# Patient Record
Sex: MALE | Race: OTHER | NOT HISPANIC OR LATINO | URBAN - METROPOLITAN AREA
[De-identification: names, ages, dates, MRNs, and addresses within clinical notes are randomized per-mention and may not be internally consistent; named-entity substitution may affect disease eponyms.]

---

## 2017-05-19 ENCOUNTER — INPATIENT (INPATIENT)
Facility: HOSPITAL | Age: 47
LOS: 6 days | Discharge: HOME | DRG: 186 | End: 2017-05-26
Attending: INTERNAL MEDICINE | Admitting: HOSPITALIST
Payer: MEDICAID

## 2017-05-19 VITALS
SYSTOLIC BLOOD PRESSURE: 126 MMHG | HEIGHT: 68 IN | HEART RATE: 107 BPM | WEIGHT: 197.98 LBS | TEMPERATURE: 99 F | RESPIRATION RATE: 22 BRPM | OXYGEN SATURATION: 88 % | DIASTOLIC BLOOD PRESSURE: 82 MMHG

## 2017-05-19 DIAGNOSIS — J90 PLEURAL EFFUSION, NOT ELSEWHERE CLASSIFIED: ICD-10-CM

## 2017-05-19 LAB
ALBUMIN SERPL ELPH-MCNC: 3 G/DL — LOW (ref 3.3–5.2)
ALP SERPL-CCNC: 88 U/L — SIGNIFICANT CHANGE UP (ref 40–120)
ALT FLD-CCNC: 60 U/L — HIGH
ANION GAP SERPL CALC-SCNC: 14 MMOL/L — SIGNIFICANT CHANGE UP (ref 5–17)
ANISOCYTOSIS BLD QL: SLIGHT — SIGNIFICANT CHANGE UP
AST SERPL-CCNC: 70 U/L — HIGH
BASOPHILS # BLD AUTO: 0 K/UL — SIGNIFICANT CHANGE UP (ref 0–0.2)
BASOPHILS NFR BLD AUTO: 0 % — SIGNIFICANT CHANGE UP (ref 0–2)
BILIRUB SERPL-MCNC: 0.2 MG/DL — LOW (ref 0.4–2)
BUN SERPL-MCNC: 27 MG/DL — HIGH (ref 8–20)
CALCIUM SERPL-MCNC: 8.7 MG/DL — SIGNIFICANT CHANGE UP (ref 8.6–10.2)
CHLORIDE SERPL-SCNC: 97 MMOL/L — LOW (ref 98–107)
CO2 SERPL-SCNC: 28 MMOL/L — SIGNIFICANT CHANGE UP (ref 22–29)
CREAT SERPL-MCNC: 1.37 MG/DL — HIGH (ref 0.5–1.3)
EOSINOPHIL # BLD AUTO: 0 K/UL — SIGNIFICANT CHANGE UP (ref 0–0.5)
EOSINOPHIL NFR BLD AUTO: 1 % — SIGNIFICANT CHANGE UP (ref 0–6)
GAS PNL BLDA: SIGNIFICANT CHANGE UP
GLUCOSE SERPL-MCNC: 90 MG/DL — SIGNIFICANT CHANGE UP (ref 70–115)
HCT VFR BLD CALC: 31.1 % — LOW (ref 42–52)
HGB BLD-MCNC: 10.2 G/DL — LOW (ref 14–18)
HYPOCHROMIA BLD QL: SLIGHT — SIGNIFICANT CHANGE UP
LACTATE BLDV-MCNC: 0.9 MMOL/L — SIGNIFICANT CHANGE UP (ref 0.5–2)
LYMPHOCYTES # BLD AUTO: 16 % — LOW (ref 20–55)
LYMPHOCYTES # BLD AUTO: 2.1 K/UL — SIGNIFICANT CHANGE UP (ref 1–4.8)
MCHC RBC-ENTMCNC: 29.4 PG — SIGNIFICANT CHANGE UP (ref 27–31)
MCHC RBC-ENTMCNC: 32.8 G/DL — SIGNIFICANT CHANGE UP (ref 32–36)
MCV RBC AUTO: 89.6 FL — SIGNIFICANT CHANGE UP (ref 80–94)
MONOCYTES # BLD AUTO: 1.2 K/UL — HIGH (ref 0–0.8)
MONOCYTES NFR BLD AUTO: 9 % — SIGNIFICANT CHANGE UP (ref 3–10)
NEUTROPHILS # BLD AUTO: 8.7 K/UL — HIGH (ref 1.8–8)
NEUTROPHILS NFR BLD AUTO: 73 % — SIGNIFICANT CHANGE UP (ref 37–73)
PLAT MORPH BLD: NORMAL — SIGNIFICANT CHANGE UP
PLATELET # BLD AUTO: 260 K/UL — SIGNIFICANT CHANGE UP (ref 150–400)
POTASSIUM SERPL-MCNC: 4 MMOL/L — SIGNIFICANT CHANGE UP (ref 3.5–5.3)
POTASSIUM SERPL-SCNC: 4 MMOL/L — SIGNIFICANT CHANGE UP (ref 3.5–5.3)
PROT SERPL-MCNC: 7.3 G/DL — SIGNIFICANT CHANGE UP (ref 6.6–8.7)
RBC # BLD: 3.47 M/UL — LOW (ref 4.6–6.2)
RBC # FLD: 14.4 % — SIGNIFICANT CHANGE UP (ref 11–15.6)
RBC BLD AUTO: SIGNIFICANT CHANGE UP
SODIUM SERPL-SCNC: 139 MMOL/L — SIGNIFICANT CHANGE UP (ref 135–145)
VARIANT LYMPHS # BLD: 1 % — SIGNIFICANT CHANGE UP (ref 0–6)
WBC # BLD: 12.1 K/UL — HIGH (ref 4.8–10.8)
WBC # FLD AUTO: 12.1 K/UL — HIGH (ref 4.8–10.8)

## 2017-05-19 PROCEDURE — 71010: CPT | Mod: 26

## 2017-05-19 PROCEDURE — 99285 EMERGENCY DEPT VISIT HI MDM: CPT

## 2017-05-19 PROCEDURE — 71250 CT THORAX DX C-: CPT | Mod: 26

## 2017-05-19 RX ORDER — FUROSEMIDE 40 MG
40 TABLET ORAL ONCE
Qty: 0 | Refills: 0 | Status: COMPLETED | OUTPATIENT
Start: 2017-05-19 | End: 2017-05-19

## 2017-05-19 RX ORDER — PIPERACILLIN AND TAZOBACTAM 4; .5 G/20ML; G/20ML
3.38 INJECTION, POWDER, LYOPHILIZED, FOR SOLUTION INTRAVENOUS ONCE
Qty: 0 | Refills: 0 | Status: COMPLETED | OUTPATIENT
Start: 2017-05-19 | End: 2017-05-19

## 2017-05-19 RX ORDER — VANCOMYCIN HCL 1 G
1000 VIAL (EA) INTRAVENOUS ONCE
Qty: 0 | Refills: 0 | Status: COMPLETED | OUTPATIENT
Start: 2017-05-19 | End: 2017-05-19

## 2017-05-19 RX ADMIN — Medication 40 MILLIGRAM(S): at 17:05

## 2017-05-19 RX ADMIN — PIPERACILLIN AND TAZOBACTAM 200 GRAM(S): 4; .5 INJECTION, POWDER, LYOPHILIZED, FOR SOLUTION INTRAVENOUS at 13:00

## 2017-05-19 RX ADMIN — Medication 250 MILLIGRAM(S): at 17:06

## 2017-05-19 NOTE — ED ADULT NURSE REASSESSMENT NOTE - NS ED NURSE REASSESS COMMENT FT1
pt refused to urinate in urinal,  very heavy depends diaper changed . pericare provided and linens changed pt manny it well.

## 2017-05-19 NOTE — ED PROVIDER NOTE - OBJECTIVE STATEMENT
48 y/o M presents to the ED from [facility] for AMS. EMS found pt to be SpO2 86% on RA and improved to 99% with NRB. Aide states pt is non verbal and is more alert/active at baseline. Aide states pt has been acting differently the past few days but appearing pale and drowsy today. He was found to be hypoxic.

## 2017-05-19 NOTE — ED PROVIDER NOTE - RESPIRATORY, MLM
Breath sounds clear and equal bilaterally. Spo2 94% on 3L NC. SpO2 90% on RA Breath sounds clear and equal bilaterally. Spo2 94% on 3L NC. SpO2 89% on RA

## 2017-05-19 NOTE — ED ADULT NURSE NOTE - OBJECTIVE STATEMENT
[facility] ref [facility] for eval of mental status changes per 1:1 , pt has been more sleepy, hypoxic on pulse ox. skin pale and dry.

## 2017-05-19 NOTE — ED PROVIDER NOTE - PROGRESS NOTE DETAILS
Called [facility] to attempt to obtain medication list, however, nurse is on break. Will call again in 1 hour. CT scan report noted. Cardiothoracic consulted for pericardial effusion. Awaiting call back. Case discussed with cardiothoracic PA. He recommends an echo and then they will evaluate the patient. Pt. currently stable appearing with no clinical signs of tamponade. Echo performed. Pt. stable appearing. Case discussed with Dr. Haddad for admission.

## 2017-05-19 NOTE — ED ADULT NURSE REASSESSMENT NOTE - NS ED NURSE REASSESS COMMENT FT1
Pt resting comfortable. Pt on monitor 100 normal sinus. cooperative. Pt and [facility] educated on admission. Pt and aide verbalize understanding and agree with POC.

## 2017-05-20 DIAGNOSIS — F25.0 SCHIZOAFFECTIVE DISORDER, BIPOLAR TYPE: ICD-10-CM

## 2017-05-20 DIAGNOSIS — K59.00 CONSTIPATION, UNSPECIFIED: ICD-10-CM

## 2017-05-20 DIAGNOSIS — J90 PLEURAL EFFUSION, NOT ELSEWHERE CLASSIFIED: ICD-10-CM

## 2017-05-20 DIAGNOSIS — F99 MENTAL DISORDER, NOT OTHERWISE SPECIFIED: ICD-10-CM

## 2017-05-20 DIAGNOSIS — R41.82 ALTERED MENTAL STATUS, UNSPECIFIED: ICD-10-CM

## 2017-05-20 DIAGNOSIS — F05 DELIRIUM DUE TO KNOWN PHYSIOLOGICAL CONDITION: ICD-10-CM

## 2017-05-20 DIAGNOSIS — E03.9 HYPOTHYROIDISM, UNSPECIFIED: ICD-10-CM

## 2017-05-20 DIAGNOSIS — I63.9 CEREBRAL INFARCTION, UNSPECIFIED: ICD-10-CM

## 2017-05-20 LAB
T3 SERPL-MCNC: 60 NG/DL — LOW (ref 80–200)
T4 AB SER-ACNC: 4.4 UG/DL — LOW (ref 4.5–12)
TSH SERPL-MCNC: 2.27 UIU/ML — SIGNIFICANT CHANGE UP (ref 0.27–4.2)
VALPROATE SERPL-MCNC: 43.4 UG/ML — LOW (ref 50–100)

## 2017-05-20 PROCEDURE — 74176 CT ABD & PELVIS W/O CONTRAST: CPT | Mod: 26

## 2017-05-20 PROCEDURE — 70450 CT HEAD/BRAIN W/O DYE: CPT | Mod: 26

## 2017-05-20 PROCEDURE — 99233 SBSQ HOSP IP/OBS HIGH 50: CPT

## 2017-05-20 RX ORDER — ATORVASTATIN CALCIUM 80 MG/1
20 TABLET, FILM COATED ORAL AT BEDTIME
Qty: 0 | Refills: 0 | Status: DISCONTINUED | OUTPATIENT
Start: 2017-05-20 | End: 2017-05-26

## 2017-05-20 RX ORDER — ENOXAPARIN SODIUM 100 MG/ML
40 INJECTION SUBCUTANEOUS DAILY
Qty: 0 | Refills: 0 | Status: DISCONTINUED | OUTPATIENT
Start: 2017-05-20 | End: 2017-05-26

## 2017-05-20 RX ORDER — HALOPERIDOL DECANOATE 100 MG/ML
5 INJECTION INTRAMUSCULAR DAILY
Qty: 0 | Refills: 0 | Status: DISCONTINUED | OUTPATIENT
Start: 2017-05-20 | End: 2017-05-20

## 2017-05-20 RX ORDER — DOCUSATE SODIUM 100 MG
100 CAPSULE ORAL
Qty: 0 | Refills: 0 | Status: DISCONTINUED | OUTPATIENT
Start: 2017-05-20 | End: 2017-05-26

## 2017-05-20 RX ORDER — ASPIRIN/CALCIUM CARB/MAGNESIUM 324 MG
325 TABLET ORAL DAILY
Qty: 0 | Refills: 0 | Status: DISCONTINUED | OUTPATIENT
Start: 2017-05-20 | End: 2017-05-26

## 2017-05-20 RX ORDER — VALPROIC ACID (AS SODIUM SALT) 250 MG/5ML
1000 SOLUTION, ORAL ORAL AT BEDTIME
Qty: 0 | Refills: 0 | Status: DISCONTINUED | OUTPATIENT
Start: 2017-05-20 | End: 2017-05-26

## 2017-05-20 RX ORDER — LEVOTHYROXINE SODIUM 125 MCG
150 TABLET ORAL DAILY
Qty: 0 | Refills: 0 | Status: DISCONTINUED | OUTPATIENT
Start: 2017-05-20 | End: 2017-05-26

## 2017-05-20 RX ORDER — VALPROIC ACID (AS SODIUM SALT) 250 MG/5ML
500 SOLUTION, ORAL ORAL DAILY
Qty: 0 | Refills: 0 | Status: DISCONTINUED | OUTPATIENT
Start: 2017-05-20 | End: 2017-05-26

## 2017-05-20 RX ORDER — CLOZAPINE 150 MG/1
400 TABLET, ORALLY DISINTEGRATING ORAL AT BEDTIME
Qty: 0 | Refills: 0 | Status: DISCONTINUED | OUTPATIENT
Start: 2017-05-20 | End: 2017-05-20

## 2017-05-20 RX ORDER — PIPERACILLIN AND TAZOBACTAM 4; .5 G/20ML; G/20ML
3.38 INJECTION, POWDER, LYOPHILIZED, FOR SOLUTION INTRAVENOUS EVERY 8 HOURS
Qty: 0 | Refills: 0 | Status: DISCONTINUED | OUTPATIENT
Start: 2017-05-20 | End: 2017-05-22

## 2017-05-20 RX ORDER — SODIUM CHLORIDE 9 MG/ML
1000 INJECTION, SOLUTION INTRAVENOUS
Qty: 0 | Refills: 0 | Status: DISCONTINUED | OUTPATIENT
Start: 2017-05-20 | End: 2017-05-22

## 2017-05-20 RX ORDER — HALOPERIDOL DECANOATE 100 MG/ML
10 INJECTION INTRAMUSCULAR AT BEDTIME
Qty: 0 | Refills: 0 | Status: DISCONTINUED | OUTPATIENT
Start: 2017-05-20 | End: 2017-05-20

## 2017-05-20 RX ORDER — CLOZAPINE 150 MG/1
300 TABLET, ORALLY DISINTEGRATING ORAL DAILY
Qty: 0 | Refills: 0 | Status: DISCONTINUED | OUTPATIENT
Start: 2017-05-20 | End: 2017-05-20

## 2017-05-20 RX ADMIN — PIPERACILLIN AND TAZOBACTAM 25 GRAM(S): 4; .5 INJECTION, POWDER, LYOPHILIZED, FOR SOLUTION INTRAVENOUS at 14:11

## 2017-05-20 RX ADMIN — Medication 100 MILLIGRAM(S): at 19:07

## 2017-05-20 RX ADMIN — Medication 1000 MILLIGRAM(S): at 22:03

## 2017-05-20 RX ADMIN — SODIUM CHLORIDE 100 MILLILITER(S): 9 INJECTION, SOLUTION INTRAVENOUS at 22:00

## 2017-05-20 RX ADMIN — PIPERACILLIN AND TAZOBACTAM 25 GRAM(S): 4; .5 INJECTION, POWDER, LYOPHILIZED, FOR SOLUTION INTRAVENOUS at 22:13

## 2017-05-20 RX ADMIN — Medication 100 MILLIGRAM(S): at 07:00

## 2017-05-20 RX ADMIN — Medication 2 MILLIGRAM(S): at 12:02

## 2017-05-20 RX ADMIN — Medication 150 MICROGRAM(S): at 07:00

## 2017-05-20 RX ADMIN — Medication 500 MILLIGRAM(S): at 12:02

## 2017-05-20 RX ADMIN — PIPERACILLIN AND TAZOBACTAM 25 GRAM(S): 4; .5 INJECTION, POWDER, LYOPHILIZED, FOR SOLUTION INTRAVENOUS at 06:41

## 2017-05-20 RX ADMIN — Medication 325 MILLIGRAM(S): at 12:02

## 2017-05-20 RX ADMIN — ATORVASTATIN CALCIUM 20 MILLIGRAM(S): 80 TABLET, FILM COATED ORAL at 22:03

## 2017-05-20 NOTE — PROGRESS NOTE ADULT - SUBJECTIVE AND OBJECTIVE BOX
Internal Medicine Hospitalist - Dr. Ghazal NUGENT    [id number]    47y      Male    Patient is a 47y old  Male who presents with a chief complaint of increased lethargy (20 May 2017 00:56)    HPI:  46 y/o male with h/o psychiatric disorder was referred fro [facility] because of increased lethargy. patient looked sleep with O2 sat: 88. o/e: left side abdominal fulness with firm masses ?? fecal impaction. Labs showed WBC: 12.1. AST: 70. ALT: 60. CT chest showed moderated left pleural effusion (20 May 2017 00:56)        INTERVAL HPI/ OVERNIGHT EVENTS: Patient is seen and examined, no new event overnight.     REVIEW OF SYSTEMS:    unable to provide     PHYSICAL EXAM:    Vital Signs Last 24 Hrs  T(C): 36.9, Max: 37.1 (05-20 @ 09:10)  T(F): 98.4, Max: 98.7 (05-20 @ 09:10)  HR: 105 (99 - 105)  BP: 112/61 (100/72 - 122/78)  BP(mean): --  RR: 20 (16 - 20)  SpO2: 95% (93% - 95%)    GENERAL: nonverbal   Neck: supple  CHEST/LUNG: positive air entry   HEART: S1S2+ audible  ABDOMEN: Soft, Nontender, Nondistended; Bowel sounds present  EXTREMITIES:  no edema  CNS: Awake, moving extremities     LABS:                        10.2   12.1  )-----------( 260      ( 19 May 2017 13:02 )             31.1     05-19    139  |  97<L>  |  27.0<H>  ----------------------------<  90  4.0   |  28.0  |  1.37<H>    Ca    8.7      19 May 2017 13:02    TPro  7.3  /  Alb  3.0<L>  /  TBili  0.2<L>  /  DBili  x   /  AST  70<H>  /  ALT  60<H>  /  AlkPhos  88  05-19        MEDICATIONS  (STANDING):  valproic acid 1000milliGRAM(s) Oral at bedtime  valproic acid 500milliGRAM(s) Oral daily  docusate sodium 100milliGRAM(s) Oral two times a day  levothyroxine 150MICROGram(s) Oral daily  dextrose 5% + sodium chloride 0.45%. 1000milliLiter(s) IV Continuous <Continuous>  enoxaparin Injectable 40milliGRAM(s) SubCutaneous daily  piperacillin/tazobactam IVPB. 3.375Gram(s) IV Intermittent every 8 hours  aspirin enteric coated 325milliGRAM(s) Oral daily  atorvastatin 20milliGRAM(s) Oral at bedtime    MEDICATIONS  (PRN):  LORazepam     Tablet 2milliGRAM(s) Oral three times a day PRN Anxiety      RADIOLOGY & ADDITIONAL TEST   EXAM:  CT ABDOMEN AND PELVIS                        PROCEDURE DATE:  05/20/2017    IMPRESSION:     Large colonic stool load. Possible cystitis. Pleural effusions. Other   incidental comments as above.   EXAM:  CT BRAIN                        PROCEDURE DATE:  05/20/2017    IMPRESSION:     Hypodensity in the left rosalind suspicious for an acute infarct. MRI is   recommended for further evaluation.  No acute intracranial hemorrhage.

## 2017-05-20 NOTE — ED BEHAVIORAL HEALTH ASSESSMENT NOTE - DETAILS
[place] transfer summary in chart NA history of aggressive behaviors and sexually inappropriate behaviors SELF [place]

## 2017-05-20 NOTE — H&P ADULT - GASTROINTESTINAL DETAILS
nontender/soft/left side abdominal firm masses possible fecal bowel sounds normal/left side abdominal firm masses possible fecal/soft/nontender

## 2017-05-20 NOTE — ED BEHAVIORAL HEALTH ASSESSMENT NOTE - OTHER
[facility] transfer Summary [facility] NA somnolent non responsive opens eyes to light tactile stimuli not assessed unknown non verbal impaired

## 2017-05-20 NOTE — PHARMACY COMMUNICATION NOTE - COMMENTS
Spoke with MD regarding dosing/frequency of clozapine.  MD said patient was receiving this dose previously and would like to continue this regimen

## 2017-05-20 NOTE — PROGRESS NOTE ADULT - PROBLEM SELECTOR PLAN 2
IVF. Received Enema.   CT abdomen and pelvis -large colonic stool load   start miralex, colace, senna

## 2017-05-20 NOTE — H&P ADULT - HISTORY OF PRESENT ILLNESS
46 y/o male with h/o psychiatric disorder was referred fro [facility] because of increased lethargy. patient looked sleep with O2 sat: 88. o/e: left side abdominal fulness with firm masses ??fecal impaction. Labs showed WBC: 12.1. AST: 70. ALT: 60. CT chest showed moderated left pleural effusion 48 y/o male with h/o psychiatric disorder was referred fro [facility] because of increased lethargy. patient looked sleep with O2 sat: 88. o/e: left side abdominal fulness with firm masses ?? fecal impaction. Labs showed WBC: 12.1. AST: 70. ALT: 60. CT chest showed moderated left pleural effusion

## 2017-05-20 NOTE — ED BEHAVIORAL HEALTH ASSESSMENT NOTE - DESCRIPTION
found to have pleural effusion and likely CVA rosalind hypothyroidism constipation nocturnal enuresis unknown

## 2017-05-20 NOTE — PROGRESS NOTE ADULT - PROBLEM SELECTOR PLAN 1
CT brain suspicious for acute stroke  MRI brain, carotid doppler,   aspirin, statin, lipid profile in am   neurocheck

## 2017-05-20 NOTE — ED BEHAVIORAL HEALTH ASSESSMENT NOTE - CURRENT MEDICATION
Clozapine 300 mg PO AM and 400 mg PO HS Ativan 2 mg PO TID Haldol 5 mg PO AM and 10 mg PO HS  levothyroxine 150 mcg po daily valproic acid 500 mg AM 1000 mg HS

## 2017-05-20 NOTE — ED ADULT NURSE REASSESSMENT NOTE - NS ED NURSE REASSESS COMMENT FT1
Pt. received at 1930, on portable bedside cardiac monitor. 1:1 and [facility] at bedside for safety. as per day RN and [facility] pt. baseline is nonverbal and unresponsive to command. Pt. is awake and alert, moves all extremities, no facial deficits noted. Pt. O2 saturation 95% on 2 liters, continue to monitor and maintain safety

## 2017-05-21 DIAGNOSIS — R47.01 APHASIA: ICD-10-CM

## 2017-05-21 DIAGNOSIS — G93.40 ENCEPHALOPATHY, UNSPECIFIED: ICD-10-CM

## 2017-05-21 LAB
AMMONIA BLD-MCNC: 39 UMOL/L — SIGNIFICANT CHANGE UP (ref 11–55)
CHOLEST SERPL-MCNC: 121 MG/DL — SIGNIFICANT CHANGE UP (ref 110–199)
HDLC SERPL-MCNC: 21 MG/DL — LOW
LIPID PNL WITH DIRECT LDL SERPL: 80 MG/DL — SIGNIFICANT CHANGE UP
TOTAL CHOLESTEROL/HDL RATIO MEASUREMENT: 6 RATIO — SIGNIFICANT CHANGE UP (ref 3.4–9.6)
TRIGL SERPL-MCNC: 100 MG/DL — SIGNIFICANT CHANGE UP (ref 10–200)

## 2017-05-21 PROCEDURE — 70551 MRI BRAIN STEM W/O DYE: CPT | Mod: 26

## 2017-05-21 PROCEDURE — 93880 EXTRACRANIAL BILAT STUDY: CPT | Mod: 26

## 2017-05-21 PROCEDURE — 99233 SBSQ HOSP IP/OBS HIGH 50: CPT

## 2017-05-21 RX ADMIN — SODIUM CHLORIDE 100 MILLILITER(S): 9 INJECTION, SOLUTION INTRAVENOUS at 19:10

## 2017-05-21 RX ADMIN — ENOXAPARIN SODIUM 40 MILLIGRAM(S): 100 INJECTION SUBCUTANEOUS at 14:39

## 2017-05-21 RX ADMIN — Medication 1000 MILLIGRAM(S): at 21:41

## 2017-05-21 RX ADMIN — Medication 150 MICROGRAM(S): at 06:25

## 2017-05-21 RX ADMIN — PIPERACILLIN AND TAZOBACTAM 25 GRAM(S): 4; .5 INJECTION, POWDER, LYOPHILIZED, FOR SOLUTION INTRAVENOUS at 21:41

## 2017-05-21 RX ADMIN — Medication 325 MILLIGRAM(S): at 14:39

## 2017-05-21 RX ADMIN — PIPERACILLIN AND TAZOBACTAM 25 GRAM(S): 4; .5 INJECTION, POWDER, LYOPHILIZED, FOR SOLUTION INTRAVENOUS at 14:39

## 2017-05-21 RX ADMIN — SODIUM CHLORIDE 100 MILLILITER(S): 9 INJECTION, SOLUTION INTRAVENOUS at 22:50

## 2017-05-21 RX ADMIN — Medication 100 MILLIGRAM(S): at 06:25

## 2017-05-21 RX ADMIN — Medication 500 MILLIGRAM(S): at 14:39

## 2017-05-21 RX ADMIN — ATORVASTATIN CALCIUM 20 MILLIGRAM(S): 80 TABLET, FILM COATED ORAL at 21:41

## 2017-05-21 RX ADMIN — Medication 2 MILLIGRAM(S): at 03:43

## 2017-05-21 RX ADMIN — PIPERACILLIN AND TAZOBACTAM 25 GRAM(S): 4; .5 INJECTION, POWDER, LYOPHILIZED, FOR SOLUTION INTRAVENOUS at 06:13

## 2017-05-21 RX ADMIN — SODIUM CHLORIDE 100 MILLILITER(S): 9 INJECTION, SOLUTION INTRAVENOUS at 11:47

## 2017-05-21 NOTE — ED ADULT NURSE REASSESSMENT NOTE - NS ED NURSE REASSESS COMMENT FT1
assumed pt care this am, aide from [facility] with pt, he pulled out saline lock and another on inserted, IVF and meds infusing. chart reviewed.

## 2017-05-21 NOTE — CONSULT NOTE ADULT - ASSESSMENT
47 M w/ multiple psychiatric co morbidities, hypothyroidism, presenting with AMS/lethargy & cough found to have desaturations at [place] now found to have possible acute infarct?, pleural effusions L > R, and pericardial effusion (trivial as per echo) being evaluated for possible chest tube currently thought to be high risk for chest tube placement in ED secondary to behavorial disturbances, selective mutism and multiple psychiatric comorbidities

## 2017-05-21 NOTE — PROGRESS NOTE ADULT - SUBJECTIVE AND OBJECTIVE BOX
Internal Medicine Hospitalist - Dr. Ghazal NUGENT    [id number]    47y      Male    Patient is a 47y old  Male who presents with a chief complaint of increased lethargy (20 May 2017 00:56)    HPI:  46 y/o male with h/o psychiatric disorder was referred fro [facility] because of increased lethargy. patient looked sleep with O2 sat: 88. o/e: left side abdominal fulness with firm masses ?? fecal impaction. Labs showed WBC: 12.1. AST: 70. ALT: 60. CT chest showed moderated left pleural effusion (20 May 2017 00:56)    INTERVAL HPI/ OVERNIGHT EVENTS: Patient is seen and examined, awake, non verbal    REVIEW OF SYSTEMS:    unable to obtain     PHYSICAL EXAM:    Vital Signs Last 24 Hrs  T(C): 37.2, Max: 37.2 (05-20 @ 18:00)  T(F): 99, Max: 99 (05-21 @ 11:33)  HR: 105 (97 - 105)  BP: 97/63 (90/58 - 110/70)  BP(mean): --  RR: 18 (18 - 20)  SpO2: 95% (94% - 96%)    GENERAL: Awake   CHEST/LUNG: decrease breath sounds over left lung base   HEART: S1S2+ audible  ABDOMEN: Soft, Nontender, Nondistended; Bowel sounds present  EXTREMITIES:  no edema  CNS: Awake, moving all extremities     LABS:        MEDICATIONS  (STANDING):  valproic acid 1000milliGRAM(s) Oral at bedtime  valproic acid 500milliGRAM(s) Oral daily  docusate sodium 100milliGRAM(s) Oral two times a day  levothyroxine 150MICROGram(s) Oral daily  dextrose 5% + sodium chloride 0.45%. 1000milliLiter(s) IV Continuous <Continuous>  enoxaparin Injectable 40milliGRAM(s) SubCutaneous daily  piperacillin/tazobactam IVPB. 3.375Gram(s) IV Intermittent every 8 hours  aspirin enteric coated 325milliGRAM(s) Oral daily  atorvastatin 20milliGRAM(s) Oral at bedtime    MEDICATIONS  (PRN):  LORazepam     Tablet 2milliGRAM(s) Oral three times a day PRN Anxiety      RADIOLOGY & ADDITIONAL TEST

## 2017-05-21 NOTE — CONSULT NOTE ADULT - ASSESSMENT
Encephalopathy, non-verbal, ? autism with psychiatric disorder. (no past history regarding psychiatric features and verbal ability prior to being sent to [facility]). Stroke, encephalopathy from multiple causes and or seizure, encephalitis are all differential diagnoses but likely this is chronic psych non-verbal situation decompensated from other medical condition.

## 2017-05-21 NOTE — CONSULT NOTE ADULT - PROBLEM SELECTOR RECOMMENDATION 9
currently stable on 1 L NC in ED in no acute distress   recommending IR drainage   consult appreciated    reconsult if necessary

## 2017-05-21 NOTE — ED ADULT NURSE REASSESSMENT NOTE - NURSING NEURO LEVEL OF CONSCIOUSNESS
alert and awake
alert and awake
follows commands/alert and awake
unresponsive to verbal/unresponsive to pain/pt is awake but is nonverbal

## 2017-05-21 NOTE — CONSULT NOTE ADULT - SUBJECTIVE AND OBJECTIVE BOX
HPI:  48 y/o male with h/o psychiatric disorder was referred from [facility] because of increased lethargy. patient looked asleep with O2 sat: 88 when presented to ER - now neurology consult called due to non-verbal. no information about status of patient prior to being sent to ER.   CT chest showed moderated left pleural effusion (20 May 2017 00:56)         PAST MEDICAL & SURGICAL HISTORY:  Hypothyroidism, unspecified type  Psychiatric disorder - ? bipolar - prior meds depakene, clozapine, haldol.      REVIEW OF SYSTEMS: per chart but patient unable to provide any information.            MEDICATIONS  (STANDING):  valproic acid 1000milliGRAM(s) Oral at bedtime  valproic acid 500milliGRAM(s) Oral daily  docusate sodium 100milliGRAM(s) Oral two times a day  levothyroxine 150MICROGram(s) Oral daily  dextrose 5% + sodium chloride 0.45%. 1000milliLiter(s) IV Continuous <Continuous>  enoxaparin Injectable 40milliGRAM(s) SubCutaneous daily  piperacillin/tazobactam IVPB. 3.375Gram(s) IV Intermittent every 8 hours  aspirin enteric coated 325milliGRAM(s) Oral daily  atorvastatin 20milliGRAM(s) Oral at bedtime    MEDICATIONS  (PRN):  Lorazepam     Tablet 2milliGRAM(s) Oral three times a day PRN Anxiety      Allergies    No Known Allergies    Intolerances        SOCIAL HISTORY: not available    FAMILY HISTORY: not available      PHYSICAL EXAM:  Vital Signs Last 24 Hrs  T(F): 99  HR: 105  BP: 97/63  RR: 18  Wt(kg): --    GENERAL: NAD, poorly groomed, well-developed  HEAD:  Atraumatic, Normocephalic  EYES: EOMI, PERRLA, conjunctiva and sclera clear  NECK: Supple, No JVD, thyroid non-palpable  NERVOUS SYSTEM:   awake alert appearing. non-verbal. does not name, repeat or follow verbal commands. some whispering but inaudible. visual fixation and tracking. blink to visual threat bilaterally.  PERRL, EOMI. facial strength symmetric. moves 4 limbs symmetrically with variable effort but no clear focal weakness. KJ trace. plantars neutral bilateral.  withdraw to painful stimulation symmetrically - bilaterally.   HEART: Regular rate and rhythm; No murmurs audible  Vascular - no carotid bruit  Peripheral - no edema in the legs.      LABS:      RADIOLOGY & ADDITIONAL STUDIES:  CT brain ? pontine CVA (not relevant to clinical picture)

## 2017-05-21 NOTE — CONSULT NOTE ADULT - SUBJECTIVE AND OBJECTIVE BOX
HISTORY OF PRESENT ILLNESS:  47y Male w/ schizophrenia, selective mutism, possible autism, current resident at [facility], hypothyroidism initially presenting to ED (as per chart and [place] nurse Marilu) with altered mental status, lethargy, coughing fits, found to have desaturation on pulse oximetry at [place].  As per staff there they noticed he was just asking differently and got concerned.  They deny sick contacts at the home, fever, chills, sputum production, or other known symptoms.  Patient unable to offer more secondary to selective mutism.      Marilu: # [phone] (at unit in [place])   Dr. Lamb # [phone] (message left for collaborative information)     Review of Systems  CONSTITUTIONAL:  denies fevers, chills   NEURO:  +behavioral disturbances, +AMS, inappropriate behavior   ENMT:  selective mutism, without difficulty of hearing   CV:  no known chest pain, palpitations   RESPIRATORY:  +cough, without hemoptysis/sputum   GI:  without n/v/d/c   : without known dysuria, hematuria   MUSKULOSKELETAL:  without known arthritis   PSYCH: known schizophrenia, known mutism, known developmental delay       PAST MEDICAL & SURGICAL HISTORY:  Hypothyroidism, unspecified type  Psychiatric disorder    MEDICATIONS  (STANDING):  valproic acid 1000milliGRAM(s) Oral at bedtime  valproic acid 500milliGRAM(s) Oral daily  docusate sodium 100milliGRAM(s) Oral two times a day  levothyroxine 150MICROGram(s) Oral daily  dextrose 5% + sodium chloride 0.45%. 1000milliLiter(s) IV Continuous <Continuous>  enoxaparin Injectable 40milliGRAM(s) SubCutaneous daily  piperacillin/tazobactam IVPB. 3.375Gram(s) IV Intermittent every 8 hours  aspirin enteric coated 325milliGRAM(s) Oral daily  atorvastatin 20milliGRAM(s) Oral at bedtime    MEDICATIONS  (PRN):  LORazepam     Tablet 2milliGRAM(s) Oral three times a day PRN Anxiety    SOCIAL HISTORY:  Smoker: NO as per patient and aid:   Live with: [facility] residence   Behavior Notes: as per Marilu/Chart patient has a history inappropriate behavior which has been violent and sexual in nature.  No recent events noted       FAMILY HISTORY:  Unable to assess         PHYSICAL EXAM  Vital Signs Last 24 Hrs  T(C): 37.2, Max: 37.2 (05-20 @ 18:00)  T(F): 99, Max: 99 (05-21 @ 11:33)  HR: 105 (97 - 105)  BP: 97/63 (90/58 - 110/70)  BP(mean): --  RR: 18 (18 - 20)  SpO2: 95% (94% - 96%)    CONSTITUTIONAL:  no acute distress, appears stated age, looks apprehensive   Neuro: moves all extremities spontaneously, sits up to command, alert,                Eyes: PERRLA   ENT: without active drainage   Chest: coughing during examination, without ronchi, wheezing or rales                                                                     CV:  +S1S2 RRR                                                                                GI: +BS soft nontender without distention                                                                                               Extremities: +pulses, without edema                                                             LABS:  T4, Serum: 4.4 ug/dL (05-20 @ 06:23)  Thyroid Stimulating Hormone, Serum: 2.27 uIU/mL (05-20 @ 06:23)      CT SCan:  FINDINGS: There is a moderate pericardial effusion. Fluid density of the   pericardial fluid is consistent with serous fluid. There is mild superior   mediastinal lymphadenopathy. No definite evidence of hilar adenopathy.    There is a moderate left pleural effusion and a small right pleural   effusion.    No evidence of axillary adenopathy.    Bibasilar atelectasis, left greater than right.    There are degenerative changes in the spine.      IMPRESSION:     Moderate left pleural effusion with a small right pleural effusion.    There is a moderate pericardial effusion.    Bibasilar atelectasis.    Superior mediastinal lymphadenopathy..     TTE / ERIN:   Summary:   1. Technically limited study. Chambers not well visualized.   2. The left atrium is normal in size.   3. Left ventricular ejection fraction, by visual estimation, is 50 to   55%.   4. The right atrium is normal in size.   5. Normal right ventricular size and function.   6. No significant valvular abnormality.   7. Trivial pericardial effusion. No echo evidence of tamponade.   8. Moderate pleural effusion in the left lateral region.

## 2017-05-21 NOTE — CONSULT NOTE ADULT - ATTENDING COMMENTS
thoughts and work up outlined as noted above. please order testing as recommended.
Pt seen and examined.  Mod left pleural effusion of unknown etiology.  Comfortable at rest.  Wouls rec IR for drainage.  may need sedation.

## 2017-05-21 NOTE — CONSULT NOTE ADULT - PROBLEM SELECTOR RECOMMENDATION 5
would advise to speak to psychiatrist at [facility] to determine verbal status prior to transfer. MRI Brain no zoila, EEG; tox screen; ammonia level, NMDA, B12, TPO, Thyroglobulin ab (?hashimotos encephalopathy), TSH, VDRL, - would defer LP unless he was noted to be verbal prior to transfer.

## 2017-05-21 NOTE — PROGRESS NOTE ADULT - ASSESSMENT
This is 47Y M with psych comorbidities sent from [place] due to AMS, ct head , neurology consult requested, cont, aspirin, statin, pending MRI brain and carotid doppler. Ct chest showed moderate left side pleural effusion, CT surgery consult requested . Pt had TTE - .Left ventricular ejection fraction, by visual estimation, is 50 to  55%. Normal right ventricular size and function.  Moderate pleural effusion in the left lateral region.

## 2017-05-21 NOTE — ED ADULT NURSE REASSESSMENT NOTE - NS ED NURSE REASSESS COMMENT FT1
Pt care assumed at this time, no apparent distress noted at this time, charting as noted. Pt received A&ox1, pt is not interacting or speaking at this time. Pt is laying in be with [facility] at the bedside. IV fluids are running without difficulty. no nonverbal signs of pain at this time. will continue to monitor. Pt care assumed at this time, no apparent distress noted at this time, charting as noted. Pt received A&ox1, pt is not interacting or speaking at this time. Pt is laying in be with [facility] at the bedside. IV fluids are running without difficulty. Pt took off cardiac monitor leads and will not put it back on at this time. No nonverbal signs of pain at this time. will continue to monitor. Pt care assumed at this time, no apparent distress noted at this time, charting as noted. Pt received A&ox1, pt is not interacting or speaking at this time. Pt is laying in be with [facility] at the bedside. IV fluids are running without difficulty. Pt took off cardiac monitor leads and will not put it back on at this time. Pt is baseline mental status, no change from before. No nonverbal signs of pain at this time. will continue to monitor.

## 2017-05-21 NOTE — PROGRESS NOTE ADULT - PROBLEM SELECTOR PLAN 1
CT brain suspicious for acute stroke, Neurology consult requested   MRI brain, carotid doppler pending   TTE = LVEF 50-55%  aspirin, statin,   LDL 80  neurocheck

## 2017-05-22 ENCOUNTER — RESULT REVIEW (OUTPATIENT)
Age: 47
End: 2017-05-22

## 2017-05-22 LAB
ALBUMIN SERPL ELPH-MCNC: 2.5 G/DL — LOW (ref 3.3–5.2)
ANION GAP SERPL CALC-SCNC: 11 MMOL/L — SIGNIFICANT CHANGE UP (ref 5–17)
APTT BLD: 33.4 SEC — SIGNIFICANT CHANGE UP (ref 27.5–37.4)
BUN SERPL-MCNC: 15 MG/DL — SIGNIFICANT CHANGE UP (ref 8–20)
CALCIUM SERPL-MCNC: 8.3 MG/DL — LOW (ref 8.6–10.2)
CHLORIDE SERPL-SCNC: 103 MMOL/L — SIGNIFICANT CHANGE UP (ref 98–107)
CO2 SERPL-SCNC: 25 MMOL/L — SIGNIFICANT CHANGE UP (ref 22–29)
CREAT SERPL-MCNC: 1.08 MG/DL — SIGNIFICANT CHANGE UP (ref 0.5–1.3)
GLUCOSE SERPL-MCNC: 95 MG/DL — SIGNIFICANT CHANGE UP (ref 70–115)
HCT VFR BLD CALC: 31.2 % — LOW (ref 42–52)
HGB BLD-MCNC: 10.3 G/DL — LOW (ref 14–18)
INR BLD: 1.39 RATIO — HIGH (ref 0.88–1.16)
LDH SERPL L TO P-CCNC: 206 U/L — HIGH (ref 98–192)
MCHC RBC-ENTMCNC: 29.2 PG — SIGNIFICANT CHANGE UP (ref 27–31)
MCHC RBC-ENTMCNC: 33 G/DL — SIGNIFICANT CHANGE UP (ref 32–36)
MCV RBC AUTO: 88.4 FL — SIGNIFICANT CHANGE UP (ref 80–94)
PLATELET # BLD AUTO: 361 K/UL — SIGNIFICANT CHANGE UP (ref 150–400)
POTASSIUM SERPL-MCNC: 3.9 MMOL/L — SIGNIFICANT CHANGE UP (ref 3.5–5.3)
POTASSIUM SERPL-SCNC: 3.9 MMOL/L — SIGNIFICANT CHANGE UP (ref 3.5–5.3)
PROT SERPL-MCNC: 6.4 G/DL — LOW (ref 6.6–8.7)
PROTHROM AB SERPL-ACNC: 15.4 SEC — HIGH (ref 9.8–12.7)
RBC # BLD: 3.53 M/UL — LOW (ref 4.6–6.2)
RBC # FLD: 13.9 % — SIGNIFICANT CHANGE UP (ref 11–15.6)
SODIUM SERPL-SCNC: 139 MMOL/L — SIGNIFICANT CHANGE UP (ref 135–145)
WBC # BLD: 7.3 K/UL — SIGNIFICANT CHANGE UP (ref 4.8–10.8)
WBC # FLD AUTO: 7.3 K/UL — SIGNIFICANT CHANGE UP (ref 4.8–10.8)

## 2017-05-22 PROCEDURE — 99233 SBSQ HOSP IP/OBS HIGH 50: CPT

## 2017-05-22 PROCEDURE — 32555 ASPIRATE PLEURA W/ IMAGING: CPT | Mod: LT

## 2017-05-22 PROCEDURE — 88112 CYTOPATH CELL ENHANCE TECH: CPT | Mod: 26

## 2017-05-22 PROCEDURE — 88305 TISSUE EXAM BY PATHOLOGIST: CPT | Mod: 26

## 2017-05-22 RX ADMIN — ATORVASTATIN CALCIUM 20 MILLIGRAM(S): 80 TABLET, FILM COATED ORAL at 22:08

## 2017-05-22 RX ADMIN — Medication 100 MILLIGRAM(S): at 20:48

## 2017-05-22 RX ADMIN — Medication 100 MILLIGRAM(S): at 05:28

## 2017-05-22 RX ADMIN — SODIUM CHLORIDE 100 MILLILITER(S): 9 INJECTION, SOLUTION INTRAVENOUS at 11:36

## 2017-05-22 RX ADMIN — ENOXAPARIN SODIUM 40 MILLIGRAM(S): 100 INJECTION SUBCUTANEOUS at 11:29

## 2017-05-22 RX ADMIN — Medication 325 MILLIGRAM(S): at 11:30

## 2017-05-22 RX ADMIN — Medication 1000 MILLIGRAM(S): at 22:08

## 2017-05-22 RX ADMIN — Medication 150 MICROGRAM(S): at 05:27

## 2017-05-22 RX ADMIN — PIPERACILLIN AND TAZOBACTAM 25 GRAM(S): 4; .5 INJECTION, POWDER, LYOPHILIZED, FOR SOLUTION INTRAVENOUS at 05:27

## 2017-05-22 RX ADMIN — Medication 500 MILLIGRAM(S): at 11:30

## 2017-05-22 NOTE — ED ADULT NURSE REASSESSMENT NOTE - NS ED NURSE REASSESS COMMENT FT1
Pt is resting in bed comfortably at this time, no apparent distress noted at this time. Pt denies any complaints at this time. [place] aid remains at the bedside. Plan of care explained, will continue to monitor.

## 2017-05-22 NOTE — PROGRESS NOTE ADULT - ASSESSMENT
This is 47Y M with psych comorbidities sent from [place] due to AMS, ct head , neurology consult requested, cont, aspirin, statin,  MRI brain No evidence of acute/subacute infarct.  and carotid doppler negative. Ct chest showed moderate left side pleural effusion, CT surgery consult requested, suggest IR guided pleurocentesis. Pt had TTE - .Left ventricular ejection fraction, by visual estimation, is 50 to  55%. Normal right ventricular size and function.  Moderate pleural effusion in the left lateral region.

## 2017-05-22 NOTE — PROGRESS NOTE ADULT - SUBJECTIVE AND OBJECTIVE BOX
Internal Medicine Hospitalist - Dr. Ghazal NUGENT    [id number]    47y      Male    Patient is a 47y old  Male who presents with a chief complaint of increased lethargy (20 May 2017 00:56)    HPI:  48 y/o male with h/o psychiatric disorder was referred fro [facility] because of increased lethargy. patient looked sleep with O2 sat: 88. o/e: left side abdominal fulness with firm masses ?? fecal impaction. Labs showed WBC: 12.1. AST: 70. ALT: 60. CT chest showed moderated left pleural effusion (20 May 2017 00:56)        INTERVAL HPI/ OVERNIGHT EVENTS: Patient is seen and examined, no new event overnight.     REVIEW OF SYSTEMS:    Denied fever, chills, abd. pain, nausea, vomiting, chest pain, SOB, headache, dizziness    PHYSICAL EXAM:    Vital Signs Last 24 Hrs  T(C): 37.6, Max: 37.6 (05-22 @ 08:10)  T(F): 99.7, Max: 99.7 (05-22 @ 08:10)  HR: 106 (92 - 106)  BP: 117/77 (113/70 - 117/77)  BP(mean): --  RR: 18 (18 - 20)  SpO2: 93% (93% - 97%)    GENERAL: NAD  HEENT: EOMI  Neck: supple  CHEST/LUNG: Clear to percussion bilaterally; No wheezing  HEART: S1S2+ audible  ABDOMEN: Soft, Nontender, Nondistended; Bowel sounds present  EXTREMITIES:  no edema  CNS: AAO X 3  Psychiatry: normal mood    LABS:                        10.3   7.3   )-----------( 361      ( 22 May 2017 08:22 )             31.2     05-22    139  |  103  |  15.0  ----------------------------<  95  3.9   |  25.0  |  1.08    Ca    8.3<L>      22 May 2017 08:22      PT/INR - ( 22 May 2017 09:36 )   PT: 15.4 sec;   INR: 1.39 ratio         PTT - ( 22 May 2017 09:36 )  PTT:33.4 sec        MEDICATIONS  (STANDING):  valproic acid 1000milliGRAM(s) Oral at bedtime  valproic acid 500milliGRAM(s) Oral daily  docusate sodium 100milliGRAM(s) Oral two times a day  levothyroxine 150MICROGram(s) Oral daily  dextrose 5% + sodium chloride 0.45%. 1000milliLiter(s) IV Continuous <Continuous>  enoxaparin Injectable 40milliGRAM(s) SubCutaneous daily  piperacillin/tazobactam IVPB. 3.375Gram(s) IV Intermittent every 8 hours  aspirin enteric coated 325milliGRAM(s) Oral daily  atorvastatin 20milliGRAM(s) Oral at bedtime    MEDICATIONS  (PRN):  LORazepam     Tablet 2milliGRAM(s) Oral three times a day PRN Anxiety      RADIOLOGY & ADDITIONAL TEST   EXAM:  BRAIN (MRI) W DIFFUSION (DWI)                        PROCEDURE DATE:  05/21/2017    IMPRESSION:  No evidence of acute/subacute infarct. No acute intracranial hemorrhage.   Minimal chronic microvascular change. Stable cerebral volume loss, again   disproportionate for age.      EXAM:  US DPLX CAROTIDS COMPL BI                        PROCEDURE DATE:  05/21/2017   IMPRESSION:    Minimal plaque.    No elevated velocities to suggest hemodynamically significant stenosis.

## 2017-05-22 NOTE — PATIENT PROFILE ADULT. - PAIN CHRONIC, PROFILE
Chief Complaint   Patient presents with     RECHECK     1 year follow up for mixed connective tissue disease       Initial /80 mmHg  Pulse 93  Wt 60.328 kg (133 lb)  SpO2 100% Estimated body mass index is 25.97 kg/(m^2) as calculated from the following:    Height as of 10/21/16: 1.524 m (5').    Weight as of this encounter: 60.328 kg (133 lb).  BP completed using cuff size: ketty Blanton CMA    
no

## 2017-05-22 NOTE — PROGRESS NOTE ADULT - SUBJECTIVE AND OBJECTIVE BOX
INTERVAL HISTORY:  stable, awake, His aide from [employer] is here and states he is himself. Nonverbaletc        VITAL SIGNS:  Vital Signs Last 24 Hrs  T(C): 37.6, Max: 37.6 (05-22 @ 08:10)  T(F): 99.7, Max: 99.7 (05-22 @ 08:10)  HR: 106 (92 - 106)  BP: 117/77 (113/70 - 117/77)  BP(mean): --  RR: 18 (18 - 20)  SpO2: 93% (93% - 97%)    PHYSICAL EXAMINATION:    Mentation:  awake, pointing to TV. follows verbal commands   Language/Speech: nonverbal  CN:nl  Visual Fields: full to threat  Motor: moves 4 limbs without diffiuculty  Sensory:  DTR:  Babinski:      MEDS:  MEDICATIONS  (STANDING):  valproic acid 1000milliGRAM(s) Oral at bedtime  valproic acid 500milliGRAM(s) Oral daily  docusate sodium 100milliGRAM(s) Oral two times a day  levothyroxine 150MICROGram(s) Oral daily  dextrose 5% + sodium chloride 0.45%. 1000milliLiter(s) IV Continuous <Continuous>  enoxaparin Injectable 40milliGRAM(s) SubCutaneous daily  piperacillin/tazobactam IVPB. 3.375Gram(s) IV Intermittent every 8 hours  aspirin enteric coated 325milliGRAM(s) Oral daily  atorvastatin 20milliGRAM(s) Oral at bedtime    MEDICATIONS  (PRN):  LORazepam     Tablet 2milliGRAM(s) Oral three times a day PRN Anxiety      LABS:                          10.3   7.3   )-----------( 361      ( 22 May 2017 08:22 )             31.2     05-22    139  |  103  |  15.0  ----------------------------<  95  3.9   |  25.0  |  1.08    Ca    8.3<L>      22 May 2017 08:22            RADIOLOGY & ADDITIONAL STUDIES:    MRI- atrophy, no acute changes  Duplex- neg    IMPRESSION & PLAN:    Altered mentation, hypoxemia, pleural effusion  No evidence of active neurologic issue.Will not actively follow.   Neurologically cleared for discharge/disposition.  Please recontact as needed.

## 2017-05-22 NOTE — PATIENT PROFILE ADULT. - ABILITY TO HEAR (WITH HEARING AID OR HEARING APPLIANCE IF NORMALLY USED):
Mildly to Moderately Impaired: difficulty hearing in some environments or speaker may need to increase volume or speak distinctly/brother states pt [place] pt responds to verbal commands

## 2017-05-22 NOTE — ED ADULT NURSE REASSESSMENT NOTE - NS ED NURSE REASSESS COMMENT FT1
Pt alert and oriented, no apparent distress noted at this time. Pt handed off to RN CB in stable condition.

## 2017-05-22 NOTE — ED BEHAVIORAL HEALTH NOTE - BEHAVIORAL HEALTH NOTE
Patient nonverbal at baseline - selective mutism, chronically psychotic.  Patient currently waiting medical management in regards to pleural effusion.  No psychiatric changes in care or contraindications to discharge.

## 2017-05-22 NOTE — ED ADULT NURSE REASSESSMENT NOTE - NS ED NURSE REASSESS COMMENT FT1
pt. awake, alert. pt. nonverbal. in no apparent distress. aide at bedside. awaiting bed. will continue to monitor. pt. awake, alert. pt. nonverbal. in no apparent distress. aide at bedside. pt. changed. pt. had 1BM.  awaiting bed. will continue to monitor.

## 2017-05-22 NOTE — ED ADULT NURSE REASSESSMENT NOTE - NS ED NURSE REASSESS COMMENT FT1
pt. received from night RN. pt. awake, alert. aide at bedside. as per aide. he does not really speak, would not able to tell if he's in pain. non verbal indicators of pain. in no apparent distress. vs as charted. awaiting bed. will continue to monitor.

## 2017-05-22 NOTE — PROCEDURE NOTE - NSINFORMCONSENT_GEN_A_CORE
Benefits, risks, and possible complications of procedure explained to patient/caregiver who verbalized understanding and gave written consent./from mother

## 2017-05-22 NOTE — PROCEDURE NOTE - NSPROCDETAILS_GEN_ALL_CORE
location identified, draped/prepped, sterile technique used, needle inserted/introduced/catheter inserted over needle/connection to evacuated glass bottle/ultrasound assessment of effusion (localization)

## 2017-05-22 NOTE — PROGRESS NOTE ADULT - PROBLEM SELECTOR PLAN 2
CT surgery consult appreciated, suggest IR guided pleurocentesis   discussed with Dr. Phillips, pt is nonverbal, as per Dr. Monroe, he has no health care proxy, talked to his mother Marcella [phone], updated about pt current condition.  TTE - LVEF 50-55% - no h/o CHF

## 2017-05-22 NOTE — PROGRESS NOTE ADULT - PROBLEM SELECTOR PLAN 1
Appreciate Neurology input, Psych input, back to baseline   MRI brain no acute stroke, carotid doppler negative   TTE = LVEF 50-55%  aspirin, statin,   LDL 80  neurocheck

## 2017-05-23 LAB
ALBUMIN FLD-MCNC: 1.9 G/DL — SIGNIFICANT CHANGE UP
ALBUMIN SERPL ELPH-MCNC: 2.5 G/DL — LOW (ref 3.3–5.2)
ALBUMIN SERPL ELPH-MCNC: 2.7 G/DL — LOW (ref 3.3–5.2)
ALBUMIN SERPL ELPH-MCNC: 2.7 G/DL — LOW (ref 3.3–5.2)
ALP SERPL-CCNC: 76 U/L — SIGNIFICANT CHANGE UP (ref 40–120)
ALT FLD-CCNC: 56 U/L — HIGH
ANION GAP SERPL CALC-SCNC: 12 MMOL/L — SIGNIFICANT CHANGE UP (ref 5–17)
ANION GAP SERPL CALC-SCNC: 13 MMOL/L — SIGNIFICANT CHANGE UP (ref 5–17)
AST SERPL-CCNC: 35 U/L — SIGNIFICANT CHANGE UP
B PERT IGG+IGM PNL SER: CLEAR — SIGNIFICANT CHANGE UP
BASOPHILS # BLD AUTO: 0 K/UL — SIGNIFICANT CHANGE UP (ref 0–0.2)
BASOPHILS NFR BLD AUTO: 0.1 % — SIGNIFICANT CHANGE UP (ref 0–2)
BILIRUB SERPL-MCNC: 0.2 MG/DL — LOW (ref 0.4–2)
BUN SERPL-MCNC: 16 MG/DL — SIGNIFICANT CHANGE UP (ref 8–20)
BUN SERPL-MCNC: 16 MG/DL — SIGNIFICANT CHANGE UP (ref 8–20)
CALCIUM SERPL-MCNC: 8.4 MG/DL — LOW (ref 8.6–10.2)
CALCIUM SERPL-MCNC: 8.6 MG/DL — SIGNIFICANT CHANGE UP (ref 8.6–10.2)
CHLORIDE SERPL-SCNC: 106 MMOL/L — SIGNIFICANT CHANGE UP (ref 98–107)
CHLORIDE SERPL-SCNC: 107 MMOL/L — SIGNIFICANT CHANGE UP (ref 98–107)
CO2 SERPL-SCNC: 21 MMOL/L — LOW (ref 22–29)
CO2 SERPL-SCNC: 24 MMOL/L — SIGNIFICANT CHANGE UP (ref 22–29)
COLOR FLD: YELLOW
CREAT SERPL-MCNC: 1.14 MG/DL — SIGNIFICANT CHANGE UP (ref 0.5–1.3)
CREAT SERPL-MCNC: 1.2 MG/DL — SIGNIFICANT CHANGE UP (ref 0.5–1.3)
CRP SERPL-MCNC: 8.5 MG/DL — HIGH (ref 0–0.4)
EOSINOPHIL # BLD AUTO: 0.3 K/UL — SIGNIFICANT CHANGE UP (ref 0–0.5)
EOSINOPHIL # FLD: 2 % — SIGNIFICANT CHANGE UP
EOSINOPHIL NFR BLD AUTO: 3.8 % — SIGNIFICANT CHANGE UP (ref 0–5)
FLUID INTAKE SUBSTANCE CLASS: SIGNIFICANT CHANGE UP
FLUID SEGMENTED GRANULOCYTES: 5 % — SIGNIFICANT CHANGE UP
GLUCOSE FLD-MCNC: 73 MG/DL — SIGNIFICANT CHANGE UP
GLUCOSE SERPL-MCNC: 104 MG/DL — SIGNIFICANT CHANGE UP (ref 70–115)
GLUCOSE SERPL-MCNC: 80 MG/DL — SIGNIFICANT CHANGE UP (ref 70–115)
GRAM STN FLD: SIGNIFICANT CHANGE UP
HCT VFR BLD CALC: 32.1 % — LOW (ref 42–52)
HCT VFR BLD CALC: 32.9 % — LOW (ref 42–52)
HGB BLD-MCNC: 10.7 G/DL — LOW (ref 14–18)
HGB BLD-MCNC: 10.8 G/DL — LOW (ref 14–18)
INR BLD: 1.51 RATIO — HIGH (ref 0.88–1.16)
LACTATE BLDV-MCNC: 0.7 MMOL/L — SIGNIFICANT CHANGE UP (ref 0.7–2)
LDH SERPL L TO P-CCNC: 208 U/L — HIGH (ref 98–192)
LDH SERPL L TO P-CCNC: 236 U/L — HIGH (ref 98–192)
LDH SERPL L TO P-CCNC: 81 U/L — SIGNIFICANT CHANGE UP
LYMPHOCYTES # BLD AUTO: 2.1 K/UL — SIGNIFICANT CHANGE UP (ref 1–4.8)
LYMPHOCYTES # BLD AUTO: 26.8 % — SIGNIFICANT CHANGE UP (ref 20–55)
LYMPHOCYTES # FLD: 74 % — SIGNIFICANT CHANGE UP
MCHC RBC-ENTMCNC: 28.8 PG — SIGNIFICANT CHANGE UP (ref 27–31)
MCHC RBC-ENTMCNC: 29.3 PG — SIGNIFICANT CHANGE UP (ref 27–31)
MCHC RBC-ENTMCNC: 32.5 G/DL — SIGNIFICANT CHANGE UP (ref 32–36)
MCHC RBC-ENTMCNC: 33.6 G/DL — SIGNIFICANT CHANGE UP (ref 32–36)
MCV RBC AUTO: 87 FL — SIGNIFICANT CHANGE UP (ref 80–94)
MCV RBC AUTO: 88.7 FL — SIGNIFICANT CHANGE UP (ref 80–94)
MESOTHL CELL # FLD: 16 % — SIGNIFICANT CHANGE UP
MONOCYTES # BLD AUTO: 0.7 K/UL — SIGNIFICANT CHANGE UP (ref 0–0.8)
MONOCYTES NFR BLD AUTO: 8.4 % — SIGNIFICANT CHANGE UP (ref 3–10)
MONOS+MACROS # FLD: 3 % — SIGNIFICANT CHANGE UP
NEUTROPHILS # BLD AUTO: 4.7 K/UL — SIGNIFICANT CHANGE UP (ref 1.8–8)
NEUTROPHILS NFR BLD AUTO: 59.6 % — SIGNIFICANT CHANGE UP (ref 37–73)
NIGHT BLUE STAIN TISS: SIGNIFICANT CHANGE UP
PH FLD: 7.5 — SIGNIFICANT CHANGE UP
PLATELET # BLD AUTO: 341 K/UL — SIGNIFICANT CHANGE UP (ref 150–400)
PLATELET # BLD AUTO: 343 K/UL — SIGNIFICANT CHANGE UP (ref 150–400)
POTASSIUM SERPL-MCNC: 3.9 MMOL/L — SIGNIFICANT CHANGE UP (ref 3.5–5.3)
POTASSIUM SERPL-MCNC: 3.9 MMOL/L — SIGNIFICANT CHANGE UP (ref 3.5–5.3)
POTASSIUM SERPL-SCNC: 3.9 MMOL/L — SIGNIFICANT CHANGE UP (ref 3.5–5.3)
POTASSIUM SERPL-SCNC: 3.9 MMOL/L — SIGNIFICANT CHANGE UP (ref 3.5–5.3)
PROCALCITONIN SERPL-MCNC: 0.11 NG/ML — HIGH (ref 0–0.05)
PROT FLD-MCNC: 3.9 G/DL — SIGNIFICANT CHANGE UP
PROT SERPL-MCNC: 6.5 G/DL — LOW (ref 6.6–8.7)
PROT SERPL-MCNC: 6.8 G/DL — SIGNIFICANT CHANGE UP (ref 6.6–8.7)
PROT SERPL-MCNC: 6.8 G/DL — SIGNIFICANT CHANGE UP (ref 6.6–8.7)
PROTHROM AB SERPL-ACNC: 16.8 SEC — HIGH (ref 9.8–12.7)
RBC # BLD: 3.69 M/UL — LOW (ref 4.6–6.2)
RBC # BLD: 3.71 M/UL — LOW (ref 4.6–6.2)
RBC # FLD: 13.9 % — SIGNIFICANT CHANGE UP (ref 11–15.6)
RBC # FLD: 14 % — SIGNIFICANT CHANGE UP (ref 11–15.6)
RCV VOL RI: 1319 /UL — HIGH (ref 0–5)
SODIUM SERPL-SCNC: 140 MMOL/L — SIGNIFICANT CHANGE UP (ref 135–145)
SODIUM SERPL-SCNC: 143 MMOL/L — SIGNIFICANT CHANGE UP (ref 135–145)
SPECIMEN SOURCE: SIGNIFICANT CHANGE UP
SPECIMEN SOURCE: SIGNIFICANT CHANGE UP
TOTAL NUCLEATED CELL COUNT, BODY FLUID: 1270 /UL — HIGH (ref 0–5)
TUBE TYPE: SIGNIFICANT CHANGE UP
WBC # BLD: 7.7 K/UL — SIGNIFICANT CHANGE UP (ref 4.8–10.8)
WBC # BLD: 7.8 K/UL — SIGNIFICANT CHANGE UP (ref 4.8–10.8)
WBC # FLD AUTO: 7.7 K/UL — SIGNIFICANT CHANGE UP (ref 4.8–10.8)
WBC # FLD AUTO: 7.8 K/UL — SIGNIFICANT CHANGE UP (ref 4.8–10.8)

## 2017-05-23 PROCEDURE — 99231 SBSQ HOSP IP/OBS SF/LOW 25: CPT

## 2017-05-23 PROCEDURE — 99233 SBSQ HOSP IP/OBS HIGH 50: CPT

## 2017-05-23 PROCEDURE — 71010: CPT | Mod: 26

## 2017-05-23 RX ORDER — ACETAMINOPHEN 500 MG
650 TABLET ORAL EVERY 6 HOURS
Qty: 0 | Refills: 0 | Status: DISCONTINUED | OUTPATIENT
Start: 2017-05-23 | End: 2017-05-26

## 2017-05-23 RX ORDER — PIPERACILLIN AND TAZOBACTAM 4; .5 G/20ML; G/20ML
3.38 INJECTION, POWDER, LYOPHILIZED, FOR SOLUTION INTRAVENOUS ONCE
Qty: 0 | Refills: 0 | Status: COMPLETED | OUTPATIENT
Start: 2017-05-23 | End: 2017-05-23

## 2017-05-23 RX ORDER — PIPERACILLIN AND TAZOBACTAM 4; .5 G/20ML; G/20ML
3.38 INJECTION, POWDER, LYOPHILIZED, FOR SOLUTION INTRAVENOUS EVERY 8 HOURS
Qty: 0 | Refills: 0 | Status: DISCONTINUED | OUTPATIENT
Start: 2017-05-23 | End: 2017-05-23

## 2017-05-23 RX ORDER — VANCOMYCIN HCL 1 G
1000 VIAL (EA) INTRAVENOUS ONCE
Qty: 0 | Refills: 0 | Status: COMPLETED | OUTPATIENT
Start: 2017-05-23 | End: 2017-05-23

## 2017-05-23 RX ADMIN — ATORVASTATIN CALCIUM 20 MILLIGRAM(S): 80 TABLET, FILM COATED ORAL at 22:13

## 2017-05-23 RX ADMIN — Medication 1000 MILLIGRAM(S): at 22:13

## 2017-05-23 RX ADMIN — Medication 100 MILLIGRAM(S): at 06:28

## 2017-05-23 RX ADMIN — Medication 250 MILLIGRAM(S): at 02:59

## 2017-05-23 RX ADMIN — PIPERACILLIN AND TAZOBACTAM 200 GRAM(S): 4; .5 INJECTION, POWDER, LYOPHILIZED, FOR SOLUTION INTRAVENOUS at 02:11

## 2017-05-23 RX ADMIN — Medication 650 MILLIGRAM(S): at 01:25

## 2017-05-23 RX ADMIN — Medication 150 MICROGRAM(S): at 06:28

## 2017-05-23 NOTE — CHART NOTE - NSCHARTNOTEFT_GEN_A_CORE
Rapid Response/ Sepsis Code Note (PGY3 on call)    47 y.o male with recent  thoracocentesis was noted to have fever of 101.4 and tachycardia of 106 which trigger a code sepsis  ____________________________________________________________________  BACKGROUND DATA  SAMIRA NUGENT,   Patient is a 47y old  Male who presents with a chief complaint of increased lethargy (20 May 2017 00:56)    HPI:  48 y/o male with h/o psychiatric disorder was referred fro [facility] because of increased lethargy. patient looked sleep with O2 sat: 88. o/e: left side abdominal fulness with firm masses ?? fecal impaction. Labs showed WBC: 12.1. AST: 70. ALT: 60. CT chest showed moderated left pleural effusion (20 May 2017 00:56)    ____________________________________________________________________    HEALTH ISSUES - PROBLEM Dx:  Mutism: Mutism  Encephalopathy: Encephalopathy  Schizoaffective disorder, bipolar type  Cerebrovascular accident (CVA), unspecified mechanism  Delirium due to another medical condition, acute, hypoactive  Hypothyroidism, unspecified type: Hypothyroidism, unspecified type  Pleural effusion: Pleural effusion  Psychiatric disorder: Psychiatric disorder  Constipation, unspecified constipation type: Constipation, unspecified constipation type  Altered mental status, unspecified altered mental status type: Altered mental status, unspecified altered mental status type        No Known Allergies    MEDICATIONS  (STANDING):  valproic acid 1000milliGRAM(s) Oral at bedtime  valproic acid 500milliGRAM(s) Oral daily  docusate sodium 100milliGRAM(s) Oral two times a day  levothyroxine 150MICROGram(s) Oral daily  enoxaparin Injectable 40milliGRAM(s) SubCutaneous daily  aspirin enteric coated 325milliGRAM(s) Oral daily  atorvastatin 20milliGRAM(s) Oral at bedtime    MEDICATIONS  (PRN):  LORazepam     Tablet 2milliGRAM(s) Oral three times a day PRN Anxiety  acetaminophen   Tablet 650milliGRAM(s) Oral every 6 hours PRN temp greater 101      ______________________________________________________________________    Vital Signs Last 24 Hrs  T(C): 38.1, Max: 38.4 (05-23 @ 00:57)  T(F): 100.5, Max: 101.2 (05-23 @ 00:57)  HR: 106 (82 - 108)  BP: 125/82 (91/49 - 164/80)  BP(mean): --  RR: 16 (14 - 21)  SpO2: 90% (90% - 100%)          CAPILLARY BLOOD GLUCOSE      PHYSICAL EXAM  GEN: NAD, very comfortable mutism noted but pt follow commands and communicates by moving head.    EENT: PERRLA    Lungs: shallow breath sounds due to poor air intake. No crackles or rhonchi noted    Heart: tachycardic    Abd: soft non tender, non distended    Ext: ROM. , no tenderness of edema    Neuro: non focal    Skin: no lesions    _________________________________________________________________    Labs                          10.8   7.8   )-----------( 341      ( 23 May 2017 01:27 )             32.1                                                        05-23    140  |  106  |  16.0  ----------------------------<  104  3.9   |  21.0<L>  |  1.14    Ca    8.4<L>      23 May 2017 01:27    TPro  6.8  /  Alb  2.7<L>  /  TBili  0.2<L>  /  DBili  x   /  AST  35  /  ALT  56<H>  /  AlkPhos  76  05-23                                                                                    PT/INR - ( 23 May 2017 01:27 )   PT: 16.8 sec;   INR: 1.51 ratio         PTT - ( 22 May 2017 09:36 )  PTT:33.4 sec                   Procalcitonin, Serum: 0.11 ng/mL (05-23 @ 01:27)      Imaging      _______________________________________________________________________  Assessment /Plan:    Fever with uncertain etiology. Sepsis is ruled out for now.  - CBC, CMP, lactate, BCX, UCx were obtained  - Abx ( vanco and zoyn) were temporarily reinstated. Following imaging and lab results the abx were discontinued. 1g of vanco was given however.  - Tylenol 650mg PO given for fever  - At this point, follow up cultures and monitor further signs of fever  - Above therapy were discussed with Dr Patel ( attending) who agreed with current management.

## 2017-05-23 NOTE — PROGRESS NOTE ADULT - ASSESSMENT
This is 47Y M with psych comorbidities sent from [place] due to AMS, ct head , neurology consult requested, cont, aspirin, statin,  MRI brain No evidence of acute/subacute infarct.  and carotid doppler negative. Ct chest showed moderate left side pleural effusion, CT surgery consult requested, suggest IR guided pleurocentesis. Pt had TTE - .Left ventricular ejection fraction, by visual estimation, is 50 to  55%. Normal right ventricular size and function.  Moderate pleural effusion in the left lateral region, pt is s/p IR guided thoracentesis, removed almost 1400 cc, spiked fever last night, received 1 dose of Abx, afebrile today.

## 2017-05-23 NOTE — PROGRESS NOTE ADULT - SUBJECTIVE AND OBJECTIVE BOX
POD # 1 Left Thoracentesis    ALLERGIES:  No Known Allergies      SUBJECTIVE:  Patient is a 47y Male comfortable in bed  Seen and examined at bedside.  No Complaints  Nontoxic appearing.      PHYSICAL EXAM:    VITALS:  T(C): 36.6, Max: 38.4 (05-23 @ 00:57)  HR: 99 (82 - 108)  BP: 115/62 (91/49 - 164/80)  RR: 18 (14 - 21)  SpO2: 98% (90% - 100%)  Wt(kg): --    PE:  Chest: good air exchange, puncture site clean and dry  Abdomin: soft nontender      IMPRESSION:    s/p left thoracentesis    PLAN:  Continue current care management  Plan per Medicine  Discussed with Dr Phillips

## 2017-05-23 NOTE — PROGRESS NOTE ADULT - PROBLEM SELECTOR PLAN 2
pt is s/p IR guided thoracentesis, removed almost 1400 cc, spiked fever last night, received 1 dose of Abx, afebrile today.   Normal WBC, lactic acid, received 1 dose of vanco and zosyn, will hold Abx for now, f/u blood c/s, urine c/s, fever is likely postprocedural, if spiked fever again, will start Abx.   talked to his mother Marcella [phone] on 05/22, updated about pt current condition.  TTE - LVEF 50-55% - no h/o CHF

## 2017-05-23 NOTE — PROGRESS NOTE ADULT - SUBJECTIVE AND OBJECTIVE BOX
Patient more alert and better mood than yesterday, continues to be non-verbal, pt also refusing breakfast and gestures that he does not have appetite.  Vitals today are WNL and patient denies any physical ailments or difficulty breathing.  Patient had fever yesterday Tmax 101.2 and received thoracentesis for L sided pleural effusion, hours later a code sepsis was called d/t tachycardia and fever, antibiotics were reinstated and patient was given 1g of Vancomycin.  Pt has been compliant with all other medications.  Medical team to f/u with cultures and monitor vital signs, patient to return to [place] when medically appropriate.

## 2017-05-23 NOTE — PROGRESS NOTE ADULT - SUBJECTIVE AND OBJECTIVE BOX
Internal Medicine Hospitalist - Dr. Ghazal NUGENT    [id number]    47y      Male    Patient is a 47y old  Male who presents with a chief complaint of increased lethargy (20 May 2017 00:56)    HPI:  46 y/o male with h/o psychiatric disorder was referred fro [facility] because of increased lethargy. patient looked sleep with O2 sat: 88. o/e: left side abdominal fulness with firm masses ?? fecal impaction. Labs showed WBC: 12.1. AST: 70. ALT: 60. CT chest showed moderated left pleural effusion (20 May 2017 00:56)    INTERVAL HPI/ OVERNIGHT EVENTS: Patient is seen and examined, pt is s/p IR guided thoracentesis, removed almost 1400 cc, spiked fever last night, received 1 dose of Abx, afebrile today.      REVIEW OF SYSTEMS:    unable to obtain     PHYSICAL EXAM:    Vital Signs Last 24 Hrs  T(C): 36.6, Max: 38.4 (05-23 @ 00:57)  T(F): 97.8, Max: 101.2 (05-23 @ 00:57)  HR: 99 (82 - 108)  BP: 115/62 (91/49 - 164/80)  BP(mean): --  RR: 18 (14 - 21)  SpO2: 98% (90% - 100%)    GENERAL: NAD  HEENT: EOMI  CHEST/LUNG: improving air entry   HEART: S1S2+ audible  ABDOMEN: Soft, Nontender, Nondistended; Bowel sounds present  EXTREMITIES:  no edema  CNS: Awake       LABS:                        10.7   7.7   )-----------( 343      ( 23 May 2017 07:07 )             32.9     05-23    143  |  107  |  16.0  ----------------------------<  80  3.9   |  24.0  |  1.20    Ca    8.6      23 May 2017 07:07    Blood Gas Venous - Lactate (05.23.17 @ 01:29)    Blood Gas Venous - Lactate: 0.7: As of 4/5/17 the reference range for Lactic Acid, whole blood venous has  changed. mmoL/L      MEDICATIONS  (STANDING):  valproic acid 1000milliGRAM(s) Oral at bedtime  valproic acid 500milliGRAM(s) Oral daily  docusate sodium 100milliGRAM(s) Oral two times a day  levothyroxine 150MICROGram(s) Oral daily  enoxaparin Injectable 40milliGRAM(s) SubCutaneous daily  aspirin enteric coated 325milliGRAM(s) Oral daily  atorvastatin 20milliGRAM(s) Oral at bedtime    MEDICATIONS  (PRN):  LORazepam     Tablet 2milliGRAM(s) Oral three times a day PRN Anxiety  acetaminophen   Tablet 650milliGRAM(s) Oral every 6 hours PRN temp greater 101      RADIOLOGY & ADDITIONAL TEST

## 2017-05-24 DIAGNOSIS — J90 PLEURAL EFFUSION, NOT ELSEWHERE CLASSIFIED: ICD-10-CM

## 2017-05-24 LAB
ANION GAP SERPL CALC-SCNC: 12 MMOL/L — SIGNIFICANT CHANGE UP (ref 5–17)
BUN SERPL-MCNC: 21 MG/DL — HIGH (ref 8–20)
CALCIUM SERPL-MCNC: 9.1 MG/DL — SIGNIFICANT CHANGE UP (ref 8.6–10.2)
CHLORIDE SERPL-SCNC: 108 MMOL/L — HIGH (ref 98–107)
CO2 SERPL-SCNC: 23 MMOL/L — SIGNIFICANT CHANGE UP (ref 22–29)
CREAT SERPL-MCNC: 0.94 MG/DL — SIGNIFICANT CHANGE UP (ref 0.5–1.3)
CULTURE RESULTS: SIGNIFICANT CHANGE UP
CULTURE RESULTS: SIGNIFICANT CHANGE UP
GLUCOSE SERPL-MCNC: 75 MG/DL — SIGNIFICANT CHANGE UP (ref 70–115)
HCT VFR BLD CALC: 33.7 % — LOW (ref 42–52)
HGB BLD-MCNC: 11.2 G/DL — LOW (ref 14–18)
MCHC RBC-ENTMCNC: 29.3 PG — SIGNIFICANT CHANGE UP (ref 27–31)
MCHC RBC-ENTMCNC: 33.2 G/DL — SIGNIFICANT CHANGE UP (ref 32–36)
MCV RBC AUTO: 88.2 FL — SIGNIFICANT CHANGE UP (ref 80–94)
PLATELET # BLD AUTO: 386 K/UL — SIGNIFICANT CHANGE UP (ref 150–400)
POTASSIUM SERPL-MCNC: 4.3 MMOL/L — SIGNIFICANT CHANGE UP (ref 3.5–5.3)
POTASSIUM SERPL-SCNC: 4.3 MMOL/L — SIGNIFICANT CHANGE UP (ref 3.5–5.3)
RBC # BLD: 3.82 M/UL — LOW (ref 4.6–6.2)
RBC # FLD: 14.3 % — SIGNIFICANT CHANGE UP (ref 11–15.6)
SODIUM SERPL-SCNC: 143 MMOL/L — SIGNIFICANT CHANGE UP (ref 135–145)
SPECIMEN SOURCE: SIGNIFICANT CHANGE UP
SPECIMEN SOURCE: SIGNIFICANT CHANGE UP
WBC # BLD: 9.2 K/UL — SIGNIFICANT CHANGE UP (ref 4.8–10.8)
WBC # FLD AUTO: 9.2 K/UL — SIGNIFICANT CHANGE UP (ref 4.8–10.8)

## 2017-05-24 PROCEDURE — 99232 SBSQ HOSP IP/OBS MODERATE 35: CPT

## 2017-05-24 RX ORDER — HALOPERIDOL DECANOATE 100 MG/ML
5 INJECTION INTRAMUSCULAR
Qty: 0 | Refills: 0 | Status: DISCONTINUED | OUTPATIENT
Start: 2017-05-24 | End: 2017-05-26

## 2017-05-24 RX ADMIN — Medication 1000 MILLIGRAM(S): at 22:14

## 2017-05-24 RX ADMIN — Medication 100 MILLIGRAM(S): at 18:17

## 2017-05-24 RX ADMIN — ATORVASTATIN CALCIUM 20 MILLIGRAM(S): 80 TABLET, FILM COATED ORAL at 22:14

## 2017-05-24 RX ADMIN — ENOXAPARIN SODIUM 40 MILLIGRAM(S): 100 INJECTION SUBCUTANEOUS at 13:21

## 2017-05-24 RX ADMIN — HALOPERIDOL DECANOATE 5 MILLIGRAM(S): 100 INJECTION INTRAMUSCULAR at 18:17

## 2017-05-24 RX ADMIN — Medication 325 MILLIGRAM(S): at 14:27

## 2017-05-24 RX ADMIN — Medication 500 MILLIGRAM(S): at 13:19

## 2017-05-24 NOTE — PROGRESS NOTE ADULT - PROBLEM SELECTOR PLAN 1
pt is s/p IR guided thoracentesis, removed almost 1400 cc, spiked fever after procedure, received 1 dose of Abx, afebrile now   Normal WBC, lactic acid, received 1 dose of vanco and zosyn, will hold Abx for now, f/u blood c/s, urine c/s, fever is likely postprocedural, if spiked fever again, will start Abx, f/u blood and urine c/s   Pleural fluid  - ph 7.5, LDH 81, P: 3.9, transduative based on LDH, mildly positive for exudative based on protein   f/u pleural fluid c/s, cytology  talked to his mother Marcella [phone] on 05/22, updated about pt current condition.  TTE - LVEF 50-55% - no h/o CHF

## 2017-05-24 NOTE — PROGRESS NOTE ADULT - SUBJECTIVE AND OBJECTIVE BOX
Patient continues to be nonverbal at bedside but denies any physical complaints by nodding no.  Ultimate plan to return to [facility] when medically appropriate.  Plan to restart Haldol 5mg PO BID, Depakote 500mg QD and 1000mg QHS, and defer to [facility] provider to restart clozapine via titration.

## 2017-05-24 NOTE — PROGRESS NOTE ADULT - ASSESSMENT
This is 47Y M with psych comorbidities sent from [place] due to AMS, ct head , neurology consult requested, cont, aspirin, statin,  MRI brain No evidence of acute/subacute infarct.  and carotid doppler negative. Ct chest showed moderate left side pleural effusion, CT surgery consult requested, suggest IR guided pleurocentesis. Pt had TTE - .Left ventricular ejection fraction, by visual estimation, is 50 to  55%. Normal right ventricular size and function.  Moderate pleural effusion in the left lateral region, pt is s/p IR guided thoracentesis, removed almost 1400 cc, spiked fever after procedure,  received 1 dose of Abx, afebrile now .

## 2017-05-24 NOTE — PROGRESS NOTE ADULT - SUBJECTIVE AND OBJECTIVE BOX
Internal Medicine Hospitalist - Dr. Ghazal NUGENT    [id number]    47y      Male    Patient is a 47y old  Male who presents with a chief complaint of increased lethargy (20 May 2017 00:56)    HPI:  46 y/o male with h/o psychiatric disorder was referred fro [facility] because of increased lethargy. patient looked sleep with O2 sat: 88. o/e: left side abdominal fulness with firm masses ?? fecal impaction. Labs showed WBC: 12.1. AST: 70. ALT: 60. CT chest showed moderated left pleural effusion (20 May 2017 00:56)    INTERVAL HPI/ OVERNIGHT EVENTS: Patient is seen and examined, no new event overnight.     REVIEW OF SYSTEMS:    unable to obtain     PHYSICAL EXAM:    Vital Signs Last 24 Hrs  T(C): 36.6, Max: 37.2 (05-23 @ 15:03)  T(F): 97.9, Max: 98.9 (05-23 @ 15:03)  HR: 97 (97 - 100)  BP: 95/59 (95/59 - 124/84)  BP(mean): --  RR: 18 (18 - 18)  SpO2: 95% (95% - 97%)    GENERAL: NAD  CHEST/LUNG: improved air entry   HEART: S1S2+ audible  ABDOMEN: Soft, Nontender, Nondistended; Bowel sounds present  EXTREMITIES:  no edema      LABS:                        11.2   9.2   )-----------( 386      ( 24 May 2017 08:06 )             33.7     05-24    143  |  108<H>  |  21.0<H>  ----------------------------<  75  4.3   |  23.0  |  0.94    Ca    9.1      24 May 2017 08:06    TPro  6.5<L>  /  Alb  2.5<L>  /  TBili  x   /  DBili  x   /  AST  x   /  ALT  x   /  AlkPhos  x   05-23    PT/INR - ( 23 May 2017 01:27 )   PT: 16.8 sec;   INR: 1.51 ratio        MEDICATIONS  (STANDING):  valproic acid 1000milliGRAM(s) Oral at bedtime  valproic acid 500milliGRAM(s) Oral daily  docusate sodium 100milliGRAM(s) Oral two times a day  levothyroxine 150MICROGram(s) Oral daily  enoxaparin Injectable 40milliGRAM(s) SubCutaneous daily  aspirin enteric coated 325milliGRAM(s) Oral daily  atorvastatin 20milliGRAM(s) Oral at bedtime    MEDICATIONS  (PRN):  LORazepam     Tablet 2milliGRAM(s) Oral three times a day PRN Anxiety  acetaminophen   Tablet 650milliGRAM(s) Oral every 6 hours PRN temp greater 101      RADIOLOGY & ADDITIONAL TEST   EXAM:  CHEST SINGLE VIEW FRONTAL                        PROCEDURE DATE:  05/23/2017      IMPRESSION:   Persistent bilateral of moderate pleural effusions and/or   atelectasis..

## 2017-05-25 ENCOUNTER — TRANSCRIPTION ENCOUNTER (OUTPATIENT)
Age: 47
End: 2017-05-25

## 2017-05-25 VITALS
HEART RATE: 86 BPM | TEMPERATURE: 98 F | SYSTOLIC BLOOD PRESSURE: 122 MMHG | OXYGEN SATURATION: 94 % | DIASTOLIC BLOOD PRESSURE: 58 MMHG | RESPIRATION RATE: 18 BRPM

## 2017-05-25 LAB — NON-GYN CYTOLOGY SPEC: SIGNIFICANT CHANGE UP

## 2017-05-25 PROCEDURE — 99238 HOSP IP/OBS DSCHRG MGMT 30/<: CPT

## 2017-05-25 RX ORDER — VALPROIC ACID (AS SODIUM SALT) 250 MG/5ML
2 SOLUTION, ORAL ORAL
Qty: 60 | Refills: 0 | OUTPATIENT
Start: 2017-05-25 | End: 2017-06-24

## 2017-05-25 RX ORDER — VALPROIC ACID (AS SODIUM SALT) 250 MG/5ML
500 SOLUTION, ORAL ORAL
Qty: 0 | Refills: 0 | COMMUNITY

## 2017-05-25 RX ORDER — CLOZAPINE 150 MG/1
300 TABLET, ORALLY DISINTEGRATING ORAL
Qty: 0 | Refills: 0 | COMMUNITY

## 2017-05-25 RX ORDER — HALOPERIDOL DECANOATE 100 MG/ML
1 INJECTION INTRAMUSCULAR
Qty: 20 | Refills: 0 | OUTPATIENT
Start: 2017-05-25 | End: 2017-06-04

## 2017-05-25 RX ORDER — HALOPERIDOL DECANOATE 100 MG/ML
1 INJECTION INTRAMUSCULAR
Qty: 20 | Refills: 0 | COMMUNITY
Start: 2017-05-25 | End: 2017-06-04

## 2017-05-25 RX ADMIN — ENOXAPARIN SODIUM 40 MILLIGRAM(S): 100 INJECTION SUBCUTANEOUS at 11:46

## 2017-05-25 RX ADMIN — Medication 325 MILLIGRAM(S): at 11:52

## 2017-05-25 RX ADMIN — Medication 500 MILLIGRAM(S): at 11:44

## 2017-05-25 RX ADMIN — Medication 100 MILLIGRAM(S): at 06:21

## 2017-05-25 RX ADMIN — HALOPERIDOL DECANOATE 5 MILLIGRAM(S): 100 INJECTION INTRAMUSCULAR at 06:21

## 2017-05-25 RX ADMIN — Medication 150 MICROGRAM(S): at 06:28

## 2017-05-25 NOTE — DISCHARGE NOTE ADULT - CARE PROVIDER_API CALL
Armen Portillo), Surgery; Thoracic Surgery  [address] [place] [address]  Phone: [phone]  Fax: [phone]    Antolin Warren), Psychiatry  [address] [place] [address]  Phone: [phone]  Fax: [phone]    PCP,   Phone: (   )    -  Fax: (   )    -    Gael Dupont), Neurology  [address] [place] [address]  Phone: [phone]  Fax: [phone]

## 2017-05-25 NOTE — DISCHARGE NOTE ADULT - CARE PLAN
Principal Discharge DX:	Altered mental status, unspecified altered mental status type  Goal:	reason unknown  Instructions for follow-up, activity and diet:	back to baseline  Secondary Diagnosis:	Pleural effusion  Goal:	s/p IR guided thoracentesis  Instructions for follow-up, activity and diet:	f/u Cytology and pleural fluid c.s  Secondary Diagnosis:	Hypothyroidism, unspecified type  Secondary Diagnosis:	Psychiatric disorder  Instructions for follow-up, activity and diet:	As per psych restart Haldol 5mg PO BID, Depakote 500mg QD and 1000mg QHS, and defer to [facility] provider to restart clozapine via titration.  Secondary Diagnosis:	Mutism

## 2017-05-25 NOTE — PROGRESS NOTE ADULT - SUBJECTIVE AND OBJECTIVE BOX
chart reviewed and patient seen [place] aide at bedside reports poor appetite afebrile vital signs stable compliant with oral medications non verbal appears alert nods head "no" in response to ROS questions appears comfortable awaiting medical clearance for return to [facility]

## 2017-05-25 NOTE — DISCHARGE NOTE ADULT - HOSPITAL COURSE
This is 47Y M with psych comorbidities sent from [place] due to AMS, ct head , neurology consult requested,   MRI brain No evidence of acute/subacute infarct.  and carotid doppler negative. He was seen by psych, decrease haldol 5mg bid, cont, depakote and also stopped clozapine. Pt mentation improved, back to baseline, non verbal     Ct chest showed moderate left side pleural effusion, CT surgery consult requested, suggest IR guided pleurocentesis. Pt had TTE - .Left ventricular ejection fraction, by visual estimation, is 50 to  55%. Normal right ventricular size and function.  Moderate pleural effusion in the left lateral region, pt is s/p IR guided thoracentesis, removed almost 1400 cc, spiked fever after procedure,  received 1 dose of Abx, afebrile now , blood c/s are negative.  Normal WBC, lactic acid.  fever is likely postprocedural  Pleural fluid  - ph 7.5, LDH 81, P: 3.9, transduative based on LDH, mildly positive for exudative based on protein   f/u pleural fluid c/s, cytology and fungal c/s  discussed with CT surgery -Dr. Portillo, clear to d/c, suggest to f/u as an outpatient.   talked to his mother Marcella [phone] on 05/22, updated about pt current condition.  TTE - LVEF 50-55% - no h/o CHF.       Psychiatric disorder.  Plan: appreciate psych input   as per psych return to [facility] -  restarted Haldol 5mg PO BID, Depakote 500mg QD and 1000mg QHS, and defer to [facility] provider to restart clozapine via titration.      Pt is back to baseline, updated Dr. duenas at [place] - need to f/u cytology and fungal c/s and f/u with Ct surgery     ICU Vital Signs Last 24 Hrs  T(C): 36.7, Max: 37.1 (05-25 @ 00:41)  T(F): 98, Max: 98.7 (05-25 @ 00:41)  HR: 70 (70 - 85)  BP: 116/70 (108/63 - 124/66)  BP(mean): --  ABP: --  ABP(mean): --  RR: 18 (18 - 18)  SpO2: 96% (95% - 96%)

## 2017-05-25 NOTE — DISCHARGE NOTE ADULT - CARE PROVIDERS DIRECT ADDRESSES
[email or web address],DirectAddress_Unknown,DirectAddress_Unknown,DirectAddress_Unknown

## 2017-05-25 NOTE — DISCHARGE NOTE ADULT - ADDITIONAL INSTRUCTIONS
F/U PCP, CT surgery  F/U Cytology and pleural fluid fungal c/s  Ok to use buckle  guard during transportation  from [facility] to [place] - if needed

## 2017-05-25 NOTE — DISCHARGE NOTE ADULT - PATIENT PORTAL LINK FT
“You can access the Follow[employer] Patient Portal, offered by [facility], by registering with the following website: [email or web address]”

## 2017-05-25 NOTE — DISCHARGE NOTE ADULT - MEDICATION SUMMARY - MEDICATIONS TO CHANGE
I will SWITCH the dose or number of times a day I take the medications listed below when I get home from the hospital:    haloperidol 10 mg oral tablet  -- 10 milligram(s) by mouth once a day    haloperidol 5 mg oral tablet  --  by mouth once a day (at bedtime)

## 2017-05-25 NOTE — DISCHARGE NOTE ADULT - MEDICATION SUMMARY - MEDICATIONS TO TAKE
I will START or STAY ON the medications listed below when I get home from the hospital:    valproic acid 500 mg oral delayed release capsule  -- 1000 milligram(s) by mouth once a day (at bedtime)  -- Indication: For Home medication    valproic acid 250 mg oral capsule  -- 2 cap(s) by mouth once a day  -- Indication: For Home medication    LORazepam 2 mg oral tablet  -- 1 tab(s) by mouth 3 times a day, As Needed  -- Indication: For Home medication    haloperidol 5 mg oral tablet  -- 1 tab(s) by mouth 2 times a day  -- Indication: For Home medication    Colace 100 mg oral capsule  -- 1 cap(s) by mouth 2 times a day, As Needed  -- Indication: For Home medication    levothyroxine 150 mcg (0.15 mg) oral tablet  -- 1 tab(s) by mouth once a day  -- Indication: For Hypothyroidism, unspecified type

## 2017-05-25 NOTE — DISCHARGE NOTE ADULT - PLAN OF CARE
reason unknown back to baseline s/p IR guided thoracentesis f/u Cytology and pleural fluid c.s As per psych restart Haldol 5mg PO BID, Depakote 500mg QD and 1000mg QHS, and defer to [facility] provider to restart clozapine via titration.

## 2017-05-25 NOTE — DISCHARGE NOTE ADULT - PROVIDER TOKENS
TOKEN:'[id number]:'[id number]',FREE:[LAST:[PCP],PHONE:[(   )    -],FAX:[(   )    -]],TOKEN:'[id number]'

## 2017-05-25 NOTE — DISCHARGE NOTE ADULT - ABILITY TO HEAR (WITH HEARING AID OR HEARING APPLIANCE IF NORMALLY USED):
brother states pt [place] pt responds to verbal commands/Mildly to Moderately Impaired: difficulty hearing in some environments or speaker may need to increase volume or speak distinctly

## 2017-05-25 NOTE — DISCHARGE NOTE ADULT - MEDICATION SUMMARY - MEDICATIONS TO STOP TAKING
I will STOP taking the medications listed below when I get home from the hospital:    cloZAPine 200 mg oral tablet, disintegrating  -- 400 milligram(s) by mouth once a day (at bedtime)    cloZAPine 200 mg oral tablet  -- 300  by mouth once a day

## 2017-05-26 ENCOUNTER — INPATIENT (INPATIENT)
Facility: HOSPITAL | Age: 47
LOS: 44 days | Discharge: PSYCHIATRIC OR SUBSTANCE USE FACILITY | DRG: 981 | End: 2017-07-10
Admitting: HOSPITALIST
Payer: MEDICAID

## 2017-05-26 VITALS
WEIGHT: 184.97 LBS | SYSTOLIC BLOOD PRESSURE: 128 MMHG | OXYGEN SATURATION: 95 % | HEIGHT: 70 IN | TEMPERATURE: 99 F | HEART RATE: 80 BPM | DIASTOLIC BLOOD PRESSURE: 75 MMHG | RESPIRATION RATE: 20 BRPM

## 2017-05-26 LAB
ALBUMIN SERPL ELPH-MCNC: 3 G/DL — LOW (ref 3.3–5.2)
ALP SERPL-CCNC: 81 U/L — SIGNIFICANT CHANGE UP (ref 40–120)
ALT FLD-CCNC: 36 U/L — SIGNIFICANT CHANGE UP
ANION GAP SERPL CALC-SCNC: 13 MMOL/L — SIGNIFICANT CHANGE UP (ref 5–17)
AST SERPL-CCNC: 26 U/L — SIGNIFICANT CHANGE UP
BILIRUB SERPL-MCNC: 0.1 MG/DL — LOW (ref 0.4–2)
BUN SERPL-MCNC: 28 MG/DL — HIGH (ref 8–20)
CALCIUM SERPL-MCNC: 9.3 MG/DL — SIGNIFICANT CHANGE UP (ref 8.6–10.2)
CHLORIDE SERPL-SCNC: 103 MMOL/L — SIGNIFICANT CHANGE UP (ref 98–107)
CO2 SERPL-SCNC: 24 MMOL/L — SIGNIFICANT CHANGE UP (ref 22–29)
CREAT SERPL-MCNC: 1.05 MG/DL — SIGNIFICANT CHANGE UP (ref 0.5–1.3)
GLUCOSE SERPL-MCNC: 89 MG/DL — SIGNIFICANT CHANGE UP (ref 70–115)
HCT VFR BLD CALC: 36.3 % — LOW (ref 42–52)
HGB BLD-MCNC: 12 G/DL — LOW (ref 14–18)
MCHC RBC-ENTMCNC: 29.3 PG — SIGNIFICANT CHANGE UP (ref 27–31)
MCHC RBC-ENTMCNC: 33.1 G/DL — SIGNIFICANT CHANGE UP (ref 32–36)
MCV RBC AUTO: 88.5 FL — SIGNIFICANT CHANGE UP (ref 80–94)
PLATELET # BLD AUTO: 452 K/UL — HIGH (ref 150–400)
POTASSIUM SERPL-MCNC: 4.4 MMOL/L — SIGNIFICANT CHANGE UP (ref 3.5–5.3)
POTASSIUM SERPL-SCNC: 4.4 MMOL/L — SIGNIFICANT CHANGE UP (ref 3.5–5.3)
PROT SERPL-MCNC: 7.6 G/DL — SIGNIFICANT CHANGE UP (ref 6.6–8.7)
RBC # BLD: 4.1 M/UL — LOW (ref 4.6–6.2)
RBC # FLD: 14.8 % — SIGNIFICANT CHANGE UP (ref 11–15.6)
SODIUM SERPL-SCNC: 140 MMOL/L — SIGNIFICANT CHANGE UP (ref 135–145)
WBC # BLD: 11.1 K/UL — HIGH (ref 4.8–10.8)
WBC # FLD AUTO: 11.1 K/UL — HIGH (ref 4.8–10.8)

## 2017-05-26 RX ORDER — SODIUM CHLORIDE 9 MG/ML
2000 INJECTION INTRAMUSCULAR; INTRAVENOUS; SUBCUTANEOUS ONCE
Qty: 0 | Refills: 0 | Status: COMPLETED | OUTPATIENT
Start: 2017-05-26 | End: 2017-05-26

## 2017-05-26 RX ORDER — SODIUM CHLORIDE 9 MG/ML
1000 INJECTION INTRAMUSCULAR; INTRAVENOUS; SUBCUTANEOUS ONCE
Qty: 0 | Refills: 0 | Status: COMPLETED | OUTPATIENT
Start: 2017-05-26 | End: 2017-05-26

## 2017-05-26 RX ADMIN — SODIUM CHLORIDE 1000 MILLILITER(S): 9 INJECTION INTRAMUSCULAR; INTRAVENOUS; SUBCUTANEOUS at 21:13

## 2017-05-26 NOTE — ED ADULT NURSE NOTE - OBJECTIVE STATEMENT
Assumed pt care @ 2030. Pt received sitting on stretcher in NAD. Pt at baseline, he is nonverbal as per [facility] aide. Pt sent for decreased po intake @ [facility]. As per caregiver pt d/c yesterday for Pneumonia and has been weak for a few days due to decrease po intake. Lungs diminished RR even unlabored. No Nausea, Vomiting, Diarrhea. Skin warm, dry, and pale.

## 2017-05-26 NOTE — ED ADULT TRIAGE NOTE - CHIEF COMPLAINT QUOTE
BIBA, patient is awake and at baseline patient was sent to the ed for eval of poor po intake, recently treated for pneumonia, sent back from [place] for eval

## 2017-05-26 NOTE — ED PROVIDER NOTE - OBJECTIVE STATEMENT
48 y/o male, nonverbal at baseline, BIBA from [place] with aide at bedside for evaluation secondary to poor PO intake. Pt was recently admitted to [facility], and evaluated at [facility] s/p PNA and pleural effusion on CXR completed at [place]. History is limited.

## 2017-05-26 NOTE — ED ADULT NURSE REASSESSMENT NOTE - NS ED NURSE REASSESS COMMENT FT1
0262- Pt noticed to have drooling. [place] aide @ bedside trying to feed for po challenge, pt not opening mouth. As per aide pt normally feeds himself. Dr. Berumen made aware and Head CT ordered. Will continue to monitor.

## 2017-05-26 NOTE — ED PROVIDER NOTE - UNABLE TO OBTAIN
Severe Illness/Injury Pt nonverbal at baseline, history is limited. Pt is nonverbal at baseline, unable to obtain ROS.

## 2017-05-26 NOTE — ED ADULT NURSE NOTE - CHIEF COMPLAINT QUOTE
BIBA, patient is awake and at baseline patient was sent to the ed for eval of poor po intake, recently treated for pneumonia, sent back from [place] for eval

## 2017-05-26 NOTE — ED PROVIDER NOTE - NS ED MD SCRIBE ATTENDING SCRIBE SECTIONS
HISTORY OF PRESENT ILLNESS/DISPOSITION/PAST MEDICAL/SURGICAL/SOCIAL HISTORY/PHYSICAL EXAM/REVIEW OF SYSTEMS/HIV/VITAL SIGNS( [place])

## 2017-05-26 NOTE — ED PROVIDER NOTE - PROGRESS NOTE DETAILS
pt not eating or drinking with FTT. pt not taking meds and pt is dehydrated. pt will be admitted due to inability to tolerate po/dehydration

## 2017-05-27 DIAGNOSIS — F25.0 SCHIZOAFFECTIVE DISORDER, BIPOLAR TYPE: ICD-10-CM

## 2017-05-27 DIAGNOSIS — E03.9 HYPOTHYROIDISM, UNSPECIFIED: ICD-10-CM

## 2017-05-27 DIAGNOSIS — E86.0 DEHYDRATION: ICD-10-CM

## 2017-05-27 DIAGNOSIS — R69 ILLNESS, UNSPECIFIED: ICD-10-CM

## 2017-05-27 DIAGNOSIS — J90 PLEURAL EFFUSION, NOT ELSEWHERE CLASSIFIED: ICD-10-CM

## 2017-05-27 LAB
APPEARANCE UR: ABNORMAL
BACTERIA # UR AUTO: ABNORMAL
BILIRUB UR-MCNC: NEGATIVE — SIGNIFICANT CHANGE UP
COLOR SPEC: YELLOW — SIGNIFICANT CHANGE UP
COMMENT - URINE: SIGNIFICANT CHANGE UP
DIFF PNL FLD: ABNORMAL
EPI CELLS # UR: SIGNIFICANT CHANGE UP
GLUCOSE UR QL: NEGATIVE MG/DL — SIGNIFICANT CHANGE UP
KETONES UR-MCNC: ABNORMAL
LEUKOCYTE ESTERASE UR-ACNC: NEGATIVE — SIGNIFICANT CHANGE UP
NITRITE UR-MCNC: NEGATIVE — SIGNIFICANT CHANGE UP
PH UR: 5 — SIGNIFICANT CHANGE UP (ref 5–8)
PROT UR-MCNC: 100 MG/DL
RBC CASTS # UR COMP ASSIST: ABNORMAL /HPF (ref 0–4)
SP GR SPEC: 1.02 — SIGNIFICANT CHANGE UP (ref 1.01–1.02)
UROBILINOGEN FLD QL: NEGATIVE MG/DL — SIGNIFICANT CHANGE UP
WBC UR QL: SIGNIFICANT CHANGE UP

## 2017-05-27 PROCEDURE — 99285 EMERGENCY DEPT VISIT HI MDM: CPT

## 2017-05-27 PROCEDURE — 71010: CPT | Mod: 26

## 2017-05-27 PROCEDURE — 12345: CPT | Mod: NC

## 2017-05-27 PROCEDURE — 70450 CT HEAD/BRAIN W/O DYE: CPT | Mod: 26

## 2017-05-27 PROCEDURE — 93010 ELECTROCARDIOGRAM REPORT: CPT

## 2017-05-27 RX ORDER — VALPROIC ACID (AS SODIUM SALT) 250 MG/5ML
1000 SOLUTION, ORAL ORAL AT BEDTIME
Qty: 0 | Refills: 0 | Status: DISCONTINUED | OUTPATIENT
Start: 2017-05-27 | End: 2017-05-29

## 2017-05-27 RX ORDER — SODIUM CHLORIDE 9 MG/ML
1000 INJECTION, SOLUTION INTRAVENOUS
Qty: 0 | Refills: 0 | Status: DISCONTINUED | OUTPATIENT
Start: 2017-05-27 | End: 2017-06-06

## 2017-05-27 RX ORDER — ONDANSETRON 8 MG/1
4 TABLET, FILM COATED ORAL EVERY 6 HOURS
Qty: 0 | Refills: 0 | Status: DISCONTINUED | OUTPATIENT
Start: 2017-05-27 | End: 2017-06-09

## 2017-05-27 RX ORDER — LEVOTHYROXINE SODIUM 125 MCG
150 TABLET ORAL DAILY
Qty: 0 | Refills: 0 | Status: DISCONTINUED | OUTPATIENT
Start: 2017-05-27 | End: 2017-06-06

## 2017-05-27 RX ORDER — HALOPERIDOL DECANOATE 100 MG/ML
5 INJECTION INTRAMUSCULAR AT BEDTIME
Qty: 0 | Refills: 0 | Status: DISCONTINUED | OUTPATIENT
Start: 2017-05-27 | End: 2017-05-29

## 2017-05-27 RX ORDER — ENOXAPARIN SODIUM 100 MG/ML
40 INJECTION SUBCUTANEOUS EVERY 24 HOURS
Qty: 0 | Refills: 0 | Status: DISCONTINUED | OUTPATIENT
Start: 2017-05-27 | End: 2017-06-08

## 2017-05-27 RX ORDER — VALPROIC ACID (AS SODIUM SALT) 250 MG/5ML
500 SOLUTION, ORAL ORAL DAILY
Qty: 0 | Refills: 0 | Status: DISCONTINUED | OUTPATIENT
Start: 2017-05-27 | End: 2017-05-29

## 2017-05-27 RX ORDER — SODIUM CHLORIDE 9 MG/ML
1000 INJECTION, SOLUTION INTRAVENOUS
Qty: 0 | Refills: 0 | Status: DISCONTINUED | OUTPATIENT
Start: 2017-05-27 | End: 2017-05-27

## 2017-05-27 RX ORDER — HALOPERIDOL DECANOATE 100 MG/ML
10 INJECTION INTRAMUSCULAR DAILY
Qty: 0 | Refills: 0 | Status: DISCONTINUED | OUTPATIENT
Start: 2017-05-27 | End: 2017-05-27

## 2017-05-27 RX ORDER — HALOPERIDOL DECANOATE 100 MG/ML
5 INJECTION INTRAMUSCULAR DAILY
Qty: 0 | Refills: 0 | Status: DISCONTINUED | OUTPATIENT
Start: 2017-05-27 | End: 2017-06-06

## 2017-05-27 RX ADMIN — SODIUM CHLORIDE 100 MILLILITER(S): 9 INJECTION, SOLUTION INTRAVENOUS at 20:20

## 2017-05-27 RX ADMIN — SODIUM CHLORIDE 125 MILLILITER(S): 9 INJECTION, SOLUTION INTRAVENOUS at 06:01

## 2017-05-27 RX ADMIN — SODIUM CHLORIDE 2000 MILLILITER(S): 9 INJECTION INTRAMUSCULAR; INTRAVENOUS; SUBCUTANEOUS at 00:24

## 2017-05-27 NOTE — PROGRESS NOTE ADULT - SUBJECTIVE AND OBJECTIVE BOX
Internal Medicine Hospitalist - Dr. Ghazal NUGENT    [id number]    47y      Male    Patient is a 47y old  Male who presents with a chief complaint of wont eat (27 May 2017 05:11)    HPI:  46 y/o male dcd from [facility] on  after inpatient w/u pleural effusion s/p thoracentesis. CHF was ruled out, cytology pending. Patient also had pysch medication regimen adjusted. Since being dcd to [facility] he will not eat and has not taken his medications. In the ED patient with normal vitals, no evidence of any new infection. He is non verbal at baseline. He is not following any commands but is awake and responsive to vocal/tactile stimuli. At this time [facility] can not accept him back until he is able to eat and take his medications. (27 May 2017 05:11)    INTERVAL HPI/ OVERNIGHT EVENTS: Patient is seen and examined, pt refused to take medication, refusing to eat    REVIEW OF SYSTEMS:    Non verbal, unable to obtain     PHYSICAL EXAM:    Vital Signs Last 24 Hrs  T(C): 37.3, Max: 37.3 ( @ 12:00)  T(F): 99.1, Max: 99.1 ( @ 12:00)  HR: 93 (68 - 93)  BP: 168/91 (103/73 - 168/91)  BP(mean): 94 (94 - 94)  RR: 20 (16 - 24)  SpO2: 94% (94% - 98%)    GENERAL: NAD  HEENT: EOMI  Neck: supple  CHEST/LUNG: decrease breath sounds over bases   HEART: S1S2+ audible  ABDOMEN: Soft, Nontender, Nondistended; Bowel sounds present  EXTREMITIES:  no edema  CNS: awake non verbal     LABS:                        12.0   11.1  )-----------( 452      ( 26 May 2017 21:21 )             36.3         140  |  103  |  28.0<H>  ----------------------------<  89  4.4   |  24.0  |  1.05    Ca    9.3      26 May 2017 21:21    TPro  7.6  /  Alb  3.0<L>  /  TBili  0.1<L>  /  DBili  x   /  AST  26  /  ALT  36  /  AlkPhos  81        Urinalysis Basic - ( 27 May 2017 06:20 )    Color: Yellow / Appearance: Slightly Turbid / S.025 / pH: x  Gluc: x / Ketone: Moderate  / Bili: Negative / Urobili: Negative mg/dL   Blood: x / Protein: 100 mg/dL / Nitrite: Negative   Leuk Esterase: Negative / RBC: 25-50 /HPF / WBC 0-2   Sq Epi: x / Non Sq Epi: Few / Bacteria: Few          MEDICATIONS  (STANDING):  enoxaparin Injectable 40milliGRAM(s) SubCutaneous every 24 hours  dextrose 5% + sodium chloride 0.9%. 1000milliLiter(s) IV Continuous <Continuous>  haloperidol     Tablet 5milliGRAM(s) Oral at bedtime  levothyroxine 150MICROGram(s) Oral daily  valproic acid 500milliGRAM(s) Oral daily  valproic acid 1000milliGRAM(s) Oral at bedtime  haloperidol     Tablet 5milliGRAM(s) Oral daily    MEDICATIONS  (PRN):  ondansetron Injectable 4milliGRAM(s) IV Push every 6 hours PRN Nausea  LORazepam     Tablet 2milliGRAM(s) Oral three times a day PRN Anxiety      RADIOLOGY & ADDITIONAL TEST  EXAM:  CHEST SINGLE VIEW FRONTAL                        PROCEDURE DATE:  2017      IMPRESSION:   Persistent bilateral of moderate pleural effusions and/or   atelectasis..

## 2017-05-27 NOTE — ED BEHAVIORAL HEALTH ASSESSMENT NOTE - HPI (INCLUDE ILLNESS QUALITY, SEVERITY, DURATION, TIMING, CONTEXT, MODIFYING FACTORS, ASSOCIATED SIGNS AND SYMPTOMS)
48 y/o, single, unemployed, disabled, -American  male;  with a history of Schizoaffective disorder, bipolar type;  domiciled at [facility]-[address], [facility]; was discharged from [facility] on 5/25 after inpatient hospitalization for pleural effusion s/p thoracentesis. Since discharged from the hospital, patient would not eat nor takes his medications; per his 1:1 aid from [facility], he is also not at his baseline. He was brought back to the hospital for further medical stabilization.     On assessment, patient is non-verbal; unable to answer yes or no questions even via nodding; patient is notably calm; appears unaware of his surrounding; per aid at bedside, patient has not been eating nor engaging with others as usual; his eye contact is notably poor, which is not baseline for patient according to his 1:1 aid. Patient is non-verbal at baseline, but has been able to answer yes or no questions, and followed commands until recently when he got sick, and is no longer able to function as usual as per aid from [facility].  Unable to determine current suicidality, homicidality or psychosis since patient is non-responsive to verbal prompts or commands. He will need further medical diagnostic screening and neuro follow up. At this time, patient’s presentation is not consistent with catatonia or acute psychiatric symptomatology, and will not benefit from impatient psychiatric hospitalization.    ~Case discussed with Dr. Sargent and Dr. Ghazal Ordaz.

## 2017-05-27 NOTE — H&P ADULT - HISTORY OF PRESENT ILLNESS
48 y/o male dcd from [facility] on 5/25 after inpatient w/u pleural effusion s/p thoracentesis. CHF was ruled out, cytology pending. Patient also had pysch medication regimen adjusted. Since being dcd to [facility] he will not eat and has not taken his medications. In the ED patient with normal vitals, no evidence of any new infection. He is non verbal at baseline. He is not following any commands but is awake and responsive to vocal/tactile stimuli. At this time [facility] can not accept him back until he is able to eat and take his medications.

## 2017-05-27 NOTE — ED BEHAVIORAL HEALTH ASSESSMENT NOTE - DESCRIPTION
Patient is nonverbal and unable to answer questions related to the psychiatric assessment. Hypothyroidism See HPI

## 2017-05-27 NOTE — ED BEHAVIORAL HEALTH ASSESSMENT NOTE - OTHER PAST PSYCHIATRIC HISTORY (INCLUDE DETAILS REGARDING ONSET, COURSE OF ILLNESS, INPATIENT/OUTPATIENT TREATMENT)
History of schizoaffective disorder. [place] [facility]

## 2017-05-27 NOTE — ED BEHAVIORAL HEALTH ASSESSMENT NOTE - CURRENT MEDICATION
enoxaparin Injectable 40milliGRAM(s) SubCutaneous every 24 hours; dextrose 5% + sodium chloride 0.9%. 1000milliLiter(s) IV Continuous; haloperidol Tablet 5milliGRAM(s) Oral at bedtime; levothyroxine 150MICROGram(s) Oral daily; valproic acid 500milliGRAM(s) Oral daily; valproic acid 1000milliGRAM(s) Oral at bedtime. Haloperidol Tablet 5milliGRAM(s) Oral daily.

## 2017-05-27 NOTE — PROGRESS NOTE ADULT - ASSESSMENT
48 y/o male with schizoaffective disorder, Pleural effusion s/p thoracentesis, anorexia, dehydration recently discharged from hospital, noted to have  Moderate pleural effusion in the left lateral region, pt is s/p IR guided thoracentesis, removed almost 1400 cc, sent back to ed as he was not eating at [facility], afebrile, xray showed moderate pleural effusion

## 2017-05-27 NOTE — ED ADULT NURSE REASSESSMENT NOTE - NS ED NURSE REASSESS COMMENT FT1
Pt back from CT scan. Resting comfortably in NAD resp even and unlabored. Pt still pending urine specimen. Straight cath attempted earlier but pt had previous had a large output in diaper just before straight cathing. Dr. Berumen aware. Tena rivera remains @ bedside. Pt's sister continues to call hourly. Will continue to monitor pt.

## 2017-05-27 NOTE — ED ADULT NURSE REASSESSMENT NOTE - NS ED NURSE REASSESS COMMENT FT1
Dr. Patel @ bedside assessing pt for admission. Pt remains @ baseline.  Sister called and is aware of the plan of care that doctor Amin will see pt in a.m. as well as psych doctor. Will continue to monitor.

## 2017-05-27 NOTE — H&P ADULT - ASSESSMENT
48 y/o male with schizoaffective disorder, Pleural effusion s/p thoracentesis, anorexia, dehydration

## 2017-05-27 NOTE — PROGRESS NOTE ADULT - PROBLEM SELECTOR PLAN 4
psych consult requested, further management as per psych   during last admission  restarted Haldol 5mg PO BID, Depakote 500mg QD and 1000mg QHS, and defer to [facility] provider to restart clozapine via titration.

## 2017-05-27 NOTE — ED BEHAVIORAL HEALTH ASSESSMENT NOTE - OTHER
Patient's 1:1 Aid from [facility] ([facility]) 1:1 Aid from [facility]. Other patients. Resides in a supervise setting at [facility]. Non-responsive to verbal commands. Unable to assess. Unable to assess due to patient being bedbound. Patient is non-verbal. Patient is non-verbal; unable to assess. Provide adequate nutrition.

## 2017-05-27 NOTE — PROGRESS NOTE ADULT - PROBLEM SELECTOR PLAN 2
pt is s/p IR guided thoracentesis, removed almost 1400 cc,   Pleural fluid  - ph 7.5, LDH 81, P: 3.9, transduative based on LDH, mildly positive for exudative based on protein   f/u pleural fluid c/s, cytology pending   TTE - LVEF 50-55% - no h/o CHF  repeat xray no pneumonia, I don't think he need Abx, will f/u on repeat c/s, consider Abx if WBC trending up or spike fever

## 2017-05-27 NOTE — ED BEHAVIORAL HEALTH ASSESSMENT NOTE - SUMMARY
48 y/o, single, unemployed, disabled, -American  male;  with a history of Schizoaffective disorder, bipolar type;  domiciled at [facility]-[address], [facility]; was discharged from [facility] on 5/25 after inpatient hospitalization for pleural effusion s/p thoracentesis. Since discharged from the hospital, patient would not eat nor takes his medications; per his 1:1 aid from [facility], he is also not at his baseline. He was brought back to the hospital for further medical stabilization.      Patient is not an acute risk to self or others at this time; however, will benefit from neuro follow-up and further screening to rule out new onset of acute medical comorbidities.

## 2017-05-27 NOTE — ED BEHAVIORAL HEALTH ASSESSMENT NOTE - REFERRAL / APPOINTMENT DETAILS
Patient will follow up with his psychiatric provider at [facility] upon discharge from the hospital.

## 2017-05-27 NOTE — PROGRESS NOTE ADULT - PROBLEM SELECTOR PLAN 1
Pt is refusing to eat or take medication, psych consult requested   pt is afebrile, xray no pneumonia, will resend blood and urine c/s  cont. IVF Pt is refusing to eat or take medication, psych consult requested   pt is afebrile, xray no pneumonia, will resend blood and urine c/s  cont. IVF  psych suggested repeat MRI brain to r/o stroke,  though he had MRI on last admission no stroke, if abnormal consider Neurology

## 2017-05-28 LAB
ANION GAP SERPL CALC-SCNC: 11 MMOL/L — SIGNIFICANT CHANGE UP (ref 5–17)
BASOPHILS # BLD AUTO: 0 K/UL — SIGNIFICANT CHANGE UP (ref 0–0.2)
BASOPHILS NFR BLD AUTO: 0.1 % — SIGNIFICANT CHANGE UP (ref 0–2)
BUN SERPL-MCNC: 23 MG/DL — HIGH (ref 8–20)
C DIFF BY PCR RESULT: SIGNIFICANT CHANGE UP
C DIFF TOX GENS STL QL NAA+PROBE: SIGNIFICANT CHANGE UP
CALCIUM SERPL-MCNC: 8.8 MG/DL — SIGNIFICANT CHANGE UP (ref 8.6–10.2)
CHLORIDE SERPL-SCNC: 110 MMOL/L — HIGH (ref 98–107)
CO2 SERPL-SCNC: 24 MMOL/L — SIGNIFICANT CHANGE UP (ref 22–29)
CREAT SERPL-MCNC: 0.86 MG/DL — SIGNIFICANT CHANGE UP (ref 0.5–1.3)
CULTURE RESULTS: SIGNIFICANT CHANGE UP
EOSINOPHIL # BLD AUTO: 0.1 K/UL — SIGNIFICANT CHANGE UP (ref 0–0.5)
EOSINOPHIL NFR BLD AUTO: 0.7 % — SIGNIFICANT CHANGE UP (ref 0–5)
GLUCOSE SERPL-MCNC: 87 MG/DL — SIGNIFICANT CHANGE UP (ref 70–115)
HCT VFR BLD CALC: 33 % — LOW (ref 42–52)
HGB BLD-MCNC: 10.6 G/DL — LOW (ref 14–18)
LYMPHOCYTES # BLD AUTO: 16.2 % — LOW (ref 20–55)
LYMPHOCYTES # BLD AUTO: 2.3 K/UL — SIGNIFICANT CHANGE UP (ref 1–4.8)
MAGNESIUM SERPL-MCNC: 1.8 MG/DL — SIGNIFICANT CHANGE UP (ref 1.6–2.6)
MCHC RBC-ENTMCNC: 28.7 PG — SIGNIFICANT CHANGE UP (ref 27–31)
MCHC RBC-ENTMCNC: 32.1 G/DL — SIGNIFICANT CHANGE UP (ref 32–36)
MCV RBC AUTO: 89.4 FL — SIGNIFICANT CHANGE UP (ref 80–94)
MONOCYTES # BLD AUTO: 0.7 K/UL — SIGNIFICANT CHANGE UP (ref 0–0.8)
MONOCYTES NFR BLD AUTO: 5.2 % — SIGNIFICANT CHANGE UP (ref 3–10)
NEUTROPHILS # BLD AUTO: 10.8 K/UL — HIGH (ref 1.8–8)
NEUTROPHILS NFR BLD AUTO: 76.1 % — HIGH (ref 37–73)
PHOSPHATE SERPL-MCNC: 3 MG/DL — SIGNIFICANT CHANGE UP (ref 2.4–4.7)
PLATELET # BLD AUTO: 344 K/UL — SIGNIFICANT CHANGE UP (ref 150–400)
POTASSIUM SERPL-MCNC: 3.9 MMOL/L — SIGNIFICANT CHANGE UP (ref 3.5–5.3)
POTASSIUM SERPL-SCNC: 3.9 MMOL/L — SIGNIFICANT CHANGE UP (ref 3.5–5.3)
RBC # BLD: 3.69 M/UL — LOW (ref 4.6–6.2)
RBC # FLD: 15.1 % — SIGNIFICANT CHANGE UP (ref 11–15.6)
SODIUM SERPL-SCNC: 145 MMOL/L — SIGNIFICANT CHANGE UP (ref 135–145)
SPECIMEN SOURCE: SIGNIFICANT CHANGE UP
WBC # BLD: 14.2 K/UL — HIGH (ref 4.8–10.8)
WBC # FLD AUTO: 14.2 K/UL — HIGH (ref 4.8–10.8)

## 2017-05-28 PROCEDURE — 99233 SBSQ HOSP IP/OBS HIGH 50: CPT

## 2017-05-28 RX ADMIN — SODIUM CHLORIDE 100 MILLILITER(S): 9 INJECTION, SOLUTION INTRAVENOUS at 11:31

## 2017-05-28 RX ADMIN — Medication 1 MILLIGRAM(S): at 06:14

## 2017-05-28 RX ADMIN — ENOXAPARIN SODIUM 40 MILLIGRAM(S): 100 INJECTION SUBCUTANEOUS at 11:31

## 2017-05-28 NOTE — PATIENT PROFILE ADULT. - ABILITY TO HEAR (WITH HEARING AID OR HEARING APPLIANCE IF NORMALLY USED):
Adequate: hears normal conversation without difficulty Understands gestures/Severely Impaired: absence of useful hearing

## 2017-05-28 NOTE — PROGRESS NOTE ADULT - PROBLEM SELECTOR PLAN 1
Pt is refusing take medication, took some food today  pt is afebrile, xray no pneumonia, will resend blood and urine c/s   although does have leucocytosis - clinically no e/o infection   had some loose stools overnight - 1 episode only  cont. IVF  psych suggested repeat MRI brain to r/o stroke,  though he had MRI on last admission no stroke, if abnormal consider Neurology  f/u MRI head  neurochecks  Will get EEG

## 2017-05-28 NOTE — PROGRESS NOTE ADULT - SUBJECTIVE AND OBJECTIVE BOX
SAMIRA NUGENT    [id number]    47y      Male    CC: unresponsiveness, not accepting feeds and meds.  tolerated food and some fruit juice today per [facility] aide at bedside  at baseline - pt is non-verbal but able to communicate his personal needs, makes good eye contact But this is not his normal self since discharged from hospital     INTERVAL HPI/OVERNIGHT EVENTS: no acute events    REVIEW OF SYSTEMS:    unable to obtain 2.2 mental state      Vital Signs Last 24 Hrs  T(C): 36.8, Max: 36.9 ( @ 17:42)  T(F): 98.3, Max: 98.4 ( @ 17:42)  HR: 84 (60 - 105)  BP: 129/78 (129/78 - 162/92)  BP(mean): --  RR: 14 (14 - 20)  SpO2: 97% (94% - 99%)    PHYSICAL EXAM:    GENERAL: NAD, well-groomed, lying comfortable, drowsy, arousable but does not follow commands  HEENT: PERRL, +EOMI  NECK: soft, Supple, No JVD,   CHEST/LUNG: Clear to percussion bilaterally; No wheezing  HEART: S1S2+, Regular rate and rhythm; No murmurs, rubs, or gallops  ABDOMEN: Soft, Nontender, Nondistended; Bowel sounds present  EXTREMITIES:  2+ Peripheral Pulses, No clubbing, cyanosis, or edema  SKIN: No rashes or lesions  NEURO: AAOX0, does not follow commands - difficult to assess        LABS:                        10.6   14.2  )-----------( 344      ( 28 May 2017 07:36 )             33.0         145  |  110<H>  |  23.0<H>  ----------------------------<  87  3.9   |  24.0  |  0.86    Ca    8.8      28 May 2017 07:36  Phos  3.0       Mg     1.8         TPro  7.6  /  Alb  3.0<L>  /  TBili  0.1<L>  /  DBili  x   /  AST  26  /  ALT  36  /  AlkPhos  81        Urinalysis Basic - ( 27 May 2017 06:20 )    Color: Yellow / Appearance: Slightly Turbid / S.025 / pH: x  Gluc: x / Ketone: Moderate  / Bili: Negative / Urobili: Negative mg/dL   Blood: x / Protein: 100 mg/dL / Nitrite: Negative   Leuk Esterase: Negative / RBC: 25-50 /HPF / WBC 0-2   Sq Epi: x / Non Sq Epi: Few / Bacteria: Few          MEDICATIONS  (STANDING):  enoxaparin Injectable 40milliGRAM(s) SubCutaneous every 24 hours  haloperidol     Tablet 5milliGRAM(s) Oral at bedtime  levothyroxine 150MICROGram(s) Oral daily  valproic acid 500milliGRAM(s) Oral daily  valproic acid 1000milliGRAM(s) Oral at bedtime  haloperidol     Tablet 5milliGRAM(s) Oral daily  dextrose 5% + sodium chloride 0.9%. 1000milliLiter(s) IV Continuous <Continuous>    MEDICATIONS  (PRN):  ondansetron Injectable 4milliGRAM(s) IV Push every 6 hours PRN Nausea  LORazepam     Tablet 2milliGRAM(s) Oral three times a day PRN Anxiety      RADIOLOGY & ADDITIONAL TESTS:  CT head -   CXR - reviewed

## 2017-05-28 NOTE — PROGRESS NOTE ADULT - PROBLEM SELECTOR PLAN 2
pt is s/p IR guided thoracentesis, removed almost 1400 cc,   Pleural fluid  - ph 7.5, LDH 81, P: 3.9, transduative based on LDH, mildly positive for exudative based on protein   f/u pleural fluid c/s, cytology pending   TTE - LVEF 50-55% - no h/o CHF  repeat xray no pneumonia, no need for Abx, will f/u on repeat c/s, consider Abx if WBC trending up or spike fever

## 2017-05-29 DIAGNOSIS — E87.8 OTHER DISORDERS OF ELECTROLYTE AND FLUID BALANCE, NOT ELSEWHERE CLASSIFIED: ICD-10-CM

## 2017-05-29 LAB
ANION GAP SERPL CALC-SCNC: 15 MMOL/L — SIGNIFICANT CHANGE UP (ref 5–17)
BUN SERPL-MCNC: 13 MG/DL — SIGNIFICANT CHANGE UP (ref 8–20)
CALCIUM SERPL-MCNC: 8.4 MG/DL — LOW (ref 8.6–10.2)
CHLORIDE SERPL-SCNC: 104 MMOL/L — SIGNIFICANT CHANGE UP (ref 98–107)
CO2 SERPL-SCNC: 24 MMOL/L — SIGNIFICANT CHANGE UP (ref 22–29)
CREAT SERPL-MCNC: 0.77 MG/DL — SIGNIFICANT CHANGE UP (ref 0.5–1.3)
GLUCOSE SERPL-MCNC: 91 MG/DL — SIGNIFICANT CHANGE UP (ref 70–115)
HCT VFR BLD CALC: 35.4 % — LOW (ref 42–52)
HGB BLD-MCNC: 11.6 G/DL — LOW (ref 14–18)
MCHC RBC-ENTMCNC: 28.9 PG — SIGNIFICANT CHANGE UP (ref 27–31)
MCHC RBC-ENTMCNC: 32.8 G/DL — SIGNIFICANT CHANGE UP (ref 32–36)
MCV RBC AUTO: 88.3 FL — SIGNIFICANT CHANGE UP (ref 80–94)
PLATELET # BLD AUTO: 361 K/UL — SIGNIFICANT CHANGE UP (ref 150–400)
POTASSIUM SERPL-MCNC: 3.2 MMOL/L — LOW (ref 3.5–5.3)
POTASSIUM SERPL-SCNC: 3.2 MMOL/L — LOW (ref 3.5–5.3)
RBC # BLD: 4.01 M/UL — LOW (ref 4.6–6.2)
RBC # FLD: 15.1 % — SIGNIFICANT CHANGE UP (ref 11–15.6)
SODIUM SERPL-SCNC: 143 MMOL/L — SIGNIFICANT CHANGE UP (ref 135–145)
WBC # BLD: 12.3 K/UL — HIGH (ref 4.8–10.8)
WBC # FLD AUTO: 12.3 K/UL — HIGH (ref 4.8–10.8)

## 2017-05-29 PROCEDURE — 93010 ELECTROCARDIOGRAM REPORT: CPT

## 2017-05-29 PROCEDURE — 99233 SBSQ HOSP IP/OBS HIGH 50: CPT

## 2017-05-29 RX ORDER — VALPROIC ACID (AS SODIUM SALT) 250 MG/5ML
1000 SOLUTION, ORAL ORAL
Qty: 0 | Refills: 0 | Status: DISCONTINUED | OUTPATIENT
Start: 2017-05-29 | End: 2017-05-30

## 2017-05-29 RX ORDER — HALOPERIDOL DECANOATE 100 MG/ML
5 INJECTION INTRAMUSCULAR ONCE
Qty: 0 | Refills: 0 | Status: COMPLETED | OUTPATIENT
Start: 2017-05-29 | End: 2017-05-29

## 2017-05-29 RX ORDER — POTASSIUM CHLORIDE 20 MEQ
10 PACKET (EA) ORAL
Qty: 0 | Refills: 0 | Status: COMPLETED | OUTPATIENT
Start: 2017-05-29 | End: 2017-05-29

## 2017-05-29 RX ORDER — VALPROIC ACID (AS SODIUM SALT) 250 MG/5ML
500 SOLUTION, ORAL ORAL
Qty: 0 | Refills: 0 | Status: DISCONTINUED | OUTPATIENT
Start: 2017-05-29 | End: 2017-05-30

## 2017-05-29 RX ORDER — MAGNESIUM SULFATE 500 MG/ML
2 VIAL (ML) INJECTION ONCE
Qty: 0 | Refills: 0 | Status: COMPLETED | OUTPATIENT
Start: 2017-05-29 | End: 2017-05-29

## 2017-05-29 RX ADMIN — Medication 27.5 MILLIGRAM(S): at 17:34

## 2017-05-29 RX ADMIN — Medication 50 GRAM(S): at 13:48

## 2017-05-29 RX ADMIN — Medication 2 MILLIGRAM(S): at 20:51

## 2017-05-29 RX ADMIN — Medication 100 MILLIEQUIVALENT(S): at 16:30

## 2017-05-29 RX ADMIN — Medication 2 MILLIGRAM(S): at 20:56

## 2017-05-29 RX ADMIN — Medication 100 MILLIEQUIVALENT(S): at 14:17

## 2017-05-29 RX ADMIN — ENOXAPARIN SODIUM 40 MILLIGRAM(S): 100 INJECTION SUBCUTANEOUS at 21:42

## 2017-05-29 RX ADMIN — HALOPERIDOL DECANOATE 5 MILLIGRAM(S): 100 INJECTION INTRAMUSCULAR at 22:14

## 2017-05-29 RX ADMIN — SODIUM CHLORIDE 100 MILLILITER(S): 9 INJECTION, SOLUTION INTRAVENOUS at 17:30

## 2017-05-29 RX ADMIN — Medication 30 MILLIGRAM(S): at 21:40

## 2017-05-29 RX ADMIN — Medication 100 MILLIEQUIVALENT(S): at 15:44

## 2017-05-29 NOTE — PROGRESS NOTE ADULT - PROBLEM SELECTOR PLAN 4
psych consult requested, further management as per psych   during last admission  restarted Haldol 5mg PO BID, Depakote 500mg QD and 1000mg QHS, and defer to [facility] provider to restart clozapine via titration. psych consult requested, further management as per psych   during last admission  restarted Haldol 5mg PO BID, Depakote 500mg QD and 1000mg QHS, and defer to [facility] provider to restart clozapine via titration.  switch to iv valproic for now as refusing to po meds

## 2017-05-29 NOTE — PROGRESS NOTE ADULT - ASSESSMENT
46 y/o male with schizoaffective disorder, Pleural effusion s/p thoracentesis, anorexia, dehydration recently discharged from hospital, noted to have  Moderate pleural effusion in the left lateral region, pt is s/p IR guided thoracentesis, removed almost 1400 cc, sent back to ed as he was not eating at [facility], afebrile, xray showed moderate pleural effusion

## 2017-05-29 NOTE — PROGRESS NOTE ADULT - SUBJECTIVE AND OBJECTIVE BOX
SAMIRA NUGENT    [id number]    47y      Male    CC: refusal to po feeds    INTERVAL HPI/OVERNIGHT EVENTS: no acute events    REVIEW OF SYSTEMS:    unable to obtain as non-verbal   but responds by nodding today - nods no to pain      Vital Signs Last 24 Hrs  T(C): 37.7, Max: 37.7 (05-29 @ 10:25)  T(F): 99.8, Max: 99.8 (05-29 @ 10:25)  HR: 88 (60 - 96)  BP: 140/80 (118/60 - 140/80)  BP(mean): --  RR: 14 (14 - 16)  SpO2: 98% (98% - 98%)    PHYSICAL EXAM:    GENERAL: NAD, well-groomed  HEENT: PERRL, +EOMI  NECK: soft, Supple, No JVD,   CHEST/LUNG: Clear to percussion bilaterally; No wheezing  HEART: S1S2+, Regular rate and rhythm; No murmurs, rubs, or gallops  ABDOMEN: Soft, Nontender, Nondistended; Bowel sounds present  EXTREMITIES:  2+ Peripheral Pulses, No clubbing, cyanosis, or edema  NEURO: AAOX1 (difficult to assess as pt does not respond to any verbal coomands, no focal deficits, no motor  loss  PSYCH: normal mood      LABS:                        11.6   12.3  )-----------( 361      ( 29 May 2017 06:55 )             35.4     05-29    143  |  104  |  13.0  ----------------------------<  91  3.2<L>   |  24.0  |  0.77    Ca    8.4<L>      29 May 2017 06:55  Phos  3.0     05-28  Mg     1.8     05-28              MEDICATIONS  (STANDING):  enoxaparin Injectable 40milliGRAM(s) SubCutaneous every 24 hours  haloperidol     Tablet 5milliGRAM(s) Oral at bedtime  levothyroxine 150MICROGram(s) Oral daily  haloperidol     Tablet 5milliGRAM(s) Oral daily  dextrose 5% + sodium chloride 0.9%. 1000milliLiter(s) IV Continuous <Continuous>  LORazepam   Injectable 1milliGRAM(s) IV Push once  potassium chloride  10 mEq/100 mL IVPB 10milliEquivalent(s) IV Intermittent every 1 hour  valproate sodium IVPB 500milliGRAM(s) IV Intermittent <User Schedule>  valproate sodium IVPB 1000milliGRAM(s) IV Intermittent <User Schedule>    MEDICATIONS  (PRN):  ondansetron Injectable 4milliGRAM(s) IV Push every 6 hours PRN Nausea  LORazepam     Tablet 2milliGRAM(s) Oral three times a day PRN Anxiety      RADIOLOGY & ADDITIONAL TESTS:

## 2017-05-29 NOTE — CHART NOTE - NSCHARTNOTEFT_GEN_A_CORE
Rapid Response/ Note (PGY3 on call)    Patient was reported to be unresponsive with  tonic clonic movements and  foaming at the mouth. His  jerking mvts  have stopped and his mental status  improved but not at baseline  after 2 doses of ativan 3mg IVP that was ordered by Cardiac PA. Rapid response was called for concern of status epilepticus.   As per SBAR report, pt skipped 3 dose of PO valproic acid and was IV was ordered and currently is hung.   ____________________________________________________________________  BACKGROUND DATA  RAFFI SAMIRA,   Patient is a 47y old  Male who presents with a chief complaint of wont eat (27 May 2017 05:11)    HPI:  46 y/o male dcd from [facility] on 5/25 after inpatient w/u pleural effusion s/p thoracentesis. CHF was ruled out, cytology pending. Patient also had pysch medication regimen adjusted. Since being dcd to [place] he will not eat and has not taken his medications. In the ED patient with normal vitals, no evidence of any new infection. He is non verbal at baseline. He is not following any commands but is awake and responsive to vocal/tactile stimuli. At this time [place] can not accept him back until he is able to eat and take his medications. (27 May 2017 05:11)    ____________________________________________________________________    HEALTH ISSUES - PROBLEM Dx:  Electrolyte abnormality: Electrolyte abnormality  Deferred condition on axis II  Pleural effusion: Pleural effusion  Hypothyroidism, unspecified type: Hypothyroidism, unspecified type  Dehydration: Dehydration  Schizoaffective disorder, bipolar type: Schizoaffective disorder, bipolar type        No Known Allergies    MEDICATIONS  (STANDING):  enoxaparin Injectable 40milliGRAM(s) SubCutaneous every 24 hours  levothyroxine 150MICROGram(s) Oral daily  haloperidol     Tablet 5milliGRAM(s) Oral daily  dextrose 5% + sodium chloride 0.9%. 1000milliLiter(s) IV Continuous <Continuous>  LORazepam   Injectable 1milliGRAM(s) IV Push once  valproate sodium IVPB 500milliGRAM(s) IV Intermittent <User Schedule>  valproate sodium IVPB 1000milliGRAM(s) IV Intermittent <User Schedule>  LORazepam   Injectable 2milliGRAM(s) IntraMuscular once  LORazepam   Injectable 2milliGRAM(s) IntraMuscular once  haloperidol    Injectable 5milliGRAM(s) IntraMuscular once    MEDICATIONS  (PRN):  ondansetron Injectable 4milliGRAM(s) IV Push every 6 hours PRN Nausea  LORazepam     Tablet 2milliGRAM(s) Oral three times a day PRN Anxiety      ______________________________________________________________________    Vital Signs Last 24 Hrs  T(C): 36.9, Max: 37.7 (05-29 @ 10:25)  T(F): 98.4, Max: 99.8 (05-29 @ 10:25)  HR: 109 (84 - 109)  BP: 122/82 (122/82 - 140/80)  BP(mean): --  RR: 16 (14 - 16)  SpO2: 96% (96% - 99%)          CAPILLARY BLOOD GLUCOSE      PHYSICAL EXAM: LIMITED  GEN: alert, eye tracking, not following command but mvts of head, eyes, limbs appear to be purposeful     EENT: PERLLA    Lungs:     Heart: tachycardic    Abd:    Ext: no edema,  no tenderness,     Neuro: no focal deficit     _________________________________________________________________    Labs                          11.6   12.3  )-----------( 361      ( 29 May 2017 06:55 )             35.4                                                        05-29    143  |  104  |  13.0  ----------------------------<  91  3.2<L>   |  24.0  |  0.77    Ca    8.4<L>      29 May 2017 06:55  Phos  3.0     05-28  Mg     1.8     05-28                                                                                    Imaging: none    _______________________________________________________________________  Assessment /Plan:    tonic clonic seizures  - witness by medical staff and pilgram staffer  - Pt is s/p ativan 2mg x2  - no signs of convulsions at the moment.  Will continue with Valproic IV administration  - Will place pt on full monitor and closely assess vitals and mental status for signs decompensation/ seizures.  - Seizure precautions  - Neurology is onboard and EEG has been ordered but not yet performed. C.w to follow neuro work up  - Dr Stevens (hospitalist attending) made aware of status and mgt. He agrees with plan.         Jamel DALEY PGY3 Rapid Response/ Note (PGY3 on call)    Patient was reported to be unresponsive with  tonic clonic movements and  foaming at the mouth. His  jerking mvts  have stopped and his mental status  improved but not at baseline  after 2 doses of ativan 3mg IVP that was ordered by Cardiac PA. Rapid response was called for concern of status epilepticus.   As per SBAR report, pt skipped 3 dose of PO valproic acid. IV dose  was ordered and currently is being given  ____________________________________________________________________  BACKGROUND DATA  RAFFI SAMIRA,   Patient is a 47y old  Male who presents with a chief complaint of wont eat (27 May 2017 05:11)    HPI:  48 y/o male dcd from [facility] on 5/25 after inpatient w/u pleural effusion s/p thoracentesis. CHF was ruled out, cytology pending. Patient also had pysch medication regimen adjusted. Since being dcd to [place] he will not eat and has not taken his medications. In the ED patient with normal vitals, no evidence of any new infection. He is non verbal at baseline. He is not following any commands but is awake and responsive to vocal/tactile stimuli. At this time [place] can not accept him back until he is able to eat and take his medications. (27 May 2017 05:11)    ____________________________________________________________________    HEALTH ISSUES - PROBLEM Dx:  Electrolyte abnormality: Electrolyte abnormality  Deferred condition on axis II  Pleural effusion: Pleural effusion  Hypothyroidism, unspecified type: Hypothyroidism, unspecified type  Dehydration: Dehydration  Schizoaffective disorder, bipolar type: Schizoaffective disorder, bipolar type        No Known Allergies    MEDICATIONS  (STANDING):  enoxaparin Injectable 40milliGRAM(s) SubCutaneous every 24 hours  levothyroxine 150MICROGram(s) Oral daily  haloperidol     Tablet 5milliGRAM(s) Oral daily  dextrose 5% + sodium chloride 0.9%. 1000milliLiter(s) IV Continuous <Continuous>  LORazepam   Injectable 1milliGRAM(s) IV Push once  valproate sodium IVPB 500milliGRAM(s) IV Intermittent <User Schedule>  valproate sodium IVPB 1000milliGRAM(s) IV Intermittent <User Schedule>  LORazepam   Injectable 2milliGRAM(s) IntraMuscular once  LORazepam   Injectable 2milliGRAM(s) IntraMuscular once  haloperidol    Injectable 5milliGRAM(s) IntraMuscular once    MEDICATIONS  (PRN):  ondansetron Injectable 4milliGRAM(s) IV Push every 6 hours PRN Nausea  LORazepam     Tablet 2milliGRAM(s) Oral three times a day PRN Anxiety      ______________________________________________________________________    Vital Signs Last 24 Hrs  T(C): 36.9, Max: 37.7 (05-29 @ 10:25)  T(F): 98.4, Max: 99.8 (05-29 @ 10:25)  HR: 109 (84 - 109)  BP: 122/82 (122/82 - 140/80)  BP(mean): --  RR: 16 (14 - 16)  SpO2: 96% (96% - 99%)          CAPILLARY BLOOD GLUCOSE      PHYSICAL EXAM: LIMITED  GEN: alert, eye tracking, not following command but mvts of head, eyes, limbs appear to be purposeful     EENT: PERLLA    Lungs:     Heart: tachycardic    Abd:    Ext: no edema,  no tenderness,     Neuro: no focal deficit     _________________________________________________________________    Labs                          11.6   12.3  )-----------( 361      ( 29 May 2017 06:55 )             35.4                                                        05-29    143  |  104  |  13.0  ----------------------------<  91  3.2<L>   |  24.0  |  0.77    Ca    8.4<L>      29 May 2017 06:55  Phos  3.0     05-28  Mg     1.8     05-28                                                                                    Imaging: none    _______________________________________________________________________  Assessment /Plan:    tonic clonic seizures  - witness by medical staff and pilgram staffer  - Pt is s/p ativan 2mg x2  - no signs of convulsions at the moment.  Will continue with Valproic IV administration  - Will place pt on full monitor and closely assess vitals and mental status for signs decompensation/ seizures.  - Seizure precautions  - Neurology is onboard and EEG has been ordered but not yet performed. C.w to follow neuro work up  - Dr Stevens (hospitalist attending) made aware of status and mgt. He agrees with plan.         Jamel DALEY PGY3

## 2017-05-30 DIAGNOSIS — G40.909 EPILEPSY, UNSPECIFIED, NOT INTRACTABLE, WITHOUT STATUS EPILEPTICUS: ICD-10-CM

## 2017-05-30 DIAGNOSIS — R62.7 ADULT FAILURE TO THRIVE: ICD-10-CM

## 2017-05-30 LAB
AMMONIA BLD-MCNC: 40 UMOL/L — SIGNIFICANT CHANGE UP (ref 11–55)
ANION GAP SERPL CALC-SCNC: 11 MMOL/L — SIGNIFICANT CHANGE UP (ref 5–17)
BUN SERPL-MCNC: 10 MG/DL — SIGNIFICANT CHANGE UP (ref 8–20)
CALCIUM SERPL-MCNC: 8.5 MG/DL — LOW (ref 8.6–10.2)
CHLORIDE SERPL-SCNC: 106 MMOL/L — SIGNIFICANT CHANGE UP (ref 98–107)
CO2 SERPL-SCNC: 23 MMOL/L — SIGNIFICANT CHANGE UP (ref 22–29)
CREAT SERPL-MCNC: 0.82 MG/DL — SIGNIFICANT CHANGE UP (ref 0.5–1.3)
GLUCOSE SERPL-MCNC: 90 MG/DL — SIGNIFICANT CHANGE UP (ref 70–115)
HCT VFR BLD CALC: 37.6 % — LOW (ref 42–52)
HGB BLD-MCNC: 12.3 G/DL — LOW (ref 14–18)
MCHC RBC-ENTMCNC: 28.7 PG — SIGNIFICANT CHANGE UP (ref 27–31)
MCHC RBC-ENTMCNC: 32.7 G/DL — SIGNIFICANT CHANGE UP (ref 32–36)
MCV RBC AUTO: 87.6 FL — SIGNIFICANT CHANGE UP (ref 80–94)
PLATELET # BLD AUTO: 438 K/UL — HIGH (ref 150–400)
POTASSIUM SERPL-MCNC: 3.6 MMOL/L — SIGNIFICANT CHANGE UP (ref 3.5–5.3)
POTASSIUM SERPL-SCNC: 3.6 MMOL/L — SIGNIFICANT CHANGE UP (ref 3.5–5.3)
RBC # BLD: 4.29 M/UL — LOW (ref 4.6–6.2)
RBC # FLD: 14.9 % — SIGNIFICANT CHANGE UP (ref 11–15.6)
SODIUM SERPL-SCNC: 140 MMOL/L — SIGNIFICANT CHANGE UP (ref 135–145)
VALPROATE SERPL-MCNC: 47.1 UG/ML — LOW (ref 50–100)
WBC # BLD: 17.8 K/UL — HIGH (ref 4.8–10.8)
WBC # FLD AUTO: 17.8 K/UL — HIGH (ref 4.8–10.8)

## 2017-05-30 PROCEDURE — 99233 SBSQ HOSP IP/OBS HIGH 50: CPT

## 2017-05-30 PROCEDURE — 70551 MRI BRAIN STEM W/O DYE: CPT | Mod: 26

## 2017-05-30 RX ORDER — VALPROIC ACID (AS SODIUM SALT) 250 MG/5ML
1000 SOLUTION, ORAL ORAL EVERY 12 HOURS
Qty: 0 | Refills: 0 | Status: DISCONTINUED | OUTPATIENT
Start: 2017-05-30 | End: 2017-06-09

## 2017-05-30 RX ADMIN — HALOPERIDOL DECANOATE 5 MILLIGRAM(S): 100 INJECTION INTRAMUSCULAR at 22:49

## 2017-05-30 RX ADMIN — Medication 27.5 MILLIGRAM(S): at 08:58

## 2017-05-30 RX ADMIN — Medication 30 MILLIGRAM(S): at 18:10

## 2017-05-30 RX ADMIN — SODIUM CHLORIDE 100 MILLILITER(S): 9 INJECTION, SOLUTION INTRAVENOUS at 07:03

## 2017-05-30 RX ADMIN — ENOXAPARIN SODIUM 40 MILLIGRAM(S): 100 INJECTION SUBCUTANEOUS at 22:49

## 2017-05-30 NOTE — PROGRESS NOTE ADULT - PROBLEM SELECTOR PLAN 2
episode of seizure last night  on iv velproate - dose increased by neurology. was off his AED due to reusal to take po  F/u EEG episode of seizure last night  on iv valproate - dose increased by neurology. was off his AED due to reusal to take po  F/u EEG

## 2017-05-30 NOTE — PROGRESS NOTE BEHAVIORAL HEALTH - OTHER
Non-responsive to verbal commands. Unable to assess. Unable to assess due to patient being bedbound. Patient is non-verbal. Patient is non-verbal; unable to assess.

## 2017-05-30 NOTE — PROGRESS NOTE ADULT - PROBLEM SELECTOR PLAN 1
Pt is refusing take medications  pt is afebrile, xray no pneumonia, will resend blood and urine c/s   although does have leucocytosis - clinically no e/o infection   had some loose stools overnight - 1 episode only  cont. IVF  psych suggested repeat MRI brain to r/o stroke,  though he had MRI on last admission no stroke, if abnormal consider Neurology  f/u MRI head  neurochecks  Will get EEG Pt is refusing to take medications  pt is afebrile, xray no pneumonia,   although does have leucocytosis - clinically no e/o infection   MRI head - Neg , neuro consulted   neurochecks  Will get EEG

## 2017-05-30 NOTE — DIETITIAN INITIAL EVALUATION ADULT. - NS AS NUTRI INTERV MEALS SNACK
Texture-modified diet/Composition of meals/snacks/defer food consistency to SLP/MD/General/healthful diet

## 2017-05-30 NOTE — DIETITIAN INITIAL EVALUATION ADULT. - OTHER INFO
Pt seen at bedside with limited history obtained. Pt unable to provide information due to being non-verbal and having altered mental status. Pt has been refusing food and therefore has a poor PO intake. Today, pt had a PO intake of 75% for the first time since current admission. As per charts, pt has had difficulty swallowing solid food/liquids

## 2017-05-30 NOTE — PROGRESS NOTE ADULT - PROBLEM SELECTOR PLAN 3
pt is s/p IR guided thoracentesis, removed almost 1400 cc,   Pleural fluid  - ph 7.5, LDH 81, P: 3.9, transduative based on LDH, mildly positive for exudative based on protein   f/u pleural fluid c/s, cytology pending   TTE - LVEF 50-55% - no h/o CHF  repeat xray no pneumonia, no need for Abx - no e/o active infection pt is s/p IR guided thoracentesis, removed almost 1400 cc,   Pleural fluid  - ph 7.5, LDH 81, P: 3.9, transduative based on LDH, mildly positive for exudative based on protein   TTE - LVEF 50-55% - no h/o CHF  repeat xray no pneumonia, no need for Abx - no e/o active infection

## 2017-05-30 NOTE — PROGRESS NOTE ADULT - SUBJECTIVE AND OBJECTIVE BOX
SAMIRA NUGENT    [id number]    47y      Male    CC: AMS - unresponsive episodes     INTERVAL HPI/OVERNIGHT EVENTS: tonic clonic seizure overnight     REVIEW OF SYSTEMS:    unable to obtain 2/2 mental state      Vital Signs Last 24 Hrs  T(C): 36.6, Max: 37.1 (05-30 @ 06:49)  T(F): 97.8, Max: 98.7 (05-30 @ 06:49)  HR: 92 (84 - 109)  BP: 124/68 (120/78 - 130/80)  BP(mean): --  RR: 18 (14 - 18)  SpO2: 98% (96% - 99%)    PHYSICAL EXAM:    GENERAL: NAD, well-groomed  HEENT: PERRL, +EOMI  NECK: soft, Supple  CHEST/LUNG: Clear to percussion bilaterally; No wheezing  HEART: S1S2+, Regular rate and rhythm; No murmurs, rubs, or gallops  ABDOMEN: Soft, Nontender, Nondistended; Bowel sounds present  EXTREMITIES:  2+ Peripheral Pulses, No clubbing, cyanosis, or edema  SKIN: No rashes or lesions  NEURO: AAOX0-1 - difficult to assess  PSYCH: normal mood      LABS:                        12.3   17.8  )-----------( 438      ( 30 May 2017 07:08 )             37.6     05-30    140  |  106  |  10.0  ----------------------------<  90  3.6   |  23.0  |  0.82    Ca    8.5<L>      30 May 2017 07:08              MEDICATIONS  (STANDING):  enoxaparin Injectable 40milliGRAM(s) SubCutaneous every 24 hours  levothyroxine 150MICROGram(s) Oral daily  haloperidol     Tablet 5milliGRAM(s) Oral daily  dextrose 5% + sodium chloride 0.9%. 1000milliLiter(s) IV Continuous <Continuous>  LORazepam   Injectable 1milliGRAM(s) IV Push once  valproate sodium IVPB 1000milliGRAM(s) IV Intermittent every 12 hours    MEDICATIONS  (PRN):  ondansetron Injectable 4milliGRAM(s) IV Push every 6 hours PRN Nausea  LORazepam     Tablet 2milliGRAM(s) Oral three times a day PRN Anxiety      RADIOLOGY & ADDITIONAL TESTS: SAMIRA NUGENT    [id number]    47y      Male    CC: AMS - unresponsiveness, awake   today he is communicating some per aide. He is non-verbal at baseline, makes eye contact but does not follow commands    INTERVAL HPI/OVERNIGHT EVENTS: tonic clonic seizure overnight     REVIEW OF SYSTEMS:    unable to obtain 2/2 mental state       Vital Signs Last 24 Hrs  T(C): 36.6, Max: 37.1 (05-30 @ 06:49)  T(F): 97.8, Max: 98.7 (05-30 @ 06:49)  HR: 92 (84 - 109)  BP: 124/68 (120/78 - 130/80)  BP(mean): --  RR: 18 (14 - 18)  SpO2: 98% (96% - 99%)    PHYSICAL EXAM:    GENERAL: NAD, well-groomed  HEENT: PERRL, +EOMI  NECK: soft, Supple  CHEST/LUNG: Clear to percussion bilaterally; No wheezing  HEART: S1S2+, Regular rate and rhythm; No murmurs, rubs, or gallops  ABDOMEN: Soft, Nontender, Nondistended; Bowel sounds present  EXTREMITIES:  2+ Peripheral Pulses, No clubbing, cyanosis, or edema  SKIN: No rashes or lesions  NEURO: AAOX0-1 - difficult to assess  PSYCH: normal mood      LABS:                        12.3   17.8  )-----------( 438      ( 30 May 2017 07:08 )             37.6     05-30    140  |  106  |  10.0  ----------------------------<  90  3.6   |  23.0  |  0.82    Ca    8.5<L>      30 May 2017 07:08              MEDICATIONS  (STANDING):  enoxaparin Injectable 40milliGRAM(s) SubCutaneous every 24 hours  levothyroxine 150MICROGram(s) Oral daily  haloperidol     Tablet 5milliGRAM(s) Oral daily  dextrose 5% + sodium chloride 0.9%. 1000milliLiter(s) IV Continuous <Continuous>  LORazepam   Injectable 1milliGRAM(s) IV Push once  valproate sodium IVPB 1000milliGRAM(s) IV Intermittent every 12 hours    MEDICATIONS  (PRN):  ondansetron Injectable 4milliGRAM(s) IV Push every 6 hours PRN Nausea  LORazepam     Tablet 2milliGRAM(s) Oral three times a day PRN Anxiety      RADIOLOGY & ADDITIONAL TESTS:

## 2017-05-31 DIAGNOSIS — D72.829 ELEVATED WHITE BLOOD CELL COUNT, UNSPECIFIED: ICD-10-CM

## 2017-05-31 LAB
ALBUMIN SERPL ELPH-MCNC: 2.3 G/DL — LOW (ref 3.3–5.2)
ALP SERPL-CCNC: 65 U/L — SIGNIFICANT CHANGE UP (ref 40–120)
ALT FLD-CCNC: 16 U/L — SIGNIFICANT CHANGE UP
ANION GAP SERPL CALC-SCNC: 13 MMOL/L — SIGNIFICANT CHANGE UP (ref 5–17)
ANION GAP SERPL CALC-SCNC: 14 MMOL/L — SIGNIFICANT CHANGE UP (ref 5–17)
ANISOCYTOSIS BLD QL: SLIGHT — SIGNIFICANT CHANGE UP
AST SERPL-CCNC: 10 U/L — SIGNIFICANT CHANGE UP
BILIRUB SERPL-MCNC: 0.3 MG/DL — LOW (ref 0.4–2)
BUN SERPL-MCNC: 11 MG/DL — SIGNIFICANT CHANGE UP (ref 8–20)
BUN SERPL-MCNC: 9 MG/DL — SIGNIFICANT CHANGE UP (ref 8–20)
CALCIUM SERPL-MCNC: 8.1 MG/DL — LOW (ref 8.6–10.2)
CALCIUM SERPL-MCNC: 8.4 MG/DL — LOW (ref 8.6–10.2)
CHLORIDE SERPL-SCNC: 102 MMOL/L — SIGNIFICANT CHANGE UP (ref 98–107)
CHLORIDE SERPL-SCNC: 102 MMOL/L — SIGNIFICANT CHANGE UP (ref 98–107)
CO2 SERPL-SCNC: 23 MMOL/L — SIGNIFICANT CHANGE UP (ref 22–29)
CO2 SERPL-SCNC: 24 MMOL/L — SIGNIFICANT CHANGE UP (ref 22–29)
CREAT SERPL-MCNC: 0.8 MG/DL — SIGNIFICANT CHANGE UP (ref 0.5–1.3)
CREAT SERPL-MCNC: 0.9 MG/DL — SIGNIFICANT CHANGE UP (ref 0.5–1.3)
CRP SERPL-MCNC: 16.2 MG/DL — HIGH (ref 0–0.4)
GLUCOSE SERPL-MCNC: 105 MG/DL — SIGNIFICANT CHANGE UP (ref 70–115)
GLUCOSE SERPL-MCNC: 96 MG/DL — SIGNIFICANT CHANGE UP (ref 70–115)
HCT VFR BLD CALC: 34.4 % — LOW (ref 42–52)
HCT VFR BLD CALC: 38.2 % — LOW (ref 42–52)
HGB BLD-MCNC: 11.6 G/DL — LOW (ref 14–18)
HGB BLD-MCNC: 12.7 G/DL — LOW (ref 14–18)
LACTATE BLDV-MCNC: 0.9 MMOL/L — SIGNIFICANT CHANGE UP (ref 0.5–2)
LDH SERPL L TO P-CCNC: 133 U/L — SIGNIFICANT CHANGE UP (ref 98–192)
LYMPHOCYTES # BLD AUTO: 6 % — LOW (ref 20–55)
MACROCYTES BLD QL: SLIGHT — SIGNIFICANT CHANGE UP
MAGNESIUM SERPL-MCNC: 1.6 MG/DL — SIGNIFICANT CHANGE UP (ref 1.6–2.6)
MCHC RBC-ENTMCNC: 28.7 PG — SIGNIFICANT CHANGE UP (ref 27–31)
MCHC RBC-ENTMCNC: 29 PG — SIGNIFICANT CHANGE UP (ref 27–31)
MCHC RBC-ENTMCNC: 33.2 G/DL — SIGNIFICANT CHANGE UP (ref 32–36)
MCHC RBC-ENTMCNC: 33.7 G/DL — SIGNIFICANT CHANGE UP (ref 32–36)
MCV RBC AUTO: 86 FL — SIGNIFICANT CHANGE UP (ref 80–94)
MCV RBC AUTO: 86.4 FL — SIGNIFICANT CHANGE UP (ref 80–94)
MICROCYTES BLD QL: SLIGHT — SIGNIFICANT CHANGE UP
MONOCYTES NFR BLD AUTO: 8 % — SIGNIFICANT CHANGE UP (ref 3–10)
NEUTROPHILS NFR BLD AUTO: 86 % — HIGH (ref 37–73)
PHOSPHATE SERPL-MCNC: 2.8 MG/DL — SIGNIFICANT CHANGE UP (ref 2.4–4.7)
PLAT MORPH BLD: NORMAL — SIGNIFICANT CHANGE UP
PLATELET # BLD AUTO: 379 K/UL — SIGNIFICANT CHANGE UP (ref 150–400)
PLATELET # BLD AUTO: 402 K/UL — HIGH (ref 150–400)
POLYCHROMASIA BLD QL SMEAR: SLIGHT — SIGNIFICANT CHANGE UP
POTASSIUM SERPL-MCNC: 3.3 MMOL/L — LOW (ref 3.5–5.3)
POTASSIUM SERPL-MCNC: 3.4 MMOL/L — LOW (ref 3.5–5.3)
POTASSIUM SERPL-SCNC: 3.3 MMOL/L — LOW (ref 3.5–5.3)
POTASSIUM SERPL-SCNC: 3.4 MMOL/L — LOW (ref 3.5–5.3)
PROCALCITONIN SERPL-MCNC: 0.15 NG/ML — HIGH (ref 0–0.04)
PROT SERPL-MCNC: 6.2 G/DL — LOW (ref 6.6–8.7)
RBC # BLD: 4 M/UL — LOW (ref 4.6–6.2)
RBC # BLD: 4.42 M/UL — LOW (ref 4.6–6.2)
RBC # FLD: 14.8 % — SIGNIFICANT CHANGE UP (ref 11–15.6)
RBC # FLD: 15 % — SIGNIFICANT CHANGE UP (ref 11–15.6)
RBC BLD AUTO: ABNORMAL
SODIUM SERPL-SCNC: 138 MMOL/L — SIGNIFICANT CHANGE UP (ref 135–145)
SODIUM SERPL-SCNC: 140 MMOL/L — SIGNIFICANT CHANGE UP (ref 135–145)
VALPROATE SERPL-MCNC: 63.9 UG/ML — SIGNIFICANT CHANGE UP (ref 50–100)
WBC # BLD: 19.4 K/UL — HIGH (ref 4.8–10.8)
WBC # BLD: 19.6 K/UL — HIGH (ref 4.8–10.8)
WBC # FLD AUTO: 19.4 K/UL — HIGH (ref 4.8–10.8)
WBC # FLD AUTO: 19.6 K/UL — HIGH (ref 4.8–10.8)

## 2017-05-31 PROCEDURE — 99223 1ST HOSP IP/OBS HIGH 75: CPT

## 2017-05-31 PROCEDURE — 99233 SBSQ HOSP IP/OBS HIGH 50: CPT

## 2017-05-31 PROCEDURE — 71020: CPT | Mod: 26

## 2017-05-31 RX ORDER — VANCOMYCIN HCL 1 G
1000 VIAL (EA) INTRAVENOUS EVERY 12 HOURS
Qty: 0 | Refills: 0 | Status: DISCONTINUED | OUTPATIENT
Start: 2017-05-31 | End: 2017-06-02

## 2017-05-31 RX ORDER — PIPERACILLIN AND TAZOBACTAM 4; .5 G/20ML; G/20ML
3.38 INJECTION, POWDER, LYOPHILIZED, FOR SOLUTION INTRAVENOUS ONCE
Qty: 0 | Refills: 0 | Status: COMPLETED | OUTPATIENT
Start: 2017-05-31 | End: 2017-05-31

## 2017-05-31 RX ORDER — POTASSIUM CHLORIDE 20 MEQ
10 PACKET (EA) ORAL
Qty: 0 | Refills: 0 | Status: COMPLETED | OUTPATIENT
Start: 2017-05-31 | End: 2017-05-31

## 2017-05-31 RX ORDER — ACETAMINOPHEN 500 MG
650 TABLET ORAL EVERY 6 HOURS
Qty: 0 | Refills: 0 | Status: DISCONTINUED | OUTPATIENT
Start: 2017-05-31 | End: 2017-06-09

## 2017-05-31 RX ORDER — SODIUM CHLORIDE 9 MG/ML
2500 INJECTION, SOLUTION INTRAVENOUS ONCE
Qty: 0 | Refills: 0 | Status: COMPLETED | OUTPATIENT
Start: 2017-05-31 | End: 2017-05-31

## 2017-05-31 RX ORDER — PIPERACILLIN AND TAZOBACTAM 4; .5 G/20ML; G/20ML
3.38 INJECTION, POWDER, LYOPHILIZED, FOR SOLUTION INTRAVENOUS EVERY 8 HOURS
Qty: 0 | Refills: 0 | Status: DISCONTINUED | OUTPATIENT
Start: 2017-05-31 | End: 2017-06-03

## 2017-05-31 RX ADMIN — HALOPERIDOL DECANOATE 5 MILLIGRAM(S): 100 INJECTION INTRAMUSCULAR at 12:51

## 2017-05-31 RX ADMIN — Medication 250 MILLIGRAM(S): at 22:23

## 2017-05-31 RX ADMIN — SODIUM CHLORIDE 100 MILLILITER(S): 9 INJECTION, SOLUTION INTRAVENOUS at 06:55

## 2017-05-31 RX ADMIN — Medication 30 MILLIGRAM(S): at 20:12

## 2017-05-31 RX ADMIN — PIPERACILLIN AND TAZOBACTAM 200 GRAM(S): 4; .5 INJECTION, POWDER, LYOPHILIZED, FOR SOLUTION INTRAVENOUS at 23:41

## 2017-05-31 RX ADMIN — SODIUM CHLORIDE 1666.67 MILLILITER(S): 9 INJECTION, SOLUTION INTRAVENOUS at 22:22

## 2017-05-31 RX ADMIN — Medication 1 MILLIGRAM(S): at 03:13

## 2017-05-31 RX ADMIN — ENOXAPARIN SODIUM 40 MILLIGRAM(S): 100 INJECTION SUBCUTANEOUS at 22:22

## 2017-05-31 RX ADMIN — Medication 100 MILLIEQUIVALENT(S): at 18:23

## 2017-05-31 RX ADMIN — Medication 150 MICROGRAM(S): at 06:54

## 2017-05-31 RX ADMIN — Medication 30 MILLIGRAM(S): at 06:56

## 2017-05-31 RX ADMIN — Medication 100 MILLIEQUIVALENT(S): at 17:21

## 2017-05-31 RX ADMIN — Medication 650 MILLIGRAM(S): at 21:44

## 2017-05-31 RX ADMIN — SODIUM CHLORIDE 100 MILLILITER(S): 9 INJECTION, SOLUTION INTRAVENOUS at 23:40

## 2017-05-31 RX ADMIN — Medication 100 MILLIEQUIVALENT(S): at 16:15

## 2017-05-31 NOTE — CONSULT NOTE ADULT - SUBJECTIVE AND OBJECTIVE BOX
[facility]  INFECTIOUS DISEASES AND INTERNAL MEDICINE OF [place]  =======================================================  Ritesh Lang MD  Diplomates American Board of Internal Medicine and Infectious Diseases  =======================================================      [id number]  SAMIRA NUGENT    CC: Patient is a 47y old  Male who presents with a chief complaint of wont eat (27 May 2017 05:11)      48y/o  Male with schizoaffective disorder, Pleural effusion s/p thoracentesis, anorexia, dehydration recently discharged from [facility] on 5/25. During that admission patient was noted to have a pleural effusion so he underwent a thoracocentesis which was borderline transudative vs exudative. Patient did not have fevers and was not given any antibiotics. He was discharged and comes back to the hospital on 5/26 for decreased appetite and not taking any medications. Patient had a seizure episode on May 29th due to not taking his medications. He also developed worsening leukocytosis since May 28th. Patient has no fever in the hospital. No antibiotics started. ID input requested.       Past Medical & Surgical Hx:  Hypothyroidism, unspecified type  Psychiatric disorder  No significant past surgical history      Social Hx:  L[facility] in [facility]      FAMILY HISTORY:  Unable to obtain due to patient's condition      Allergies  No Known Allergies      ANTIBIOTICS:   None       REVIEW OF SYSTEMS:  Unable to obtain due to patient's condition      Physical Exam:  Vital Signs Last 24 Hrs  T(C): 37.3, Max: 37.6 (05-31 @ 06:48)  T(F): 99.1, Max: 99.7 (05-31 @ 06:48)  HR: 111 (94 - 111)  BP: 130/70 (120/72 - 130/70)  RR: 18 (16 - 18)  SpO2: 97% (94% - 98%)      GEN: NAD, no meaningful communication   HEENT: normocephalic and atraumatic. EOMI. PERRL.    NECK: Supple.   LUNGS: Clear to auscultation.  HEART: Regular rate and rhythm  ABDOMEN: Soft, nontender, and nondistended.  Positive bowel sounds.    : No CVA tenderness  EXTREMITIES: Without any edema.  MSK: No joint swelling  NEUROLOGIC: Awake, Alert, no meaningful communication   PSYCHIATRIC: no meaningful communication   SKIN: No rash      Labs:  05-31    140  |  102  |  9.0  ----------------------------<  96  3.3<L>   |  24.0  |  0.90    Ca    8.4<L>      31 May 2017 07:24                            12.7   19.4  )-----------( 402      ( 31 May 2017 07:24 )             38.2       RECENT CULTURES:  05-27 @ 10:56 .Blood Blood     No growth at 48 hours      05-27 @ 06:21 .Urine Clean Catch (Midstream)     <10,000 CFU/ml Yeast cells      05-23 @ 18:18 .Body Fluid Pleural Fluid     No growth at 1 week.      05-23 @ 08:43 .Body Fluid Pleural Fluid     No fungus isolated at 1 week.  Culture in progress      05-23 @ 08:42 .Body Fluid Pleural Fluid     No growth at 5 days.  Few WBC's  No organisms seen      05-23 @ 02:01 .Blood Blood     No growth at 5 days.      05-23 @ 01:48 .Blood Blood     No growth at 5 days.      05-19 @ 17:42 .Blood Blood-Peripheral     No growth at 5 days.      05-19 @ 17:41 .Blood Blood-Peripheral     No growth at 5 days.

## 2017-05-31 NOTE — PROGRESS NOTE ADULT - PROBLEM SELECTOR PLAN 2
Pt is refusing to take medications  pt is afebrile,but worsening leucocytosis  MRI head - Neg , neuro consulted   EEG - pt refusing per neuro

## 2017-05-31 NOTE — CHART NOTE - NSCHARTNOTEFT_GEN_A_CORE
Code sepsis PGY 3 Note  CC/ HPI Patient is a 47y old  [facility] PMHx schizophrenia, pleural efussion admited for failure to thrive, pleural efussion sepsis called due to fever, tachycardia and tachypnea.        Allergies    No Known Allergies    Intolerances        PAST MEDICAL & SURGICAL HISTORY:  Hypothyroidism, unspecified type  Psychiatric disorder  No significant past surgical history      Vital Signs Last 24 Hrs  T(C): 39.3, Max: 39.3 (05-31 @ 21:55)  T(F): 102.7, Max: 102.7 (05-31 @ 21:55)  HR: 114 (97 - 115)  BP: 128/72 (120/72 - 130/70)  BP(mean): --  RR: 28 (18 - 28)  SpO2: 96% (92% - 98%)        HR: 114 BP: 122/70 T: 102.7 rectal RR: 28 O2 sat: 96% RA  GENERAL: The patient is awake and alert in no apparent distress.   HEENT: Head is normocephalic and atraumatic. Extraocular muscles are intact. Mucous membranes are moist. No throat erythema/exudates no lymphadenopathy, no JVD,   NECK: Supple.  LUNGS: Clear to auscultation BL without wheezing, rales or rhonchi; respirations unlabored  HEART: Regular rate and rhythm ,+S1/+S2, no murmurs, rubs, gallops  ABDOMEN: Soft, nontender, and nondistended, no rebound, guarding rigidity, bowel sounds in all 4 quadrants  EXTREMITIES: Without any cyanosis, clubbing, rash, lesions or edema.  SKIN: No new rashes or lesions.  MSK: strength equal BL  VASCULAR: Radial and Dorsal pedal pulses palpable BL  NEUROLOGIC: Grossly intact.  PSYCH: No new changes.                            12.7   19.4  )-----------( 402      ( 31 May 2017 07:24 )             38.2     05-31    140  |  102  |  9.0  ----------------------------<  96  3.3<L>   |  24.0  |  0.90    Ca    8.4<L>      31 May 2017 07:24                    Vital Signs Last 24 Hrs*       Assessment- Rapid Response called for 47y year old Male with a past medical history of schizophrenia, pleural effusion s/p thoracentesis admitted due to failure to thrive, Id recently consulted due to leukocytosis, no atbx started because pt was afebrile, cultures no growth, now sepsis code called new onset fever, tachycardia and tachypnea,  pt looks comfortable, pt is going to be started on broad spectrum antibiotics, to cover nosocomial infection likely  pulmonary in origin, low in the differential urine and other sources of infection.      Plan-  Plasmalyte  2500 ml bolus ivfs as 30cc/kg will adjust pending lactate results  Stat Xray  lactate, procalcitonin, cbc, cmp,   blood cx x 2, urine cx  Tylenol  650po stat  ABX- vancomycin 1g, zosyn 3.375g q8hr    Dr Stevens hospitalist on call notified and agree with the plan.

## 2017-05-31 NOTE — PROGRESS NOTE ADULT - SUBJECTIVE AND OBJECTIVE BOX
SAMIRA NUGENT    [id number]    47y      Male    CC: Refusal to eat, seizure episode 5/29    INTERVAL HPI/OVERNIGHT EVENTS : no acute events overnight    REVIEW OF SYSTEMS:    unable to obtain 2/2 pt non-verbal       Vital Signs Last 24 Hrs  T(C): 37.3, Max: 37.6 (05-31 @ 06:48)  T(F): 99.1, Max: 99.7 (05-31 @ 06:48)  HR: 111 (94 - 111)  BP: 130/70 (120/72 - 130/70)  BP(mean): --  RR: 18 (16 - 18)  SpO2: 97% (94% - 98%)    PHYSICAL EXAM:    GENERAL: NAD, well-groomed  HEENT: PERRL, +EOMI  CHEST/LUNG: Clear to percussion bilaterally; No wheezing  HEART: S1S2+, Regular rate and rhythm; No murmurs, rubs, or gallops  ABDOMEN: Soft, Nontender, Nondistended; Bowel sounds present  EXTREMITIES:   No clubbing, cyanosis, or edema  NEURO: AAOX3, no focal deficits, no motor r sensory loss        LABS:                        12.7   19.4  )-----------( 402      ( 31 May 2017 07:24 )             38.2     05-31    140  |  102  |  9.0  ----------------------------<  96  3.3<L>   |  24.0  |  0.90    Ca    8.4<L>      31 May 2017 07:24              MEDICATIONS  (STANDING):  enoxaparin Injectable 40milliGRAM(s) SubCutaneous every 24 hours  levothyroxine 150MICROGram(s) Oral daily  haloperidol     Tablet 5milliGRAM(s) Oral daily  dextrose 5% + sodium chloride 0.9%. 1000milliLiter(s) IV Continuous <Continuous>  valproate sodium IVPB 1000milliGRAM(s) IV Intermittent every 12 hours  potassium chloride  10 mEq/100 mL IVPB 10milliEquivalent(s) IV Intermittent every 1 hour    MEDICATIONS  (PRN):  ondansetron Injectable 4milliGRAM(s) IV Push every 6 hours PRN Nausea  LORazepam     Tablet 2milliGRAM(s) Oral three times a day PRN Anxiety      RADIOLOGY & ADDITIONAL TESTS:  CXR 5/27 - reviewed

## 2017-05-31 NOTE — PROGRESS NOTE ADULT - PROBLEM SELECTOR PLAN 4
pt is s/p IR guided thoracentesis, removed almost 1400 cc,   Pleural fluid  - ph 7.5, LDH 81, P: 3.9, transudative based on LDH, mildly positive for exudative based on protein   TTE - LVEF 50-55% - no h/o CHF  repeat xray no pneumonia, no need for Abx - no e/o active infection

## 2017-05-31 NOTE — PROGRESS NOTE ADULT - ASSESSMENT
46 y/o male with schizoaffective disorder, Pleural effusion s/p thoracentesis, anorexia, dehydration recently discharged from hospital, noted to have  Moderate pleural effusion in the left lateral region, pt is s/p IR guided thoracentesis, removed almost 1400 cc, sent back to ed as he was not eating at [facility], afebrile, xray showed moderate pleural effusion. Had a seizure episode on 5/29 2/2 refusal to take po meds. Started on iv AEDs, Now stay complicated by worsening leucocytosis. ID consulted. continues to refuse to eat.

## 2017-05-31 NOTE — PROGRESS NOTE ADULT - SUBJECTIVE AND OBJECTIVE BOX
INTERVAL HISTORY:  Seen at bedside; no new changes.  No further sz overnight.    No Known Allergies      VITAL SIGNS:  Vital Signs Last 24 Hrs  T(C): 37.3, Max: 37.6 (05-31 @ 06:48)  T(F): 99.1, Max: 99.7 (05-31 @ 06:48)  HR: 97 (92 - 105)  BP: 124/74 (120/72 - 126/70)  BP(mean): --  RR: 18 (16 - 18)  SpO2: 98% (94% - 98%)    PHYSICAL EXAMINATION:  General: Well-developed, well nourished, in no acute distress.  Eyes: Conjunctiva and sclera clear.  Neurologic:  - Mental Status:  Alert, awake.  No speech.  Follows minimally.  Not cooperative  Cranial Nerves II-XII:  Pupil 4 mm reactive; + corneal; no facial.  - Motor:  Spontaneous all 4 ext.  Normal muscle bulk and tone throughout.  - Reflexes:  2+ and symmetric throughout.  - Sensory:  Withdraws to noxious.  - Coordination:  Not cooperative.  - Gait: Deferred.    MEDS:  MEDICATIONS  (STANDING):  enoxaparin Injectable 40milliGRAM(s) SubCutaneous every 24 hours  levothyroxine 150MICROGram(s) Oral daily  haloperidol     Tablet 5milliGRAM(s) Oral daily  dextrose 5% + sodium chloride 0.9%. 1000milliLiter(s) IV Continuous <Continuous>  valproate sodium IVPB 1000milliGRAM(s) IV Intermittent every 12 hours    MEDICATIONS  (PRN):  ondansetron Injectable 4milliGRAM(s) IV Push every 6 hours PRN Nausea  LORazepam     Tablet 2milliGRAM(s) Oral three times a day PRN Anxiety      LABS:                          12.7   19.4  )-----------( 402      ( 31 May 2017 07:24 )             38.2     05-31    140  |  102  |  9.0  ----------------------------<  96  3.3<L>   |  24.0  |  0.90    Ca    8.4<L>      31 May 2017 07:24    Valproic Acid Level, Serum: 63.9 ug/mL (05.31.17 @ 07:24)             IMPRESSION: Neurology ADDENDUM 5/31/17 3:28pm    Was notified that pt is refusing EEG and continues not to cooperate.  Continue per Psychiatry at this point.  Reconsult PRN.      INTERVAL HISTORY:  Seen at bedside; no new changes.  No further sz overnight.    No Known Allergies      VITAL SIGNS:  Vital Signs Last 24 Hrs  T(C): 37.3, Max: 37.6 (05-31 @ 06:48)  T(F): 99.1, Max: 99.7 (05-31 @ 06:48)  HR: 97 (92 - 105)  BP: 124/74 (120/72 - 126/70)  BP(mean): --  RR: 18 (16 - 18)  SpO2: 98% (94% - 98%)    PHYSICAL EXAMINATION:  General: Well-developed, well nourished, in no acute distress.  Eyes: Conjunctiva and sclera clear.  Neurologic:  - Mental Status:  Alert, awake.  No speech.  Follows minimally.  Not cooperative  Cranial Nerves II-XII:  Pupil 4 mm reactive; + corneal; no facial.  - Motor:  Spontaneous all 4 ext.  Normal muscle bulk and tone throughout.  - Reflexes:  2+ and symmetric throughout.  - Sensory:  Withdraws to noxious.  - Coordination:  Not cooperative.  - Gait: Deferred.    MEDS:  MEDICATIONS  (STANDING):  enoxaparin Injectable 40milliGRAM(s) SubCutaneous every 24 hours  levothyroxine 150MICROGram(s) Oral daily  haloperidol     Tablet 5milliGRAM(s) Oral daily  dextrose 5% + sodium chloride 0.9%. 1000milliLiter(s) IV Continuous <Continuous>  valproate sodium IVPB 1000milliGRAM(s) IV Intermittent every 12 hours    MEDICATIONS  (PRN):  ondansetron Injectable 4milliGRAM(s) IV Push every 6 hours PRN Nausea  LORazepam     Tablet 2milliGRAM(s) Oral three times a day PRN Anxiety      LABS:                          12.7   19.4  )-----------( 402      ( 31 May 2017 07:24 )             38.2     05-31    140  |  102  |  9.0  ----------------------------<  96  3.3<L>   |  24.0  |  0.90    Ca    8.4<L>      31 May 2017 07:24    Valproic Acid Level, Serum: 63.9 ug/mL (05.31.17 @ 07:24)             IMPRESSION: Encephalopathy with Schizoaffective dx.  Seizure

## 2017-05-31 NOTE — PROGRESS NOTE ADULT - PROBLEM SELECTOR PLAN 1
Continue Depacon 1000mg q12.  Await for eeg.  Follow per Psych.  DVT prophylaxis.  Maintain seizure precaution.  Will follow.

## 2017-05-31 NOTE — PROGRESS NOTE ADULT - PROBLEM SELECTOR PLAN 1
? 2/2 seizure epsiode vs Infection  will repeat CBC in am   CXR to look for pleural effusion  c/o cough per aide  hold off on antibiotics  ID consult

## 2017-05-31 NOTE — PROGRESS NOTE BEHAVIORAL HEALTH - OTHER
Unable to assess due to patient being bedbound. Patient is non-verbal. Patient is non-verbal; unable to assess. not assessed

## 2017-05-31 NOTE — PROGRESS NOTE ADULT - PROBLEM SELECTOR PLAN 3
episode of seizure 5/29  on iv valproate - dose increased by neurology. was off his AED due to refusal to take po  F/u EEG

## 2017-06-01 DIAGNOSIS — R65.10 SYSTEMIC INFLAMMATORY RESPONSE SYNDROME (SIRS) OF NON-INFECTIOUS ORIGIN WITHOUT ACUTE ORGAN DYSFUNCTION: ICD-10-CM

## 2017-06-01 LAB
ALBUMIN SERPL ELPH-MCNC: 2.2 G/DL — LOW (ref 3.3–5.2)
ALP SERPL-CCNC: 61 U/L — SIGNIFICANT CHANGE UP (ref 40–120)
ALT FLD-CCNC: 14 U/L — SIGNIFICANT CHANGE UP
ANION GAP SERPL CALC-SCNC: 16 MMOL/L — SIGNIFICANT CHANGE UP (ref 5–17)
ANISOCYTOSIS BLD QL: SLIGHT — SIGNIFICANT CHANGE UP
AST SERPL-CCNC: 11 U/L — SIGNIFICANT CHANGE UP
BILIRUB SERPL-MCNC: 0.2 MG/DL — LOW (ref 0.4–2)
BUN SERPL-MCNC: 9 MG/DL — SIGNIFICANT CHANGE UP (ref 8–20)
CALCIUM SERPL-MCNC: 8.3 MG/DL — LOW (ref 8.6–10.2)
CHLORIDE SERPL-SCNC: 102 MMOL/L — SIGNIFICANT CHANGE UP (ref 98–107)
CO2 SERPL-SCNC: 23 MMOL/L — SIGNIFICANT CHANGE UP (ref 22–29)
CREAT SERPL-MCNC: 0.95 MG/DL — SIGNIFICANT CHANGE UP (ref 0.5–1.3)
CULTURE RESULTS: SIGNIFICANT CHANGE UP
GLUCOSE SERPL-MCNC: 71 MG/DL — SIGNIFICANT CHANGE UP (ref 70–115)
HCT VFR BLD CALC: 34.8 % — LOW (ref 42–52)
HGB BLD-MCNC: 11.5 G/DL — LOW (ref 14–18)
LACTATE SERPL-SCNC: 0.7 MMOL/L — SIGNIFICANT CHANGE UP (ref 0.5–2)
LYMPHOCYTES # BLD AUTO: 9 % — LOW (ref 20–55)
MACROCYTES BLD QL: SLIGHT — SIGNIFICANT CHANGE UP
MCHC RBC-ENTMCNC: 28.7 PG — SIGNIFICANT CHANGE UP (ref 27–31)
MCHC RBC-ENTMCNC: 33 G/DL — SIGNIFICANT CHANGE UP (ref 32–36)
MCV RBC AUTO: 86.8 FL — SIGNIFICANT CHANGE UP (ref 80–94)
MICROCYTES BLD QL: SLIGHT — SIGNIFICANT CHANGE UP
MONOCYTES NFR BLD AUTO: 8 % — SIGNIFICANT CHANGE UP (ref 3–10)
NEUTROPHILS NFR BLD AUTO: 80 % — HIGH (ref 37–73)
PLAT MORPH BLD: NORMAL — SIGNIFICANT CHANGE UP
PLATELET # BLD AUTO: 384 K/UL — SIGNIFICANT CHANGE UP (ref 150–400)
POTASSIUM SERPL-MCNC: 3.2 MMOL/L — LOW (ref 3.5–5.3)
POTASSIUM SERPL-SCNC: 3.2 MMOL/L — LOW (ref 3.5–5.3)
PROCALCITONIN SERPL-MCNC: 0.18 NG/ML — HIGH (ref 0–0.04)
PROT SERPL-MCNC: 5.9 G/DL — LOW (ref 6.6–8.7)
RBC # BLD: 4.01 M/UL — LOW (ref 4.6–6.2)
RBC # FLD: 14.9 % — SIGNIFICANT CHANGE UP (ref 11–15.6)
RBC BLD AUTO: ABNORMAL
SODIUM SERPL-SCNC: 141 MMOL/L — SIGNIFICANT CHANGE UP (ref 135–145)
SPECIMEN SOURCE: SIGNIFICANT CHANGE UP
VARIANT LYMPHS # BLD: 3 % — SIGNIFICANT CHANGE UP (ref 0–6)
WBC # BLD: 18 K/UL — HIGH (ref 4.8–10.8)
WBC # FLD AUTO: 18 K/UL — HIGH (ref 4.8–10.8)

## 2017-06-01 PROCEDURE — 99232 SBSQ HOSP IP/OBS MODERATE 35: CPT

## 2017-06-01 PROCEDURE — 99233 SBSQ HOSP IP/OBS HIGH 50: CPT

## 2017-06-01 RX ADMIN — PIPERACILLIN AND TAZOBACTAM 25 GRAM(S): 4; .5 INJECTION, POWDER, LYOPHILIZED, FOR SOLUTION INTRAVENOUS at 14:11

## 2017-06-01 RX ADMIN — Medication 250 MILLIGRAM(S): at 05:56

## 2017-06-01 RX ADMIN — PIPERACILLIN AND TAZOBACTAM 25 GRAM(S): 4; .5 INJECTION, POWDER, LYOPHILIZED, FOR SOLUTION INTRAVENOUS at 05:35

## 2017-06-01 RX ADMIN — ENOXAPARIN SODIUM 40 MILLIGRAM(S): 100 INJECTION SUBCUTANEOUS at 22:23

## 2017-06-01 RX ADMIN — PIPERACILLIN AND TAZOBACTAM 25 GRAM(S): 4; .5 INJECTION, POWDER, LYOPHILIZED, FOR SOLUTION INTRAVENOUS at 22:23

## 2017-06-01 RX ADMIN — Medication 650 MILLIGRAM(S): at 05:46

## 2017-06-01 RX ADMIN — Medication 150 MICROGRAM(S): at 05:35

## 2017-06-01 RX ADMIN — Medication 30 MILLIGRAM(S): at 05:35

## 2017-06-01 RX ADMIN — Medication 650 MILLIGRAM(S): at 16:48

## 2017-06-01 RX ADMIN — SODIUM CHLORIDE 100 MILLILITER(S): 9 INJECTION, SOLUTION INTRAVENOUS at 07:38

## 2017-06-01 RX ADMIN — Medication 30 MILLIGRAM(S): at 18:59

## 2017-06-01 RX ADMIN — Medication 250 MILLIGRAM(S): at 17:01

## 2017-06-01 RX ADMIN — SODIUM CHLORIDE 100 MILLILITER(S): 9 INJECTION, SOLUTION INTRAVENOUS at 18:20

## 2017-06-01 RX ADMIN — HALOPERIDOL DECANOATE 5 MILLIGRAM(S): 100 INJECTION INTRAMUSCULAR at 11:22

## 2017-06-01 NOTE — PROGRESS NOTE ADULT - SUBJECTIVE AND OBJECTIVE BOX
SAMIRA NUGENT    [id number]    47y      Male    CC: fever overnight, tachypnea, tachycardia - rapid response called - started on antibiotics for SIRS.  pt - non-verbal , refusal to eat/take po medications        INTERVAL HPI/OVERNIGHT EVENTS: as above    REVIEW OF SYSTEMS:  unable to obtain 2/2 non-verbal       Vital Signs Last 24 Hrs  T(C): 38.9, Max: 39.3 (05-31 @ 21:55)  T(F): 102.1, Max: 102.7 (05-31 @ 21:55)  HR: 109 (104 - 115)  BP: 143/82 (110/64 - 143/82)  BP(mean): --  RR: 20 (18 - 34)  SpO2: 98% (92% - 98%)    PHYSICAL EXAM:    GENERAL: NAD, well-groomed  HEENT: PERRL, +EOMI  NECK: soft, Supple, No JVD,   CHEST/LUNG: Clear to percussion bilaterally; No wheezing  HEART: S1S2+, Regular rate and rhythm; No murmurs, rubs, or gallops  ABDOMEN: Soft, Nontender, Nondistended; Bowel sounds present  EXTREMITIES:  2+ Peripheral Pulses, No clubbing, cyanosis, or edema  SKIN: No rashes or lesions  NEURO: AAOX3, no focal deficits, no motor r sensory loss  PSYCH: normal mood      LABS:                        11.5   18.0  )-----------( 384      ( 01 Jun 2017 05:20 )             34.8     06-01    141  |  102  |  9.0  ----------------------------<  71  3.2<L>   |  23.0  |  0.95    Ca    8.3<L>      01 Jun 2017 05:20  Phos  2.8     05-31  Mg     1.6     05-31    TPro  5.9<L>  /  Alb  2.2<L>  /  TBili  0.2<L>  /  DBili  x   /  AST  11  /  ALT  14  /  AlkPhos  61  06-01            MEDICATIONS  (STANDING):  enoxaparin Injectable 40milliGRAM(s) SubCutaneous every 24 hours  levothyroxine 150MICROGram(s) Oral daily  haloperidol     Tablet 5milliGRAM(s) Oral daily  dextrose 5% + sodium chloride 0.9%. 1000milliLiter(s) IV Continuous <Continuous>  valproate sodium IVPB 1000milliGRAM(s) IV Intermittent every 12 hours  vancomycin  IVPB 1000milliGRAM(s) IV Intermittent every 12 hours  piperacillin/tazobactam IVPB. 3.375Gram(s) IV Intermittent every 8 hours    MEDICATIONS  (PRN):  ondansetron Injectable 4milliGRAM(s) IV Push every 6 hours PRN Nausea  LORazepam     Tablet 2milliGRAM(s) Oral three times a day PRN Anxiety  acetaminophen   Tablet 650milliGRAM(s) Oral every 6 hours PRN For Temp greater than 38 C (100.4 F)      RADIOLOGY & ADDITIONAL TESTS:  CXR - reviewed

## 2017-06-01 NOTE — PROGRESS NOTE ADULT - PROBLEM SELECTOR PLAN 1
? 2/2 seizure episode vs Infection  CT chest today to look for Lt pleural effusion/pneumonia - will consider pulm/thoracic sx pending CT  on zosyn+vanc   ID recs

## 2017-06-01 NOTE — PROGRESS NOTE ADULT - SUBJECTIVE AND OBJECTIVE BOX
[facility]  INFECTIOUS DISEASES AND INTERNAL MEDICINE OF [place]  =======================================================  Ritesh Lang MD  Diplomates American Board of Internal Medicine and Infectious Diseases  =======================================================    SAMIRA NUGENT [id number]    Follow up: Leukocytosis  Had fever last night      Allergies:  No Known Allergies      Antibiotics:  vancomycin  IVPB 1000milliGRAM(s) IV Intermittent every 12 hours  piperacillin/tazobactam IVPB. 3.375Gram(s) IV Intermittent every 8 hours        REVIEW OF SYSTEMS:  Unable to obtain due to patient's condition      Physical Exam:  Vital Signs Last 24 Hrs  T(C): 37.4, Max: 39.3 (05-31 @ 21:55)  T(F): 99.3, Max: 102.7 (05-31 @ 21:55)  HR: 110 (103 - 115)  BP: 149/88 (110/64 - 149/88)  RR: 20 (20 - 34)  SpO2: 97% (92% - 98%)    GEN: NAD, pleasant  HEENT: normocephalic and atraumatic. EOMI. CASSANDRA.    NECK: Supple. No carotid bruits.  No lymphadenopathy or thyromegaly.  LUNGS: Clear to auscultation.  HEART: Regular rate and rhythm without murmur.  ABDOMEN: Soft, nontender, and nondistended.  Positive bowel sounds.    : No CVA tenderness  EXTREMITIES: Without any cyanosis, clubbing, rash, lesions or edema.  MSK: no joint swelling  NEUROLOGIC: Cranial nerves II through XII are grossly intact.  PSYCHIATRIC: Appropriate affect .  SKIN: No ulceration or induration present.        Labs:  06-01    141  |  102  |  9.0  ----------------------------<  71  3.2<L>   |  23.0  |  0.95    Ca    8.3<L>      01 Jun 2017 05:20  Phos  2.8     05-31  Mg     1.6     05-31    TPro  5.9<L>  /  Alb  2.2<L>  /  TBili  0.2<L>  /  DBili  x   /  AST  11  /  ALT  14  /  AlkPhos  61  06-01                          11.5   18.0  )-----------( 384      ( 01 Jun 2017 05:20 )             34.8       LIVER FUNCTIONS - ( 01 Jun 2017 05:20 )  Alb: 2.2 g/dL / Pro: 5.9 g/dL / ALK PHOS: 61 U/L / ALT: 14 U/L / AST: 11 U/L / GGT: x             RECENT CULTURES:  05-27 @ 10:56 .Blood Blood     No growth at 5 days.      05-27 @ 06:21 .Urine Clean Catch (Midstream)     <10,000 CFU/ml Yeast cells      05-23 @ 18:18 .Body Fluid Pleural Fluid     No growth at 1 week.      05-23 @ 08:43 .Body Fluid Pleural Fluid     No fungus isolated at 1 week.  Culture in progress      05-23 @ 08:42 .Body Fluid Pleural Fluid     No growth at 5 days.  Few WBC's  No organisms seen      05-23 @ 02:01 .Blood Blood     No growth at 5 days.      05-23 @ 01:48 .Blood Blood     No growth at 5 days.      05-19 @ 17:42 .Blood Blood-Peripheral     No growth at 5 days.      05-19 @ 17:41 .Blood Blood-Peripheral     No growth at 5 days.        EXAM:  CHEST P.A. LATERAL                        PROCEDURE DATE:  05/31/2017    INTERPRETATION:  PA and lateral chest radiographs   COMPARISON: 5/27/2017.  CLINICAL INFORMATION: Chest Pain.  FINDINGS:  Focus of breath is enlarging left pleural effusion which layers to the   left posterior eighth rib. Underlying infiltrate atelectasis cannot be   excluded. There is a early right lung base of infiltrate/opacity. Upper   lobes and apices are clear.  The  heart is enlarged in transverse diameter. No hilar mass. Trachea   midline.        The visualized osseus structures are intact.   IMPRESSION:  Increasing left pleural effusion with consolidation of the left lower   lobe hemithorax..

## 2017-06-01 NOTE — PROGRESS NOTE ADULT - PROBLEM SELECTOR PLAN 1
Blood culture no growth, repeat pending  Procalcitonin elevated  Had a fever last night  CXR with recurrent effusion, images reviewed  Need to do CT chest d/w Dr Traore  Started on Vancomycin and Zosyn  Will monitor Vancomycin level  follow up cultures

## 2017-06-01 NOTE — PROGRESS NOTE ADULT - PROBLEM SELECTOR PLAN 3
episode of seizure 5/29  on iv valproate - dose increased by neurology. was off his AED due to refusal to take po

## 2017-06-01 NOTE — PROGRESS NOTE ADULT - SUBJECTIVE AND OBJECTIVE BOX
comfortable in bed communicates by gestures non verbal no complaints cooperative with current treratment

## 2017-06-01 NOTE — PROGRESS NOTE ADULT - ASSESSMENT
48 y/o male with schizoaffective disorder, Pleural effusion s/p thoracentesis, anorexia, dehydration recently discharged from hospital, noted to have  Moderate pleural effusion in the left lateral region, pt is s/p IR guided thoracentesis, removed almost 1400 cc, sent back to ed as he was not eating at [facility], afebrile, xray showed moderate pleural effusion. Had a seizure episode on 5/29 2/2 refusal to take po meds. Started on iv AEDs, Now stay complicated by worsening leucocytosis. ID consulted. continues to refuse to eat.

## 2017-06-01 NOTE — PROGRESS NOTE ADULT - PROBLEM SELECTOR PLAN 2
pleural effusion from prior admission s/p thoracocentesis now has reoccurred on repeat CXR  Will do CT chest since this may be the source.

## 2017-06-02 LAB
ALBUMIN SERPL ELPH-MCNC: 2.4 G/DL — LOW (ref 3.3–5.2)
ALP SERPL-CCNC: 69 U/L — SIGNIFICANT CHANGE UP (ref 40–120)
ALT FLD-CCNC: 37 U/L — SIGNIFICANT CHANGE UP
ANION GAP SERPL CALC-SCNC: 17 MMOL/L — SIGNIFICANT CHANGE UP (ref 5–17)
ANISOCYTOSIS BLD QL: SLIGHT — SIGNIFICANT CHANGE UP
AST SERPL-CCNC: 50 U/L — HIGH
BILIRUB SERPL-MCNC: 0.2 MG/DL — LOW (ref 0.4–2)
BUN SERPL-MCNC: 9 MG/DL — SIGNIFICANT CHANGE UP (ref 8–20)
CALCIUM SERPL-MCNC: 8.6 MG/DL — SIGNIFICANT CHANGE UP (ref 8.6–10.2)
CHLORIDE SERPL-SCNC: 102 MMOL/L — SIGNIFICANT CHANGE UP (ref 98–107)
CO2 SERPL-SCNC: 24 MMOL/L — SIGNIFICANT CHANGE UP (ref 22–29)
CREAT SERPL-MCNC: 0.87 MG/DL — SIGNIFICANT CHANGE UP (ref 0.5–1.3)
EOSINOPHIL NFR BLD AUTO: 1 % — SIGNIFICANT CHANGE UP (ref 0–6)
GLUCOSE SERPL-MCNC: 87 MG/DL — SIGNIFICANT CHANGE UP (ref 70–115)
HCT VFR BLD CALC: 30.8 % — LOW (ref 42–52)
HCT VFR BLD CALC: 34.2 % — LOW (ref 42–52)
HGB BLD-MCNC: 10.3 G/DL — LOW (ref 14–18)
HGB BLD-MCNC: 11.5 G/DL — LOW (ref 14–18)
LACTATE BLDV-MCNC: 1.3 MMOL/L — SIGNIFICANT CHANGE UP (ref 0.5–2)
LYMPHOCYTES # BLD AUTO: 7 % — LOW (ref 20–55)
MCHC RBC-ENTMCNC: 28.6 PG — SIGNIFICANT CHANGE UP (ref 27–31)
MCHC RBC-ENTMCNC: 29.2 PG — SIGNIFICANT CHANGE UP (ref 27–31)
MCHC RBC-ENTMCNC: 33.4 G/DL — SIGNIFICANT CHANGE UP (ref 32–36)
MCHC RBC-ENTMCNC: 33.6 G/DL — SIGNIFICANT CHANGE UP (ref 32–36)
MCV RBC AUTO: 85.6 FL — SIGNIFICANT CHANGE UP (ref 80–94)
MCV RBC AUTO: 86.8 FL — SIGNIFICANT CHANGE UP (ref 80–94)
MONOCYTES NFR BLD AUTO: 10 % — SIGNIFICANT CHANGE UP (ref 3–10)
NEUTROPHILS NFR BLD AUTO: 81 % — HIGH (ref 37–73)
NEUTS BAND # BLD: 1 % — SIGNIFICANT CHANGE UP (ref 0–8)
PLAT MORPH BLD: NORMAL — SIGNIFICANT CHANGE UP
PLATELET # BLD AUTO: 274 K/UL — SIGNIFICANT CHANGE UP (ref 150–400)
PLATELET # BLD AUTO: 353 K/UL — SIGNIFICANT CHANGE UP (ref 150–400)
POTASSIUM SERPL-MCNC: 3.3 MMOL/L — LOW (ref 3.5–5.3)
POTASSIUM SERPL-SCNC: 3.3 MMOL/L — LOW (ref 3.5–5.3)
PROCALCITONIN SERPL-MCNC: 0.25 NG/ML — HIGH (ref 0–0.04)
PROT SERPL-MCNC: 6.9 G/DL — SIGNIFICANT CHANGE UP (ref 6.6–8.7)
RBC # BLD: 3.6 M/UL — LOW (ref 4.6–6.2)
RBC # BLD: 3.94 M/UL — LOW (ref 4.6–6.2)
RBC # FLD: 14.7 % — SIGNIFICANT CHANGE UP (ref 11–15.6)
RBC # FLD: 15 % — SIGNIFICANT CHANGE UP (ref 11–15.6)
RBC BLD AUTO: PRESENT — SIGNIFICANT CHANGE UP
SODIUM SERPL-SCNC: 143 MMOL/L — SIGNIFICANT CHANGE UP (ref 135–145)
VANCOMYCIN TROUGH SERPL-MCNC: 8.4 UG/ML — LOW (ref 10–20)
WBC # BLD: 16.3 K/UL — HIGH (ref 4.8–10.8)
WBC # BLD: 18.1 K/UL — HIGH (ref 4.8–10.8)
WBC # FLD AUTO: 16.3 K/UL — HIGH (ref 4.8–10.8)
WBC # FLD AUTO: 18.1 K/UL — HIGH (ref 4.8–10.8)

## 2017-06-02 PROCEDURE — 99232 SBSQ HOSP IP/OBS MODERATE 35: CPT

## 2017-06-02 PROCEDURE — 99233 SBSQ HOSP IP/OBS HIGH 50: CPT

## 2017-06-02 PROCEDURE — 71010: CPT | Mod: 26

## 2017-06-02 PROCEDURE — 71250 CT THORAX DX C-: CPT | Mod: 26

## 2017-06-02 RX ORDER — VANCOMYCIN HCL 1 G
1000 VIAL (EA) INTRAVENOUS EVERY 8 HOURS
Qty: 0 | Refills: 0 | Status: DISCONTINUED | OUTPATIENT
Start: 2017-06-02 | End: 2017-06-03

## 2017-06-02 RX ORDER — SODIUM CHLORIDE 9 MG/ML
1000 INJECTION, SOLUTION INTRAVENOUS ONCE
Qty: 0 | Refills: 0 | Status: COMPLETED | OUTPATIENT
Start: 2017-06-02 | End: 2017-06-02

## 2017-06-02 RX ORDER — SODIUM CHLORIDE 9 MG/ML
500 INJECTION, SOLUTION INTRAVENOUS ONCE
Qty: 0 | Refills: 0 | Status: COMPLETED | OUTPATIENT
Start: 2017-06-02 | End: 2017-06-02

## 2017-06-02 RX ORDER — VANCOMYCIN HCL 1 G
1000 VIAL (EA) INTRAVENOUS EVERY 12 HOURS
Qty: 0 | Refills: 0 | Status: DISCONTINUED | OUTPATIENT
Start: 2017-06-02 | End: 2017-06-02

## 2017-06-02 RX ADMIN — Medication 150 MICROGRAM(S): at 05:34

## 2017-06-02 RX ADMIN — SODIUM CHLORIDE 100 MILLILITER(S): 9 INJECTION, SOLUTION INTRAVENOUS at 04:23

## 2017-06-02 RX ADMIN — HALOPERIDOL DECANOATE 5 MILLIGRAM(S): 100 INJECTION INTRAMUSCULAR at 13:57

## 2017-06-02 RX ADMIN — SODIUM CHLORIDE 2000 MILLILITER(S): 9 INJECTION, SOLUTION INTRAVENOUS at 21:49

## 2017-06-02 RX ADMIN — Medication 250 MILLIGRAM(S): at 10:40

## 2017-06-02 RX ADMIN — PIPERACILLIN AND TAZOBACTAM 25 GRAM(S): 4; .5 INJECTION, POWDER, LYOPHILIZED, FOR SOLUTION INTRAVENOUS at 13:57

## 2017-06-02 RX ADMIN — PIPERACILLIN AND TAZOBACTAM 25 GRAM(S): 4; .5 INJECTION, POWDER, LYOPHILIZED, FOR SOLUTION INTRAVENOUS at 05:34

## 2017-06-02 RX ADMIN — Medication 30 MILLIGRAM(S): at 17:56

## 2017-06-02 RX ADMIN — SODIUM CHLORIDE 100 MILLILITER(S): 9 INJECTION, SOLUTION INTRAVENOUS at 13:57

## 2017-06-02 RX ADMIN — PIPERACILLIN AND TAZOBACTAM 25 GRAM(S): 4; .5 INJECTION, POWDER, LYOPHILIZED, FOR SOLUTION INTRAVENOUS at 20:59

## 2017-06-02 RX ADMIN — Medication 30 MILLIGRAM(S): at 06:35

## 2017-06-02 RX ADMIN — ENOXAPARIN SODIUM 40 MILLIGRAM(S): 100 INJECTION SUBCUTANEOUS at 21:02

## 2017-06-02 RX ADMIN — Medication 250 MILLIGRAM(S): at 22:05

## 2017-06-02 NOTE — CONSULT NOTE ADULT - SUBJECTIVE AND OBJECTIVE BOX
HISTORY OF PRESENT ILLNESS:  47y Male    Notes from last CT Sx Consult   47y Male w/ schizophrenia, selective mutism, possible autism, current resident at [facility], hypothyroidism initially presenting to ED (as per chart and [place] nurse Marilu) with altered mental status, lethargy, coughing fits, found to have desaturation on pulse oximetry at [place].  As per staff there they noticed he was just asking differently and got concerned.  They deny sick contacts at the home, fever, chills, sputum production, or other known symptoms.  Patient unable to offer more secondary to selective mutism.      PAST MEDICAL & SURGICAL HISTORY:  Hypothyroidism, unspecified type  Psychiatric disorder  No significant past surgical history      MEDICATIONS  (STANDING):  enoxaparin Injectable 40milliGRAM(s) SubCutaneous every 24 hours  levothyroxine 150MICROGram(s) Oral daily  haloperidol     Tablet 5milliGRAM(s) Oral daily  dextrose 5% + sodium chloride 0.9%. 1000milliLiter(s) IV Continuous <Continuous>  valproate sodium IVPB 1000milliGRAM(s) IV Intermittent every 12 hours  piperacillin/tazobactam IVPB. 3.375Gram(s) IV Intermittent every 8 hours  multiple electrolytes Injection Type 1 Bolus 1000milliLiter(s) IV Bolus once  multiple electrolytes Injection Type 1 Bolus 500milliLiter(s) IV Bolus once  vancomycin  IVPB 1000milliGRAM(s) IV Intermittent every 8 hours    MEDICATIONS  (PRN):  ondansetron Injectable 4milliGRAM(s) IV Push every 6 hours PRN Nausea  LORazepam     Tablet 2milliGRAM(s) Oral three times a day PRN Anxiety  acetaminophen   Tablet 650milliGRAM(s) Oral every 6 hours PRN For Temp greater than 38 C (100.4 F)    Antiplatelet therapy:                           Last dose/amt:    Allergies    No Known Allergies    Intolerances        SOCIAL HISTORY:  Smoker: [ ] Yes  [ ] No        PACK YEARS:                         WHEN QUIT?  ETOH use: [ ] Yes  [ ] No              FREQUENCY / QUANTITY:  Ilicit Drug use:  [ ] Yes  [ ] No  Occupation:  Live with:  Assisted device use:    FAMILY HISTORY:  No pertinent family history in first degree relatives      Review of Systems  CONSTITUTIONAL:  Fevers[ ] chills[ ] sweats[ ] fatigue[ ] weight loss[ ] weight gain [ ]                                     NEGATIVE [ ]   NEURO:  parathesias[ ] seizures [ ]  syncope [ ]  confusion [ ]                                                                                NEGATIVE[ ]   EYES: glasses[ ]  blurry vision[ ]  discharge[ ] pain[ ] glaucoma [ ]                                                                          NEGATIVE[ ]   ENMT:  difficulty hearing [ ]  vertigo[ ]  dysphagia[ ] epistaxis[ ] recent dental work [ ]                                    NEGATIVE[ ]   CV:  chest pain[ ] palpitations[ ] SHAIKH [ ] diaphoresis [ ]                                                                                           NEGATIVE[ ]   RESPIRATORY:  wheezing[ ] SOB[ ] cough [ ] sputum[ ] hemoptysis[ ]                                                                  NEGATIVE[ ]   GI:  nausea[ ]  vommiting [ ]  diarrhea[ ] constipation [ ] melena [ ]                                                                      NEGATIVE[ ]   : hematuria[ ]  dysuria[ ] urgency[ ] incontinence[ ]                                                                                            NEGATIVE[ ]   MUSKULOSKELETAL:  arthritis[ ]  joint swelling [ ] muscle weakness [ ]                                                                NEGATIVE[ ]   SKIN/BREAST:  rash[ ] itching [ ]  hair loss[ ] masses[ ]                                                                                              NEGATIVE[ ]   PSYCH:  dementia [ ] depresion [ ] anxiety[ ]                                                                                                               NEGATIVE[ ]   HEME/LYMPH:  bruises easily[ ] enlarged lymph nodes[ ] tender lymph nodes[ ]                                               NEGATIVE[ ]   ENDOCRINE:  cold intolerance[ ] heat intolerance[ ] polydipsia[ ]                                                                          NEGATIVE[ ]     PHYSICAL EXAM  Vital Signs Last 24 Hrs  T(C): 38.8, Max: 38.8 (06-02 @ 21:15)  T(F): 101.8, Max: 101.8 (06-02 @ 21:15)  HR: 110 (83 - 110)  BP: 136/76 (114/75 - 136/76)  BP(mean): --  RR: 20 (19 - 20)  SpO2: 93% (82% - 94%)    CONSTITUTIONAL:                                                                          WNL[ ]   Neuro: WNL[ ] Normal exam oriented to person/place & time with no focal motor or sensory  deficits. Other __________                    Eyes: WNL[ ]   Normal exam of conjunctiva & lids, pupils equally reactive. Other______________________________     ENT: WNL[ ]    Normal exam of nasal/oral mucosa with absence of cyanosis. Other_____________________________  Neck: WNL[ ]  Normal exam of jugular veins, trachea & thyroid. Other_________________________________________  Chest: WNL[ ] Normal lung exam with good air movement absence of wheezes, rales, or rhonchi: Other_________________________________________                                                                                CV:  Auscultation: normal [ ] S3[ ] S4[ ] Irregular [ ] Rub[ ] Clicks[ ]    Murmurs none:[ ]systolic [ ]  diastolic [ ] holosystolic [ ]  Carotids: No Bruits[ ] Other____________ Abdominal Aorta: normal [ ] nonpalpable[ ]Other___________                                                                                      GI: WNL[ ] Normal exam of abdomen, liver & spleen with no noted masses or tenderness. Other______________________                                                                                                        Extremities: WNL[ ] Normal no evidence of cyanosis or deformity Edema: none[ ]trace[ ]1+[ ]2+[ ]3+[ ]4+[ ]  Lower Extremity Pulses: Right[ ] Left[ ]Varicosities[ ]  SKIN :WNL[ ] Normal exam to inspection & palation. Other:____________________                                                          LABS:                        11.5   18.1  )-----------( 353      ( 02 Jun 2017 22:08 )             34.2     06-02    143  |  102  |  9.0  ----------------------------<  87  3.3<L>   |  24.0  |  0.87    Ca    8.6      02 Jun 2017 22:08    TPro  6.9  /  Alb  2.4<L>  /  TBili  0.2<L>  /  DBili  x   /  AST  50<H>  /  ALT  37  /  AlkPhos  69  06-02                    Cardiac Cath:    TTE / ERIN:      Assessment:          Plan: HISTORY OF PRESENT ILLNESS:  47y Male w/ schizophrenia, selective mutism, possible autism, current resident at [facility], hypothyroidism readmitted to [facility] for failure to thrive and increasing altered state found to be unsafe; admission significant for RR for foaming at the mouth and sepsis complicated by recurrent pleural effusion now found to have complete white our of left lung thought to be an empyema     Notes from last CT Sx Consult   47y Male w/ schizophrenia, selective mutism, possible autism, current resident at [facility], hypothyroidism initially presenting to ED (as per chart and [place] nurse Marilu) with altered mental status, lethargy, coughing fits, found to have desaturation on pulse oximetry at [place].  As per staff there they noticed he was just asking differently and got concerned.  They deny sick contacts at the home, fever, chills, sputum production, or other known symptoms.  Patient unable to offer more secondary to selective mutism.      PAST MEDICAL & SURGICAL HISTORY:  Hypothyroidism, unspecified type  Psychiatric disorder  No significant past surgical history      MEDICATIONS  (STANDING):  enoxaparin Injectable 40milliGRAM(s) SubCutaneous every 24 hours  levothyroxine 150MICROGram(s) Oral daily  haloperidol     Tablet 5milliGRAM(s) Oral daily  dextrose 5% + sodium chloride 0.9%. 1000milliLiter(s) IV Continuous <Continuous>  valproate sodium IVPB 1000milliGRAM(s) IV Intermittent every 12 hours  piperacillin/tazobactam IVPB. 3.375Gram(s) IV Intermittent every 8 hours  multiple electrolytes Injection Type 1 Bolus 1000milliLiter(s) IV Bolus once  multiple electrolytes Injection Type 1 Bolus 500milliLiter(s) IV Bolus once  vancomycin  IVPB 1000milliGRAM(s) IV Intermittent every 8 hours    MEDICATIONS  (PRN):  ondansetron Injectable 4milliGRAM(s) IV Push every 6 hours PRN Nausea  LORazepam     Tablet 2milliGRAM(s) Oral three times a day PRN Anxiety  acetaminophen   Tablet 650milliGRAM(s) Oral every 6 hours PRN For Temp greater than 38 C (100.4 F)          FAMILY HISTORY:  No pertinent family history in first degree relatives        PHYSICAL EXAM  Vital Signs Last 24 Hrs  T(C): 38.8, Max: 38.8 (06-02 @ 21:15)  T(F): 101.8, Max: 101.8 (06-02 @ 21:15)  HR: 110 (83 - 110)  BP: 136/76 (114/75 - 136/76)  BP(mean): --  RR: 20 (19 - 20)  SpO2: 93% (82% - 94%)    CONSTITUTIONAL:   ashen male sitting in bed, in no acute distress   Neuro: unable to assess, tracks, appears to listen   Chest: +s1s2 without murmur                                                                                                                                                       GI: soft nontender without guarding                                                                                                   Extremities: +pulses trace edema                                                             LABS:                        11.5   18.1  )-----------( 353      ( 02 Jun 2017 22:08 )             34.2     06-02    143  |  102  |  9.0  ----------------------------<  87  3.3<L>   |  24.0  |  0.87    Ca    8.6      02 Jun 2017 22:08    TPro  6.9  /  Alb  2.4<L>  /  TBili  0.2<L>  /  DBili  x   /  AST  50<H>  /  ALT  37  /  AlkPhos  69  06-02

## 2017-06-02 NOTE — PROGRESS NOTE ADULT - SUBJECTIVE AND OBJECTIVE BOX
SAMIRA NUGENT is a 47y Male with HPI:  48 y/o male dcd from [facility] on 5/25 after inpatient w/u pleural effusion s/p thoracentesis. CHF was ruled out, cytology pending. Patient also had pysch medication regimen adjusted. Since being dcd to [place] he will not eat and has not taken his medications. In the ED patient with normal vitals, no evidence of any new infection. He is non verbal at baseline. He is not following any commands but is awake and responsive to vocal/tactile stimuli. At this time [facility] can not accept him back until he is able to eat and take his medications.   PT WITH LEUKOCYTOSIS  CXR PLEURAL EFFUSION FOR CT CHEST        Allergies:  No Known Allergies      Medications:  enoxaparin Injectable 40milliGRAM(s) SubCutaneous every 24 hours  ondansetron Injectable 4milliGRAM(s) IV Push every 6 hours PRN  LORazepam     Tablet 2milliGRAM(s) Oral three times a day PRN  levothyroxine 150MICROGram(s) Oral daily  haloperidol     Tablet 5milliGRAM(s) Oral daily  dextrose 5% + sodium chloride 0.9%. 1000milliLiter(s) IV Continuous <Continuous>  valproate sodium IVPB 1000milliGRAM(s) IV Intermittent every 12 hours  acetaminophen   Tablet 650milliGRAM(s) Oral every 6 hours PRN  vancomycin  IVPB 1000milliGRAM(s) IV Intermittent every 12 hours  piperacillin/tazobactam IVPB. 3.375Gram(s) IV Intermittent every 8 hours      ANTIBIOTICS: ZOSYN        Review of Systems: - Negative except as mentioned above     Physical Exam:  ICU Vital Signs Last 24 Hrs  T(C): 36.8, Max: 37.9 (06-01 @ 16:53)  T(F): 98.2, Max: 100.3 (06-01 @ 16:53)  HR: 92 (92 - 110)  BP: 154/72 (149/88 - 154/72)  BP(mean): --  ABP: --  ABP(mean): --  RR: 20 (20 - 20)  SpO2: 94% (94% - 94%)    GEN: NAD NONVERBAL   HEENT: normocephalic and atraumatic. EOMI. CASSANDRA...  NECK: Supple. No carotid bruits.  No lymphadenopathy or thyromegaly.  LUNGS: Clear to auscultation.  HEART: Regular rate and rhythm without murmur.  ABDOMEN: Soft, nontender, and nondistended.  Positive bowel sounds.  No hepatosplenomegaly was noted.  NO REBOUND NO GUARDING  EXTREMITIES: Without any cyanosis, clubbing, rash, lesions or edema.  NEUROLOGIC: NON VERBAL   SKIN: No ulceration or induration present.      Labs:    06-01    141  |  102  |  9.0  ----------------------------<  71  3.2<L>   |  23.0  |  0.95    Ca    8.3<L>      01 Jun 2017 05:20  Phos  2.8     05-31  Mg     1.6     05-31    TPro  5.9<L>  /  Alb  2.2<L>  /  TBili  0.2<L>  /  DBili  x   /  AST  11  /  ALT  14  /  AlkPhos  61  06-01                          10.3   16.3  )-----------( 274      ( 02 Jun 2017 09:36 )             30.8           LIVER FUNCTIONS - ( 01 Jun 2017 05:20 )  Alb: 2.2 g/dL / Pro: 5.9 g/dL / ALK PHOS: 61 U/L / ALT: 14 U/L / AST: 11 U/L / GGT: x               CAPILLARY BLOOD GLUCOSE

## 2017-06-02 NOTE — PROGRESS NOTE ADULT - ASSESSMENT
46 y/o male dcd from [facility] on 5/25 after inpatient w/u pleural effusion s/p thoracentesis. CHF was ruled out, cytology pending. Patient also had pysch medication regimen adjusted. Since being dcd to [place] he will not eat and has not taken his medications. In the ED patient with normal vitals, no evidence of any new infection. He is non verbal at baseline. He is not following any commands but is awake and responsive to vocal/tactile stimuli. At this time [facility] can not accept him back until he is able to eat and take his medications.   PT WITH LEUKOCYTOSIS  CXR PLEURAL EFFUSION FOR CT CHEST  TODAY  PT APPEARS COMFORTABLE   WILL FOLLOW UP

## 2017-06-02 NOTE — CHART NOTE - NSCHARTNOTEFT_GEN_A_CORE
Code sepsis PGY 3 Note  CC/ HPI Patient is a 47y old  with PMHx schizophrenia, pleural efussion admitted for failure to thrive and  pleural efussion, recently diagnosed with HCAP during code on 05/31/2017, started on vanco zosyn that day, now  sepsis called again due to fever, tachycardia.        Allergies    No Known Allergies    Intolerances        PAST MEDICAL & SURGICAL HISTORY:  Hypothyroidism, unspecified type  Psychiatric disorder  No significant past surgical history      Vital Signs Last 24 Hrs  T(C): 38.8, Max: 38.8 (06-02 @ 21:15)  T(F): 101.8, Max: 101.8 (06-02 @ 21:15)  HR: 110 (83 - 110)  BP: 136/76 (114/75 - 136/76)  BP(mean): --  RR: 20 (19 - 20)  SpO2: 93% (82% - 94%)      HR: 110 BP: 136/70 T: 101.8 rectal RR: 20 O2 sat: 96% with O2 3 liters NC  GENERAL: The patient is awake and alert in no apparent distress.   HEENT: Head is normocephalic and atraumatic. Extraocular muscles are intact. Mucous membranes are moist. No throat erythema/exudates no lymphadenopathy, no JVD,   NECK: Supple.  LUNGS: Clear to auscultation BL without wheezing, rales or rhonchi; respirations unlabored  HEART: Regular rate and rhythm ,+S1/+S2, no murmurs, rubs, gallops  ABDOMEN: Soft, nontender, and nondistended, no rebound, guarding rigidity, bowel sounds in all 4 quadrants  EXTREMITIES: Without any cyanosis, clubbing, rash, lesions or edema.  SKIN: No new rashes or lesions.  MSK: strength equal BL  VASCULAR: Radial and Dorsal pedal pulses palpable BL  NEUROLOGIC: Grossly intact.  PSYCH: No new changes.                          10.3   16.3  )-----------( 274      ( 02 Jun 2017 09:36 )             30.8   06-01    141  |  102  |  9.0  ----------------------------<  71  3.2<L>   |  23.0  |  0.95    Ca    8.3<L>      01 Jun 2017 05:20  Phos  2.8     05-31  Mg     1.6     05-31    TPro  5.9<L>  /  Alb  2.2<L>  /  TBili  0.2<L>  /  DBili  x   /  AST  11  /  ALT  14  /  AlkPhos  61  06-01                                        Vital Signs Last 24 Hrs*       Assessment- Rapid Response called for 47y year old Male with a past medical history of schizophrenia, pleural effusion s/p thoracentesis admitted due to failure to thrive, Id recently consulted due to leukocytosis, no atbx started because pt was afebrile, cultures no growth, 2 days ago sepsis code called for  fever, tachycardia and tachypnea started at that time on vanco+zosyn, today called again due to fever and tachycardia,  also nurse resport on episode pf loose stool pt looks comfortable, pt is going to be restarted on vancomycin, to cover nosocomial infection likely  pulmonary in origin, low in the differential urine and other sources of infection.      Plan-  Plasmalyte  2500 ml bolus ivfs as 30cc/kg will adjust pending lactate results  Stat Xray  lactate, procalcitonin, cbc, cmp, C diff.  blood cx x 2, urine cx  Tylenol  650po stat  ABX- vancomycin 1g, zosyn 3.375g q8hr    Dr Stevens hospitalist on call notified and agree with the plan. Code sepsis PGY 3 Note  CC/ HPI Patient is a 47y old  with PMHx schizophrenia, pleural efussion admitted for failure to thrive and  pleural efussion, recently diagnosed with HCAP during code on 05/31/2017, started on vanco zosyn that day, now  sepsis called again due to fever, tachycardia.        Allergies    No Known Allergies    Intolerances        PAST MEDICAL & SURGICAL HISTORY:  Hypothyroidism, unspecified type  Psychiatric disorder  No significant past surgical history      Vital Signs Last 24 Hrs  T(C): 38.8, Max: 38.8 (06-02 @ 21:15)  T(F): 101.8, Max: 101.8 (06-02 @ 21:15)  HR: 110 (83 - 110)  BP: 136/76 (114/75 - 136/76)  BP(mean): --  RR: 20 (19 - 20)  SpO2: 93% (82% - 94%)      HR: 110 BP: 136/70 T: 101.8 rectal RR: 20 O2 sat: 96% with O2 3 liters NC  GENERAL: The patient is awake and alert in no apparent distress.   HEENT: Head is normocephalic and atraumatic. Extraocular muscles are intact. Mucous membranes are moist. No throat erythema/exudates no lymphadenopathy, no JVD,   NECK: Supple.  LUNGS: Clear to auscultation BL without wheezing, rales or rhonchi; respirations unlabored  HEART: Regular rate and rhythm ,+S1/+S2, no murmurs, rubs, gallops  ABDOMEN: Soft, nontender, and nondistended, no rebound, guarding rigidity, bowel sounds in all 4 quadrants  EXTREMITIES: Without any cyanosis, clubbing, rash, lesions or edema.  SKIN: No new rashes or lesions.  MSK: strength equal BL  VASCULAR: Radial and Dorsal pedal pulses palpable BL  NEUROLOGIC: Grossly intact.  PSYCH: No new changes.                          10.3   16.3  )-----------( 274      ( 02 Jun 2017 09:36 )             30.8   06-01    141  |  102  |  9.0  ----------------------------<  71  3.2<L>   |  23.0  |  0.95    Ca    8.3<L>      01 Jun 2017 05:20  Phos  2.8     05-31  Mg     1.6     05-31    TPro  5.9<L>  /  Alb  2.2<L>  /  TBili  0.2<L>  /  DBili  x   /  AST  11  /  ALT  14  /  AlkPhos  61  06-01                                        Vital Signs Last 24 Hrs*       Assessment- Rapid Response called for 47y year old Male with a past medical history of schizophrenia, pleural effusion s/p thoracentesis admitted due to failure to thrive, Id recently consulted due to leukocytosis, no atbx started because pt was afebrile, cultures no growth, 2 days ago sepsis code called for  fever, tachycardia and tachypnea started at that time on vanco+zosyn, today called again due to fever and tachycardia,  also nurse resport on episode pf loose stool pt looks comfortable, pt is going to be restarted on vancomycin, to cover nosocomial infection likely  pulmonary in origin, low in the differential urine and other sources of infection.      Plan-  Plasmalyte  2500 ml bolus ivfs as 30cc/kg will adjust pending lactate results  Stat Xray  lactate, procalcitonin, cbc, cmp, C diff.  blood cx x 2, urine cx  Tylenol  650po stat  ABX- vancomycin 1g, zosyn 3.375g q8hr  CXR: showed left lung pleural efussion complete left lung.    Dr Stevens hospitalist on call notified and agree with the plan.

## 2017-06-02 NOTE — CONSULT NOTE ADULT - ATTENDING COMMENTS
Will follow
Pt seen and images reviewed.  b/L effusion lt>rt.  Lt pig placed and will likelty need VATS in future for decort/ bx

## 2017-06-02 NOTE — PROGRESS NOTE ADULT - SUBJECTIVE AND OBJECTIVE BOX
SAMIRA NUGENT    [id number]    47y      Male    CC: being seen for Failure to thrive, fever in the setting of Pleural effusion     INTERVAL HPI/OVERNIGHT EVENTS: no acute events. febrile.     REVIEW OF SYSTEMS:    unable to obtain 2/2 mental state    Vital Signs Last 24 Hrs  T(C): 36.9, Max: 37.9 (06-01 @ 16:53)  T(F): 98.4, Max: 100.3 (06-01 @ 16:53)  HR: 83 (83 - 110)  BP: 114/75 (114/75 - 154/72)  BP(mean): --  RR: 19 (19 - 20)  SpO2: 92% (82% - 94%)    PHYSICAL EXAM:    GENERAL: NAD, well-groomed  HEENT: PERRL, +EOMI  NECK: soft, Supple  CHEST/LUNG: Clear to auscultation. LLE AE decreased  HEART: S1S2+, Regular rate and rhythm; No murmurs, rubs, or gallops  ABDOMEN: Soft, Nontender, Nondistended; Bowel sounds present  EXTREMITIES:  2+ Peripheral Pulses, No clubbing, cyanosis, or edema  NEURO: AAOX2, (difficult to assess as pt non-verbal)        LABS:                        10.3   16.3  )-----------( 274      ( 02 Jun 2017 09:36 )             30.8     06-01    141  |  102  |  9.0  ----------------------------<  71  3.2<L>   |  23.0  |  0.95    Ca    8.3<L>      01 Jun 2017 05:20  Phos  2.8     05-31  Mg     1.6     05-31    TPro  5.9<L>  /  Alb  2.2<L>  /  TBili  0.2<L>  /  DBili  x   /  AST  11  /  ALT  14  /  AlkPhos  61  06-01            MEDICATIONS  (STANDING):  enoxaparin Injectable 40milliGRAM(s) SubCutaneous every 24 hours  levothyroxine 150MICROGram(s) Oral daily  haloperidol     Tablet 5milliGRAM(s) Oral daily  dextrose 5% + sodium chloride 0.9%. 1000milliLiter(s) IV Continuous <Continuous>  valproate sodium IVPB 1000milliGRAM(s) IV Intermittent every 12 hours  piperacillin/tazobactam IVPB. 3.375Gram(s) IV Intermittent every 8 hours    MEDICATIONS  (PRN):  ondansetron Injectable 4milliGRAM(s) IV Push every 6 hours PRN Nausea  LORazepam     Tablet 2milliGRAM(s) Oral three times a day PRN Anxiety  acetaminophen   Tablet 650milliGRAM(s) Oral every 6 hours PRN For Temp greater than 38 C (100.4 F)      RADIOLOGY & ADDITIONAL TESTS:

## 2017-06-02 NOTE — PROGRESS NOTE ADULT - PROBLEM SELECTOR PLAN 1
? 2/2 seizure episode vs Infection  CT ches to look for Lt pleural effusion/pneumonia - will consider pulm/thoracic sx pending CT  on zosyn+vanc   ID recs

## 2017-06-03 DIAGNOSIS — J90 PLEURAL EFFUSION, NOT ELSEWHERE CLASSIFIED: ICD-10-CM

## 2017-06-03 LAB
ALBUMIN FLD-MCNC: 1.8 G/DL — SIGNIFICANT CHANGE UP
ANION GAP SERPL CALC-SCNC: 10 MMOL/L — SIGNIFICANT CHANGE UP (ref 5–17)
B PERT IGG+IGM PNL SER: ABNORMAL
BUN SERPL-MCNC: 10 MG/DL — SIGNIFICANT CHANGE UP (ref 8–20)
CALCIUM SERPL-MCNC: 8.1 MG/DL — LOW (ref 8.6–10.2)
CHLORIDE SERPL-SCNC: 105 MMOL/L — SIGNIFICANT CHANGE UP (ref 98–107)
CO2 SERPL-SCNC: 26 MMOL/L — SIGNIFICANT CHANGE UP (ref 22–29)
COLOR FLD: YELLOW
CREAT SERPL-MCNC: 0.89 MG/DL — SIGNIFICANT CHANGE UP (ref 0.5–1.3)
EOSINOPHIL # FLD: 1 % — SIGNIFICANT CHANGE UP
FLUID INTAKE SUBSTANCE CLASS: SIGNIFICANT CHANGE UP
FLUID SEGMENTED GRANULOCYTES: 39 % — SIGNIFICANT CHANGE UP
GAS PNL BLDA: SIGNIFICANT CHANGE UP
GLUCOSE FLD-MCNC: 98 MG/DL — SIGNIFICANT CHANGE UP
GLUCOSE SERPL-MCNC: 92 MG/DL — SIGNIFICANT CHANGE UP (ref 70–115)
GRAM STN FLD: SIGNIFICANT CHANGE UP
GRAM STN FLD: SIGNIFICANT CHANGE UP
HCT VFR BLD CALC: 32.1 % — LOW (ref 42–52)
HGB BLD-MCNC: 10.7 G/DL — LOW (ref 14–18)
LYMPHOCYTES # FLD: 24 % — SIGNIFICANT CHANGE UP
MCHC RBC-ENTMCNC: 28.7 PG — SIGNIFICANT CHANGE UP (ref 27–31)
MCHC RBC-ENTMCNC: 33.3 G/DL — SIGNIFICANT CHANGE UP (ref 32–36)
MCV RBC AUTO: 86.1 FL — SIGNIFICANT CHANGE UP (ref 80–94)
MESOTHL CELL # FLD: 20 % — SIGNIFICANT CHANGE UP
MONOS+MACROS # FLD: 16 % — SIGNIFICANT CHANGE UP
NRBC # FLD: 0 % — SIGNIFICANT CHANGE UP (ref 0–0)
PH FLD: 8 — SIGNIFICANT CHANGE UP
PLATELET # BLD AUTO: 342 K/UL — SIGNIFICANT CHANGE UP (ref 150–400)
POTASSIUM SERPL-MCNC: 3.2 MMOL/L — LOW (ref 3.5–5.3)
POTASSIUM SERPL-SCNC: 3.2 MMOL/L — LOW (ref 3.5–5.3)
PROT FLD-MCNC: 3.7 G/DL — SIGNIFICANT CHANGE UP
RBC # BLD: 3.73 M/UL — LOW (ref 4.6–6.2)
RBC # FLD: 14.9 % — SIGNIFICANT CHANGE UP (ref 11–15.6)
RCV VOL RI: 2250 /UL — HIGH (ref 0–5)
SODIUM SERPL-SCNC: 141 MMOL/L — SIGNIFICANT CHANGE UP (ref 135–145)
SPECIMEN SOURCE FLD: SIGNIFICANT CHANGE UP
SPECIMEN SOURCE FLD: SIGNIFICANT CHANGE UP
SPECIMEN SOURCE: SIGNIFICANT CHANGE UP
SPECIMEN SOURCE: SIGNIFICANT CHANGE UP
TOTAL NUCLEATED CELL COUNT, BODY FLUID: 975 /UL — HIGH (ref 0–5)
TUBE TYPE: SIGNIFICANT CHANGE UP
VANCOMYCIN TROUGH SERPL-MCNC: 16.8 UG/ML — SIGNIFICANT CHANGE UP (ref 10–20)
WBC # BLD: 18.9 K/UL — HIGH (ref 4.8–10.8)
WBC # FLD AUTO: 18.9 K/UL — HIGH (ref 4.8–10.8)

## 2017-06-03 PROCEDURE — 32556 INSERT CATH PLEURA W/O IMAGE: CPT

## 2017-06-03 PROCEDURE — 71010: CPT | Mod: 26,76

## 2017-06-03 PROCEDURE — 99233 SBSQ HOSP IP/OBS HIGH 50: CPT

## 2017-06-03 PROCEDURE — 99232 SBSQ HOSP IP/OBS MODERATE 35: CPT

## 2017-06-03 PROCEDURE — 71010: CPT | Mod: 26,77

## 2017-06-03 RX ORDER — POTASSIUM CHLORIDE 20 MEQ
40 PACKET (EA) ORAL EVERY 4 HOURS
Qty: 0 | Refills: 0 | Status: COMPLETED | OUTPATIENT
Start: 2017-06-03 | End: 2017-06-03

## 2017-06-03 RX ORDER — PIPERACILLIN AND TAZOBACTAM 4; .5 G/20ML; G/20ML
3.38 INJECTION, POWDER, LYOPHILIZED, FOR SOLUTION INTRAVENOUS EVERY 8 HOURS
Qty: 0 | Refills: 0 | Status: DISCONTINUED | OUTPATIENT
Start: 2017-06-03 | End: 2017-06-09

## 2017-06-03 RX ORDER — IBUPROFEN 200 MG
400 TABLET ORAL ONCE
Qty: 0 | Refills: 0 | Status: COMPLETED | OUTPATIENT
Start: 2017-06-03 | End: 2017-06-03

## 2017-06-03 RX ADMIN — Medication 400 MILLIGRAM(S): at 19:30

## 2017-06-03 RX ADMIN — Medication 250 MILLIGRAM(S): at 05:19

## 2017-06-03 RX ADMIN — Medication 30 MILLIGRAM(S): at 17:10

## 2017-06-03 RX ADMIN — Medication 650 MILLIGRAM(S): at 17:09

## 2017-06-03 RX ADMIN — PIPERACILLIN AND TAZOBACTAM 25 GRAM(S): 4; .5 INJECTION, POWDER, LYOPHILIZED, FOR SOLUTION INTRAVENOUS at 21:05

## 2017-06-03 RX ADMIN — PIPERACILLIN AND TAZOBACTAM 25 GRAM(S): 4; .5 INJECTION, POWDER, LYOPHILIZED, FOR SOLUTION INTRAVENOUS at 13:27

## 2017-06-03 RX ADMIN — HALOPERIDOL DECANOATE 5 MILLIGRAM(S): 100 INJECTION INTRAMUSCULAR at 13:16

## 2017-06-03 RX ADMIN — Medication 30 MILLIGRAM(S): at 05:19

## 2017-06-03 RX ADMIN — PIPERACILLIN AND TAZOBACTAM 25 GRAM(S): 4; .5 INJECTION, POWDER, LYOPHILIZED, FOR SOLUTION INTRAVENOUS at 05:18

## 2017-06-03 RX ADMIN — Medication 40 MILLIEQUIVALENT(S): at 14:56

## 2017-06-03 RX ADMIN — ENOXAPARIN SODIUM 40 MILLIGRAM(S): 100 INJECTION SUBCUTANEOUS at 21:05

## 2017-06-03 RX ADMIN — SODIUM CHLORIDE 100 MILLILITER(S): 9 INJECTION, SOLUTION INTRAVENOUS at 17:10

## 2017-06-03 RX ADMIN — Medication 250 MILLIGRAM(S): at 13:16

## 2017-06-03 RX ADMIN — Medication 400 MILLIGRAM(S): at 19:00

## 2017-06-03 RX ADMIN — Medication 40 MILLIEQUIVALENT(S): at 17:09

## 2017-06-03 NOTE — PROGRESS NOTE ADULT - PROBLEM SELECTOR PLAN 1
? 2/2 seizure episode vs Infection   Ct surgery input appreciated, chest tube in place  on zosyn+vanc   ID recs

## 2017-06-03 NOTE — PROGRESS NOTE ADULT - ASSESSMENT
47 year old male with schizoaffective disorder, Pleural effusion s/p thoracentesis, anorexia, dehydration recently discharged from hospital, noted to have  Moderate pleural effusion in the left lateral region, pt is s/p IR guided thoracentesis, removed almost 1400 cc, sent back to ed as he was not eating at [facility], afebrile, xray showed moderate pleural effusion. Had a seizure episode on 5/29 2/2 refusal to take po meds. Started on iv AEDs, Now stay complicated by worsening leucocytosis. ID consulted. continues to refuse to eat. CT chest to evaluate pleural effusion awaited. would Consider thoracic surgery cpnsult if persistent/worsening effusion.  fever improving with zosyn. Spoke to sister Aydee @ [phone] at length - awit CT chest, swallow eval r/o aspiration

## 2017-06-03 NOTE — PROGRESS NOTE ADULT - SUBJECTIVE AND OBJECTIVE BOX
SAMIRA NUGENT     Chief Complaint: Patient is a 47y old  Male who presents with a chief complaint of wont eat (27 May 2017 05:11)      PAST MEDICAL & SURGICAL HISTORY:  Hypothyroidism, unspecified type  Psychiatric disorder  No significant past surgical history      HPI/OVERNIGHT EVENTS: Patient continues with chest tube non-verbal on vanco and zosyn. Poor appetite    MEDICATIONS  (STANDING):  enoxaparin Injectable 40milliGRAM(s) SubCutaneous every 24 hours  levothyroxine 150MICROGram(s) Oral daily  haloperidol     Tablet 5milliGRAM(s) Oral daily  dextrose 5% + sodium chloride 0.9%. 1000milliLiter(s) IV Continuous <Continuous>  valproate sodium IVPB 1000milliGRAM(s) IV Intermittent every 12 hours  piperacillin/tazobactam IVPB. 3.375Gram(s) IV Intermittent every 8 hours  vancomycin  IVPB 1000milliGRAM(s) IV Intermittent every 8 hours  potassium chloride    Tablet ER 40milliEquivalent(s) Oral every 4 hours      Vital Signs Last 24 Hrs  T(C): 36.6, Max: 38.8 (06-02 @ 21:15)  T(F): 97.8, Max: 101.8 (06-02 @ 21:15)  HR: 85 (85 - 110)  BP: 109/66 (109/66 - 136/76)  BP(mean): --  RR: 20 (20 - 26)  SpO2: 100% (89% - 999%)    PHYSICAL EXAM:     Non verbal with chest tube in place  HEENT: PERRLA, EOMI, Normal Hearing, MMM  Neck: No LAD, No JVD  Back: Normal spine flexure, No CVA tenderness  Respiratory:  Chest tube Cardiovascular: S1 and S2, RRR, no M/G/R  Gastrointestinal: BS+, soft, NT/ND  Extremities: No peripheral edema  Vascular: 2+ peripheral pulses  Neurological: non verbal  Psychiatric: Normal mood, normal affect       CAPILLARY BLOOD GLUCOSE  109 (31 May 2017 21:55)    LABS:                        10.7   18.9  )-----------( 342      ( 03 Jun 2017 05:51 )             32.1     06-03    141  |  105  |  10.0  ----------------------------<  92  3.2<L>   |  26.0  |  0.89    Ca    8.1<L>      03 Jun 2017 05:51    TPro  6.9  /  Alb  2.4<L>  /  TBili  0.2<L>  /  DBili  x   /  AST  50<H>  /  ALT  37  /  AlkPhos  69  06-02          RADIOLOGY & ADDITIONAL TESTS:

## 2017-06-03 NOTE — PROCEDURE NOTE - NSPROCDETAILS_GEN_ALL_CORE
ultrasound assessment of fluid (size)/Seldinger technique/sterile dressing applied/ultrasound assessment of fluid (location)/secured in place

## 2017-06-03 NOTE — CHART NOTE - NSCHARTNOTEFT_GEN_A_CORE
Patient has left sided effusion which has markedly increased since 5/31/17. Mediastinum is pushed to the right. CTS was consulted for thoracocentesis. Tried to contact patient's mother who is health care proxy for consent but couldn't get in touch.   Due to emergent nature of the procedure and medical necessity at this time - will do two provider consent.  Case discussed with CTS NP - Jose Daniel Avalos.

## 2017-06-03 NOTE — PROGRESS NOTE ADULT - PROBLEM SELECTOR PLAN 1
blood cultures negative  sputum with GPC pairs on Gram stain  continue Zosyn for now  - d/c vancomycin

## 2017-06-03 NOTE — PROGRESS NOTE ADULT - SUBJECTIVE AND OBJECTIVE BOX
[facility]  INFECTIOUS DISEASES AND [facility] [place] [facility]  =======================================================  Ritesh Lang MD  Diplomates American Board of Internal Medicine and Infectious Diseases  =======================================================    SAMIRA NUGENT [id number]    Follow up: Leukocytosis; Pleural effusion    s/p chest tube on 6/2/17 with  more than 2.2 Liter removed.    =======================================================    Allergies:  No Known Allergies    MEDICATIONS  (STANDING):  enoxaparin Injectable 40milliGRAM(s) SubCutaneous every 24 hours  levothyroxine 150MICROGram(s) Oral daily  haloperidol     Tablet 5milliGRAM(s) Oral daily  dextrose 5% + sodium chloride 0.9%. 1000milliLiter(s) IV Continuous <Continuous>  valproate sodium IVPB 1000milliGRAM(s) IV Intermittent every 12 hours  potassium chloride    Tablet ER 40milliEquivalent(s) Oral every 4 hours  piperacillin/tazobactam IVPB. 3.375Gram(s) IV Intermittent every 8 hours     =======================================================  REVIEW OF SYSTEMS:  Unable to obtain due to patient's condition    =======================================================  Physical Exam:  ICU Vital Signs Last 24 Hrs  T(C): 36.6, Max: 38.8 (06-02 @ 21:15)  T(F): 97.8, Max: 101.8 (06-02 @ 21:15)  HR: 85 (85 - 110)  BP: 109/66 (109/66 - 136/76)  RR: 20 (20 - 26)  SpO2: 100% (89% - 999%)      GEN: NAD, pleasant  HEENT: normocephalic and atraumatic. EOMI. CASSANDRA.    NECK: Supple. No carotid bruits.  No lymphadenopathy or thyromegaly.  LUNGS: Clear to auscultation.  HEART: Regular rate and rhythm without murmur.  ABDOMEN: Soft, nontender, and nondistended.  Positive bowel sounds.    : No CVA tenderness  EXTREMITIES: Without any cyanosis, clubbing, rash, lesions or edema.  MSK: no joint swelling  NEUROLOGIC: Cranial nerves II through XII are grossly intact.  PSYCHIATRIC: Appropriate affect .  SKIN: No ulceration or induration present.      =======================================================    Labs:  06-03    141  |  105  |  10.0  ----------------------------<  92  3.2<L>   |  26.0  |  0.89    Ca    8.1<L>      03 Jun 2017 05:51    TPro  6.9  /  Alb  2.4<L>  /  TBili  0.2<L>  /  DBili  x   /  AST  50<H>  /  ALT  37  /  AlkPhos  69  06-02                          10.7   18.9  )-----------( 342      ( 03 Jun 2017 05:51 )             32.1        LIVER FUNCTIONS - ( 02 Jun 2017 22:08 )  Alb: 2.4 g/dL / Pro: 6.9 g/dL / ALK PHOS: 69 U/L / ALT: 37 U/L / AST: 50 U/L / GGT: x               CAPILLARY BLOOD GLUCOSE    ABG - ( 03 Jun 2017 03:47 )  pH: 7.46  /  pCO2: 35    /  pO2: 87    / HCO3: 25    / Base Excess: 1.2   /  SaO2: 97               RECENT CULTURES:  06-03 @ 14:19 .Sputum Sputum       Few White blood cells  Few Gram positive cocci in pairs      06-03 @ 01:48 .Body Fluid Pleural Fluid       Few White blood cells  No organisms seen      06-01 @ 05:20 .Blood Blood     No growth at 48 hours        05-31 @ 22:12 .Blood Blood     No growth at 48 hours        05-27 @ 10:56 .Blood Blood     No growth at 5 days.        05-27 @ 06:21 .Urine Clean Catch (Midstream)     <10,000 CFU/ml Yeast cells        05-23 @ 18:18 .Body Fluid Pleural Fluid     No growth at 1 week.        05-23 @ 08:43 .Body Fluid Pleural Fluid     No fungus isolated at 1 week.  Culture in progress        05-23 @ 08:42 .Body Fluid Pleural Fluid     No growth at 5 days.    Few WBC's  No organisms seen      05-23 @ 02:01 .Blood Blood     No growth at 5 days.        05-23 @ 01:48 .Blood Blood     No growth at 5 days.              =======================================================    EXAM:  CHEST P.A. LATERAL                        PROCEDURE DATE:  05/31/2017    INTERPRETATION:  PA and lateral chest radiographs   COMPARISON: 5/27/2017.  CLINICAL INFORMATION: Chest Pain.  FINDINGS:  Focus of breath is enlarging left pleural effusion which layers to the   left posterior eighth rib. Underlying infiltrate atelectasis cannot be   excluded. There is a early right lung base of infiltrate/opacity. Upper   lobes and apices are clear.  The  heart is enlarged in transverse diameter. No hilar mass. Trachea   midline.        The visualized osseus structures are intact.   IMPRESSION:  Increasing left pleural effusion with consolidation of the left lower   lobe hemithorax.. [facility]  INFECTIOUS DISEASES [facility]NAL [facility] [place] [facility]  =======================================================  Ritesh Lang MD  Diplomates American Board of Internal Medicine and Infectious Diseases  =======================================================    SMAIRA NUGENT [id number]    Follow up: Leukocytosis; Pleural effusion    s/p chest tube on 6/2/17 with  more than 2.2 Liter removed.    =======================================================    Allergies:  No Known Allergies    MEDICATIONS  (STANDING):  enoxaparin Injectable 40milliGRAM(s) SubCutaneous every 24 hours  levothyroxine 150MICROGram(s) Oral daily  haloperidol     Tablet 5milliGRAM(s) Oral daily  dextrose 5% + sodium chloride 0.9%. 1000milliLiter(s) IV Continuous <Continuous>  valproate sodium IVPB 1000milliGRAM(s) IV Intermittent every 12 hours  potassium chloride    Tablet ER 40milliEquivalent(s) Oral every 4 hours  piperacillin/tazobactam IVPB. 3.375Gram(s) IV Intermittent every 8 hours     =======================================================  REVIEW OF SYSTEMS:  Unable to obtain due to patient's condition. patient non verbal    =======================================================  Physical Exam:  ICU Vital Signs Last 24 Hrs  T(C): 36.6, Max: 38.8 (06-02 @ 21:15)  T(F): 97.8, Max: 101.8 (06-02 @ 21:15)  HR: 85 (85 - 110)  BP: 109/66 (109/66 - 136/76)  RR: 20 (20 - 26)  SpO2: 100% (89% - 999%)      GEN: NAD, pleasant Non verbal  HEENT: normocephalic and atraumatic. EOMI. CASSANDRA.    NECK: Supple. No carotid bruits.  No lymphadenopathy or thyromegaly.  LUNGS: DIMINISHED breath sounds left base    CHEST TUBE left side with yellow pleural fluid drainage.   HEART: Regular rate and rhythm without murmur.  ABDOMEN: Soft, nontender, and nondistended.  Positive bowel sounds.    : No CVA tenderness  EXTREMITIES: Without any cyanosis, clubbing, rash, lesions or edema.  MSK: no joint swelling  NEUROLOGIC: Cranial nerves II through XII are grossly intact.  PSYCHIATRIC: Appropriate affect .  SKIN: No ulceration or induration present.      =======================================================    Labs:  06-03    141  |  105  |  10.0  ----------------------------<  92  3.2<L>   |  26.0  |  0.89    Ca    8.1<L>      03 Jun 2017 05:51    TPro  6.9  /  Alb  2.4<L>  /  TBili  0.2<L>  /  DBili  x   /  AST  50<H>  /  ALT  37  /  AlkPhos  69  06-02                          10.7   18.9  )-----------( 342      ( 03 Jun 2017 05:51 )             32.1        LIVER FUNCTIONS - ( 02 Jun 2017 22:08 )  Alb: 2.4 g/dL / Pro: 6.9 g/dL / ALK PHOS: 69 U/L / ALT: 37 U/L / AST: 50 U/L / GGT: x               CAPILLARY BLOOD GLUCOSE    ABG - ( 03 Jun 2017 03:47 )  pH: 7.46  /  pCO2: 35    /  pO2: 87    / HCO3: 25    / Base Excess: 1.2   /  SaO2: 97               RECENT CULTURES:  06-03 @ 14:19 .Sputum Sputum       Few White blood cells  Few Gram positive cocci in pairs      06-03 @ 01:48 .Body Fluid Pleural Fluid       Few White blood cells  No organisms seen      06-01 @ 05:20 .Blood Blood     No growth at 48 hours        05-31 @ 22:12 .Blood Blood     No growth at 48 hours        05-27 @ 10:56 .Blood Blood     No growth at 5 days.        05-27 @ 06:21 .Urine Clean Catch (Midstream)     <10,000 CFU/ml Yeast cells        05-23 @ 18:18 .Body Fluid Pleural Fluid     No growth at 1 week.        05-23 @ 08:43 .Body Fluid Pleural Fluid     No fungus isolated at 1 week.  Culture in progress        05-23 @ 08:42 .Body Fluid Pleural Fluid     No growth at 5 days.    Few WBC's  No organisms seen      05-23 @ 02:01 .Blood Blood     No growth at 5 days.        05-23 @ 01:48 .Blood Blood     No growth at 5 days.              =======================================================    EXAM:  CHEST P.A. LATERAL                        PROCEDURE DATE:  05/31/2017    INTERPRETATION:  PA and lateral chest radiographs   COMPARISON: 5/27/2017.  CLINICAL INFORMATION: Chest Pain.  FINDINGS:  Focus of breath is enlarging left pleural effusion which layers to the   left posterior eighth rib. Underlying infiltrate atelectasis cannot be   excluded. There is a early right lung base of infiltrate/opacity. Upper   lobes and apices are clear.  The  heart is enlarged in transverse diameter. No hilar mass. Trachea   midline.        The visualized osseus structures are intact.   IMPRESSION:  Increasing left pleural effusion with consolidation of the left lower   lobe hemithorax..

## 2017-06-04 LAB
ANION GAP SERPL CALC-SCNC: 15 MMOL/L — SIGNIFICANT CHANGE UP (ref 5–17)
BUN SERPL-MCNC: 12 MG/DL — SIGNIFICANT CHANGE UP (ref 8–20)
CALCIUM SERPL-MCNC: 8.2 MG/DL — LOW (ref 8.6–10.2)
CHLORIDE SERPL-SCNC: 106 MMOL/L — SIGNIFICANT CHANGE UP (ref 98–107)
CO2 SERPL-SCNC: 24 MMOL/L — SIGNIFICANT CHANGE UP (ref 22–29)
CREAT SERPL-MCNC: 1.39 MG/DL — HIGH (ref 0.5–1.3)
GLUCOSE SERPL-MCNC: 89 MG/DL — SIGNIFICANT CHANGE UP (ref 70–115)
HCT VFR BLD CALC: 31.9 % — LOW (ref 42–52)
HGB BLD-MCNC: 10.6 G/DL — LOW (ref 14–18)
LDH SERPL L TO P-CCNC: 142 U/L — SIGNIFICANT CHANGE UP
MAGNESIUM SERPL-MCNC: 2 MG/DL — SIGNIFICANT CHANGE UP (ref 1.6–2.6)
MCHC RBC-ENTMCNC: 28.8 PG — SIGNIFICANT CHANGE UP (ref 27–31)
MCHC RBC-ENTMCNC: 33.2 G/DL — SIGNIFICANT CHANGE UP (ref 32–36)
MCV RBC AUTO: 86.7 FL — SIGNIFICANT CHANGE UP (ref 80–94)
NIGHT BLUE STAIN TISS: SIGNIFICANT CHANGE UP
PHOSPHATE SERPL-MCNC: 3.2 MG/DL — SIGNIFICANT CHANGE UP (ref 2.4–4.7)
PLATELET # BLD AUTO: 313 K/UL — SIGNIFICANT CHANGE UP (ref 150–400)
POTASSIUM SERPL-MCNC: 3.4 MMOL/L — LOW (ref 3.5–5.3)
POTASSIUM SERPL-SCNC: 3.4 MMOL/L — LOW (ref 3.5–5.3)
RBC # BLD: 3.68 M/UL — LOW (ref 4.6–6.2)
RBC # FLD: 15.2 % — SIGNIFICANT CHANGE UP (ref 11–15.6)
SODIUM SERPL-SCNC: 145 MMOL/L — SIGNIFICANT CHANGE UP (ref 135–145)
SPECIMEN SOURCE: SIGNIFICANT CHANGE UP
WBC # BLD: 11.9 K/UL — HIGH (ref 4.8–10.8)
WBC # FLD AUTO: 11.9 K/UL — HIGH (ref 4.8–10.8)

## 2017-06-04 PROCEDURE — 99233 SBSQ HOSP IP/OBS HIGH 50: CPT

## 2017-06-04 RX ADMIN — PIPERACILLIN AND TAZOBACTAM 25 GRAM(S): 4; .5 INJECTION, POWDER, LYOPHILIZED, FOR SOLUTION INTRAVENOUS at 14:17

## 2017-06-04 RX ADMIN — Medication 30 MILLIGRAM(S): at 17:57

## 2017-06-04 RX ADMIN — Medication 150 MICROGRAM(S): at 05:39

## 2017-06-04 RX ADMIN — HALOPERIDOL DECANOATE 5 MILLIGRAM(S): 100 INJECTION INTRAMUSCULAR at 12:05

## 2017-06-04 RX ADMIN — Medication 30 MILLIGRAM(S): at 05:39

## 2017-06-04 RX ADMIN — PIPERACILLIN AND TAZOBACTAM 25 GRAM(S): 4; .5 INJECTION, POWDER, LYOPHILIZED, FOR SOLUTION INTRAVENOUS at 22:48

## 2017-06-04 RX ADMIN — PIPERACILLIN AND TAZOBACTAM 25 GRAM(S): 4; .5 INJECTION, POWDER, LYOPHILIZED, FOR SOLUTION INTRAVENOUS at 05:39

## 2017-06-04 RX ADMIN — ENOXAPARIN SODIUM 40 MILLIGRAM(S): 100 INJECTION SUBCUTANEOUS at 22:48

## 2017-06-04 NOTE — SWALLOW BEDSIDE ASSESSMENT ADULT - ORAL PREPARATORY PHASE
Reduced oral grading Reduced oral grading/Decreased mastication ability pt with dorinda rodriguez, spit out PO trial,/Decreased mastication ability

## 2017-06-04 NOTE — PROGRESS NOTE ADULT - PROBLEM SELECTOR PLAN 1
? 2/2 seizure episode vs Infection   Ct surgery input appreciated, chest tube in place  on zosyn+vanc   6/4/2017 Vanco discontinued but zosyn remains  ID recs

## 2017-06-04 NOTE — PROGRESS NOTE ADULT - ASSESSMENT
47 year old male with schizoaffective disorder, Pleural effusion s/p thoracentesis, anorexia, dehydration recently discharged from hospital, noted to have  Moderate pleural effusion in the left lateral region, pt is s/p IR guided thoracentesis, removed almost 1400 cc, sent back to ed as he was not eating at [facility], afebrile, xray showed moderate pleural effusion. Had a seizure episode on 5/29 2/2 refusal to take po meds. Started on iv AEDs, Now stay complicated by worsening leucocytosis. ID consulted. continues to refuse to eat. CT chest to evaluate pleural effusion awaited. would Consider thoracic surgery cpnsult if persistent/worsening effusion.  6/4/2017 Ct surgery input appreciated. Chest tube in place. Small pneumo noted. Repeat CXR in am.  fever improving with zosyn. Spoke to sister Aydee @ [phone] at length - awit CT chest, swallow eval r/o aspiration

## 2017-06-04 NOTE — PROGRESS NOTE ADULT - SUBJECTIVE AND OBJECTIVE BOX
SAMIRA NUGENT     Chief Complaint: Patient is a 47y old  Male who presents with a chief complaint of wont eat (27 May 2017 05:11)      PAST MEDICAL & SURGICAL HISTORY:  Hypothyroidism, unspecified type  Psychiatric disorder  No significant past surgical history      HPI/OVERNIGHT EVENTS: I was called by radiology DR Rose who alerted me of small pneumothorax apical less than 10%. I alerted ct surgery. Chest tube is already in place.    MEDICATIONS  (STANDING):  enoxaparin Injectable 40milliGRAM(s) SubCutaneous every 24 hours  levothyroxine 150MICROGram(s) Oral daily  haloperidol     Tablet 5milliGRAM(s) Oral daily  dextrose 5% + sodium chloride 0.9%. 1000milliLiter(s) IV Continuous <Continuous>  valproate sodium IVPB 1000milliGRAM(s) IV Intermittent every 12 hours  piperacillin/tazobactam IVPB. 3.375Gram(s) IV Intermittent every 8 hours      Vital Signs Last 24 Hrs  T(C): 37.4, Max: 38.4 (06-03 @ 18:37)  T(F): 99.4, Max: 101.1 (06-03 @ 18:37)  HR: 82 (81 - 92)  BP: 126/79 (104/61 - 134/77)  BP(mean): --  RR: 20 (20 - 20)  SpO2: 99% (99% - 99%)    PHYSICAL EXAM:  Constitutional: non verbal makes eye contacts   HEENT: PERRLA, EOMI, Normal Hearing, MMM  Neck: No LAD, No JVD  Back: Normal spine flexure, No CVA tenderness  Respiratory: Chest tube in place Cardiovascular: S1 and S2, RRR, no M/G/R  Gastrointestinal: BS+, soft, NT/ND  Extremities: No peripheral edema  Vascular: 2+ peripheral pulses  Neurological: non verbal     CAPILLARY BLOOD GLUCOSE  109 (31 May 2017 21:55)    LABS:                        10.6   11.9  )-----------( 313      ( 04 Jun 2017 07:41 )             31.9     06-04    145  |  106  |  12.0  ----------------------------<  89  3.4<L>   |  24.0  |  1.39<H>    Ca    8.2<L>      04 Jun 2017 07:41  Phos  3.2     06-04  Mg     2.0     06-04    TPro  6.9  /  Alb  2.4<L>  /  TBili  0.2<L>  /  DBili  x   /  AST  50<H>  /  ALT  37  /  AlkPhos  69  06-02          RADIOLOGY & ADDITIONAL TESTS:

## 2017-06-04 NOTE — SWALLOW BEDSIDE ASSESSMENT ADULT - ORAL PHASE
pt holds bolus in oral cavity, cues to swallow needed. 1:1 reports this behavior is baseline/Decreased anterior-posterior movement of the bolus Decreased anterior-posterior movement of the bolus/pt holds bolus in oral cavity, cues to swallow needed. 1:1 reports this behavior is baseline Decreased anterior-posterior movement of the bolus Decreased anterior-posterior movement of the bolus/Delayed oral transit time/?piecemeal, pt would transport and swallow part of bolus, then 30 seconds would pass and would then transfer and swallow bolus ?piecemeal, pt would transport and swallow part of bolus, then 5-10 seconds would pass and would then transfer and swallow bolus/Delayed oral transit time/Decreased anterior-posterior movement of the bolus

## 2017-06-04 NOTE — SWALLOW BEDSIDE ASSESSMENT ADULT - SWALLOW EVAL: DIAGNOSIS
Pt presents with at least mild oral and overt s/s suspected pharyngeal dysphagia due to overt s/s aspiration/penetration impacted by decreased cogition. Pt presents with at least mild-mod oral and overt s/s suspected pharyngeal dysphagia due to overt s/s aspiration/penetration impacted by decreased cogition.

## 2017-06-04 NOTE — SWALLOW BEDSIDE ASSESSMENT ADULT - SLP PERTINENT HISTORY OF CURRENT PROBLEM
pt resides at [facility], r/o aspiration pt resides at [facility], r/o aspiration, chest xray: New  complete  atelectasis  of  the  left  hemithorax, pt resides at [facility], r/o aspiration, chest xray: Small bilateral  pleural  effusions, pneumothorax, chest tube in place

## 2017-06-05 LAB
CULTURE RESULTS: SIGNIFICANT CHANGE UP
SPECIMEN SOURCE: SIGNIFICANT CHANGE UP

## 2017-06-05 PROCEDURE — 99233 SBSQ HOSP IP/OBS HIGH 50: CPT

## 2017-06-05 PROCEDURE — 71010: CPT | Mod: 26

## 2017-06-05 RX ORDER — CLOZAPINE 150 MG/1
400 TABLET, ORALLY DISINTEGRATING ORAL AT BEDTIME
Qty: 0 | Refills: 0 | Status: DISCONTINUED | OUTPATIENT
Start: 2017-06-05 | End: 2017-06-06

## 2017-06-05 RX ORDER — CLOZAPINE 150 MG/1
300 TABLET, ORALLY DISINTEGRATING ORAL DAILY
Qty: 0 | Refills: 0 | Status: DISCONTINUED | OUTPATIENT
Start: 2017-06-05 | End: 2017-06-06

## 2017-06-05 RX ADMIN — Medication 30 MILLIGRAM(S): at 05:16

## 2017-06-05 RX ADMIN — CLOZAPINE 400 MILLIGRAM(S): 150 TABLET, ORALLY DISINTEGRATING ORAL at 21:33

## 2017-06-05 RX ADMIN — Medication 30 MILLIGRAM(S): at 18:18

## 2017-06-05 RX ADMIN — Medication 2 MILLIGRAM(S): at 21:33

## 2017-06-05 RX ADMIN — PIPERACILLIN AND TAZOBACTAM 25 GRAM(S): 4; .5 INJECTION, POWDER, LYOPHILIZED, FOR SOLUTION INTRAVENOUS at 14:38

## 2017-06-05 RX ADMIN — PIPERACILLIN AND TAZOBACTAM 25 GRAM(S): 4; .5 INJECTION, POWDER, LYOPHILIZED, FOR SOLUTION INTRAVENOUS at 07:55

## 2017-06-05 RX ADMIN — ENOXAPARIN SODIUM 40 MILLIGRAM(S): 100 INJECTION SUBCUTANEOUS at 21:33

## 2017-06-05 RX ADMIN — SODIUM CHLORIDE 100 MILLILITER(S): 9 INJECTION, SOLUTION INTRAVENOUS at 21:41

## 2017-06-05 RX ADMIN — PIPERACILLIN AND TAZOBACTAM 25 GRAM(S): 4; .5 INJECTION, POWDER, LYOPHILIZED, FOR SOLUTION INTRAVENOUS at 21:33

## 2017-06-05 RX ADMIN — Medication 150 MICROGRAM(S): at 05:16

## 2017-06-05 RX ADMIN — HALOPERIDOL DECANOATE 5 MILLIGRAM(S): 100 INJECTION INTRAMUSCULAR at 12:09

## 2017-06-05 NOTE — PROGRESS NOTE ADULT - SUBJECTIVE AND OBJECTIVE BOX
SAMIRA NUGENT     Chief Complaint: Patient is a 47y old  Male who presents with a chief complaint of wont eat (27 May 2017 05:11)      PAST MEDICAL & SURGICAL HISTORY:  Hypothyroidism, unspecified type  Psychiatric disorder  No significant past surgical history      HPI/OVERNIGHT EVENTS: Patient sleeping, arousable. He is non verbal.    MEDICATIONS  (STANDING):  enoxaparin Injectable 40milliGRAM(s) SubCutaneous every 24 hours  levothyroxine 150MICROGram(s) Oral daily  haloperidol     Tablet 5milliGRAM(s) Oral daily  dextrose 5% + sodium chloride 0.9%. 1000milliLiter(s) IV Continuous <Continuous>  valproate sodium IVPB 1000milliGRAM(s) IV Intermittent every 12 hours  piperacillin/tazobactam IVPB. 3.375Gram(s) IV Intermittent every 8 hours      Vital Signs Last 24 Hrs  T(C): 36.7, Max: 37.5 (06-04 @ 17:58)  T(F): 98.1, Max: 99.5 (06-04 @ 17:58)  HR: 87 (87 - 99)  BP: 142/86 (127/73 - 142/86)  BP(mean): --  RR: 20 (20 - 20)  SpO2: 98% (96% - 98%)    PHYSICAL EXAM:  Constitutional: NAD, well-groomed, well-developed  HEENT:  temporal wasting  Neck: No LAD, No JVD  Back: Normal spine flexure, No CVA tenderness  Respiratory: CTAB Cardiovascular: S1 and S2, RRR, no M/G/R chest tube in place  Gastrointestinal: BS+, soft, NT/ND  Extremities: No peripheral edema  Vascular: 2+ peripheral pulses  Neurological:  non verbal    CAPILLARY BLOOD GLUCOSE    LABS:                        10.6   11.9  )-----------( 313      ( 04 Jun 2017 07:41 )             31.9     06-04    145  |  106  |  12.0  ----------------------------<  89  3.4<L>   |  24.0  |  1.39<H>    Ca    8.2<L>      04 Jun 2017 07:41  Phos  3.2     06-04  Mg     2.0     06-04            RADIOLOGY & ADDITIONAL TESTS:

## 2017-06-05 NOTE — PROGRESS NOTE ADULT - SUBJECTIVE AND OBJECTIVE BOX
chart reviewed and patient seen Mute eyes wide open nods head "No" in response to questions Side reports patient ate most of breakfast when she fed him earlier  no new recommendations  Needs surrogate consent for any procedure as patient lacks reasonable decision making capacity He has not opposed any treatment thus far

## 2017-06-05 NOTE — PROGRESS NOTE ADULT - ASSESSMENT
47 year old male with schizoaffective disorder, Pleural effusion s/p thoracentesis, anorexia, dehydration recently discharged from hospital, noted to have  Moderate pleural effusion in the left lateral region, pt is s/p IR guided thoracentesis, removed almost 1400 cc, sent back to ed as he was not eating at [facility], afebrile, xray showed moderate pleural effusion. Had a seizure episode on 5/29 2/2 refusal to take po meds. Started on iv AEDs, Now stay complicated by worsening leucocytosis. ID consulted. continues to refuse to eat. CT chest to evaluate pleural effusion awaited. would Consider thoracic surgery cpnsult if persistent/worsening effusion.  6/4/2017 Ct surgery input appreciated. Chest tube in place. Small pneumo noted. Repeat CXR in am.  fever improving with zosyn. Spoke to sister Aydee @ [phone] at length - await CT chest, swallow eval r/o aspiration

## 2017-06-06 DIAGNOSIS — L98.9 DISORDER OF THE SKIN AND SUBCUTANEOUS TISSUE, UNSPECIFIED: ICD-10-CM

## 2017-06-06 DIAGNOSIS — R41.82 ALTERED MENTAL STATUS, UNSPECIFIED: ICD-10-CM

## 2017-06-06 DIAGNOSIS — R09.02 HYPOXEMIA: ICD-10-CM

## 2017-06-06 LAB
ANION GAP SERPL CALC-SCNC: 11 MMOL/L — SIGNIFICANT CHANGE UP (ref 5–17)
ANION GAP SERPL CALC-SCNC: 12 MMOL/L — SIGNIFICANT CHANGE UP (ref 5–17)
BASE EXCESS BLDA CALC-SCNC: 1.5 MMOL/L — SIGNIFICANT CHANGE UP (ref -3–3)
BLOOD GAS COMMENTS ARTERIAL: SIGNIFICANT CHANGE UP
BUN SERPL-MCNC: 10 MG/DL — SIGNIFICANT CHANGE UP (ref 8–20)
BUN SERPL-MCNC: 11 MG/DL — SIGNIFICANT CHANGE UP (ref 8–20)
CALCIUM SERPL-MCNC: 8.2 MG/DL — LOW (ref 8.6–10.2)
CALCIUM SERPL-MCNC: 8.7 MG/DL — SIGNIFICANT CHANGE UP (ref 8.6–10.2)
CHLORIDE SERPL-SCNC: 108 MMOL/L — HIGH (ref 98–107)
CHLORIDE SERPL-SCNC: 111 MMOL/L — HIGH (ref 98–107)
CO2 SERPL-SCNC: 24 MMOL/L — SIGNIFICANT CHANGE UP (ref 22–29)
CO2 SERPL-SCNC: 28 MMOL/L — SIGNIFICANT CHANGE UP (ref 22–29)
CREAT SERPL-MCNC: 1.21 MG/DL — SIGNIFICANT CHANGE UP (ref 0.5–1.3)
CREAT SERPL-MCNC: 1.21 MG/DL — SIGNIFICANT CHANGE UP (ref 0.5–1.3)
D DIMER BLD IA.RAPID-MCNC: 2910 NG/ML DDU — HIGH
GAS PNL BLDA: SIGNIFICANT CHANGE UP
GLUCOSE SERPL-MCNC: 92 MG/DL — SIGNIFICANT CHANGE UP (ref 70–115)
GLUCOSE SERPL-MCNC: 98 MG/DL — SIGNIFICANT CHANGE UP (ref 70–115)
HCO3 BLDA-SCNC: 26 MMOL/L — SIGNIFICANT CHANGE UP (ref 20–26)
HCT VFR BLD CALC: 29.1 % — LOW (ref 42–52)
HGB BLD-MCNC: 9.5 G/DL — LOW (ref 14–18)
HOROWITZ INDEX BLDA+IHG-RTO: SIGNIFICANT CHANGE UP
MAGNESIUM SERPL-MCNC: 1.9 MG/DL — SIGNIFICANT CHANGE UP (ref 1.8–2.6)
MCHC RBC-ENTMCNC: 28.9 PG — SIGNIFICANT CHANGE UP (ref 27–31)
MCHC RBC-ENTMCNC: 32.6 G/DL — SIGNIFICANT CHANGE UP (ref 32–36)
MCV RBC AUTO: 88.4 FL — SIGNIFICANT CHANGE UP (ref 80–94)
PCO2 BLDA: 44 MMHG — SIGNIFICANT CHANGE UP (ref 35–45)
PH BLDA: 7.39 — SIGNIFICANT CHANGE UP (ref 7.35–7.45)
PHOSPHATE SERPL-MCNC: 3.9 MG/DL — SIGNIFICANT CHANGE UP (ref 2.4–4.7)
PLATELET # BLD AUTO: 275 K/UL — SIGNIFICANT CHANGE UP (ref 150–400)
PO2 BLDA: 70 MMHG — LOW (ref 83–108)
POTASSIUM SERPL-MCNC: 3.1 MMOL/L — LOW (ref 3.5–5.3)
POTASSIUM SERPL-MCNC: 3.4 MMOL/L — LOW (ref 3.5–5.3)
POTASSIUM SERPL-SCNC: 3.1 MMOL/L — LOW (ref 3.5–5.3)
POTASSIUM SERPL-SCNC: 3.4 MMOL/L — LOW (ref 3.5–5.3)
RBC # BLD: 3.29 M/UL — LOW (ref 4.6–6.2)
RBC # FLD: 15.4 % — SIGNIFICANT CHANGE UP (ref 11–15.6)
SAO2 % BLDA: 94 % — LOW (ref 95–99)
SODIUM SERPL-SCNC: 146 MMOL/L — HIGH (ref 135–145)
SODIUM SERPL-SCNC: 148 MMOL/L — HIGH (ref 135–145)
WBC # BLD: 7.9 K/UL — SIGNIFICANT CHANGE UP (ref 4.8–10.8)
WBC # FLD AUTO: 7.9 K/UL — SIGNIFICANT CHANGE UP (ref 4.8–10.8)

## 2017-06-06 PROCEDURE — 93010 ELECTROCARDIOGRAM REPORT: CPT

## 2017-06-06 PROCEDURE — 99233 SBSQ HOSP IP/OBS HIGH 50: CPT

## 2017-06-06 PROCEDURE — 99291 CRITICAL CARE FIRST HOUR: CPT

## 2017-06-06 PROCEDURE — 71010: CPT | Mod: 26,76

## 2017-06-06 RX ORDER — MORPHINE SULFATE 50 MG/1
2 CAPSULE, EXTENDED RELEASE ORAL ONCE
Qty: 0 | Refills: 0 | Status: DISCONTINUED | OUTPATIENT
Start: 2017-06-06 | End: 2017-06-06

## 2017-06-06 RX ORDER — SODIUM CHLORIDE 9 MG/ML
1000 INJECTION, SOLUTION INTRAVENOUS
Qty: 0 | Refills: 0 | Status: DISCONTINUED | OUTPATIENT
Start: 2017-06-06 | End: 2017-06-07

## 2017-06-06 RX ORDER — LEVOTHYROXINE SODIUM 125 MCG
75 TABLET ORAL DAILY
Qty: 0 | Refills: 0 | Status: DISCONTINUED | OUTPATIENT
Start: 2017-06-06 | End: 2017-06-09

## 2017-06-06 RX ADMIN — Medication 75 MICROGRAM(S): at 14:34

## 2017-06-06 RX ADMIN — Medication 30 MILLIGRAM(S): at 05:43

## 2017-06-06 RX ADMIN — PIPERACILLIN AND TAZOBACTAM 25 GRAM(S): 4; .5 INJECTION, POWDER, LYOPHILIZED, FOR SOLUTION INTRAVENOUS at 05:43

## 2017-06-06 RX ADMIN — SODIUM CHLORIDE 150 MILLILITER(S): 9 INJECTION, SOLUTION INTRAVENOUS at 09:08

## 2017-06-06 RX ADMIN — Medication 30 MILLIGRAM(S): at 18:22

## 2017-06-06 RX ADMIN — PIPERACILLIN AND TAZOBACTAM 25 GRAM(S): 4; .5 INJECTION, POWDER, LYOPHILIZED, FOR SOLUTION INTRAVENOUS at 14:35

## 2017-06-06 RX ADMIN — ENOXAPARIN SODIUM 40 MILLIGRAM(S): 100 INJECTION SUBCUTANEOUS at 21:51

## 2017-06-06 RX ADMIN — PIPERACILLIN AND TAZOBACTAM 25 GRAM(S): 4; .5 INJECTION, POWDER, LYOPHILIZED, FOR SOLUTION INTRAVENOUS at 21:51

## 2017-06-06 NOTE — PROVIDER CONTACT NOTE (OTHER) - ASSESSMENT
Patient using accessory muscles with increase respiration, oxygen saturation 95% on O2. Chest tube intact .

## 2017-06-06 NOTE — CONSULT NOTE ADULT - PROBLEM SELECTOR RECOMMENDATION 9
Increase Depacon 1000mg Q12.  Follow per Psychiatry.  EEG Pending.  DVT prophylaxis.  Maintain seizure precaution.  Ativan PRN for seizure.  DVT prophylaxis.  Will follow.
Likely due to readdition of psych meds Clozaril and Ativan restarted yesterday  Both medications d/c'd  monitor neurologic status, pt did wake up and scream at me with NT suctioning and was able to speak   If  no improvement in MS will need head CT, moves all ext well
plan to place pigtail catheter secondary to reaccumulation of effusion   continue ABX as per ID   f/u labs  f/u chest xray
Leukocytosis is trending up  Will monitor  No fevers  Blood culture no growth  Will check Procalcitonin, CBC with diff, LFT's, and blood cultures  He has pleural effusion from prior admission, will check CXR and possibly CT chest for reoccurrence  UA not suggestive of UTI  Continue to monitor of antibiotics

## 2017-06-06 NOTE — CONSULT NOTE ADULT - PROBLEM SELECTOR PROBLEM 1
Nonintractable epilepsy without status epilepticus, unspecified epilepsy type
Altered mental status, unspecified altered mental status type
Pleural effusion on left
Leukocytosis, unspecified type

## 2017-06-06 NOTE — CONSULT NOTE ADULT - PROBLEM SELECTOR RECOMMENDATION 3
End tidal CO2 monitoring due to lethargy  If numbers increase from baseline will repeat ABG
encourage intake
On iV medications since not taking oral

## 2017-06-06 NOTE — PROGRESS NOTE ADULT - PROBLEM SELECTOR PLAN 2
s/p Chest tube  Possible VATS  sputum with GPC pairs on Gram stain  Pleural fluid culture with no growth  Blood cultures no growth  Continue Zosyn

## 2017-06-06 NOTE — CHART NOTE - NSCHARTNOTEFT_GEN_A_CORE
Rapid Response PGY 2/ PGY 3 Note  Patient is a 47y old   dcd from [facility] on 5/25 after inpatient w/u pleural effusion s/p thoracentesis, admitted  Schizoaffective disorder and dehydration. Pt had a reaccumulation of fluid and a chest tube was placed. Chest tube has been draining well as seen by serial CXR.   Rapid response team called because Pt has been tachycardic since this AM    Patient was seen and examined at the bedside by the rapid response team.    Allergies    No Known Allergies    Intolerances  PAST MEDICAL & SURGICAL HISTORY:  Hypothyroidism, unspecified type  Psychiatric disorder    Vital Signs Last 24 Hrs  T(C): 37.7, Max: 37.7 (06-06 @ 04:39)  T(F): 99.9, Max: 99.9 (06-06 @ 04:39)  HR: 126 (88 - 126)  BP: 125/63 (125/63 - 144/85)  BP(mean): --  RR: 28 (19 - 28)  SpO2: 95% (95% - 98%)          GENERAL: The patient is mildly responsive but below baseline  HEENT: Head is normocephalic and atraumatic. Extraocular muscles are intact. Mucous membranes are moist. No throat erythema/exudates no lymphadenopathy, no JVD,   NECK: Supple.  LUNGS:BL rhonchi, chest tube in left lung tachypnea, exertional   HEART: Regular rate and rhythm ,+S1/+S2, no murmurs, rubs, gallops  ABDOMEN: Soft, nontender, and nondistended, no rebound, guarding rigidity, bowel sounds in all 4 quadrants  EXTREMITIES: Without any cyanosis, clubbing, rash, lesions or edema.  SKIN: No new rashes or lesions.  MSK: strength equal BL  VASCULAR: Radial and Dorsal pedal pulses palpable BL  NEUROLOGIC: Grossly intact.  PSYCH: No new changes.    I & Os for current day (as of 06-06 @ 08:42)  =============================================  IN: 1600 ml / OUT: 170 ml / NET: 1430 ml                            9.5    7.9   )-----------( 275      ( 06 Jun 2017 05:27 )             29.1     06-06    146<H>  |  111<H>  |  10.0  ----------------------------<  98  3.1<L>   |  24.0  |  1.21    Ca    8.2<L>      06 Jun 2017 05:27  Phos  3.9     06-06  Mg     1.9     06-06      ABG - ( 06 Jun 2017 07:45 )  pH: 7.42  /  pCO2: 40    /  pO2: 86    / HCO3: 25    / Base Excess: 1.2   /  SaO2: 97                Vital Signs Last 24 Hrs*       Assessment- Rapid Response called for 47y year old Male with a past medical history of pleural effusions and schiztoaffective disorder  seen for tachypnea    Plan-ABG, cxr normal. Case was discussed with Dr Richardson, who stated that pt was recently restarted on his psych meds.  CT surgery was also on the case who was considering a VATS procedure due to the effusion. D-dimer and TSH pending. I spoke with ICU who agreed that pt may need to watched because of the respiratory status and use of accessory muscles.  DR Richardson spoke with Dr Payne who suggested the cause may be due to Clozaril toxicity as the dose needs to be titrated slowly and resumed at a lower dose than previously. Pt is currently on a monitor and will be transferred to [facility] when bed available.

## 2017-06-06 NOTE — PROGRESS NOTE ADULT - ASSESSMENT
47 year old male with schizoaffective disorder, Pleural effusion s/p thoracentesis, anorexia, dehydration recently discharged from hospital, noted to have  Moderate pleural effusion in the left lateral region, pt is s/p IR guided thoracentesis, removed almost 1400 cc, sent back to ed as he was not eating at [facility], afebrile, xray showed moderate pleural effusion. Had a seizure episode on 5/29 2/2 refusal to take po meds. Started on iv AEDs, Now stay complicated by worsening leucocytosis. ID consulted. continues to refuse to eat. CT chest to evaluate pleural effusion awaited. would Consider thoracic surgery cpnsult if persistent/worsening effusion.  6/4/2017 Ct surgery input appreciated. Chest tube in place. Small pneumo noted. Repeat CXR in am.  fever improving with zosyn. Spoke to sister Aydee @ [phone] at length - await CT chest, swallow eval r/o aspiration   6/6/2017 Rapid response called for altered mental status. Likely Clozapine toxicity. Tranfer to ICU.

## 2017-06-06 NOTE — CONSULT NOTE ADULT - PROBLEM SELECTOR RECOMMENDATION 2
O2 as needed to maintain 02 sat>92%  Started on 50% facemask on floor, will continue to wean fi02 down if tolerates
as per medicine   nutrition consult
Neurology following  Will need to avoid antibiotics which lower threshold for seizures

## 2017-06-06 NOTE — PROGRESS NOTE ADULT - SUBJECTIVE AND OBJECTIVE BOX
[facility]  INFECTIOUS DISEASES AND INTERNAL MEDICINE OF [place]  =======================================================  Ritesh Lang MD  Diplomates American Board of Internal Medicine and Infectious Diseases  =======================================================      SAMIRA NUGENT [id number]    Follow up: Leukocytosis; Pleural effusion  s/p chest tube on 6/2/17    Had rapid response 6/6/17 for tachycardia and change in mental status.   Patient transferred to MICU       Allergies:  No Known Allergies      Antibiotics:  piperacillin/tazobactam IVPB. 3.375Gram(s) IV Intermittent every 8 hours      REVIEW OF SYSTEMS:  Unable to obtain due to patient's condition. Patient non verbal      Physical Exam:  Vital Signs Last 24 Hrs  T(C): 36.6, Max: 37.7 (06-06 @ 04:39)  T(F): 97.8, Max: 99.9 (06-06 @ 04:39)  HR: 106 (84 - 126)  BP: 142/78 (96/53 - 144/85)  BP(mean): 104 (67 - 104)  RR: 33 (20 - 33)  SpO2: 96% (95% - 100%)      GEN: Non verbal. Moderate respiratory distress, on 50% ventimask   HEENT: normocephalic and atraumatic. EOMI. PERRL.    NECK: Supple.   LUNGS: DIMINISHED breath sounds left base, chest tube side.   HEART: Regular rate and rhythm.  ABDOMEN: Soft, nontender, and nondistended.  Positive bowel sounds.    : No CVA tenderness  EXTREMITIES: Without any edema.  MSK: no joint swelling  NEUROLOGIC: awake, no meaningful communication  PSYCHIATRIC: Appropriate affect .  SKIN: dark pigmented lesions B/L LE.      Labs:  06-06    146<H>  |  111<H>  |  10.0  ----------------------------<  98  3.1<L>   |  24.0  |  1.21    Ca    8.2<L>      06 Jun 2017 05:27  Phos  3.9     06-06  Mg     1.9     06-06                        9.5    7.9   )-----------( 275      ( 06 Jun 2017 05:27 )             29.1         ABG - ( 06 Jun 2017 16:06 )  pH: 7.39  /  pCO2: 46    /  pO2: 96    / HCO3: 26    / Base Excess: 2.1   /  SaO2: 98          RECENT CULTURES:  06-03 @ 21:38 .Body Fluid Pleural Fluid         06-03 @ 19:40 .Blood Blood     No growth at 48 hours      06-03 @ 14:19 .Sputum Sputum     Few Routine respiratory grayson present  Few White blood cells  Few Gram positive cocci in pairs      06-03 @ 01:48 .Body Fluid Pleural Fluid     No growth at 3 days.  Culture in progress  Few White blood cells  No organisms seen      06-02 @ 22:08 .Blood Blood-Peripheral     No growth at 48 hours      06-01 @ 05:20 .Blood Blood     No growth at 48 hours      05-31 @ 22:12 .Blood Blood     No growth at 5 days.      05-27 @ 10:56 .Blood Blood     No growth at 5 days.      05-27 @ 06:21 .Urine Clean Catch (Midstream)     <10,000 CFU/ml Yeast cells      05-23 @ 18:18 .Body Fluid Pleural Fluid     No growth at 1 week.      05-23 @ 08:43 .Body Fluid Pleural Fluid     No fungus isolated at 2 weeks.  Culture in progress      05-23 @ 08:42 .Body Fluid Pleural Fluid     No growth at 5 days.  Few WBC's  No organisms seen      05-23 @ 02:01 .Blood Blood     No growth at 5 days.      05-23 @ 01:48 .Blood Blood     No growth at 5 days.        EXAM:  CHEST SINGLE VIEW FRONTAL                        PROCEDURE DATE:  06/06/2017    INTERPRETATION:  Portable chest radiograph      CLINICAL INFORMATION:     The response. Shortness of breath.  TECHNIQUE:  Portable  AP view of the chest was obtained.  COMPARISON: 6/6/2017 taken 5:11 available for review.  FINDINGS: There is no interval change.  The lungs  show haziness overlying left] lower hemithoraces with a left   chest tube in place without pneumothorax. Increased opacity in left   retrocardiac parenchyma likely indicates lower lobe retrocardiac airspace   consolidation/atelectasis.       The  heart is enlarged in transverse diameter. No hilar mass. Trachea   midline.        Visualized osseous structures are intact.  IMPRESSION:   No interval change as described

## 2017-06-06 NOTE — PROGRESS NOTE ADULT - SUBJECTIVE AND OBJECTIVE BOX
SAMIRA NUGENT     Chief Complaint: Patient is a 47y old  Male who presents with a chief complaint of wont eat (27 May 2017 05:11)      PAST MEDICAL & SURGICAL HISTORY:  Hypothyroidism, unspecified type  Psychiatric disorder  No significant past surgical history      HPI/OVERNIGHT EVENTS: Rapid response called for altered mental status. Likely clozapine toxicity. Transfer to ICU.    MEDICATIONS  (STANDING):  enoxaparin Injectable 40milliGRAM(s) SubCutaneous every 24 hours  valproate sodium IVPB 1000milliGRAM(s) IV Intermittent every 12 hours  piperacillin/tazobactam IVPB. 3.375Gram(s) IV Intermittent every 8 hours  multiple electrolytes Injection Type 1 1000milliLiter(s) IV Continuous <Continuous>  levothyroxine Injectable 75MICROGram(s) IV Push daily      Vital Signs Last 24 Hrs  T(C): 37.7, Max: 37.7 (06-06 @ 04:39)  T(F): 99.9, Max: 99.9 (06-06 @ 04:39)  HR: 100 (88 - 126)  BP: 96/53 (96/53 - 144/85)  BP(mean): 67 (67 - 93)  RR: 27 (19 - 28)  SpO2: 99% (95% - 100%)    PHYSICAL EXAM:     HEENT: PERRLA, EOMI non verbal  Neck: No LAD, No JVD  Back: Normal spine flexure, No CVA tenderness  Respiratory: Chest tube in place Cardiovascular: S1 and S2, RRR, no M/G/R  Gastrointestinal: BS+, soft, NT/ND  Extremities: No peripheral edema  Vascular: 2+ peripheral pulses  Neurological: Minimally responsive    CAPILLARY BLOOD GLUCOSE    LABS:                        9.5    7.9   )-----------( 275      ( 06 Jun 2017 05:27 )             29.1     06-06    146<H>  |  111<H>  |  10.0  ----------------------------<  98  3.1<L>   |  24.0  |  1.21    Ca    8.2<L>      06 Jun 2017 05:27  Phos  3.9     06-06  Mg     1.9     06-06            RADIOLOGY & ADDITIONAL TESTS:

## 2017-06-06 NOTE — PROGRESS NOTE ADULT - SUBJECTIVE AND OBJECTIVE BOX
The patient is a 47y old schizophrenic basically deaf mute male from [facility] who   presents with a chief problem of  loss of appetite and  recurrence of a large left pleural effusion  (27 May 2017 05:11).  He is scheduled to have a FLEXIBLE BRONCHOSCOPT, LEFT VIDEO  ASSISTED THORACOSCOPIC SURGERY (VATS), DECORTICATION.      PAST MEDICAL HISTORY:  Hypothyroidism  Schizophrenia  Hx of seizures  Deaf mute  Left lung pleural effusion  ? new black bruises on his lower legs  Difficulty swallowing     PAST SURGICAL HISTORY:  Interventional radiology drainage of pleural effusions      MEDICATIONS  (STANDING):  enoxaparin Injectable 40milliGRAM(s) SubCutaneous every 24 hours  valproate sodium IVPB 1000milliGRAM(s) IV Intermittent every 12 hours  piperacillin/tazobactam IVPB. 3.375Gram(s) IV Intermittent every 8 hours  multiple electrolytes Injection Type 1 1000milliLiter(s) IV Continuous <Continuous>  levothyroxine Injectable 75MICROGram(s) IV Push daily    MEDICATIONS  (PRN):  ondansetron Injectable 4milliGRAM(s) IV Push every 6 hours PRN Nausea  acetaminophen   Tablet 650milliGRAM(s) Oral every 6 hours PRN For Temp greater than 38 C (100.4 F)      Allergies: No Known Drug  Allergies      SOCIAL HISTORY:  The patient in an inpatient at [facility] and does not smoke                                drink alcohol or use illicit drugs.                      9.5    7.9   )-----------( 275      ( 2017 05:27 )             29.1       PT/INR - ( 23 May 2017 01:27 )   PT: 16.8 sec;   INR: 1.51 ratio         PTT - ( 22 May 2017 09:36 )  PTT:33.4 sec        148<H>  |  108<H>  |  11.0  ----------------------------<  92  3.4<L>   |  28.0  |  1.21    Ca    8.7      2017 20:42  Phos  3.9     -  Mg     1.9         EK/6/2017  Sinus tachycardia  Nonspecific T wave abnormality  Abnormal ECG     ECHO TRANSTHORACIC  -   May 19 2017    Summary:   2. The left atrium is normal in size.   3. Left ventricular ejection fraction, by visual estimation, is 50 to         55%.   4. The right atrium is normal in size.   5. Normal right ventricular size and function.   6. No significant valvular abnormality.     CT CHEST   -  2017    FINDINGS:  LUNGS AND LARGE AIRWAYS: Near complete atelectasis of the left lung.   Small amount of aerated lung in the left upper lobe. Subtotal atelectasis   of the right lower lobe. New groundglass opacity in the right upper lobe,   measuring 1.4 x 0.7 cm (series 2, image 26) and in the right upper lobe,   measuring 1.2 x 0.8 cm (series 2, image 41).  PLEURA: Increased size of large left pleural effusion which exerts mass   effect on the left hemidiaphragm resulting in flattened appearance.   Increased size of small right pleural effusion.  IMPRESSION:   1.  Since May 19, 2017, increased size of large left pleural effusion   with near complete atelectasis of the left lung.  2.  Increased size of small right pleural effusion with subtotal   atelectasis of the right lower lobe.  3.  Opacities in the right upper lobe, possibly infection/inflammatory.    ASA # =  3   Mallampati # = unknown

## 2017-06-06 NOTE — CONSULT NOTE ADULT - SUBJECTIVE AND OBJECTIVE BOX
Pt is a 47 YOM h/o psychiatric d/o and multiple admissions with left pleural effusions, possible pneumonia.  Pt was admitted several days ago with poor appetite, not eating.  He was found to be hypoxic and have recurrent left pleural effusion with white out of left lung.  A left pigtail chest tube was placed for drainage of recurrent left pleural effusion with hypoxemia.  Today RRT called for pt with tachypneia, tachycardia and worsening mental status since yesterday.  Pt was started on Ativan and Clozaril yesterday.  He has received no narcotics.  His 02 sat is 95-96% on venti mask at 50%.  He is lethargic and snoring at times.  He wakes up to noxious stimulation.  He had a small amount of upper airway secretions which were NT suctioned.  He has rectal temp of 99.3 this am.  His RR is in the high 30's to low 40's and Pt is a 47 YOM h/o psychiatric d/o and multiple admissions with left pleural effusions, possible pneumonia.  Pt was admitted several days ago with poor appetite, not eating.  He was found to be hypoxic and have recurrent left pleural effusion with white out of left lung.  A left pigtail chest tube was placed for drainage of recurrent left pleural effusion with hypoxemia.  Today RRT called for pt with tachypneia, tachycardia and worsening mental status since yesterday.  Pt was started on Ativan and Clozaril yesterday.  He has received no narcotics.  His 02 sat is 95-96% on venti mask at 50%.  He is lethargic and snoring at times.  He wakes up to noxious stimulation.  He had a small amount of upper airway secretions which were NT suctioned.  He has rectal temp of 99.3 this am.  His RR is in the high 30's to 40's and is having some retractions.   Pt is most likely lethargic due to medications and will move to ICU for Endtidal CO2 monitoring until mental status improves.  He will be NT suctioned as needed for upper airway secretions.  He will be monitored to make sure he is protecting his airway.  REASON FOR CONSULT:  tacypnea and tachycardia    CONSULT REQUESTED BY: Debra    Patient is a 47y old  Male who presents with a chief complaint of wont eat (27 May 2017 05:11)      BRIEF HOSPITAL COURSE: as above    Events last 24 hours: as above    PAST MEDICAL & SURGICAL HISTORY:  Hypothyroidism, unspecified type  Psychiatric disorder  No significant past surgical history    Allergies    No Known Allergies    Intolerances      FAMILY HISTORY:  No pertinent family history in first degree relatives      Review of Systems:  Pt is unable to answer questions in ROS, h/o selective mutism/deaf  Medications:  piperacillin/tazobactam IVPB. 3.375Gram(s) IV Intermittent every 8 hours        ondansetron Injectable 4milliGRAM(s) IV Push every 6 hours PRN  valproate sodium IVPB 1000milliGRAM(s) IV Intermittent every 12 hours  acetaminophen   Tablet 650milliGRAM(s) Oral every 6 hours PRN  LORazepam   Injectable 1milliGRAM(s) IV Push every 8 hours PRN      enoxaparin Injectable 40milliGRAM(s) SubCutaneous every 24 hours        levothyroxine Injectable 75MICROGram(s) IV Push daily    multiple electrolytes Injection Type 1 1000milliLiter(s) IV Continuous <Continuous>                ICU Vital Signs Last 24 Hrs  T(C): 36.6, Max: 37.7 (06-06 @ 04:39)  T(F): 97.8, Max: 99.9 (06-06 @ 04:39)  HR: 84 (84 - 126)  BP: 109/64 (96/53 - 144/85)  BP(mean): 81 (67 - 93)  ABP: --  ABP(mean): --  RR: 21 (19 - 28)  SpO2: 100% (95% - 100%)    Vital Signs Last 24 Hrs  T(C): 36.6, Max: 37.7 (06-06 @ 04:39)  T(F): 97.8, Max: 99.9 (06-06 @ 04:39)  HR: 84 (84 - 126)  BP: 109/64 (96/53 - 144/85)  BP(mean): 81 (67 - 93)  RR: 21 (19 - 28)  SpO2: 100% (95% - 100%)    ABG - ( 06 Jun 2017 07:45 )  pH: 7.42  /  pCO2: 40    /  pO2: 86    / HCO3: 25    / Base Excess: 1.2   /  SaO2: 97                  I&O's Detail  I & Os for 24h ending 06 Jun 2017 07:00  =============================================  IN:    dextrose 5% + sodium chloride 0.9%: 1400 ml    Solution: 150 ml    Solution: 50 ml    Total IN: 1600 ml  ---------------------------------------------  OUT:    Chest Tube: 170 ml    Total OUT: 170 ml  ---------------------------------------------  Total NET: 1430 ml    I & Os for current day (as of 06 Jun 2017 13:50)  =============================================  IN:    multiple electrolytes Injection Type 1: 450 ml    Total IN: 450 ml  ---------------------------------------------  OUT:    Total OUT: 0 ml  ---------------------------------------------  Total NET: 450 ml        LABS:                        9.5    7.9   )-----------( 275      ( 06 Jun 2017 05:27 )             29.1     06-06    146<H>  |  111<H>  |  10.0  ----------------------------<  98  3.1<L>   |  24.0  |  1.21    Ca    8.2<L>      06 Jun 2017 05:27  Phos  3.9     06-06  Mg     1.9     06-06            CAPILLARY BLOOD GLUCOSE        CULTURES:  Culture Results:   No growth at 48 hours (06-03 @ 19:40)  Culture Results:   No growth at 48 hours (06-03 @ 19:40)  Culture Results:   Few Routine respiratory grayson present (06-03 @ 14:19)  Culture Results:   No growth at 3 days.  Culture in progress (06-03 @ 01:48)  Culture Results:   No growth at 48 hours (06-02 @ 22:08)  Culture Results:   No growth at 48 hours (06-02 @ 22:08)  Culture Results:   No growth at 48 hours (06-01 @ 05:20)  Culture Results:   No growth at 5 days. (05-31 @ 22:12)  Culture Results:   No growth at 5 days. (05-31 @ 22:12)      Physical Examination:    General: Lethargic, arousable to noxious stimuli, coughs with NT suctioning, strong gag    HEENT: Pupils equal, reactive to light.  Symmetric.    PULM: diminished at bases bilaterally, increased thin white sputum production    CVS: Sinus tachycardia,no murmurs, rubs, or gallops    ABD: Soft, nondistended, nontender, normoactive bowel sounds, no masses    EXT: No edema, nontender    SKIN: Warm and well perfused, no rashes noted.    RADIOLOGY:     CRITICAL CARE TIME SPENT: 45 min

## 2017-06-06 NOTE — PROGRESS NOTE ADULT - PROBLEM SELECTOR PLAN 1
Patient started on psych medications yesterday vs worsening hypoxia causing change in mental status.

## 2017-06-06 NOTE — PROGRESS NOTE ADULT - SUBJECTIVE AND OBJECTIVE BOX
[id number]    HPI:  48 y/o male dcd from [facility] on 5/25 after inpatient w/u pleural effusion s/p thoracentesis. CHF was ruled out, cytology pending. Patient also had pysch medication regimen adjusted. Since being dcd to [facility] he will not eat and has not taken his medications. In the ED patient with normal vitals, no evidence of any new infection. He is non verbal at baseline. He is not following any commands but is awake and responsive to vocal/tactile stimuli. At this time [facility] can not accept him back until he is able to eat and take his medications. (27 May 2017 05:11)      PAST MEDICAL & SURGICAL HISTORY:  Hypothyroidism, unspecified type  Psychiatric disorder  No significant past surgical history      No Known Allergies      Chief Complaint: Pt noted to have no drainage from Chest tube entire shift. Pt  RR 38    ROS: Pt tachycardic and tachypnic    Vital Signs Last 24 Hrs  T(C): 37.7, Max: 37.7 (06-06 @ 04:39)  T(F): 99.9, Max: 99.9 (06-06 @ 04:39)  HR: 126 (87 - 126)  BP: 125/63 (125/63 - 144/85)  BP(mean): --  RR: 28 (19 - 28)  SpO2: 95% (95% - 98%)    General :pt lethargic  tachypneic using accessory muscles  heart tachy  lungs audible congestion bilat    abg po2 70 other values wnl  cxr appears improved  ekg tachy rbbb    Discussed cxr with cardiothoracic pa  Discussed with Dr Aguilera  Pt sat 95% on 50% venti mask  continue to monitor. no further w/u or treatment at this time

## 2017-06-07 LAB
ANION GAP SERPL CALC-SCNC: 11 MMOL/L — SIGNIFICANT CHANGE UP (ref 5–17)
BUN SERPL-MCNC: 11 MG/DL — SIGNIFICANT CHANGE UP (ref 8–20)
CALCIUM SERPL-MCNC: 8.8 MG/DL — SIGNIFICANT CHANGE UP (ref 8.6–10.2)
CHLORIDE SERPL-SCNC: 110 MMOL/L — HIGH (ref 98–107)
CO2 SERPL-SCNC: 29 MMOL/L — SIGNIFICANT CHANGE UP (ref 22–29)
CREAT SERPL-MCNC: 1.22 MG/DL — SIGNIFICANT CHANGE UP (ref 0.5–1.3)
CULTURE RESULTS: SIGNIFICANT CHANGE UP
GLUCOSE SERPL-MCNC: 82 MG/DL — SIGNIFICANT CHANGE UP (ref 70–115)
HCT VFR BLD CALC: 26.7 % — LOW (ref 42–52)
HGB BLD-MCNC: 8.4 G/DL — LOW (ref 14–18)
MAGNESIUM SERPL-MCNC: 2.2 MG/DL — SIGNIFICANT CHANGE UP (ref 1.6–2.6)
MCHC RBC-ENTMCNC: 28.3 PG — SIGNIFICANT CHANGE UP (ref 27–31)
MCHC RBC-ENTMCNC: 31.5 G/DL — LOW (ref 32–36)
MCV RBC AUTO: 89.9 FL — SIGNIFICANT CHANGE UP (ref 80–94)
PHOSPHATE SERPL-MCNC: 4 MG/DL — SIGNIFICANT CHANGE UP (ref 2.4–4.7)
PLATELET # BLD AUTO: 251 K/UL — SIGNIFICANT CHANGE UP (ref 150–400)
POTASSIUM SERPL-MCNC: 3.3 MMOL/L — LOW (ref 3.5–5.3)
POTASSIUM SERPL-SCNC: 3.3 MMOL/L — LOW (ref 3.5–5.3)
RBC # BLD: 2.97 M/UL — LOW (ref 4.6–6.2)
RBC # FLD: 15.9 % — HIGH (ref 11–15.6)
SODIUM SERPL-SCNC: 150 MMOL/L — HIGH (ref 135–145)
SPECIMEN SOURCE: SIGNIFICANT CHANGE UP
WBC # BLD: 6.6 K/UL — SIGNIFICANT CHANGE UP (ref 4.8–10.8)
WBC # FLD AUTO: 6.6 K/UL — SIGNIFICANT CHANGE UP (ref 4.8–10.8)

## 2017-06-07 PROCEDURE — 99233 SBSQ HOSP IP/OBS HIGH 50: CPT

## 2017-06-07 PROCEDURE — 99222 1ST HOSP IP/OBS MODERATE 55: CPT

## 2017-06-07 RX ORDER — DEXTROSE MONOHYDRATE, SODIUM CHLORIDE, AND POTASSIUM CHLORIDE 50; .745; 4.5 G/1000ML; G/1000ML; G/1000ML
1000 INJECTION, SOLUTION INTRAVENOUS
Qty: 0 | Refills: 0 | Status: DISCONTINUED | OUTPATIENT
Start: 2017-06-07 | End: 2017-06-08

## 2017-06-07 RX ORDER — POTASSIUM CHLORIDE 20 MEQ
10 PACKET (EA) ORAL
Qty: 0 | Refills: 0 | Status: COMPLETED | OUTPATIENT
Start: 2017-06-07 | End: 2017-06-07

## 2017-06-07 RX ORDER — POTASSIUM CHLORIDE 20 MEQ
10 PACKET (EA) ORAL ONCE
Qty: 0 | Refills: 0 | Status: COMPLETED | OUTPATIENT
Start: 2017-06-07 | End: 2017-06-07

## 2017-06-07 RX ADMIN — Medication 30 MILLIGRAM(S): at 06:03

## 2017-06-07 RX ADMIN — Medication 100 MILLIEQUIVALENT(S): at 06:03

## 2017-06-07 RX ADMIN — DEXTROSE MONOHYDRATE, SODIUM CHLORIDE, AND POTASSIUM CHLORIDE 125 MILLILITER(S): 50; .745; 4.5 INJECTION, SOLUTION INTRAVENOUS at 15:37

## 2017-06-07 RX ADMIN — Medication 100 MILLIEQUIVALENT(S): at 15:00

## 2017-06-07 RX ADMIN — Medication 100 MILLIEQUIVALENT(S): at 07:30

## 2017-06-07 RX ADMIN — DEXTROSE MONOHYDRATE, SODIUM CHLORIDE, AND POTASSIUM CHLORIDE 125 MILLILITER(S): 50; .745; 4.5 INJECTION, SOLUTION INTRAVENOUS at 20:46

## 2017-06-07 RX ADMIN — PIPERACILLIN AND TAZOBACTAM 25 GRAM(S): 4; .5 INJECTION, POWDER, LYOPHILIZED, FOR SOLUTION INTRAVENOUS at 14:00

## 2017-06-07 RX ADMIN — Medication 75 MICROGRAM(S): at 06:02

## 2017-06-07 RX ADMIN — Medication 30 MILLIGRAM(S): at 18:00

## 2017-06-07 RX ADMIN — Medication 100 MILLIEQUIVALENT(S): at 08:33

## 2017-06-07 RX ADMIN — ENOXAPARIN SODIUM 40 MILLIGRAM(S): 100 INJECTION SUBCUTANEOUS at 22:21

## 2017-06-07 RX ADMIN — PIPERACILLIN AND TAZOBACTAM 25 GRAM(S): 4; .5 INJECTION, POWDER, LYOPHILIZED, FOR SOLUTION INTRAVENOUS at 22:22

## 2017-06-07 RX ADMIN — PIPERACILLIN AND TAZOBACTAM 25 GRAM(S): 4; .5 INJECTION, POWDER, LYOPHILIZED, FOR SOLUTION INTRAVENOUS at 06:03

## 2017-06-07 RX ADMIN — DEXTROSE MONOHYDRATE, SODIUM CHLORIDE, AND POTASSIUM CHLORIDE 75 MILLILITER(S): 50; .745; 4.5 INJECTION, SOLUTION INTRAVENOUS at 08:34

## 2017-06-07 NOTE — PROGRESS NOTE ADULT - ASSESSMENT
47 year old male with schizoaffective disorder, Pleural effusion s/p thoracentesis, anorexia, dehydration recently discharged from hospital, noted to have  Moderate pleural effusion in the left lateral region, pt is s/p IR guided thoracentesis, removed almost 1400 cc, sent back to ed as he was not eating at [facility], afebrile, xray showed moderate pleural effusion. Had a seizure episode on 5/29 2/2 refusal to take po meds. Started on iv AEDs, Now stay complicated by worsening leucocytosis. ID consulted. continues to refuse to eat. CT chest to evaluate pleural effusion awaited. would Consider thoracic surgery cpnsult if persistent/worsening effusion.  6/4/2017 Ct surgery input appreciated. Chest tube in place. Small pneumo noted. Repeat CXR in am.  fever improving with zosyn. Spoke to sister Aydee @ [phone] at length - await CT chest, swallow eval r/o aspiration   6/6/2017 Rapid response called for altered mental status. Likely Clozapine toxicity. Tranfer to ICU.  6/7/2017 Patient downgraded from icu to medical floor. He is arousable to deep stimuli.

## 2017-06-07 NOTE — PROGRESS NOTE ADULT - PROBLEM SELECTOR PLAN 1
Patient started on psych medications to fast of an increase in dose per psych note  will monitor mental status

## 2017-06-07 NOTE — CHART NOTE - NSCHARTNOTEFT_GEN_A_CORE
Admit to critical care yesterday with AMS and hypoxemia with respiratory distress and secretions attributed to drug effect of Clozaril and ativan now d/c'd    His ABG and ETco2 ok   BP OK too    He is arousable to stimuli.  I pinched his nail bed and he told me to "F*^k off"    NA this    K low replaced.      change IVF to 1/2 NS    OK to go to SDU    Case endorsed to Dr Plaza.

## 2017-06-07 NOTE — PROGRESS NOTE ADULT - SUBJECTIVE AND OBJECTIVE BOX
[facility]  INFECTIOUS DISEASES AND INTERNAL MEDICINE OF [place]  =======================================================  Ritesh Lang MD  Diplomates American Board of Internal Medicine and Infectious Diseases  =======================================================      SAMIRA NUGENT [id number]    Follow up: Leukocytosis; Pleural effusion  s/p chest tube on 6/2/17    Had rapid response 6/6/17 for tachycardia and change in mental status.     Patient not tachycardic or tachypneic today  Afebrile  wakes up to stimuli, no meaningful communication      Allergies:  No Known Allergies      Antibiotics:  piperacillin/tazobactam IVPB. 3.375Gram(s) IV Intermittent every 8 hours      REVIEW OF SYSTEMS:  Unable to obtain due to patient's condition. Patient non verbal      Physical Exam:  ICU Vital Signs Last 24 Hrs  T(C): 36.7, Max: 36.7 (06-06 @ 16:00)  T(F): 98.1, Max: 98.1 (06-06 @ 16:00)  HR: 97 (80 - 107)  BP: 146/79 (96/53 - 163/80)  BP(mean): 107 (67 - 115)  RR: 15 (14 - 33)  SpO2: 98% (96% - 100%)      GEN: Non verbal. NAD  HEENT: normocephalic and atraumatic. EOMI. PERRL.    NECK: Supple.   LUNGS: DIMINISHED breath sounds left base, chest tube side.   HEART: Regular rate and rhythm.  ABDOMEN: Soft, nontender, and nondistended.  Positive bowel sounds.    : No CVA tenderness  EXTREMITIES: Without any edema.  MSK: no joint swelling  NEUROLOGIC: awake, no meaningful communication  PSYCHIATRIC: Appropriate affect .  SKIN: dark pigmented lesions B/L LE.      Labs:  06-07    150<H>  |  110<H>  |  11.0  ----------------------------<  82  3.3<L>   |  29.0  |  1.22    Ca    8.8      07 Jun 2017 04:15  Phos  4.0     06-07  Mg     2.2     06-07                        8.4    6.6   )-----------( 251      ( 07 Jun 2017 04:15 )             26.7         ABG - ( 06 Jun 2017 16:06 )  pH: 7.39  /  pCO2: 46    /  pO2: 96    / HCO3: 26    / Base Excess: 2.1   /  SaO2: 98          RECENT CULTURES:  06-03 @ 21:38 .Body Fluid Pleural Fluid         06-03 @ 19:40 .Blood Blood     No growth at 48 hours      06-03 @ 14:19 .Sputum Sputum     Few Routine respiratory grayson present  Few White blood cells  Few Gram positive cocci in pairs      06-03 @ 01:48 .Body Fluid Pleural Fluid     No growth at 3 days.  Culture in progress  Few White blood cells  No organisms seen      06-02 @ 22:08 .Blood Blood-Peripheral     No growth at 48 hours      06-01 @ 05:20 .Blood Blood     No growth at 5 days.      05-31 @ 22:12 .Blood Blood     No growth at 5 days.      05-27 @ 10:56 .Blood Blood     No growth at 5 days.      05-27 @ 06:21 .Urine Clean Catch (Midstream)     <10,000 CFU/ml Yeast cells      05-23 @ 18:18 .Body Fluid Pleural Fluid     No growth at 1 week.      05-23 @ 08:43 .Body Fluid Pleural Fluid     No fungus isolated at 2 weeks.  Culture in progress      05-23 @ 08:42 .Body Fluid Pleural Fluid     No growth at 5 days.  Few WBC's  No organisms seen      05-23 @ 02:01 .Blood Blood     No growth at 5 days.      05-23 @ 01:48 .Blood Blood     No growth at 5 days.        EXAM:  CHEST SINGLE VIEW FRONTAL                        PROCEDURE DATE:  06/06/2017    INTERPRETATION:  Portable chest radiograph      CLINICAL INFORMATION:     The response. Shortness of breath.  TECHNIQUE:  Portable  AP view of the chest was obtained.  COMPARISON: 6/6/2017 taken 5:11 available for review.  FINDINGS: There is no interval change.  The lungs  show haziness overlying left] lower hemithoraces with a left   chest tube in place without pneumothorax. Increased opacity in left   retrocardiac parenchyma likely indicates lower lobe retrocardiac airspace   consolidation/atelectasis.       The  heart is enlarged in transverse diameter. No hilar mass. Trachea   midline.        Visualized osseous structures are intact.  IMPRESSION:   No interval change as described

## 2017-06-07 NOTE — CONSULT NOTE ADULT - CONSULT REASON
Leukocytosis
Asked to eval pt after RRT called for tachypnea, tachycardia
Left pleural effusion on CXR; concerned for empyema and source of sepsis
Rash
Seizure

## 2017-06-07 NOTE — PROGRESS NOTE ADULT - PROBLEM SELECTOR PLAN 2
MRI head - Neg , neuro consulted   EEG - pt refusing per neuro  accepting some food  6/7/2017 Pt NPO until he wakes up.

## 2017-06-07 NOTE — PROGRESS NOTE ADULT - SUBJECTIVE AND OBJECTIVE BOX
SAMIRA NUGENT     Chief Complaint: Patient is a 47y old  Male who presents with a chief complaint of wont eat (27 May 2017 05:11)      PAST MEDICAL & SURGICAL HISTORY:  Hypothyroidism, unspecified type  Psychiatric disorder  No significant past surgical history      HPI/OVERNIGHT EVENTS:    MEDICATIONS  (STANDING):  enoxaparin Injectable 40milliGRAM(s) SubCutaneous every 24 hours  valproate sodium IVPB 1000milliGRAM(s) IV Intermittent every 12 hours  piperacillin/tazobactam IVPB. 3.375Gram(s) IV Intermittent every 8 hours  levothyroxine Injectable 75MICROGram(s) IV Push daily  sodium chloride 0.45% with potassium chloride 20 mEq/L 1000milliLiter(s) IV Continuous <Continuous>  potassium chloride  10 mEq/100 mL IVPB 10milliEquivalent(s) IV Intermittent once      Vital Signs Last 24 Hrs  T(C): 36.7, Max: 36.7 (06-06 @ 16:00)  T(F): 98.1, Max: 98.1 (06-06 @ 16:00)  HR: 89 (80 - 106)  BP: 113/63 (106/62 - 163/80)  BP(mean): 80 (77 - 115)  RR: 16 (14 - 33)  SpO2: 99% (96% - 100%)    PHYSICAL EXAM:  Constitutional: minimally responsive to deep stimuli  HEENT: PERRLA, EOMI, Normal Hearing, MMM  Neck: No LAD, No JVD  Back: Normal spine flexure, No CVA tenderness  Respiratory: chest tube in place  Cardiovascular: S1 and S2, RRR, no M/G/R  Gastrointestinal: BS+, soft, NT/ND  Extremities: No peripheral edema  Vascular: 2+ peripheral pulses  Neurological: lethargic    CAPILLARY BLOOD GLUCOSE    LABS:                        8.4    6.6   )-----------( 251      ( 07 Jun 2017 04:15 )             26.7     06-07    150<H>  |  110<H>  |  11.0  ----------------------------<  82  3.3<L>   |  29.0  |  1.22    Ca    8.8      07 Jun 2017 04:15  Phos  4.0     06-07  Mg     2.2     06-07            RADIOLOGY & ADDITIONAL TESTS:

## 2017-06-07 NOTE — PROGRESS NOTE ADULT - PROBLEM SELECTOR PLAN 6
management as per psych   defer to [facility] provider to restart clozapine via titration.  6/7/2017 All psychactive medications are to be used minimally.

## 2017-06-07 NOTE — CONSULT NOTE ADULT - ASSESSMENT
47 M w/ schizophrenia, selective mutism, possible autism, hypothyroidism, currently admitted for failure to thrive found to have sepsis now with complete opacification of hemithorax w/ or without shift cannot rule out empyema/ source of sepsis
47 YOM h/o schizophrenia with lethargy, tachypnea with mild hypoxia, r/o hypercarbia
POST INFLAMMATORY HYPERPIGMENTATION      PER AIDE AND BROTHER THEY ARE AWARE THAT HE DOES SIT ON FLOOR AND JUMP ON AND OFF FURNITURE.  NO EVIDENCE OF RECENT PHYSICAL INJURY
46 y/o M with unexplained leukocytosis, seizure episode

## 2017-06-07 NOTE — CHART NOTE - NSCHARTNOTEFT_GEN_A_CORE
Upon Nutritional Assessment by the Registered Dietitian your patient was determined to meet criteria / has evidence of the following diagnosis/diagnoses:          [ ]  Mild Protein Calorie Malnutrition        [ ]  Moderate Protein Calorie Malnutrition        [ x] Severe Protein Calorie Malnutrition        [ ] Unspecified Protein Calorie Malnutrition        [ ] Underweight / BMI <19        [ ] Morbid Obesity / BMI > 40      Findings as based on:  •  Comprehensive nutrition assessment and consultation  •  Calorie counts (nutrient intake analysis)  •  Food acceptance and intake status from observations by staff  •  Follow up  •  Patient education  •  Intervention secondary to interdisciplinary rounds  •   concerns      Treatment:    The following diet has been recommended:    re-swallow evaluation as/when feasible.     PROVIDER Section:     By signing this assessment you are acknowledging and agree with the diagnosis/diagnoses assigned by the Registered Dietitian    Comments:

## 2017-06-07 NOTE — CONSULT NOTE ADULT - SUBJECTIVE AND OBJECTIVE BOX
HPI:  46 y/o male dcd from [facility] on 5/25 after inpatient w/u pleural effusion s/p thoracentesis. CHF was ruled out, cytology pending. Patient also had pysch medication regimen adjusted. Since being dcd to [facility] he will not eat and has not taken his medications. In the ED patient with normal vitals, no evidence of any new infection. He is non verbal at baseline. He is not following any commands but is awake and responsive to vocal/tactile stimuli. At this time [facility] can not accept him back until he is able to eat and take his medications. (27 May 2017 05:11). Being treated for pleural effusion. No evidence of infection. Brother concerned about marks on legs that are getting "better". Three months ago the marks were not there      PAST MEDICAL & SURGICAL HISTORY:  Hypothyroidism, unspecified type  Psychiatric disorder  No significant past surgical history      REVIEW OF SYSTEMS:          MEDICATIONS  (STANDING):  enoxaparin Injectable 40milliGRAM(s) SubCutaneous every 24 hours  valproate sodium IVPB 1000milliGRAM(s) IV Intermittent every 12 hours  piperacillin/tazobactam IVPB. 3.375Gram(s) IV Intermittent every 8 hours  levothyroxine Injectable 75MICROGram(s) IV Push daily  sodium chloride 0.45% with potassium chloride 20 mEq/L 1000milliLiter(s) IV Continuous <Continuous>    MEDICATIONS  (PRN):  ondansetron Injectable 4milliGRAM(s) IV Push every 6 hours PRN Nausea  acetaminophen   Tablet 650milliGRAM(s) Oral every 6 hours PRN For Temp greater than 38 C (100.4 F)      Allergies    No Known Allergies    Intolerances        SOCIAL HISTORY:    FAMILY HISTORY:  No pertinent family history in first degree relatives      Vital Signs Last 24 Hrs  T(C): 36.4, Max: 36.7 (06-07 @ 07:00)  T(F): 97.6, Max: 98.1 (06-07 @ 07:00)  HR: 100 (80 - 103)  BP: 151/74 (106/62 - 155/87)  BP(mean): 103 (77 - 115)  RR: 18 (14 - 26)  SpO2: 95% (94% - 100%)    PHYSICAL EXAM:    GENERAL: NAD    SKIN    PATCHES OF HYPERPIGMENTATION BILATERAL LOWER EXTREMITIES.      LABS:                        8.4    6.6   )-----------( 251      ( 07 Jun 2017 04:15 )             26.7     06-07    150<H>  |  110<H>  |  11.0  ----------------------------<  82  3.3<L>   |  29.0  |  1.22    Ca    8.8      07 Jun 2017 04:15  Phos  4.0     06-07  Mg     2.2     06-07            RADIOLOGY & ADDITIONAL STUDIES:

## 2017-06-07 NOTE — PROGRESS NOTE ADULT - PROBLEM SELECTOR PLAN 2
s/p Chest tube  Possible VATS  sputum with normal grayson  Pleural fluid culture with no growth  Blood cultures no growth  Continue Zosyn

## 2017-06-07 NOTE — PROGRESS NOTE ADULT - PROBLEM SELECTOR PLAN 1
? 2/2 seizure episode vs Infection   Ct surgery input appreciated, chest tube in place  on zosyn+vanc   6/4/2017 Vanco discontinued but zosyn remains  ID recs  6/7/2017 Patient downgraded from icu to medicine

## 2017-06-08 LAB
ANION GAP SERPL CALC-SCNC: 13 MMOL/L — SIGNIFICANT CHANGE UP (ref 5–17)
ANION GAP SERPL CALC-SCNC: 15 MMOL/L — SIGNIFICANT CHANGE UP (ref 5–17)
BLD GP AB SCN SERPL QL: SIGNIFICANT CHANGE UP
BUN SERPL-MCNC: 14 MG/DL — SIGNIFICANT CHANGE UP (ref 8–20)
BUN SERPL-MCNC: 14 MG/DL — SIGNIFICANT CHANGE UP (ref 8–20)
CALCIUM SERPL-MCNC: 8.8 MG/DL — SIGNIFICANT CHANGE UP (ref 8.6–10.2)
CALCIUM SERPL-MCNC: 8.9 MG/DL — SIGNIFICANT CHANGE UP (ref 8.6–10.2)
CHLORIDE SERPL-SCNC: 103 MMOL/L — SIGNIFICANT CHANGE UP (ref 98–107)
CHLORIDE SERPL-SCNC: 106 MMOL/L — SIGNIFICANT CHANGE UP (ref 98–107)
CO2 SERPL-SCNC: 28 MMOL/L — SIGNIFICANT CHANGE UP (ref 22–29)
CO2 SERPL-SCNC: 29 MMOL/L — SIGNIFICANT CHANGE UP (ref 22–29)
CREAT SERPL-MCNC: 0.98 MG/DL — SIGNIFICANT CHANGE UP (ref 0.5–1.3)
CREAT SERPL-MCNC: 1.03 MG/DL — SIGNIFICANT CHANGE UP (ref 0.5–1.3)
CULTURE RESULTS: SIGNIFICANT CHANGE UP
CULTURE RESULTS: SIGNIFICANT CHANGE UP
GLUCOSE SERPL-MCNC: 117 MG/DL — HIGH (ref 70–115)
GLUCOSE SERPL-MCNC: 79 MG/DL — SIGNIFICANT CHANGE UP (ref 70–115)
HCT VFR BLD CALC: 29 % — LOW (ref 42–52)
HGB BLD-MCNC: 9.3 G/DL — LOW (ref 14–18)
MAGNESIUM SERPL-MCNC: 1.8 MG/DL — SIGNIFICANT CHANGE UP (ref 1.6–2.6)
MCHC RBC-ENTMCNC: 28.5 PG — SIGNIFICANT CHANGE UP (ref 27–31)
MCHC RBC-ENTMCNC: 32.1 G/DL — SIGNIFICANT CHANGE UP (ref 32–36)
MCV RBC AUTO: 89 FL — SIGNIFICANT CHANGE UP (ref 80–94)
PHOSPHATE SERPL-MCNC: 2.9 MG/DL — SIGNIFICANT CHANGE UP (ref 2.4–4.7)
PLATELET # BLD AUTO: 284 K/UL — SIGNIFICANT CHANGE UP (ref 150–400)
POTASSIUM SERPL-MCNC: 3.6 MMOL/L — SIGNIFICANT CHANGE UP (ref 3.5–5.3)
POTASSIUM SERPL-MCNC: 3.7 MMOL/L — SIGNIFICANT CHANGE UP (ref 3.5–5.3)
POTASSIUM SERPL-SCNC: 3.6 MMOL/L — SIGNIFICANT CHANGE UP (ref 3.5–5.3)
POTASSIUM SERPL-SCNC: 3.7 MMOL/L — SIGNIFICANT CHANGE UP (ref 3.5–5.3)
RBC # BLD: 3.26 M/UL — LOW (ref 4.6–6.2)
RBC # FLD: 15.6 % — SIGNIFICANT CHANGE UP (ref 11–15.6)
SODIUM SERPL-SCNC: 145 MMOL/L — SIGNIFICANT CHANGE UP (ref 135–145)
SODIUM SERPL-SCNC: 149 MMOL/L — HIGH (ref 135–145)
SPECIMEN SOURCE: SIGNIFICANT CHANGE UP
SPECIMEN SOURCE: SIGNIFICANT CHANGE UP
TYPE + AB SCN PNL BLD: SIGNIFICANT CHANGE UP
WBC # BLD: 9.6 K/UL — SIGNIFICANT CHANGE UP (ref 4.8–10.8)
WBC # FLD AUTO: 9.6 K/UL — SIGNIFICANT CHANGE UP (ref 4.8–10.8)

## 2017-06-08 PROCEDURE — 99233 SBSQ HOSP IP/OBS HIGH 50: CPT

## 2017-06-08 PROCEDURE — 71020: CPT | Mod: 26

## 2017-06-08 RX ORDER — SODIUM CHLORIDE 9 MG/ML
1000 INJECTION, SOLUTION INTRAVENOUS
Qty: 0 | Refills: 0 | Status: DISCONTINUED | OUTPATIENT
Start: 2017-06-08 | End: 2017-06-09

## 2017-06-08 RX ORDER — CEFAZOLIN SODIUM 1 G
2000 VIAL (EA) INJECTION ONCE
Qty: 0 | Refills: 0 | Status: DISCONTINUED | OUTPATIENT
Start: 2017-06-08 | End: 2017-06-09

## 2017-06-08 RX ADMIN — Medication 30 MILLIGRAM(S): at 05:19

## 2017-06-08 RX ADMIN — PIPERACILLIN AND TAZOBACTAM 25 GRAM(S): 4; .5 INJECTION, POWDER, LYOPHILIZED, FOR SOLUTION INTRAVENOUS at 22:10

## 2017-06-08 RX ADMIN — PIPERACILLIN AND TAZOBACTAM 25 GRAM(S): 4; .5 INJECTION, POWDER, LYOPHILIZED, FOR SOLUTION INTRAVENOUS at 15:39

## 2017-06-08 RX ADMIN — Medication 75 MICROGRAM(S): at 05:20

## 2017-06-08 RX ADMIN — Medication 30 MILLIGRAM(S): at 18:17

## 2017-06-08 RX ADMIN — DEXTROSE MONOHYDRATE, SODIUM CHLORIDE, AND POTASSIUM CHLORIDE 125 MILLILITER(S): 50; .745; 4.5 INJECTION, SOLUTION INTRAVENOUS at 05:19

## 2017-06-08 RX ADMIN — SODIUM CHLORIDE 175 MILLILITER(S): 9 INJECTION, SOLUTION INTRAVENOUS at 15:38

## 2017-06-08 RX ADMIN — PIPERACILLIN AND TAZOBACTAM 25 GRAM(S): 4; .5 INJECTION, POWDER, LYOPHILIZED, FOR SOLUTION INTRAVENOUS at 05:19

## 2017-06-08 NOTE — SWALLOW BEDSIDE ASSESSMENT ADULT - SLP GENERAL OBSERVATIONS
Pt received & seen bedside, +awake/alert, +non-verbal, +inconsistent command following, +O2 nasal canula (sats: 94-95%), +brother present
Awake, +02 5L NC, sat 97%, nonverbal/nonvocal, decreased command following, 1:1 from [facility] present, cooperative,

## 2017-06-08 NOTE — PROGRESS NOTE BEHAVIORAL HEALTH - NSBHCHARTREVIEWVS_PSY_A_CORE FT
Vital Signs Last 24 Hrs  T(C): 36.3, Max: 37.1 (06-08 @ 00:00)  T(F): 97.4, Max: 98.7 (06-08 @ 00:00)  HR: 99 (83 - 108)  BP: 162/80 (115/55 - 162/80)  BP(mean): 113 (70 - 113)  RR: 26 (14 - 26)  SpO2: 94% (92% - 98%)
Vital Signs Last 24 Hrs  T(C): 37.3, Max: 37.6 (05-31 @ 06:48)  T(F): 99.1, Max: 99.7 (05-31 @ 06:48)  HR: 97 (92 - 105)  BP: 124/74 (120/72 - 126/70)  BP(mean): --  RR: 18 (16 - 18)  SpO2: 98% (94% - 98%)

## 2017-06-08 NOTE — PROGRESS NOTE ADULT - PROBLEM SELECTOR PLAN 1
? 2/2 seizure episode vs Infection   Ct surgery input appreciated, chest tube in place  on zosyn+vanc   6/4/2017 Vanco discontinued but zosyn remains  ID recs  6/7/2017 Patient downgraded from icu to medicine  6/8/2017 Patient is now on zosyn for exudative transudate.

## 2017-06-08 NOTE — PROGRESS NOTE ADULT - PROBLEM SELECTOR PLAN 4
pt is s/p IR guided thoracentesis, removed almost 1400 cc,   Pleural fluid  - ph 7.5, LDH 81, P: 3.9, transudative based on LDH, mildly positive for exudative based on protein   TTE - LVEF 50-55% - no h/o CHF  repeat xray no pneumonia, no need for Abx - no e/o active infection  6/8/2017 VATS in am.

## 2017-06-08 NOTE — SWALLOW BEDSIDE ASSESSMENT ADULT - SWALLOW EVAL: RECOMMENDED FEEDING/EATING TECHNIQUES
oral hygiene/allow for swallow between intakes/small sips/bites/no straws/crush medication (when feasible)/maintain upright posture during/after eating for 30 mins/position upright (90 degrees)/check mouth frequently for oral residue/pocketing
small sips/bites/check mouth frequently for oral residue/pocketing/position upright (90 degrees)/maintain upright posture during/after eating for 30 mins/oral hygiene/allow for swallow between intakes

## 2017-06-08 NOTE — PROGRESS NOTE ADULT - ASSESSMENT
47 year old male with schizoaffective disorder, Pleural effusion s/p thoracentesis, anorexia, dehydration recently discharged from hospital, noted to have  Moderate pleural effusion in the left lateral region, pt is s/p IR guided thoracentesis, removed almost 1400 cc, sent back to ed as he was not eating at [facility], afebrile, xray showed moderate pleural effusion. Had a seizure episode on 5/29 2/2 refusal to take po meds. Started on iv AEDs, Now stay complicated by worsening leucocytosis. ID consulted. continues to refuse to eat. CT chest to evaluate pleural effusion awaited. would Consider thoracic surgery cpnsult if persistent/worsening effusion.  6/4/2017 Ct surgery input appreciated. Chest tube in place. Small pneumo noted. Repeat CXR in am.  fever improving with zosyn. Spoke to sister Aydee @ [phone] at length - await CT chest, swallow eval r/o aspiration   6/6/2017 Rapid response called for altered mental status. Likely Clozapine toxicity. Tranfer to ICU.  6/7/2017 Patient downgraded from icu to medical floor. He is arousable to deep stimuli.

## 2017-06-08 NOTE — SWALLOW BEDSIDE ASSESSMENT ADULT - ORAL PHASE
Within functional limits suspected premature spillage/Within functional limits Decreased anterior-posterior movement of the bolus/Delayed oral transit time/reduced attention to the bolus due to reduced cognition

## 2017-06-08 NOTE — PROGRESS NOTE ADULT - PROBLEM SELECTOR PLAN 2
Patient started on psych medications to fast of an increase in dose per psych note  Mental status seems to be at baseline now

## 2017-06-08 NOTE — SWALLOW BEDSIDE ASSESSMENT ADULT - SLP PERTINENT HISTORY OF CURRENT PROBLEM
Seen bedside 6/4/17 with RX for mech soft & nectar thick fluids. Pt s/p rapid response 6/6 for tachycardia. Now NPO pending swallow re-evaluation at bedside

## 2017-06-08 NOTE — PROGRESS NOTE ADULT - PROBLEM SELECTOR PLAN 1
s/p Chest tube  Possible VATS  Pleural fluid culture with no growth  Blood cultures no growth  Continue Zosyn

## 2017-06-08 NOTE — SWALLOW BEDSIDE ASSESSMENT ADULT - MODE OF PRESENTATION
spoon fed by clinician/self fed/cup fed by clinician/cup/self fed spoon/fed by clinician fed by clinician

## 2017-06-08 NOTE — PROGRESS NOTE ADULT - SUBJECTIVE AND OBJECTIVE BOX
[facility]  INFECTIOUS DISEASES AND INTERNAL MEDICINE OF [place]  =======================================================  Ritesh Lang MD  Diplomates American Board of Internal Medicine and Infectious Diseases  =======================================================      SAMIRA NUGENT [id number]    Follow up: Leukocytosis; Pleural effusion  s/p chest tube on 6/2/17    Patient is awake, comfortable  Afebrile  no meaningful communication      Allergies:  No Known Allergies      Antibiotics:  piperacillin/tazobactam IVPB. 3.375Gram(s) IV Intermittent every 8 hours      REVIEW OF SYSTEMS:  Unable to obtain due to patient's condition. Patient non verbal      Physical Exam:  Vital Signs Last 24 Hrs  T(C): 37.1, Max: 37.1 (06-08 @ 00:00)  T(F): 98.7, Max: 98.7 (06-08 @ 00:00)  HR: 99 (83 - 108)  BP: 115/55 (114/67 - 158/77)  BP(mean): 70 (70 - 111)  RR: 14 (14 - 20)  SpO2: 92% (92% - 99%)      GEN: Non verbal. NAD  HEENT: normocephalic and atraumatic. EOMI. PERRL.    NECK: Supple.   LUNGS: DIMINISHED breath sounds left base, chest tube side.   HEART: Regular rate and rhythm.  ABDOMEN: Soft, nontender, and nondistended.  Positive bowel sounds.    : No CVA tenderness  EXTREMITIES: Without any edema.  MSK: no joint swelling  NEUROLOGIC: awake, no meaningful communication  SKIN: dark pigmented lesions B/L LE.      Labs:  06-08    149<H>  |  106  |  14.0  ----------------------------<  79  3.6   |  28.0  |  1.03    Ca    8.8      08 Jun 2017 04:17  Phos  2.9     06-08  Mg     1.8     06-08                          9.3    9.6   )-----------( 284      ( 08 Jun 2017 04:17 )             29.0       ABG - ( 06 Jun 2017 16:06 )  pH: 7.39  /  pCO2: 46    /  pO2: 96    / HCO3: 26    / Base Excess: 2.1   /  SaO2: 98          RECENT CULTURES:  06-03 @ 21:38 .Body Fluid Pleural Fluid         06-03 @ 19:40 .Blood Blood     No growth at 48 hours      06-03 @ 14:19 .Sputum Sputum     Few Routine respiratory grayson present  Few White blood cells  Few Gram positive cocci in pairs      06-03 @ 01:48 .Body Fluid Pleural Fluid     No growth at 4 days.  Culture in progress  Few White blood cells  No organisms seen      06-02 @ 22:08 .Blood Blood-Peripheral     No growth at 5 days.      06-01 @ 05:20 .Blood Blood     No growth at 5 days.      05-31 @ 22:12 .Blood Blood     No growth at 5 days.      05-27 @ 10:56 .Blood Blood     No growth at 5 days.      05-27 @ 06:21 .Urine Clean Catch (Midstream)     <10,000 CFU/ml Yeast cells      05-23 @ 18:18 .Body Fluid Pleural Fluid     No growth at 1 week.      05-23 @ 08:43 .Body Fluid Pleural Fluid     No fungus isolated at 2 weeks.  Culture in progress      05-23 @ 08:42 .Body Fluid Pleural Fluid     No growth at 5 days.  Few WBC's  No organisms seen      05-23 @ 02:01 .Blood Blood     No growth at 5 days.      05-23 @ 01:48 .Blood Blood     No growth at 5 days.        EXAM:  CHEST SINGLE VIEW FRONTAL                        PROCEDURE DATE:  06/06/2017    INTERPRETATION:  Portable chest radiograph      CLINICAL INFORMATION:     The response. Shortness of breath.  TECHNIQUE:  Portable  AP view of the chest was obtained.  COMPARISON: 6/6/2017 taken 5:11 available for review.  FINDINGS: There is no interval change.  The lungs  show haziness overlying left] lower hemithoraces with a left   chest tube in place without pneumothorax. Increased opacity in left   retrocardiac parenchyma likely indicates lower lobe retrocardiac airspace   consolidation/atelectasis.       The  heart is enlarged in transverse diameter. No hilar mass. Trachea   midline.        Visualized osseous structures are intact.  IMPRESSION:   No interval change as described

## 2017-06-08 NOTE — SWALLOW BEDSIDE ASSESSMENT ADULT - ASR SWALLOW ASPIRATION MONITOR
oral hygiene/change of breathing pattern/pneumonia/position upright (90Y)/fever/throat clearing/cough/gurgly voice/upper respiratory infection
gurgly voice/oral hygiene/throat clearing/change of breathing pattern/cough/pneumonia/upper respiratory infection/position upright (90Y)/fever

## 2017-06-08 NOTE — SWALLOW BEDSIDE ASSESSMENT ADULT - SWALLOW EVAL: DIAGNOSIS
Mild oral stage dysphagia impacted by reduced cognition. Pharyngeal dysphagia suspected for thin fluids.

## 2017-06-08 NOTE — CHART NOTE - NSCHARTNOTEFT_GEN_A_CORE
pt with recurrent pleural effusion, possible empyema plan to go to OR with Dr. Portillo for VATS decortication pleurodesis tomorrow. 2 units PRBC on hold for OR. NPO after midnight. call [phone] for questions or concerns.

## 2017-06-08 NOTE — PROGRESS NOTE ADULT - SUBJECTIVE AND OBJECTIVE BOX
SAMIRA NUGENT     Chief Complaint: Patient is a 47y old  Male who presents with a chief complaint of wont eat (27 May 2017 05:11)      PAST MEDICAL & SURGICAL HISTORY:  Hypothyroidism, unspecified type  Psychiatric disorder  No significant past surgical history      HPI/OVERNIGHT EVENTS:Patient chest tube continues to drain. He is scheduled for VATS in am.    MEDICATIONS  (STANDING):  valproate sodium IVPB 1000milliGRAM(s) IV Intermittent every 12 hours  piperacillin/tazobactam IVPB. 3.375Gram(s) IV Intermittent every 8 hours  levothyroxine Injectable 75MICROGram(s) IV Push daily  ceFAZolin   IVPB 2000milliGRAM(s) IV Intermittent once  dextrose 5%. 1000milliLiter(s) IV Continuous <Continuous>      Vital Signs Last 24 Hrs  T(C): 36.7, Max: 37.1 (06-08 @ 00:00)  T(F): 98.1, Max: 98.7 (06-08 @ 00:00)  HR: 85 (85 - 108)  BP: 151/72 (115/55 - 162/80)  BP(mean): 101 (70 - 113)  RR: 15 (14 - 26)  SpO2: 98% (92% - 98%)    PHYSICAL EXAM:  Constitutional: NAD, well-groomed, well-developed  HEENT: PERRLA, EOMI, Normal Hearing, MMM  Neck: No LAD, No JVD  Back: Normal spine flexure, No CVA tenderness  Respiratory: chest tube  Cardiovascular: S1 and S2, RRR, no M/G/R  Gastrointestinal: BS+, soft, NT/ND  Extremities: No peripheral edema  Vascular: 2+ peripheral pulses  Neurological: non verbal    CAPILLARY BLOOD GLUCOSE    LABS:                        9.3    9.6   )-----------( 284      ( 08 Jun 2017 04:17 )             29.0     06-08    145  |  103  |  14.0  ----------------------------<  117<H>  3.7   |  29.0  |  0.98    Ca    8.9      08 Jun 2017 15:44  Phos  2.9     06-08  Mg     1.8     06-08            RADIOLOGY & ADDITIONAL TESTS:

## 2017-06-09 ENCOUNTER — RESULT REVIEW (OUTPATIENT)
Age: 47
End: 2017-06-09

## 2017-06-09 ENCOUNTER — APPOINTMENT (OUTPATIENT)
Dept: THORACIC SURGERY | Facility: HOSPITAL | Age: 47
End: 2017-06-09
Payer: MEDICAID

## 2017-06-09 DIAGNOSIS — R13.10 DYSPHAGIA, UNSPECIFIED: ICD-10-CM

## 2017-06-09 LAB
ABO RH CONFIRMATION: SIGNIFICANT CHANGE UP
ALBUMIN SERPL ELPH-MCNC: 2.1 G/DL — LOW (ref 3.3–5.2)
ALP SERPL-CCNC: 61 U/L — SIGNIFICANT CHANGE UP (ref 40–120)
ALT FLD-CCNC: 22 U/L — SIGNIFICANT CHANGE UP
ANION GAP SERPL CALC-SCNC: 13 MMOL/L — SIGNIFICANT CHANGE UP (ref 5–17)
APTT BLD: 37.7 SEC — HIGH (ref 27.5–37.4)
AST SERPL-CCNC: 16 U/L — SIGNIFICANT CHANGE UP
BILIRUB SERPL-MCNC: 0.1 MG/DL — LOW (ref 0.4–2)
BUN SERPL-MCNC: 13 MG/DL — SIGNIFICANT CHANGE UP (ref 8–20)
CALCIUM SERPL-MCNC: 8.7 MG/DL — SIGNIFICANT CHANGE UP (ref 8.6–10.2)
CHLORIDE SERPL-SCNC: 97 MMOL/L — LOW (ref 98–107)
CO2 SERPL-SCNC: 28 MMOL/L — SIGNIFICANT CHANGE UP (ref 22–29)
CREAT SERPL-MCNC: 0.87 MG/DL — SIGNIFICANT CHANGE UP (ref 0.5–1.3)
GLUCOSE SERPL-MCNC: 119 MG/DL — HIGH (ref 70–115)
GRAM STN FLD: SIGNIFICANT CHANGE UP
HCT VFR BLD CALC: 30.9 % — LOW (ref 42–52)
HGB BLD-MCNC: 10.1 G/DL — LOW (ref 14–18)
INR BLD: 2.01 RATIO — HIGH (ref 0.88–1.16)
MAGNESIUM SERPL-MCNC: 1.5 MG/DL — LOW (ref 1.6–2.6)
MCHC RBC-ENTMCNC: 28.3 PG — SIGNIFICANT CHANGE UP (ref 27–31)
MCHC RBC-ENTMCNC: 32.7 G/DL — SIGNIFICANT CHANGE UP (ref 32–36)
MCV RBC AUTO: 86.6 FL — SIGNIFICANT CHANGE UP (ref 80–94)
PHOSPHATE SERPL-MCNC: 3.1 MG/DL — SIGNIFICANT CHANGE UP (ref 2.4–4.7)
PLATELET # BLD AUTO: 256 K/UL — SIGNIFICANT CHANGE UP (ref 150–400)
POTASSIUM SERPL-MCNC: 3.3 MMOL/L — LOW (ref 3.5–5.3)
POTASSIUM SERPL-SCNC: 3.3 MMOL/L — LOW (ref 3.5–5.3)
PROT SERPL-MCNC: 5.4 G/DL — LOW (ref 6.6–8.7)
PROTHROM AB SERPL-ACNC: 22.4 SEC — HIGH (ref 9.8–12.7)
RBC # BLD: 3.57 M/UL — LOW (ref 4.6–6.2)
RBC # FLD: 15.3 % — SIGNIFICANT CHANGE UP (ref 11–15.6)
SODIUM SERPL-SCNC: 138 MMOL/L — SIGNIFICANT CHANGE UP (ref 135–145)
SPECIMEN SOURCE: SIGNIFICANT CHANGE UP
WBC # BLD: 12 K/UL — HIGH (ref 4.8–10.8)
WBC # FLD AUTO: 12 K/UL — HIGH (ref 4.8–10.8)

## 2017-06-09 PROCEDURE — 88305 TISSUE EXAM BY PATHOLOGIST: CPT | Mod: 26

## 2017-06-09 PROCEDURE — 32650 THORACOSCOPY W/PLEURODESIS: CPT | Mod: AS

## 2017-06-09 PROCEDURE — 99233 SBSQ HOSP IP/OBS HIGH 50: CPT

## 2017-06-09 PROCEDURE — 31622 DX BRONCHOSCOPE/WASH: CPT

## 2017-06-09 PROCEDURE — 32650 THORACOSCOPY W/PLEURODESIS: CPT

## 2017-06-09 PROCEDURE — 71010: CPT | Mod: 26,76

## 2017-06-09 PROCEDURE — 32651 THORACOSCOPY REMOVE CORTEX: CPT | Mod: AS

## 2017-06-09 PROCEDURE — 32651 THORACOSCOPY REMOVE CORTEX: CPT

## 2017-06-09 RX ORDER — PIPERACILLIN AND TAZOBACTAM 4; .5 G/20ML; G/20ML
3.38 INJECTION, POWDER, LYOPHILIZED, FOR SOLUTION INTRAVENOUS EVERY 8 HOURS
Qty: 0 | Refills: 0 | Status: COMPLETED | OUTPATIENT
Start: 2017-06-09 | End: 2017-06-27

## 2017-06-09 RX ORDER — MAGNESIUM SULFATE 500 MG/ML
1 VIAL (ML) INJECTION ONCE
Qty: 0 | Refills: 0 | Status: COMPLETED | OUTPATIENT
Start: 2017-06-09 | End: 2017-06-09

## 2017-06-09 RX ORDER — ONDANSETRON 8 MG/1
4 TABLET, FILM COATED ORAL ONCE
Qty: 0 | Refills: 0 | Status: DISCONTINUED | OUTPATIENT
Start: 2017-06-09 | End: 2017-06-09

## 2017-06-09 RX ORDER — DOCUSATE SODIUM 100 MG
100 CAPSULE ORAL THREE TIMES A DAY
Qty: 0 | Refills: 0 | Status: DISCONTINUED | OUTPATIENT
Start: 2017-06-09 | End: 2017-06-19

## 2017-06-09 RX ORDER — SODIUM CHLORIDE 9 MG/ML
1000 INJECTION, SOLUTION INTRAVENOUS
Qty: 0 | Refills: 0 | Status: DISCONTINUED | OUTPATIENT
Start: 2017-06-09 | End: 2017-06-09

## 2017-06-09 RX ORDER — SODIUM CHLORIDE 9 MG/ML
1000 INJECTION, SOLUTION INTRAVENOUS
Qty: 0 | Refills: 0 | Status: DISCONTINUED | OUTPATIENT
Start: 2017-06-09 | End: 2017-06-10

## 2017-06-09 RX ORDER — POTASSIUM CHLORIDE 20 MEQ
10 PACKET (EA) ORAL ONCE
Qty: 0 | Refills: 0 | Status: COMPLETED | OUTPATIENT
Start: 2017-06-09 | End: 2017-06-09

## 2017-06-09 RX ORDER — ENOXAPARIN SODIUM 100 MG/ML
40 INJECTION SUBCUTANEOUS EVERY 24 HOURS
Qty: 0 | Refills: 0 | Status: DISCONTINUED | OUTPATIENT
Start: 2017-06-10 | End: 2017-06-23

## 2017-06-09 RX ORDER — LEVOTHYROXINE SODIUM 125 MCG
75 TABLET ORAL DAILY
Qty: 0 | Refills: 0 | Status: DISCONTINUED | OUTPATIENT
Start: 2017-06-09 | End: 2017-06-12

## 2017-06-09 RX ORDER — FENTANYL CITRATE 50 UG/ML
50 INJECTION INTRAVENOUS
Qty: 0 | Refills: 0 | Status: DISCONTINUED | OUTPATIENT
Start: 2017-06-09 | End: 2017-06-09

## 2017-06-09 RX ORDER — VALPROIC ACID (AS SODIUM SALT) 250 MG/5ML
1000 SOLUTION, ORAL ORAL EVERY 12 HOURS
Qty: 0 | Refills: 0 | Status: DISCONTINUED | OUTPATIENT
Start: 2017-06-09 | End: 2017-06-12

## 2017-06-09 RX ORDER — ACETAMINOPHEN 500 MG
650 TABLET ORAL EVERY 6 HOURS
Qty: 0 | Refills: 0 | Status: DISCONTINUED | OUTPATIENT
Start: 2017-06-09 | End: 2017-06-12

## 2017-06-09 RX ADMIN — FENTANYL CITRATE 50 MICROGRAM(S): 50 INJECTION INTRAVENOUS at 10:35

## 2017-06-09 RX ADMIN — Medication 30 MILLIGRAM(S): at 18:19

## 2017-06-09 RX ADMIN — PIPERACILLIN AND TAZOBACTAM 25 GRAM(S): 4; .5 INJECTION, POWDER, LYOPHILIZED, FOR SOLUTION INTRAVENOUS at 05:55

## 2017-06-09 RX ADMIN — FENTANYL CITRATE 50 MICROGRAM(S): 50 INJECTION INTRAVENOUS at 10:40

## 2017-06-09 RX ADMIN — PIPERACILLIN AND TAZOBACTAM 25 GRAM(S): 4; .5 INJECTION, POWDER, LYOPHILIZED, FOR SOLUTION INTRAVENOUS at 21:18

## 2017-06-09 RX ADMIN — FENTANYL CITRATE 50 MICROGRAM(S): 50 INJECTION INTRAVENOUS at 11:00

## 2017-06-09 RX ADMIN — FENTANYL CITRATE 50 MICROGRAM(S): 50 INJECTION INTRAVENOUS at 10:49

## 2017-06-09 RX ADMIN — SODIUM CHLORIDE 100 MILLILITER(S): 9 INJECTION, SOLUTION INTRAVENOUS at 18:11

## 2017-06-09 NOTE — PROGRESS NOTE ADULT - PROBLEM SELECTOR PLAN 2
MRI head - Neg , neuro consulted   EEG - pt refusing per neuro  accepting some food  6/7/2017 Pt NPO until he wakes up.  6/8/2017 Patient awake and able to resume diet. NPO after midnight for VATS MRI head - Neg , neuro consulted   EEG - pt refusing per neuro  accepting some food  6/7/2017 Pt NPO until he wakes up.  6/8/2017 Patient awake and able to resume diet. NPO after midnight for VATS  6/9/2017 The patient is now npo and sedated. When he was at his baseline he could be spoonfed few mouthfuls of food.

## 2017-06-09 NOTE — PROGRESS NOTE ADULT - PROBLEM SELECTOR PLAN 3
episode of seizure 5/29  on iv valproate - dose increased by neurology. was off his AED due to refusal to take po episode of seizure 5/29  on iv valproate - dose increased by neurology. was off his AED due to refusal to take po  6/9/2017 No further seizure activity

## 2017-06-09 NOTE — PROGRESS NOTE ADULT - SUBJECTIVE AND OBJECTIVE BOX
SAMIRA NUGENT     Chief Complaint: Patient is a 47y old  Male who presents with a chief complaint of wont eat (27 May 2017 05:11)      PAST MEDICAL & SURGICAL HISTORY:  Hypothyroidism, unspecified type  Psychiatric disorder  No significant past surgical history      HPI/OVERNIGHT EVENTS: Patient status post Vats. 2 chest tubes in place. He is nonverbal at baseline. In the OR surgery reports no evidence of infection. Partially trapped lung. Biopsy and culture performed. Pleurodesis performed and 1500 cc drained from left side.    MEDICATIONS  (STANDING):  lactated ringers. 1000milliLiter(s) IV Continuous <Continuous>  docusate sodium 100milliGRAM(s) Oral three times a day  valproate sodium IVPB 1000milliGRAM(s) IV Intermittent every 12 hours  piperacillin/tazobactam IVPB. 3.375Gram(s) IV Intermittent every 8 hours  levothyroxine Injectable 75MICROGram(s) IV Push daily      Vital Signs Last 24 Hrs  T(C): 36.2, Max: 36.8 (06-09 @ 04:00)  T(F): 97.2, Max: 98.2 (06-09 @ 04:00)  HR: 94 (83 - 99)  BP: 123/82 (116/75 - 154/91)  BP(mean): 89 (89 - 115)  RR: 20 (10 - 26)  SpO2: 98% (94% - 100%)    PHYSICAL EXAM:     HEENT:  venti mask in place   Neck: No LAD, No JVD  Back: Normal spine flexure, No CVA tenderness  Respiratory: CTAB Cardiovascular: S1 and S2, RRR, no M/G/R  Gastrointestinal: BS+, soft, NT/ND  Extremities: No peripheral edema  Vascular: 2+ peripheral pulses  Neurological:  sedated    CAPILLARY BLOOD GLUCOSE    LABS:                        10.1   12.0  )-----------( 256      ( 09 Jun 2017 04:25 )             30.9     06-09    138  |  97<L>  |  13.0  ----------------------------<  119<H>  3.3<L>   |  28.0  |  0.87    Ca    8.7      09 Jun 2017 03:49  Phos  3.1     06-09  Mg     1.5     06-09    TPro  5.4<L>  /  Alb  2.1<L>  /  TBili  0.1<L>  /  DBili  x   /  AST  16  /  ALT  22  /  AlkPhos  61  06-09    PT/INR - ( 09 Jun 2017 03:49 )   PT: 22.4 sec;   INR: 2.01 ratio         PTT - ( 09 Jun 2017 03:49 )  PTT:37.7 sec      RADIOLOGY & ADDITIONAL TESTS:

## 2017-06-09 NOTE — PROGRESS NOTE ADULT - PROBLEM SELECTOR PLAN 1
? 2/2 seizure episode vs Infection   Ct surgery input appreciated, chest tube in place  on zosyn+vanc   6/4/2017 Vanco discontinued but zosyn remains  ID recs  6/7/2017 Patient downgraded from icu to medicine  6/8/2017 Patient is now on zosyn for exudative transudate. ? 2/2 seizure episode vs Infection   Ct surgery input appreciated, chest tube in place  on zosyn+vanc   6/4/2017 Vanco discontinued but zosyn remains  ID recs  6/7/2017 Patient downgraded from icu to medicine  6/8/2017 Patient is now on zosyn for exudative transudate.  6/9/2017 Zosyn day 10. ID suggests empyema requires antibiotics for weeks.

## 2017-06-09 NOTE — PROGRESS NOTE ADULT - PROBLEM SELECTOR PLAN 4
pt is s/p IR guided thoracentesis, removed almost 1400 cc,   Pleural fluid  - ph 7.5, LDH 81, P: 3.9, transudative based on LDH, mildly positive for exudative based on protein   TTE - LVEF 50-55% - no h/o CHF  repeat xray no pneumonia, no need for Abx - no e/o active infection  6/8/2017 VATS in am. pt is s/p IR guided thoracentesis, removed almost 1400 cc,   Pleural fluid  - ph 7.5, LDH 81, P: 3.9, transudative based on LDH, mildly positive for exudative based on protein   TTE - LVEF 50-55% - no h/o CHF  repeat xray no pneumonia, no need for Abx - no e/o active infection  6/8/2017 VATS in am.  6/9/2017 S/P Vats, findings reported above.

## 2017-06-09 NOTE — PROGRESS NOTE ADULT - PROBLEM SELECTOR PLAN 6
management as per psych   defer to [facility] provider to restart clozapine via titration.  6/7/2017 All psychactive medications are to be used minimally. management as per psych   defer to [facility] provider to restart clozapine via titration.  6/7/2017 All psychactive medications are to be used minimally.  6/9/2017 Psychoactive medications have been held as patient is lethargic. If he needs to resume psychoactive they have to be titrated slowly with psychiatric approval as he is sensitive to clozaril. management as per psych   defer to [facility] provider to restart clozapine via titration.  6/7/2017 All psychactive medications are to be used minimally.  6/9/2017 Psychoactive medications have been held as patient is lethargic. If he needs to resume psychoactive they have to be titrated slowly with psychiatric approval as he is sensitive to clozapine.

## 2017-06-09 NOTE — PROGRESS NOTE ADULT - ASSESSMENT
47 year old male with schizoaffective disorder, Pleural effusion s/p thoracentesis, anorexia, dehydration recently discharged from hospital, noted to have  Moderate pleural effusion in the left lateral region, pt is s/p IR guided thoracentesis, removed almost 1400 cc, sent back to ed as he was not eating at [employer], afebrile, xray showed moderate pleural effusion. Had a seizure episode on 5/29 2/2 refusal to take po meds. Started on iv AEDs, Now stay complicated by worsening leucocytosis. ID consulted. continues to refuse to eat. CT chest to evaluate pleural effusion awaited. would Consider thoracic surgery cpnsult if persistent/worsening effusion.  6/4/2017 Ct surgery input appreciated. Chest tube in place. Small pneumo noted. Repeat CXR in am.  fever improving with zosyn. Spoke to sister Aydee @ [phone] at length - await CT chest, swallow eval r/o aspiration   6/6/2017 Rapid response called for altered mental status. Likely Clozapine toxicity. Tranfer to ICU.  6/7/2017 Patient downgraded from icu to medical floor. He is arousable to deep stimuli.  6/9/2017 Patient status post VATS.  2 chest tubes in place. He is nonverbal at baseline. In the OR surgery reports no evidence of infection. Partially trapped lung. Biopsy and culture performed. Pleurodesis performed and 1500 cc drained from left side. 47 year old male with schizoaffective disorder, Pleural effusion s/p thoracentesis, anorexia, dehydration recently discharged from hospital, noted to have moderate pleural effusion in the left lateral region, patient is status post IR guided thoracentesis, removed almost 1400 cc, sent back to ed as he was not eating at [employer], afebrile, xray showed moderate pleural effusion. Had a seizure episode on 5/29 2/2 refusal to take po meds. Started on iv AEDs, Now stay complicated by worsening leucocytosis. ID consulted. continues to refuse to eat. CT chest to evaluate pleural effusion awaited. would Consider thoracic surgery cpnsult if persistent/worsening effusion.  6/4/2017 Ct surgery input appreciated. Chest tube in place. Small pneumo noted. Repeat CXR in am.  fever improving with zosyn. Spoke to sister Aydee @ [phone] at length - await CT chest, swallow eval r/o aspiration   6/6/2017 Rapid response called for altered mental status. Likely Clozapine toxicity. Tranfer to ICU.  6/7/2017 Patient downgraded from icu to medical floor. He is arousable to deep stimuli.  6/9/2017 Patient status post VATS.  2 chest tubes in place. He is nonverbal at baseline. In the OR surgery reports no evidence of infection. Partially trapped lung. Biopsy and culture performed. Pleurodesis performed and 1500 cc drained from left side.

## 2017-06-09 NOTE — PROGRESS NOTE ADULT - SUBJECTIVE AND OBJECTIVE BOX
Patient is resting comfortably @ time of visit  Vital signs are Stable   No Anesthesia Complications noted

## 2017-06-10 ENCOUNTER — TRANSCRIPTION ENCOUNTER (OUTPATIENT)
Age: 47
End: 2017-06-10

## 2017-06-10 DIAGNOSIS — R79.1 ABNORMAL COAGULATION PROFILE: ICD-10-CM

## 2017-06-10 LAB
ANION GAP SERPL CALC-SCNC: 10 MMOL/L — SIGNIFICANT CHANGE UP (ref 5–17)
ANISOCYTOSIS BLD QL: SLIGHT — SIGNIFICANT CHANGE UP
BUN SERPL-MCNC: 16 MG/DL — SIGNIFICANT CHANGE UP (ref 8–20)
CALCIUM SERPL-MCNC: 8.3 MG/DL — LOW (ref 8.6–10.2)
CHLORIDE SERPL-SCNC: 99 MMOL/L — SIGNIFICANT CHANGE UP (ref 98–107)
CO2 SERPL-SCNC: 33 MMOL/L — HIGH (ref 22–29)
CREAT SERPL-MCNC: 0.98 MG/DL — SIGNIFICANT CHANGE UP (ref 0.5–1.3)
GLUCOSE SERPL-MCNC: 84 MG/DL — SIGNIFICANT CHANGE UP (ref 70–115)
HCT VFR BLD CALC: 28.2 % — LOW (ref 42–52)
HGB BLD-MCNC: 9.2 G/DL — LOW (ref 14–18)
HYPOCHROMIA BLD QL: SLIGHT — SIGNIFICANT CHANGE UP
INR BLD: 1.58 RATIO — HIGH (ref 0.88–1.16)
LYMPHOCYTES # BLD AUTO: 4 % — LOW (ref 20–55)
MACROCYTES BLD QL: SLIGHT — SIGNIFICANT CHANGE UP
MAGNESIUM SERPL-MCNC: 1.7 MG/DL — SIGNIFICANT CHANGE UP (ref 1.6–2.6)
MCHC RBC-ENTMCNC: 28.6 PG — SIGNIFICANT CHANGE UP (ref 27–31)
MCHC RBC-ENTMCNC: 32.6 G/DL — SIGNIFICANT CHANGE UP (ref 32–36)
MCV RBC AUTO: 87.6 FL — SIGNIFICANT CHANGE UP (ref 80–94)
MICROCYTES BLD QL: SLIGHT — SIGNIFICANT CHANGE UP
MONOCYTES NFR BLD AUTO: 8 % — SIGNIFICANT CHANGE UP (ref 3–10)
NEUTROPHILS NFR BLD AUTO: 82 % — HIGH (ref 37–73)
NIGHT BLUE STAIN TISS: SIGNIFICANT CHANGE UP
OVALOCYTES BLD QL SMEAR: SLIGHT — SIGNIFICANT CHANGE UP
PHOSPHATE SERPL-MCNC: 4.1 MG/DL — SIGNIFICANT CHANGE UP (ref 2.4–4.7)
PLAT MORPH BLD: NORMAL — SIGNIFICANT CHANGE UP
PLATELET # BLD AUTO: 190 K/UL — SIGNIFICANT CHANGE UP (ref 150–400)
POIKILOCYTOSIS BLD QL AUTO: SLIGHT — SIGNIFICANT CHANGE UP
POTASSIUM SERPL-MCNC: 3.7 MMOL/L — SIGNIFICANT CHANGE UP (ref 3.5–5.3)
POTASSIUM SERPL-SCNC: 3.7 MMOL/L — SIGNIFICANT CHANGE UP (ref 3.5–5.3)
PROTHROM AB SERPL-ACNC: 17.5 SEC — HIGH (ref 9.8–12.7)
RBC # BLD: 3.22 M/UL — LOW (ref 4.6–6.2)
RBC # FLD: 15.5 % — SIGNIFICANT CHANGE UP (ref 11–15.6)
RBC BLD AUTO: ABNORMAL
SODIUM SERPL-SCNC: 142 MMOL/L — SIGNIFICANT CHANGE UP (ref 135–145)
SPECIMEN SOURCE: SIGNIFICANT CHANGE UP
VARIANT LYMPHS # BLD: 1 % — SIGNIFICANT CHANGE UP (ref 0–6)
WBC # BLD: 13.6 K/UL — HIGH (ref 4.8–10.8)
WBC # FLD AUTO: 13.6 K/UL — HIGH (ref 4.8–10.8)

## 2017-06-10 PROCEDURE — 99232 SBSQ HOSP IP/OBS MODERATE 35: CPT

## 2017-06-10 PROCEDURE — 99233 SBSQ HOSP IP/OBS HIGH 50: CPT

## 2017-06-10 PROCEDURE — 71010: CPT | Mod: 26

## 2017-06-10 RX ADMIN — PIPERACILLIN AND TAZOBACTAM 25 GRAM(S): 4; .5 INJECTION, POWDER, LYOPHILIZED, FOR SOLUTION INTRAVENOUS at 21:58

## 2017-06-10 RX ADMIN — SODIUM CHLORIDE 100 MILLILITER(S): 9 INJECTION, SOLUTION INTRAVENOUS at 05:03

## 2017-06-10 RX ADMIN — PIPERACILLIN AND TAZOBACTAM 25 GRAM(S): 4; .5 INJECTION, POWDER, LYOPHILIZED, FOR SOLUTION INTRAVENOUS at 05:03

## 2017-06-10 RX ADMIN — PIPERACILLIN AND TAZOBACTAM 25 GRAM(S): 4; .5 INJECTION, POWDER, LYOPHILIZED, FOR SOLUTION INTRAVENOUS at 14:48

## 2017-06-10 RX ADMIN — Medication 100 MILLIGRAM(S): at 21:58

## 2017-06-10 RX ADMIN — Medication 30 MILLIGRAM(S): at 05:03

## 2017-06-10 RX ADMIN — ENOXAPARIN SODIUM 40 MILLIGRAM(S): 100 INJECTION SUBCUTANEOUS at 21:58

## 2017-06-10 RX ADMIN — Medication 30 MILLIGRAM(S): at 17:13

## 2017-06-10 RX ADMIN — Medication 75 MICROGRAM(S): at 05:03

## 2017-06-10 NOTE — PROGRESS NOTE ADULT - PROBLEM SELECTOR PLAN 3
Requires assistance in feeding.  Per aide, eats minimally  Will eventually need to OOB with physical therapy

## 2017-06-10 NOTE — PROGRESS NOTE ADULT - ASSESSMENT
46 yo M with h/o schizoaffective disorder, selective mutism, recurrent pleural effusion (transudative) here with anroexia and pleural effusion.

## 2017-06-10 NOTE — PROGRESS NOTE ADULT - PROBLEM SELECTOR PLAN 2
Consistently elevated INR  Trend PT/PTT  Check mixing study, fibronogen assay  No evidence of cirrhosis on CT abd/pelvis

## 2017-06-10 NOTE — PROGRESS NOTE ADULT - PROBLEM SELECTOR PLAN 1
s/p VATS decortication.  Maintain CT x 2 to suction through tomorrow per Dr. Ferrer.  CXR in AM.  Discussed with CT surgery team and Dr. Ferrer in AM.

## 2017-06-10 NOTE — PROGRESS NOTE ADULT - SUBJECTIVE AND OBJECTIVE BOX
[facility]  INFECTIOUS DISEASES AND INTERNAL MEDICINE OF [place]  =======================================================  Ritesh Lang MD  Diplomates American Board of Internal Medicine and Infectious Diseases  =======================================================      SAMIRA NUGENT [id number]    Follow up: Leukocytosis; Pleural effusion  s/p chest tube on 6/2/17  s/p VATS 6/9/17    Patient is awake, comfortable  Afebrile  no meaningful communication      Allergies:  No Known Allergies      Antibiotics:  piperacillin/tazobactam IVPB. 3.375Gram(s) IV Intermittent every 8 hours      REVIEW OF SYSTEMS:  Unable to obtain due to patient's condition. Patient non verbal      Physical Exam:  Vital Signs Last 24 Hrs  T(C): 36.9, Max: 36.9 (06-10 @ 04:58)  T(F): 98.5, Max: 98.5 (06-10 @ 04:58)  HR: 82 (82 - 103)  BP: 112/64 (112/64 - 135/82)  RR: 16 (12 - 20)  SpO2: 100% (95% - 100%)    GEN: Non verbal. NAD  HEENT: normocephalic and atraumatic. EOMI. PERRL.    NECK: Supple.   LUNGS: Decreased breath sounds b/l base. + Chest tubes  HEART: Regular rate and rhythm.  ABDOMEN: Soft, nontender, and nondistended.  Positive bowel sounds.    : No CVA tenderness  EXTREMITIES: Without any edema.  MSK: no joint swelling  NEUROLOGIC: awake, no meaningful communication  SKIN: dark pigmented lesions B/L LE.      Labs:  06-10    142  |  99  |  16.0  ----------------------------<  84  3.7   |  33.0<H>  |  0.98    Ca    8.3<L>      10 [place] 2017 07:22  Phos  4.1     06-10  Mg     1.7     06-10    TPro  5.4<L>  /  Alb  2.1<L>  /  TBili  0.1<L>  /  DBili  x   /  AST  16  /  ALT  22  /  AlkPhos  61  06-09                          9.2    13.6  )-----------( 190      ( 10 Colten 2017 07:22 )             28.2       PT/INR - ( 10 Colten 2017 07:22 )   PT: 17.5 sec;   INR: 1.58 ratio         PTT - ( 09 Jun 2017 03:49 )  PTT:37.7 sec    LIVER FUNCTIONS - ( 09 Jun 2017 03:49 )  Alb: 2.1 g/dL / Pro: 5.4 g/dL / ALK PHOS: 61 U/L / ALT: 22 U/L / AST: 16 U/L / GGT: x             RECENT CULTURES:  06-09 @ 11:56 .Body Fluid     No growth at 1 day.  Culture in progress  No WBC's seen.  No organisms seen      06-03 @ 19:40 .Blood Blood     No growth at 5 days.      06-03 @ 14:19 .Sputum Sputum     Few Routine respiratory grayson present  Few White blood cells  Few Gram positive cocci in pairs      06-03 @ 01:48 .Body Fluid Pleural Fluid     No growth at 4 days.  Culture in progress  Few White blood cells  No organisms seen      06-02 @ 22:08 .Blood Blood-Peripheral     No growth at 5 days.      06-01 @ 05:20 .Blood Blood     No growth at 5 days.      05-31 @ 22:12 .Blood Blood     No growth at 5 days.      05-27 @ 10:56 .Blood Blood     No growth at 5 days.      05-27 @ 06:21 .Urine Clean Catch (Midstream)     <10,000 CFU/ml Yeast cells      05-23 @ 18:18 .Body Fluid Pleural Fluid     No growth at 1 week.      05-23 @ 08:43 .Body Fluid Pleural Fluid     No fungus isolated at 2 weeks.  Culture in progress      05-23 @ 08:42 .Body Fluid Pleural Fluid     No growth at 5 days.  Few WBC's  No organisms seen      05-23 @ 02:01 .Blood Blood     No growth at 5 days.      05-23 @ 01:48 .Blood Blood     No growth at 5 days.        EXAM:  CHEST SINGLE VIEW FRONTAL                        PROCEDURE DATE:  06/10/2017    INTERPRETATION:  TECHNIQUE: Single portable view of the chest.  COMPARISON: 6/9/2017  CLINICAL HISTORY: Pleural effusion.   FINDINGS:  Single frontal view of the chest demonstrates tiny left apical   pneumothorax. 2 left-sided chest tubes. Mild right basilar atelectasis,   unchanged. The cardiomediastinal silhouette is enlarged. No acute osseous   abnormalities. Overlying EKG leads and wires are noted.  IMPRESSION:   No interval change.

## 2017-06-10 NOTE — PROGRESS NOTE ADULT - SUBJECTIVE AND OBJECTIVE BOX
SAMIRA NUGENT    [id number]    47y      Male    INTERVAL HPI/OVERNIGHT EVENTS: S/p VATS yesterday. [place] aide at bedside. Per documentation and aide, pt nonverbal at bedside. Pt opens eyes to verbal stimuli but does not communicate.    Hospital course:  48 yo M with h/o schizoaffective disorder, selective mutism, recurrent pleural effusion (transudative) represents from [place] for anorexia and refusal to take PO medications. He was last discharged after thoracentesis (1400cc) of transudative pleural effusion in left lateral region. Upon readmission, repeat chest xray showed recurrent moderate left pleural effusion. On 5/29, pt had seizure episode 2/2 noncompliance with PO medications, then transitioned to IV valproate. MRI brain x 2 were negative for acute infarct. Pt noted to have worsening leukocytosis, elevated CRP and procalcitonin. Per ID, vanc and zosyn were started. CT chest showed worsening left pleural effusion. CTS placed chest tube on 6/4. Pt had rapid response on 6/6, likely due to clozapine toxicity and was monitored overnight in MICU. On 6/9, pt had VATS which did not show evidence of infection and partially trapped lung. Pleurodesis was performed and had 1500cc drained.     REVIEW OF SYSTEMS:  Unable to obtain 2/2 mutism.    Vital Signs Last 24 Hrs  T(C): 36.9, Max: 36.9 (06-10 @ 04:58)  T(F): 98.5, Max: 98.5 (06-10 @ 04:58)  HR: 82 (82 - 103)  BP: 112/64 (112/64 - 154/91)  BP(mean): --  RR: 16 (11 - 26)  SpO2: 100% (94% - 100%) BiPAP    PHYSICAL EXAM:    GENERAL: nontoxic appearing, easily arousable to verbal stimuli  HEENT: PERRL, +EOMI  CHEST/LUNG: No wheezing  HEART: S1S2+, Regular rate and rhythm   ABDOMEN: Soft, Nontender, Nondistended; Bowel sounds present    LABS:                        9.2    13.6  )-----------( 190      ( 10 Colten 2017 07:22 )             28.2     06-10    142  |  99  |  16.0  ----------------------------<  84  3.7   |  33.0<H>  |  0.98    Ca    8.3<L>      10 Colten 2017 07:22  Phos  4.1     06-10  Mg     1.7     06-10    TPro  5.4<L>  /  Alb  2.1<L>  /  TBili  0.1<L>  /  DBili  x   /  AST  16  /  ALT  22  /  AlkPhos  61  06-09    PT/INR - ( 10 Colten 2017 07:22 )   PT: 17.5 sec;   INR: 1.58 ratio         PTT - ( 09 Jun 2017 03:49 )  PTT:37.7 sec        MEDICATIONS  (STANDING):  enoxaparin Injectable 40milliGRAM(s) SubCutaneous every 24 hours  docusate sodium 100milliGRAM(s) Oral three times a day  valproate sodium IVPB 1000milliGRAM(s) IV Intermittent every 12 hours  piperacillin/tazobactam IVPB. 3.375Gram(s) IV Intermittent every 8 hours  levothyroxine Injectable 75MICROGram(s) IV Push daily  multiple electrolytes Injection Type 1 1000milliLiter(s) IV Continuous <Continuous>    MEDICATIONS  (PRN):  acetaminophen   Tablet 650milliGRAM(s) Oral every 6 hours PRN For Temp greater than 38 C (100.4 F)      RADIOLOGY & ADDITIONAL TESTS:

## 2017-06-10 NOTE — PROGRESS NOTE ADULT - SUBJECTIVE AND OBJECTIVE BOX
Subjective: Pt lying in bed.  [place] aid at bedside for safety. Pt is Nonverbal.        Tele:  SR                        T(F): 97.9, Max: 98.5 (06-10 @ 04:58)  HR: 102 (82 - 103)  BP: 114/68 (112/64 - 118/74)  RR: 16 (16 - 16)  SpO2: 100% (97% - 100%) on 3 liters nasal O2 with nocturnal Bipap.        No Known Allergies      06-10    142  |  99  |  16.0  ----------------------------<  84  3.7   |  33.0<H>  |  0.98    Ca    8.3<L>      10 Colten 2017 07:22  Phos  4.1     06-10  Mg     1.7     06-10    TPro  5.4<L>  /  Alb  2.1<L>  /  TBili  0.1<L>  /  DBili  x   /  AST  16  /  ALT  22  /  AlkPhos  61  06-09                               9.2    13.6  )-----------( 190      ( 10 Colten 2017 07:22 )             28.2        PT/INR - ( 10 Colten 2017 07:22 )   PT: 17.5 sec;   INR: 1.58 ratio         PTT - ( 09 Jun 2017 03:49 )  PTT:37.7 sec            CXR: FINDINGS:    Single frontal view of the chest demonstrates tiny left apical   pneumothorax. 2 left-sided chest tubes. Mild right basilar atelectasis,   unchanged. The cardiomediastinal silhouette is enlarged. No acute osseous   abnormalities. Overlying EKG leads and wires are noted.    IMPRESSION: No interval change.    LOS BOOKER M.D., ATTENDING RADIOLOGIST  This document has been electronically signed. [place] 10 2017 10:35AM    I&O's Detail    I & Os for current day (as of 10 Colten 2017 19:19)  =============================================  IN:    multiple electrolytes Injection Type 1: 1200 ml    Solution: 100 ml    Solution: 50 ml    Total IN: 1350 ml  ---------------------------------------------  OUT:    Chest Tube: 420 ml    Chest Tube: 170 ml    Total OUT: 590 ml  ---------------------------------------------  Total NET: 760 ml      CHEST TUBE:  [x ] YES [ ] NO  OUTPUT:       AIR LEAKS:  [ ] YES [x ] NO      Active Medications:  enoxaparin Injectable 40milliGRAM(s) SubCutaneous every 24 hours  docusate sodium 100milliGRAM(s) Oral three times a day  valproate sodium IVPB 1000milliGRAM(s) IV Intermittent every 12 hours  acetaminophen   Tablet 650milliGRAM(s) Oral every 6 hours PRN  piperacillin/tazobactam IVPB. 3.375Gram(s) IV Intermittent every 8 hours  levothyroxine Injectable 75MICROGram(s) IV Push daily  multiple electrolytes Injection Type 1 1000milliLiter(s) IV Continuous <Continuous>      Physical Exam:    Neuro: Nonverbal.  Opens eyes.  Does not respond.    Pulm: Diminished BLL. +CT x 2.  No air leak or crepitus.    CV: RRR.  +S1+S2.    Abd: Soft/NT/ND.  +BS.    Extremities: No edema BLE.  +pp.    Incision(s): Left VATS incision ANYA and without erythema or drainage.  CT sites with DSD.       PAST MEDICAL & SURGICAL HISTORY:  Hypothyroidism, unspecified type  Psychiatric disorder  No significant past surgical history

## 2017-06-10 NOTE — PROGRESS NOTE ADULT - PROBLEM SELECTOR PLAN 1
s/p Chest tube  s/p VATS 6/9/17  Pleural fluid culture with no growth  Blood cultures no growth  Continue Zosyn

## 2017-06-11 DIAGNOSIS — J86.9 PYOTHORAX WITHOUT FISTULA: ICD-10-CM

## 2017-06-11 LAB
ANION GAP SERPL CALC-SCNC: 9 MMOL/L — SIGNIFICANT CHANGE UP (ref 5–17)
APTT BLD: 37.3 SEC — SIGNIFICANT CHANGE UP (ref 27.5–37.4)
BUN SERPL-MCNC: 14 MG/DL — SIGNIFICANT CHANGE UP (ref 8–20)
CALCIUM SERPL-MCNC: 8.3 MG/DL — LOW (ref 8.6–10.2)
CHLORIDE SERPL-SCNC: 97 MMOL/L — LOW (ref 98–107)
CO2 SERPL-SCNC: 33 MMOL/L — HIGH (ref 22–29)
CREAT SERPL-MCNC: 0.97 MG/DL — SIGNIFICANT CHANGE UP (ref 0.5–1.3)
FIBRINOGEN PPP-MCNC: 679 MG/DL — HIGH (ref 310–510)
GLUCOSE SERPL-MCNC: 103 MG/DL — SIGNIFICANT CHANGE UP (ref 70–115)
HAPTOGLOB SERPL-MCNC: 430 MG/DL — HIGH (ref 34–200)
HCT VFR BLD CALC: 30.9 % — LOW (ref 42–52)
HGB BLD-MCNC: 10.1 G/DL — LOW (ref 14–18)
INR BLD: 1.92 RATIO — HIGH (ref 0.88–1.16)
LDH SERPL L TO P-CCNC: 134 U/L — SIGNIFICANT CHANGE UP (ref 98–192)
MCHC RBC-ENTMCNC: 28.7 PG — SIGNIFICANT CHANGE UP (ref 27–31)
MCHC RBC-ENTMCNC: 32.7 G/DL — SIGNIFICANT CHANGE UP (ref 32–36)
MCV RBC AUTO: 87.8 FL — SIGNIFICANT CHANGE UP (ref 80–94)
PLATELET # BLD AUTO: 199 K/UL — SIGNIFICANT CHANGE UP (ref 150–400)
POTASSIUM SERPL-MCNC: 3.5 MMOL/L — SIGNIFICANT CHANGE UP (ref 3.5–5.3)
POTASSIUM SERPL-SCNC: 3.5 MMOL/L — SIGNIFICANT CHANGE UP (ref 3.5–5.3)
PROTHROM AB SERPL-ACNC: 21.4 SEC — HIGH (ref 9.8–12.7)
RBC # BLD: 3.52 M/UL — LOW (ref 4.6–6.2)
RBC # BLD: 3.52 M/UL — LOW (ref 4.6–6.2)
RBC # FLD: 15.5 % — SIGNIFICANT CHANGE UP (ref 11–15.6)
RETICS #: 78.5 K/UL — SIGNIFICANT CHANGE UP (ref 25–125)
RETICS/RBC NFR: 2.2 % — SIGNIFICANT CHANGE UP (ref 0.5–2.6)
SODIUM SERPL-SCNC: 139 MMOL/L — SIGNIFICANT CHANGE UP (ref 135–145)
WBC # BLD: 14.9 K/UL — HIGH (ref 4.8–10.8)
WBC # FLD AUTO: 14.9 K/UL — HIGH (ref 4.8–10.8)

## 2017-06-11 PROCEDURE — 99233 SBSQ HOSP IP/OBS HIGH 50: CPT

## 2017-06-11 PROCEDURE — 71010: CPT | Mod: 26

## 2017-06-11 PROCEDURE — 99232 SBSQ HOSP IP/OBS MODERATE 35: CPT

## 2017-06-11 RX ORDER — POTASSIUM CHLORIDE 20 MEQ
20 PACKET (EA) ORAL ONCE
Qty: 0 | Refills: 0 | Status: COMPLETED | OUTPATIENT
Start: 2017-06-11 | End: 2017-06-11

## 2017-06-11 RX ADMIN — PIPERACILLIN AND TAZOBACTAM 25 GRAM(S): 4; .5 INJECTION, POWDER, LYOPHILIZED, FOR SOLUTION INTRAVENOUS at 21:40

## 2017-06-11 RX ADMIN — Medication 100 MILLIGRAM(S): at 06:01

## 2017-06-11 RX ADMIN — ENOXAPARIN SODIUM 40 MILLIGRAM(S): 100 INJECTION SUBCUTANEOUS at 21:40

## 2017-06-11 RX ADMIN — PIPERACILLIN AND TAZOBACTAM 25 GRAM(S): 4; .5 INJECTION, POWDER, LYOPHILIZED, FOR SOLUTION INTRAVENOUS at 13:56

## 2017-06-11 RX ADMIN — Medication 100 MILLIGRAM(S): at 13:57

## 2017-06-11 RX ADMIN — Medication 20 MILLIEQUIVALENT(S): at 16:57

## 2017-06-11 RX ADMIN — Medication 100 MILLIGRAM(S): at 21:40

## 2017-06-11 RX ADMIN — Medication 650 MILLIGRAM(S): at 06:27

## 2017-06-11 RX ADMIN — PIPERACILLIN AND TAZOBACTAM 25 GRAM(S): 4; .5 INJECTION, POWDER, LYOPHILIZED, FOR SOLUTION INTRAVENOUS at 06:01

## 2017-06-11 RX ADMIN — Medication 75 MICROGRAM(S): at 06:01

## 2017-06-11 RX ADMIN — Medication 650 MILLIGRAM(S): at 22:04

## 2017-06-11 RX ADMIN — Medication 30 MILLIGRAM(S): at 17:01

## 2017-06-11 RX ADMIN — Medication 30 MILLIGRAM(S): at 06:01

## 2017-06-11 NOTE — PROGRESS NOTE ADULT - PROBLEM SELECTOR PLAN 1
s/p Chest tube  s/p VATS 6/9/17  Pleural fluid culture with no growth  Blood cultures no growth  Continue Zosyn  Had fever  will repeat blood cultures

## 2017-06-11 NOTE — PROGRESS NOTE ADULT - SUBJECTIVE AND OBJECTIVE BOX
SAMIRA NUGENT    [id number]    47y      Male    INTERVAL HPI/OVERNIGHT EVENTS: Passed bedside dysphagia. Ate entire breakfast this morning per [place] aide. Pt noncommunicative but tracks me.    Hospital course:  46 yo M with h/o schizoaffective disorder, selective mutism, recurrent pleural effusion (transudative) represents from [place] for anorexia and refusal to take PO medications. He was last discharged after thoracentesis (1400cc) of transudative pleural effusion in left lateral region. Upon readmission, repeat chest xray showed recurrent moderate left pleural effusion. On 5/29, pt had seizure episode 2/2 noncompliance with PO medications, then transitioned to IV valproate. MRI brain x 2 were negative for acute infarct. Pt noted to have worsening leukocytosis, elevated CRP and procalcitonin. Per ID, vanc and zosyn were started. CT chest showed worsening left pleural effusion. CTS placed chest tube on 6/4. Pt had rapid response on 6/6, likely due to clozapine toxicity and was monitored overnight in MICU. On 6/9, pt had VATS which did not show evidence of infection and partially trapped lung. Pleurodesis was performed and had 1500cc drained.     REVIEW OF SYSTEMS:  Unable to obtain 2/2 mutism.       Vital Signs Last 24 Hrs  T(C): 36.8, Max: 38.7 (06-11 @ 06:00)  T(F): 98.3, Max: 101.7 (06-11 @ 06:00)  HR: 87 (78 - 103)  BP: 92/62 (92/62 - 124/74)  BP(mean): --  RR: 14 (14 - 16)  SpO2: 96% (96% - 100%) RA    PHYSICAL EXAM:    GENERAL: nontoxic appearing, comfortable  HEENT: PERRL, +EOMI, MMM  CHEST/LUNG: Clear to percussion bilaterally   HEART: S1S2+, Regular rate and rhythm   ABDOMEN: Soft, Nontender, Nondistended; Bowel sounds present    LABS:                        10.1   14.9  )-----------( 199      ( 11 Jun 2017 07:14 )             30.9     06-11    139  |  97<L>  |  14.0  ----------------------------<  103  3.5   |  33.0<H>  |  0.97    Ca    8.3<L>      11 Jun 2017 07:14  Phos  4.1     06-10  Mg     1.7     06-10      PT/INR - ( 11 Jun 2017 07:14 )   PT: 21.4 sec;   INR: 1.92 ratio         PTT - ( 11 Jun 2017 07:14 )  PTT:37.3 sec        MEDICATIONS  (STANDING):  enoxaparin Injectable 40milliGRAM(s) SubCutaneous every 24 hours  docusate sodium 100milliGRAM(s) Oral three times a day  valproate sodium IVPB 1000milliGRAM(s) IV Intermittent every 12 hours  piperacillin/tazobactam IVPB. 3.375Gram(s) IV Intermittent every 8 hours  levothyroxine Injectable 75MICROGram(s) IV Push daily    MEDICATIONS  (PRN):  acetaminophen   Tablet 650milliGRAM(s) Oral every 6 hours PRN For Temp greater than 38 C (100.4 F)      RADIOLOGY & ADDITIONAL TESTS:

## 2017-06-11 NOTE — PROGRESS NOTE ADULT - SUBJECTIVE AND OBJECTIVE BOX
[facility]  INFECTIOUS DISEASES AND INTERNAL MEDICINE OF [place]  =======================================================  Ritesh Lang MD  Diplomates American Board of Internal Medicine and Infectious Diseases  =======================================================      SAMIRA NUGENT [id number]    Follow up: Leukocytosis; Pleural effusion  s/p chest tube on 6/2/17  s/p VATS 6/9/17    Patient is awake, comfortable  Febrile today  no meaningful communication      Allergies:  No Known Allergies      Antibiotics:  piperacillin/tazobactam IVPB. 3.375Gram(s) IV Intermittent every 8 hours      REVIEW OF SYSTEMS:  Unable to obtain due to patient's condition. Patient non verbal      Physical Exam:  Vital Signs Last 24 Hrs  T(C): 38.7, Max: 38.7 (06-11 @ 06:00)  T(F): 101.7, Max: 101.7 (06-11 @ 06:00)  HR: 103 (78 - 103)  BP: 106/76 (106/76 - 124/74)  RR: 16 (16 - 16)  SpO2: 97% (97% - 100%)      GEN: Non verbal. NAD  HEENT: normocephalic and atraumatic. EOMI. PERRL.    NECK: Supple.   LUNGS: Decreased breath sounds b/l base. + Chest tubes  HEART: Regular rate and rhythm.  ABDOMEN: Soft, nontender, and nondistended.  Positive bowel sounds.    : No CVA tenderness  EXTREMITIES: Without any edema.  MSK: no joint swelling  NEUROLOGIC: awake, no meaningful communication  SKIN: dark pigmented lesions B/L LE.      Labs:  06-11    139  |  97<L>  |  14.0  ----------------------------<  103  3.5   |  33.0<H>  |  0.97    Ca    8.3<L>      11 Jun 2017 07:14  Phos  4.1     06-10  Mg     1.7     06-10                          10.1   14.9  )-----------( 199      ( 11 Jun 2017 07:14 )             30.9       PT/INR - ( 11 Jun 2017 07:14 )   PT: 21.4 sec;   INR: 1.92 ratio         PTT - ( 11 Jun 2017 07:14 )  PTT:37.3 sec      RECENT CULTURES:  06-09 @ 21:58 .Body Fluid     No AFB on smear      06-09 @ 11:56 .Body Fluid     No growth at 1 day.  Culture in progress  No WBC's seen.  No organisms seen      06-03 @ 19:40 .Blood Blood     No growth at 5 days.      06-03 @ 14:19 .Sputum Sputum     Few Routine respiratory grayson present  Few White blood cells  Few Gram positive cocci in pairs      06-03 @ 01:48 .Body Fluid Pleural Fluid     No growth at 4 days.  Culture in progress  Few White blood cells  No organisms seen      06-02 @ 22:08 .Blood Blood-Peripheral     No growth at 5 days.      06-01 @ 05:20 .Blood Blood     No growth at 5 days.      05-31 @ 22:12 .Blood Blood     No growth at 5 days.      05-27 @ 10:56 .Blood Blood     No growth at 5 days.      05-27 @ 06:21 .Urine Clean Catch (Midstream)     <10,000 CFU/ml Yeast cells      05-23 @ 18:18 .Body Fluid Pleural Fluid     No growth at 1 week.      05-23 @ 08:43 .Body Fluid Pleural Fluid     No fungus isolated at 2 weeks.  Culture in progress      05-23 @ 08:42 .Body Fluid Pleural Fluid     No growth at 5 days.  Few WBC's  No organisms seen      05-23 @ 02:01 .Blood Blood     No growth at 5 days.      05-23 @ 01:48 .Blood Blood     No growth at 5 days.        EXAM:  CHEST SINGLE VIEW FRONTAL                        PROCEDURE DATE:  06/10/2017    INTERPRETATION:  TECHNIQUE: Single portable view of the chest.  COMPARISON: 6/9/2017  CLINICAL HISTORY: Pleural effusion.   FINDINGS:  Single frontal view of the chest demonstrates tiny left apical   pneumothorax. 2 left-sided chest tubes. Mild right basilar atelectasis,   unchanged. The cardiomediastinal silhouette is enlarged. No acute osseous   abnormalities. Overlying EKG leads and wires are noted.  IMPRESSION:   No interval change.

## 2017-06-12 LAB
ANION GAP SERPL CALC-SCNC: 11 MMOL/L — SIGNIFICANT CHANGE UP (ref 5–17)
APTT BLD: 42.3 SEC — HIGH (ref 27.5–37.4)
APTT BLD: SIGNIFICANT CHANGE UP (ref 27.5–37.4)
BUN SERPL-MCNC: 17 MG/DL — SIGNIFICANT CHANGE UP (ref 8–20)
CALCIUM SERPL-MCNC: 8.1 MG/DL — LOW (ref 8.6–10.2)
CHLORIDE SERPL-SCNC: 98 MMOL/L — SIGNIFICANT CHANGE UP (ref 98–107)
CO2 SERPL-SCNC: 30 MMOL/L — HIGH (ref 22–29)
CREAT SERPL-MCNC: 1.02 MG/DL — SIGNIFICANT CHANGE UP (ref 0.5–1.3)
FIBRINOGEN PPP-MCNC: 637 MG/DL — HIGH (ref 310–510)
GLUCOSE SERPL-MCNC: 92 MG/DL — SIGNIFICANT CHANGE UP (ref 70–115)
HCT VFR BLD CALC: 29.9 % — LOW (ref 42–52)
HGB BLD-MCNC: 9.8 G/DL — LOW (ref 14–18)
MCHC RBC-ENTMCNC: 29.1 PG — SIGNIFICANT CHANGE UP (ref 27–31)
MCHC RBC-ENTMCNC: 32.8 G/DL — SIGNIFICANT CHANGE UP (ref 32–36)
MCV RBC AUTO: 88.7 FL — SIGNIFICANT CHANGE UP (ref 80–94)
PLATELET # BLD AUTO: 220 K/UL — SIGNIFICANT CHANGE UP (ref 150–400)
POTASSIUM SERPL-MCNC: 3.8 MMOL/L — SIGNIFICANT CHANGE UP (ref 3.5–5.3)
POTASSIUM SERPL-SCNC: 3.8 MMOL/L — SIGNIFICANT CHANGE UP (ref 3.5–5.3)
PT 100%: 19.9 SEC — HIGH (ref 10–13.1)
PT 50/50: SIGNIFICANT CHANGE UP (ref 9.7–15.2)
RBC # BLD: 3.37 M/UL — LOW (ref 4.6–6.2)
RBC # FLD: 15.3 % — SIGNIFICANT CHANGE UP (ref 11–15.6)
SODIUM SERPL-SCNC: 139 MMOL/L — SIGNIFICANT CHANGE UP (ref 135–145)
THROMBIN TIME: 19.4 SEC — SIGNIFICANT CHANGE UP (ref 16–25)
WBC # BLD: 16.9 K/UL — HIGH (ref 4.8–10.8)
WBC # FLD AUTO: 16.9 K/UL — HIGH (ref 4.8–10.8)

## 2017-06-12 PROCEDURE — 99233 SBSQ HOSP IP/OBS HIGH 50: CPT

## 2017-06-12 RX ORDER — VALPROIC ACID (AS SODIUM SALT) 250 MG/5ML
1000 SOLUTION, ORAL ORAL EVERY 12 HOURS
Qty: 0 | Refills: 0 | Status: DISCONTINUED | OUTPATIENT
Start: 2017-06-12 | End: 2017-06-19

## 2017-06-12 RX ORDER — LEVOTHYROXINE SODIUM 125 MCG
150 TABLET ORAL DAILY
Qty: 0 | Refills: 0 | Status: DISCONTINUED | OUTPATIENT
Start: 2017-06-12 | End: 2017-06-29

## 2017-06-12 RX ORDER — ACETAMINOPHEN 500 MG
650 TABLET ORAL EVERY 6 HOURS
Qty: 0 | Refills: 0 | Status: DISCONTINUED | OUTPATIENT
Start: 2017-06-12 | End: 2017-06-12

## 2017-06-12 RX ORDER — ACETAMINOPHEN 500 MG
650 TABLET ORAL EVERY 6 HOURS
Qty: 0 | Refills: 0 | Status: DISCONTINUED | OUTPATIENT
Start: 2017-06-12 | End: 2017-06-29

## 2017-06-12 RX ADMIN — PIPERACILLIN AND TAZOBACTAM 25 GRAM(S): 4; .5 INJECTION, POWDER, LYOPHILIZED, FOR SOLUTION INTRAVENOUS at 17:22

## 2017-06-12 RX ADMIN — Medication 1000 MILLIGRAM(S): at 17:22

## 2017-06-12 RX ADMIN — Medication 650 MILLIGRAM(S): at 09:39

## 2017-06-12 RX ADMIN — Medication 30 MILLIGRAM(S): at 05:30

## 2017-06-12 RX ADMIN — ENOXAPARIN SODIUM 40 MILLIGRAM(S): 100 INJECTION SUBCUTANEOUS at 21:48

## 2017-06-12 RX ADMIN — Medication 75 MICROGRAM(S): at 05:30

## 2017-06-12 RX ADMIN — PIPERACILLIN AND TAZOBACTAM 25 GRAM(S): 4; .5 INJECTION, POWDER, LYOPHILIZED, FOR SOLUTION INTRAVENOUS at 09:23

## 2017-06-12 RX ADMIN — Medication 650 MILLIGRAM(S): at 17:44

## 2017-06-12 RX ADMIN — PIPERACILLIN AND TAZOBACTAM 25 GRAM(S): 4; .5 INJECTION, POWDER, LYOPHILIZED, FOR SOLUTION INTRAVENOUS at 21:48

## 2017-06-12 RX ADMIN — Medication 100 MILLIGRAM(S): at 21:48

## 2017-06-12 NOTE — PROGRESS NOTE ADULT - PROBLEM SELECTOR PLAN 4
Requires assistance in feeding.  Appreciate speech therapy: 1. Initiate mechanical soft with thin liquids  2. Aspiration Precautions  3. Oral Care  4. 1:1 feed and assist with all PO  5. Only feed pt when awake and alert  6. Discontinue PO if overt s/s aspiration/penetration noted  Will eventually need to OOB with physical therapy

## 2017-06-12 NOTE — PROGRESS NOTE ADULT - PROBLEM SELECTOR PLAN 1
Febrile this morning - likely due to pleurodesis as it creates an inflammatory response  Repeat blood cultures pending  C/w zosyn  Appreciate ID consult

## 2017-06-12 NOTE — PROGRESS NOTE ADULT - SUBJECTIVE AND OBJECTIVE BOX
SAMIRA NUGENT    [id number]    47y      Male    INTERVAL HPI/OVERNIGHT EVENTS: No events on. Tolerating PO diet. Was febrile this morning. Pt more responsive by nodding his head. Had recent bowel movement. Ate most of his breakfast per [place] aide. Had one chest tube removed.     Hospital course:  48 yo M with h/o schizoaffective disorder, selective mutism, recurrent pleural effusion (transudative) represents from [place] for anorexia and refusal to take PO medications. He was last discharged after thoracentesis (1400cc) of transudative pleural effusion in left lateral region. Upon readmission, repeat chest xray showed recurrent moderate left pleural effusion. On 5/29, pt had seizure episode 2/2 noncompliance with PO medications, then transitioned to IV valproate. MRI brain x 2 were negative for acute infarct. Pt noted to have worsening leukocytosis, elevated CRP and procalcitonin. Per ID, vanc and zosyn were started. CT chest showed worsening left pleural effusion. CTS placed chest tube on 6/4. Pt had rapid response on 6/6, likely due to clozapine toxicity and was monitored overnight in MICU. On 6/9, pt had VATS which did not show evidence of infection and partially trapped lung. Pleurodesis was performed and had 1500cc drained.     REVIEW OF SYSTEMS:    Unable to obtain 2/2 mutism    Vital Signs Last 24 Hrs  T(C): 36.9, Max: 38 (06-11 @ 21:51)  T(F): 98.5, Max: 100.4 (06-11 @ 21:51)  HR: 106 (88 - 106)  BP: 120/60 (96/60 - 122/71)  BP(mean): --  RR: 16 (14 - 16)  SpO2: 92% (92% - 100%) RA    PHYSICAL EXAM:    GENERAL: NAD   HEENT: PERRL, +EOMI, MMM  CHEST/LUNG: Clear to percussion bilaterally   HEART: S1S2+, Regular rate and rhythm   ABDOMEN: Soft, Nontender, Nondistended; Bowel sounds present    LABS:                        9.8    16.9  )-----------( 220      ( 12 Jun 2017 06:56 )             29.9     06-12    139  |  98  |  17.0  ----------------------------<  92  3.8   |  30.0<H>  |  1.02    Ca    8.1<L>      12 Jun 2017 06:56      PT/INR - ( 11 Jun 2017 07:14 )   PT: 21.4 sec;   INR: 1.92 ratio         PTT - ( 11 Jun 2017 07:14 )  PTT:37.3 sec        MEDICATIONS  (STANDING):  enoxaparin Injectable 40milliGRAM(s) SubCutaneous every 24 hours  docusate sodium 100milliGRAM(s) Oral three times a day  valproate sodium IVPB 1000milliGRAM(s) IV Intermittent every 12 hours  piperacillin/tazobactam IVPB. 3.375Gram(s) IV Intermittent every 8 hours  levothyroxine Injectable 75MICROGram(s) IV Push daily    MEDICATIONS  (PRN):  acetaminophen   Tablet 650milliGRAM(s) Oral every 6 hours PRN For Temp greater than 38 C (100.4 F)  acetaminophen  Suppository 650milliGRAM(s) Rectal every 6 hours PRN For Temp greater than 38 C (100.4 F)      RADIOLOGY & ADDITIONAL TESTS:

## 2017-06-12 NOTE — PROGRESS NOTE ADULT - PROBLEM SELECTOR PLAN 3
Consistently elevated INR  Trend PT/PTT  Elevated fibrinogen  No evidence of cirrhosis on CT abd/pelvis  Likely due to infection

## 2017-06-12 NOTE — CHART NOTE - NSCHARTNOTEFT_GEN_A_CORE
CTS  NP  46 y/o male S/P doxy pleuredesis for recurrent effusions> Tube #2 removed w/o incident  Follow up CXR am  Will continue to follow> Discussed w/ Dr Portillo

## 2017-06-13 LAB
ALBUMIN SERPL ELPH-MCNC: 2 G/DL — LOW (ref 3.3–5.2)
ALP SERPL-CCNC: 52 U/L — SIGNIFICANT CHANGE UP (ref 40–120)
ALT FLD-CCNC: 16 U/L — SIGNIFICANT CHANGE UP
ANION GAP SERPL CALC-SCNC: 9 MMOL/L — SIGNIFICANT CHANGE UP (ref 5–17)
AST SERPL-CCNC: 23 U/L — SIGNIFICANT CHANGE UP
BILIRUB SERPL-MCNC: 0.1 MG/DL — LOW (ref 0.4–2)
BUN SERPL-MCNC: 18 MG/DL — SIGNIFICANT CHANGE UP (ref 8–20)
CALCIUM SERPL-MCNC: 8.1 MG/DL — LOW (ref 8.6–10.2)
CHLORIDE SERPL-SCNC: 98 MMOL/L — SIGNIFICANT CHANGE UP (ref 98–107)
CO2 SERPL-SCNC: 30 MMOL/L — HIGH (ref 22–29)
CREAT SERPL-MCNC: 1.08 MG/DL — SIGNIFICANT CHANGE UP (ref 0.5–1.3)
GLUCOSE SERPL-MCNC: 83 MG/DL — SIGNIFICANT CHANGE UP (ref 70–115)
HCT VFR BLD CALC: 27.3 % — LOW (ref 42–52)
HGB BLD-MCNC: 8.9 G/DL — LOW (ref 14–18)
MCHC RBC-ENTMCNC: 28.3 PG — SIGNIFICANT CHANGE UP (ref 27–31)
MCHC RBC-ENTMCNC: 32.6 G/DL — SIGNIFICANT CHANGE UP (ref 32–36)
MCV RBC AUTO: 86.7 FL — SIGNIFICANT CHANGE UP (ref 80–94)
PLATELET # BLD AUTO: 223 K/UL — SIGNIFICANT CHANGE UP (ref 150–400)
POTASSIUM SERPL-MCNC: 3.6 MMOL/L — SIGNIFICANT CHANGE UP (ref 3.5–5.3)
POTASSIUM SERPL-SCNC: 3.6 MMOL/L — SIGNIFICANT CHANGE UP (ref 3.5–5.3)
PROT SERPL-MCNC: 5.3 G/DL — LOW (ref 6.6–8.7)
RBC # BLD: 3.15 M/UL — LOW (ref 4.6–6.2)
RBC # FLD: 15.4 % — SIGNIFICANT CHANGE UP (ref 11–15.6)
SODIUM SERPL-SCNC: 137 MMOL/L — SIGNIFICANT CHANGE UP (ref 135–145)
SURGICAL PATHOLOGY FINAL REPORT - CH: SIGNIFICANT CHANGE UP
WBC # BLD: 13.9 K/UL — HIGH (ref 4.8–10.8)
WBC # FLD AUTO: 13.9 K/UL — HIGH (ref 4.8–10.8)

## 2017-06-13 PROCEDURE — 99233 SBSQ HOSP IP/OBS HIGH 50: CPT

## 2017-06-13 PROCEDURE — 71010: CPT | Mod: 26,77

## 2017-06-13 PROCEDURE — 71010: CPT | Mod: 26

## 2017-06-13 RX ORDER — HALOPERIDOL DECANOATE 100 MG/ML
10 INJECTION INTRAMUSCULAR
Qty: 0 | Refills: 0 | COMMUNITY

## 2017-06-13 RX ORDER — CLOZAPINE 150 MG/1
400 TABLET, ORALLY DISINTEGRATING ORAL
Qty: 0 | Refills: 0 | COMMUNITY

## 2017-06-13 RX ORDER — VALPROIC ACID (AS SODIUM SALT) 250 MG/5ML
1000 SOLUTION, ORAL ORAL
Qty: 0 | Refills: 0 | COMMUNITY

## 2017-06-13 RX ORDER — HALOPERIDOL DECANOATE 100 MG/ML
0 INJECTION INTRAMUSCULAR
Qty: 0 | Refills: 0 | COMMUNITY

## 2017-06-13 RX ADMIN — Medication 1000 MILLIGRAM(S): at 12:19

## 2017-06-13 RX ADMIN — Medication 1000 MILLIGRAM(S): at 23:19

## 2017-06-13 RX ADMIN — Medication 150 MICROGRAM(S): at 06:30

## 2017-06-13 RX ADMIN — Medication 650 MILLIGRAM(S): at 16:57

## 2017-06-13 RX ADMIN — Medication 100 MILLIGRAM(S): at 06:30

## 2017-06-13 RX ADMIN — PIPERACILLIN AND TAZOBACTAM 25 GRAM(S): 4; .5 INJECTION, POWDER, LYOPHILIZED, FOR SOLUTION INTRAVENOUS at 06:30

## 2017-06-13 RX ADMIN — Medication 100 MILLIGRAM(S): at 12:19

## 2017-06-13 RX ADMIN — PIPERACILLIN AND TAZOBACTAM 25 GRAM(S): 4; .5 INJECTION, POWDER, LYOPHILIZED, FOR SOLUTION INTRAVENOUS at 16:56

## 2017-06-13 RX ADMIN — ENOXAPARIN SODIUM 40 MILLIGRAM(S): 100 INJECTION SUBCUTANEOUS at 23:19

## 2017-06-13 RX ADMIN — Medication 100 MILLIGRAM(S): at 23:19

## 2017-06-13 RX ADMIN — PIPERACILLIN AND TAZOBACTAM 25 GRAM(S): 4; .5 INJECTION, POWDER, LYOPHILIZED, FOR SOLUTION INTRAVENOUS at 23:19

## 2017-06-13 NOTE — PROGRESS NOTE ADULT - SUBJECTIVE AND OBJECTIVE BOX
SAMIRA NUGENT    [id number]    47y      Male    INTERVAL HPI/OVERNIGHT EVENTS: Per RN, chest tube is leaking. When I asked the patient if I could take a look at it, he said an expletive and told me to go away. When I asked again, he said "No."    Hospital course:  48 yo M with h/o schizoaffective disorder, selective mutism, recurrent pleural effusion (transudative) represents from [place] for anorexia and refusal to take PO medications. He was last discharged after thoracentesis (1400cc) of transudative pleural effusion in left lateral region. Upon readmission, repeat chest xray showed recurrent moderate left pleural effusion. On 5/29, pt had seizure episode 2/2 noncompliance with PO medications, then transitioned to IV valproate. MRI brain x 2 were negative for acute infarct. Pt noted to have worsening leukocytosis, elevated CRP and procalcitonin. Per ID, vanc and zosyn were started. CT chest showed worsening left pleural effusion. CTS placed chest tube on 6/4. Pt had rapid response on 6/6, likely due to clozapine toxicity and was monitored overnight in MICU. On 6/9, pt had VATS which did not show evidence of infection and partially trapped lung. Pleurodesis was performed and had 1500cc drained.     REVIEW OF SYSTEMS:  Unable to obtain due to selective mutism      Vital Signs Last 24 Hrs  T(C): 37.5, Max: 38.6 (06-12 @ 17:36)  T(F): 99.5, Max: 101.4 (06-12 @ 17:36)  HR: 64 (64 - 102)  BP: 103/66 (98/62 - 115/67)  BP(mean): --  RR: 18 (16 - 18)  SpO2: 92% (92% - 97%) RA    PHYSICAL EXAM:    GENERAL: NAD, well-groomed  HEENT: PERRL, +EOMI  CHEST/LUNG: pt refused  HEART:pt refused  ABDOMEN: pt refused        LABS:                        8.9    13.9  )-----------( 223      ( 13 Jun 2017 05:49 )             27.3     06-13    137  |  98  |  18.0  ----------------------------<  83  3.6   |  30.0<H>  |  1.08    Ca    8.1<L>      13 Jun 2017 05:49    TPro  5.3<L>  /  Alb  2.0<L>  /  TBili  0.1<L>  /  DBili  x   /  AST  23  /  ALT  16  /  AlkPhos  52  06-13            MEDICATIONS  (STANDING):  enoxaparin Injectable 40milliGRAM(s) SubCutaneous every 24 hours  docusate sodium 100milliGRAM(s) Oral three times a day  piperacillin/tazobactam IVPB. 3.375Gram(s) IV Intermittent every 8 hours  levothyroxine 150MICROGram(s) Oral daily  valproic acid 1000milliGRAM(s) Oral every 12 hours    MEDICATIONS  (PRN):  acetaminophen   Tablet 650milliGRAM(s) Oral every 6 hours PRN For Temp greater than 38.5 C (101.3 F)      RADIOLOGY & ADDITIONAL TESTS:

## 2017-06-13 NOTE — PROGRESS NOTE ADULT - SUBJECTIVE AND OBJECTIVE BOX
Patient seen and examined earlier with plan to remove chest tube. Air leak noted in pleurivac and on inspection, chest tube slightly disconnected/loose at connection to pleurivac. Reconnected and secured with silk tape. Air leak seemed to resolve. Chest xray confirms no pneumothorax. Nurse reports improvement in oxygenation after reconnection.  Chest tube then removed without incident this afternoon. Repeat CXR pending.     Pt nonverbal to me but appears to understand simple commands and is cooperative. Appears in NAD.    Vital Signs Last 24 Hrs  T(C): 38.2, Max: 38.2 (06-13 @ 17:46)  T(F): 100.8, Max: 100.8 (06-13 @ 17:46)  HR: 96 (64 - 96)  BP: 122/66 (103/66 - 122/66)  BP(mean): --  RR: 18 (18 - 18)  SpO2: 98% (92% - 98%)    General: WN/WD NAD  Neurology: nonfocal  Respiratory: diminished right> left, scattered rhonchi left  CV: RRR, S1S2, no murmurs, rubs or gallops  Abdominal: Soft, NT, ND +BS  Extremities: No edema, + peripheral pulses  Incisions: chest tube site draining serous fluid    A/P: 47M h/o scizophrenia, autism, hypothyroidism, PNA, FTT, sepsis, s/p L VATS decort/pleurodesis on 6/9.     Last chest tube removed today.   Optimize nutritional status  May need right chest tube eventually  Medical therapy.  D/W Dr Portillo

## 2017-06-14 LAB
-  AMPICILLIN/SULBACTAM: SIGNIFICANT CHANGE UP
-  CEFAZOLIN: SIGNIFICANT CHANGE UP
-  CIPROFLOXACIN: SIGNIFICANT CHANGE UP
-  CLINDAMYCIN: SIGNIFICANT CHANGE UP
-  ERYTHROMYCIN: SIGNIFICANT CHANGE UP
-  GENTAMICIN: SIGNIFICANT CHANGE UP
-  LEVOFLOXACIN: SIGNIFICANT CHANGE UP
-  MOXIFLOXACIN(AEROBIC): SIGNIFICANT CHANGE UP
-  OXACILLIN: SIGNIFICANT CHANGE UP
-  RIFAMPIN: SIGNIFICANT CHANGE UP
-  TETRACYCLINE: SIGNIFICANT CHANGE UP
-  TRIMETHOPRIM/SULFAMETHOXAZOLE: SIGNIFICANT CHANGE UP
-  VANCOMYCIN: SIGNIFICANT CHANGE UP
ANION GAP SERPL CALC-SCNC: 11 MMOL/L — SIGNIFICANT CHANGE UP (ref 5–17)
BUN SERPL-MCNC: 17 MG/DL — SIGNIFICANT CHANGE UP (ref 8–20)
CALCIUM SERPL-MCNC: 8.4 MG/DL — LOW (ref 8.6–10.2)
CHLORIDE SERPL-SCNC: 99 MMOL/L — SIGNIFICANT CHANGE UP (ref 98–107)
CO2 SERPL-SCNC: 30 MMOL/L — HIGH (ref 22–29)
CREAT SERPL-MCNC: 1.12 MG/DL — SIGNIFICANT CHANGE UP (ref 0.5–1.3)
CULTURE RESULTS: SIGNIFICANT CHANGE UP
GLUCOSE SERPL-MCNC: 81 MG/DL — SIGNIFICANT CHANGE UP (ref 70–115)
HCT VFR BLD CALC: 26.6 % — LOW (ref 42–52)
HGB BLD-MCNC: 8.6 G/DL — LOW (ref 14–18)
HIV 1 & 2 AB SERPL IA.RAPID: SIGNIFICANT CHANGE UP
MCHC RBC-ENTMCNC: 28.5 PG — SIGNIFICANT CHANGE UP (ref 27–31)
MCHC RBC-ENTMCNC: 32.3 G/DL — SIGNIFICANT CHANGE UP (ref 32–36)
MCV RBC AUTO: 88.1 FL — SIGNIFICANT CHANGE UP (ref 80–94)
METHOD TYPE: SIGNIFICANT CHANGE UP
ORGANISM # SPEC MICROSCOPIC CNT: SIGNIFICANT CHANGE UP
ORGANISM # SPEC MICROSCOPIC CNT: SIGNIFICANT CHANGE UP
PLATELET # BLD AUTO: 233 K/UL — SIGNIFICANT CHANGE UP (ref 150–400)
POTASSIUM SERPL-MCNC: 3.6 MMOL/L — SIGNIFICANT CHANGE UP (ref 3.5–5.3)
POTASSIUM SERPL-SCNC: 3.6 MMOL/L — SIGNIFICANT CHANGE UP (ref 3.5–5.3)
RBC # BLD: 3.02 M/UL — LOW (ref 4.6–6.2)
RBC # FLD: 15.8 % — HIGH (ref 11–15.6)
SODIUM SERPL-SCNC: 140 MMOL/L — SIGNIFICANT CHANGE UP (ref 135–145)
SPECIMEN SOURCE: SIGNIFICANT CHANGE UP
WBC # BLD: 11.5 K/UL — HIGH (ref 4.8–10.8)
WBC # FLD AUTO: 11.5 K/UL — HIGH (ref 4.8–10.8)

## 2017-06-14 PROCEDURE — 99233 SBSQ HOSP IP/OBS HIGH 50: CPT

## 2017-06-14 RX ADMIN — PIPERACILLIN AND TAZOBACTAM 25 GRAM(S): 4; .5 INJECTION, POWDER, LYOPHILIZED, FOR SOLUTION INTRAVENOUS at 14:23

## 2017-06-14 RX ADMIN — Medication 100 MILLIGRAM(S): at 06:10

## 2017-06-14 RX ADMIN — Medication 150 MICROGRAM(S): at 06:10

## 2017-06-14 RX ADMIN — PIPERACILLIN AND TAZOBACTAM 25 GRAM(S): 4; .5 INJECTION, POWDER, LYOPHILIZED, FOR SOLUTION INTRAVENOUS at 06:10

## 2017-06-14 RX ADMIN — Medication 100 MILLIGRAM(S): at 14:23

## 2017-06-14 RX ADMIN — Medication 100 MILLIGRAM(S): at 22:12

## 2017-06-14 RX ADMIN — PIPERACILLIN AND TAZOBACTAM 25 GRAM(S): 4; .5 INJECTION, POWDER, LYOPHILIZED, FOR SOLUTION INTRAVENOUS at 22:12

## 2017-06-14 RX ADMIN — Medication 1000 MILLIGRAM(S): at 18:44

## 2017-06-14 RX ADMIN — Medication 1000 MILLIGRAM(S): at 06:10

## 2017-06-14 RX ADMIN — ENOXAPARIN SODIUM 40 MILLIGRAM(S): 100 INJECTION SUBCUTANEOUS at 22:12

## 2017-06-14 NOTE — ADVANCED PRACTICE NURSE CONSULT - RECOMMEDATIONS
No wound care recommendation needed since pt has no open wounds at bilateral lower extremities. Recommend dermatologist consult if the skin discoloration deteriorates.   Finding discussed with HARI Greco and Dr Cruz.

## 2017-06-14 NOTE — PROGRESS NOTE ADULT - SUBJECTIVE AND OBJECTIVE BOX
SAMIRA NUGENT    [id number]    47y      Male    INTERVAL HPI/OVERNIGHT EVENTS: No events on. [place] aide at bedside. Pt eating breakfast. Agreeable to physical exam today.    Hospital course:  48 yo M with h/o schizoaffective disorder, selective mutism, recurrent pleural effusion (transudative) represents from [place] for anorexia and refusal to take PO medications. He was last discharged after thoracentesis (1400cc) of transudative pleural effusion in left lateral region. Upon readmission, repeat chest xray showed recurrent moderate left pleural effusion. On 5/29, pt had seizure episode 2/2 noncompliance with PO medications, then transitioned to IV valproate. MRI brain x 2 were negative for acute infarct. Pt noted to have worsening leukocytosis, elevated CRP and procalcitonin. Per ID, vanc and zosyn were started. CT chest showed worsening left pleural effusion. CTS placed chest tube on 6/4. Pt had rapid response on 6/6, likely due to clozapine toxicity and was monitored overnight in MICU. On 6/9, pt had VATS which did not show evidence of infection and partially trapped lung. Pleurodesis was performed and had 1500cc drained. Had last chest tube removed on 6/13.     REVIEW OF SYSTEMS:  Unable to obtain 2/2 selective mutism.       Vital Signs Last 24 Hrs  T(C): 36.5, Max: 38.2 (06-13 @ 17:46)  T(F): 97.7, Max: 100.8 (06-13 @ 17:46)  HR: 81 (81 - 96)  BP: 100/66 (92/63 - 130/60)  BP(mean): --  RR: 18 (18 - 18)  SpO2: 99% (92% - 99%)    PHYSICAL EXAM:    GENERAL: NAD   HEENT: PERRL, +EOMI, MMM  CHEST/LUNG: Right basilar fine rales  HEART: S1S2+, Regular rate and rhythm  ABDOMEN: Soft, Nontender, Nondistended; Bowel sounds present    LABS:                        8.6    11.5  )-----------( 233      ( 14 Jun 2017 07:00 )             26.6     06-14    140  |  99  |  17.0  ----------------------------<  81  3.6   |  30.0<H>  |  1.12    Ca    8.4<L>      14 Jun 2017 07:00    TPro  5.3<L>  /  Alb  2.0<L>  /  TBili  0.1<L>  /  DBili  x   /  AST  23  /  ALT  16  /  AlkPhos  52  06-13            MEDICATIONS  (STANDING):  enoxaparin Injectable 40milliGRAM(s) SubCutaneous every 24 hours  docusate sodium 100milliGRAM(s) Oral three times a day  piperacillin/tazobactam IVPB. 3.375Gram(s) IV Intermittent every 8 hours  levothyroxine 150MICROGram(s) Oral daily  valproic acid 1000milliGRAM(s) Oral every 12 hours    MEDICATIONS  (PRN):  acetaminophen   Tablet 650milliGRAM(s) Oral every 6 hours PRN For Temp greater than 38.5 C (101.3 F)      RADIOLOGY & ADDITIONAL TESTS:

## 2017-06-14 NOTE — PROGRESS NOTE ADULT - PROBLEM SELECTOR PLAN 1
Tmax 100.8 yesterday afternoon.  Repeat blood cultures neg. on 6/11  C/w zosyn  Appreciate ID consult

## 2017-06-14 NOTE — PROGRESS NOTE ADULT - PROBLEM SELECTOR PLAN 3
Requires assistance in feeding.  Appreciate speech therapy: 1. Initiate mechanical soft with thin liquids  2. Aspiration Precautions  3. Oral Care  4. 1:1 feed and assist with all PO  5. Only feed pt when awake and alert  6. Discontinue PO if overt s/s aspiration/penetration noted  OOB to chair today  Physical therapy pending

## 2017-06-14 NOTE — PROGRESS NOTE ADULT - PROBLEM SELECTOR PLAN 1
s/p Chest tube  s/p VATS 6/9/17  Pleural fluid culture with no growth  Blood cultures no growth  Continue Zosyn  End Date June 27th

## 2017-06-14 NOTE — PROGRESS NOTE ADULT - SUBJECTIVE AND OBJECTIVE BOX
[facility]  INFECTIOUS DISEASES AND INTERNAL MEDICINE OF [place]  =======================================================  Ritesh Lang MD  Diplomates American Board of Internal Medicine and Infectious Diseases  =======================================================      SAMIRA NUGENT [id number]    Follow up: Leukocytosis; Pleural effusion  s/p chest tube on 6/2/17  s/p VATS 6/9/17    Patient is awake, comfortable  Afebrile  no meaningful communication  Chest tubes are out    Allergies:  No Known Allergies      Antibiotics:  piperacillin/tazobactam IVPB. 3.375Gram(s) IV Intermittent every 8 hours      REVIEW OF SYSTEMS:  Unable to obtain due to patient's condition. Patient non verbal      Physical Exam:  Vital Signs Last 24 Hrs  T(C): 36.5, Max: 38.2 (06-13 @ 17:46)  T(F): 97.7, Max: 100.8 (06-13 @ 17:46)  HR: 76 (76 - 96)  BP: 100/66 (92/63 - 130/60)  RR: 18 (18 - 18)  SpO2: 94% (92% - 99%)      GEN: Non verbal. NAD  HEENT: normocephalic and atraumatic. EOMI. PERRL.    NECK: Supple.   LUNGS: Decreased breath sounds b/l base.  HEART: Regular rate and rhythm.  ABDOMEN: Soft, nontender, and nondistended.  Positive bowel sounds.    : No CVA tenderness  EXTREMITIES: Without any edema.  MSK: no joint swelling  NEUROLOGIC: awake, no meaningful communication  SKIN: dark pigmented lesions B/L LE.      Labs:  06-14    140  |  99  |  17.0  ----------------------------<  81  3.6   |  30.0<H>  |  1.12    Ca    8.4<L>      14 Jun 2017 07:00    TPro  5.3<L>  /  Alb  2.0<L>  /  TBili  0.1<L>  /  DBili  x   /  AST  23  /  ALT  16  /  AlkPhos  52  06-13                          8.6    11.5  )-----------( 233      ( 14 Jun 2017 07:00 )             26.6       LIVER FUNCTIONS - ( 13 Jun 2017 05:49 )  Alb: 2.0 g/dL / Pro: 5.3 g/dL / ALK PHOS: 52 U/L / ALT: 16 U/L / AST: 23 U/L / GGT: x             RECENT CULTURES:  06-11 @ 11:51 .Blood Blood     No growth at 48 hours      06-09 @ 21:58 .Body Fluid     No AFB on Smear      06-09 @ 11:56 .Body Fluid Coag Negative Staphylococcus    Coag Negative Staphylococcus Growing in broth media only  No WBC's seen.  No organisms seen      06-03 @ 21:38 .Body Fluid Pleural Fluid     No growth at 1 week.      06-03 @ 19:40 .Blood Blood     No growth at 5 days.      06-03 @ 14:19 .Sputum Sputum     Few Routine respiratory grayson present  Few White blood cells  Few Gram positive cocci in pairs      06-03 @ 01:48 .Body Fluid Pleural Fluid     No growth at 4 days.  Culture in progress    Few White blood cells  No organisms seen      06-02 @ 22:08 .Blood Blood-Peripheral     No growth at 5 days.      06-01 @ 05:20 .Blood Blood     No growth at 5 days.      05-31 @ 22:12 .Blood Blood     No growth at 5 days.      05-27 @ 10:56 .Blood Blood     No growth at 5 days.      05-27 @ 06:21 .Urine Clean Catch (Midstream)     <10,000 CFU/ml Yeast cells      05-23 @ 18:18 .Body Fluid Pleural Fluid     No growth at 1 week.      05-23 @ 08:43 .Body Fluid Pleural Fluid     No fungus isolated at 2 weeks.  Culture in progress      05-23 @ 08:42 .Body Fluid Pleural Fluid     No growth at 5 days.  Few WBC's  No organisms seen      05-23 @ 02:01 .Blood Blood     No growth at 5 days.      05-23 @ 01:48 .Blood Blood     No growth at 5 days.        EXAM:  CHEST SINGLE VIEW FRONTAL                        PROCEDURE DATE:  06/10/2017    INTERPRETATION:  TECHNIQUE: Single portable view of the chest.  COMPARISON: 6/9/2017  CLINICAL HISTORY: Pleural effusion.   FINDINGS:  Single frontal view of the chest demonstrates tiny left apical   pneumothorax. 2 left-sided chest tubes. Mild right basilar atelectasis,   unchanged. The cardiomediastinal silhouette is enlarged. No acute osseous   abnormalities. Overlying EKG leads and wires are noted.  IMPRESSION:   No interval change.
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
## 2017-06-15 LAB
ANION GAP SERPL CALC-SCNC: 12 MMOL/L — SIGNIFICANT CHANGE UP (ref 5–17)
BUN SERPL-MCNC: 17 MG/DL — SIGNIFICANT CHANGE UP (ref 8–20)
CALCIUM SERPL-MCNC: 8.3 MG/DL — LOW (ref 8.6–10.2)
CHLORIDE SERPL-SCNC: 101 MMOL/L — SIGNIFICANT CHANGE UP (ref 98–107)
CO2 SERPL-SCNC: 29 MMOL/L — SIGNIFICANT CHANGE UP (ref 22–29)
CREAT SERPL-MCNC: 1.13 MG/DL — SIGNIFICANT CHANGE UP (ref 0.5–1.3)
GLUCOSE SERPL-MCNC: 90 MG/DL — SIGNIFICANT CHANGE UP (ref 70–115)
HCT VFR BLD CALC: 26 % — LOW (ref 42–52)
HGB BLD-MCNC: 8.3 G/DL — LOW (ref 14–18)
MCHC RBC-ENTMCNC: 28.2 PG — SIGNIFICANT CHANGE UP (ref 27–31)
MCHC RBC-ENTMCNC: 31.9 G/DL — LOW (ref 32–36)
MCV RBC AUTO: 88.4 FL — SIGNIFICANT CHANGE UP (ref 80–94)
PLATELET # BLD AUTO: 304 K/UL — SIGNIFICANT CHANGE UP (ref 150–400)
POTASSIUM SERPL-MCNC: 3.7 MMOL/L — SIGNIFICANT CHANGE UP (ref 3.5–5.3)
POTASSIUM SERPL-SCNC: 3.7 MMOL/L — SIGNIFICANT CHANGE UP (ref 3.5–5.3)
RBC # BLD: 2.94 M/UL — LOW (ref 4.6–6.2)
RBC # FLD: 15.4 % — SIGNIFICANT CHANGE UP (ref 11–15.6)
SODIUM SERPL-SCNC: 142 MMOL/L — SIGNIFICANT CHANGE UP (ref 135–145)
WBC # BLD: 11.4 K/UL — HIGH (ref 4.8–10.8)
WBC # FLD AUTO: 11.4 K/UL — HIGH (ref 4.8–10.8)

## 2017-06-15 PROCEDURE — 99232 SBSQ HOSP IP/OBS MODERATE 35: CPT

## 2017-06-15 PROCEDURE — 99233 SBSQ HOSP IP/OBS HIGH 50: CPT

## 2017-06-15 RX ORDER — PANTOPRAZOLE SODIUM 20 MG/1
40 TABLET, DELAYED RELEASE ORAL
Qty: 0 | Refills: 0 | Status: DISCONTINUED | OUTPATIENT
Start: 2017-06-15 | End: 2017-06-29

## 2017-06-15 RX ADMIN — Medication 100 MILLIGRAM(S): at 21:50

## 2017-06-15 RX ADMIN — Medication 1000 MILLIGRAM(S): at 05:51

## 2017-06-15 RX ADMIN — ENOXAPARIN SODIUM 40 MILLIGRAM(S): 100 INJECTION SUBCUTANEOUS at 21:50

## 2017-06-15 RX ADMIN — Medication 100 MILLIGRAM(S): at 13:48

## 2017-06-15 RX ADMIN — Medication 150 MICROGRAM(S): at 05:51

## 2017-06-15 RX ADMIN — PIPERACILLIN AND TAZOBACTAM 25 GRAM(S): 4; .5 INJECTION, POWDER, LYOPHILIZED, FOR SOLUTION INTRAVENOUS at 21:50

## 2017-06-15 RX ADMIN — Medication 100 MILLIGRAM(S): at 05:51

## 2017-06-15 RX ADMIN — PIPERACILLIN AND TAZOBACTAM 25 GRAM(S): 4; .5 INJECTION, POWDER, LYOPHILIZED, FOR SOLUTION INTRAVENOUS at 05:51

## 2017-06-15 RX ADMIN — PIPERACILLIN AND TAZOBACTAM 25 GRAM(S): 4; .5 INJECTION, POWDER, LYOPHILIZED, FOR SOLUTION INTRAVENOUS at 13:48

## 2017-06-15 RX ADMIN — Medication 1000 MILLIGRAM(S): at 17:26

## 2017-06-15 NOTE — PROGRESS NOTE ADULT - SUBJECTIVE AND OBJECTIVE BOX
[facility]  INFECTIOUS DISEASES AND INTERNAL MEDICINE OF [place]  =======================================================  Ritesh Lang MD  Diplomates American Board of Internal Medicine and Infectious Diseases  =======================================================      SAMIRA NUGENT [id number]    Follow up: Leukocytosis; Pleural effusion  s/p chest tube on 6/2/17  s/p VATS 6/9/17    Patient is awake, comfortable  Afebrile  no meaningful communication  Chest tubes are out    Allergies:  No Known Allergies      Antibiotics:  piperacillin/tazobactam IVPB. 3.375Gram(s) IV Intermittent every 8 hours      REVIEW OF SYSTEMS:  Unable to obtain due to patient's condition. Patient non verbal      Physical Exam:  Vital Signs Last 24 Hrs  T(C): 36.8, Max: 36.8 (06-14 @ 16:21)  T(F): 98.2, Max: 98.2 (06-14 @ 16:21)  HR: 86 (86 - 105)  BP: 103/60 (103/60 - 114/61)  RR: 18 (18 - 22)  SpO2: 92% (92% - 97%)      GEN: Non verbal. NAD  HEENT: normocephalic and atraumatic. EOMI. PERRL.    NECK: Supple.   LUNGS: Decreased breath sounds b/l base.  HEART: Regular rate and rhythm.  ABDOMEN: Soft, nontender, and nondistended.  Positive bowel sounds.    : No CVA tenderness  EXTREMITIES: Without any edema.  MSK: no joint swelling  NEUROLOGIC: awake, no meaningful communication  SKIN: dark pigmented lesions B/L LE.      Labs:  06-15    142  |  101  |  17.0  ----------------------------<  90  3.7   |  29.0  |  1.13    Ca    8.3<L>      15 [place]ten 2017 06:25                            8.3    11.4  )-----------( 304      ( 15 Colten 2017 06:25 )             26.0         RECENT CULTURES:  06-11 @ 11:51 .Blood Blood     No growth at 48 hours      06-09 @ 21:58 .Body Fluid     No AFB on Smear      06-09 @ 11:56 .Body Fluid Coag Negative Staphylococcus    Coag Negative Staphylococcus Growing in broth media only  No WBC's seen.  No organisms seen      06-03 @ 21:38 .Body Fluid Pleural Fluid     No growth at 1 week.      06-03 @ 19:40 .Blood Blood     No growth at 5 days.      06-03 @ 14:19 .Sputum Sputum     Few Routine respiratory grayson present  Few White blood cells  Few Gram positive cocci in pairs      06-03 @ 01:48 .Body Fluid Pleural Fluid     No growth at 4 days.  Culture in progress  Few White blood cells  No organisms seen      06-02 @ 22:08 .Blood Blood-Peripheral     No growth at 5 days.      06-01 @ 05:20 .Blood Blood     No growth at 5 days.      05-31 @ 22:12 .Blood Blood     No growth at 5 days.      05-27 @ 10:56 .Blood Blood     No growth at 5 days.      05-27 @ 06:21 .Urine Clean Catch (Midstream)     <10,000 CFU/ml Yeast cells      05-23 @ 18:18 .Body Fluid Pleural Fluid     No growth at 1 week.      05-23 @ 08:43 .Body Fluid Pleural Fluid     No fungus isolated at 2 weeks.  Culture in progress      05-23 @ 08:42 .Body Fluid Pleural Fluid     No growth at 5 days.  Few WBC's  No organisms seen      05-23 @ 02:01 .Blood Blood     No growth at 5 days.      05-23 @ 01:48 .Blood Blood     No growth at 5 days.        EXAM:  CHEST SINGLE VIEW FRONTAL                        PROCEDURE DATE:  06/10/2017    INTERPRETATION:  TECHNIQUE: Single portable view of the chest.  COMPARISON: 6/9/2017  CLINICAL HISTORY: Pleural effusion.   FINDINGS:  Single frontal view of the chest demonstrates tiny left apical   pneumothorax. 2 left-sided chest tubes. Mild right basilar atelectasis,   unchanged. The cardiomediastinal silhouette is enlarged. No acute osseous   abnormalities. Overlying EKG leads and wires are noted.  IMPRESSION:   No interval change.

## 2017-06-15 NOTE — PROGRESS NOTE ADULT - SUBJECTIVE AND OBJECTIVE BOX
SAMIRA NUGENT    [id number]    47y      Male    INTERVAL HPI/OVERNIGHT EVENTS: No events on. Sister at bedside. Pt sleeping. Per [place] aide, pt ambulated around the [place] with assistance.    Hospital course:  48 yo M with h/o schizoaffective disorder, selective mutism, recurrent pleural effusion (transudative) represents from [place] for anorexia and refusal to take PO medications. He was last discharged after thoracentesis (1400cc) of transudative pleural effusion in left lateral region. Upon readmission, repeat chest xray showed recurrent moderate left pleural effusion. On 5/29, pt had seizure episode 2/2 noncompliance with PO medications, then transitioned to IV valproate. MRI brain x 2 were negative for acute infarct. Pt noted to have worsening leukocytosis, elevated CRP and procalcitonin. Per ID, vanc and zosyn were started. CT chest showed worsening left pleural effusion. CTS placed chest tube on 6/4. Pt had rapid response on 6/6, likely due to clozapine toxicity and was monitored overnight in MICU. On 6/9, pt had VATS which did not show evidence of infection and partially trapped lung. Pleurodesis was performed and had 1500cc drained. Had last chest tube removed on 6/13.     REVIEW OF SYSTEMS:    CONSTITUTIONAL: No fever  CARDIOVASCULAR: No chest pain    Vital Signs Last 24 Hrs  T(C): 36.4, Max: 36.8 (06-15 @ 08:57)  T(F): 97.6, Max: 98.2 (06-15 @ 08:57)  HR: 77 (77 - 101)  BP: 111/63 (103/60 - 114/61)  BP(mean): --  RR: 16 (16 - 22)  SpO2: 96% (92% - 96%)     PHYSICAL EXAM:    GENERAL: NAD  HEENT: PERRL, +EOMI  CHEST/LUNG: No wheezing  HEART: S1S2+, Regular rate and rhythm  ABDOMEN: Soft, Nontender, Nondistended     LABS:                        8.3    11.4  )-----------( 304      ( 15 Colten 2017 06:25 )             26.0     06-15    142  |  101  |  17.0  ----------------------------<  90  3.7   |  29.0  |  1.13    Ca    8.3<L>      15 Jun 2017 06:25              MEDICATIONS  (STANDING):  enoxaparin Injectable 40milliGRAM(s) SubCutaneous every 24 hours  docusate sodium 100milliGRAM(s) Oral three times a day  piperacillin/tazobactam IVPB. 3.375Gram(s) IV Intermittent every 8 hours  levothyroxine 150MICROGram(s) Oral daily  valproic acid 1000milliGRAM(s) Oral every 12 hours    MEDICATIONS  (PRN):  acetaminophen   Tablet 650milliGRAM(s) Oral every 6 hours PRN For Temp greater than 38.5 C (101.3 F)      RADIOLOGY & ADDITIONAL TESTS:

## 2017-06-16 LAB
CULTURE RESULTS: SIGNIFICANT CHANGE UP
CULTURE RESULTS: SIGNIFICANT CHANGE UP
SPECIMEN SOURCE: SIGNIFICANT CHANGE UP
SPECIMEN SOURCE: SIGNIFICANT CHANGE UP

## 2017-06-16 PROCEDURE — 71010: CPT | Mod: 26

## 2017-06-16 PROCEDURE — 99232 SBSQ HOSP IP/OBS MODERATE 35: CPT

## 2017-06-16 RX ADMIN — ENOXAPARIN SODIUM 40 MILLIGRAM(S): 100 INJECTION SUBCUTANEOUS at 22:09

## 2017-06-16 RX ADMIN — PIPERACILLIN AND TAZOBACTAM 25 GRAM(S): 4; .5 INJECTION, POWDER, LYOPHILIZED, FOR SOLUTION INTRAVENOUS at 14:11

## 2017-06-16 RX ADMIN — Medication 1000 MILLIGRAM(S): at 17:17

## 2017-06-16 RX ADMIN — PIPERACILLIN AND TAZOBACTAM 25 GRAM(S): 4; .5 INJECTION, POWDER, LYOPHILIZED, FOR SOLUTION INTRAVENOUS at 22:10

## 2017-06-16 RX ADMIN — Medication 150 MICROGRAM(S): at 06:13

## 2017-06-16 RX ADMIN — Medication 1000 MILLIGRAM(S): at 06:13

## 2017-06-16 RX ADMIN — Medication 100 MILLIGRAM(S): at 06:13

## 2017-06-16 RX ADMIN — PIPERACILLIN AND TAZOBACTAM 25 GRAM(S): 4; .5 INJECTION, POWDER, LYOPHILIZED, FOR SOLUTION INTRAVENOUS at 06:13

## 2017-06-16 RX ADMIN — PANTOPRAZOLE SODIUM 40 MILLIGRAM(S): 20 TABLET, DELAYED RELEASE ORAL at 06:13

## 2017-06-16 NOTE — PROGRESS NOTE ADULT - SUBJECTIVE AND OBJECTIVE BOX
SAMIRA NUGENT    [id number]    47y      Male    INTERVAL HPI/OVERNIGHT EVENTS: Ambulated with PT earlier today. [place] aide and brother at bedside. Pt offers no complaints.    Hospital course:  46 yo M with h/o schizoaffective disorder, selective mutism, recurrent pleural effusion (transudative) represents from [place] for anorexia and refusal to take PO medications. He was last discharged after thoracentesis (1400cc) of transudative pleural effusion in left lateral region. Upon readmission, repeat chest xray showed recurrent moderate left pleural effusion. On 5/29, pt had seizure episode 2/2 noncompliance with PO medications, then transitioned to IV valproate. MRI brain x 2 were negative for acute infarct. Pt noted to have worsening leukocytosis, elevated CRP and procalcitonin. Per ID, vanc and zosyn were started. CT chest showed worsening left pleural effusion. CTS placed chest tube on 6/4. Pt had rapid response on 6/6, likely due to clozapine toxicity and was monitored overnight in MICU. On 6/9, pt had VATS which did not show evidence of infection and partially trapped lung. Pleurodesis was performed and had 1500cc drained. Had last chest tube removed on 6/13.     REVIEW OF SYSTEMS:    CONSTITUTIONAL: No fever  RESPIRATORY: No shortness of breath  CARDIOVASCULAR: No chest pain, palpitations    Vital Signs Last 24 Hrs  T(C): 37.1, Max: 37.1 (06-16 @ 09:57)  T(F): 98.7, Max: 98.7 (06-16 @ 09:57)  HR: 80 (77 - 90)  BP: 105/59 (101/63 - 111/63)  BP(mean): --  RR: 18 (16 - 18)  SpO2: 96% (96% - 96%) RA    PHYSICAL EXAM:    GENERAL: NAD   HEENT: PERRL, +EOMI, MMM  EXTREMITIES: WWP  SKIN: hyperpigmentation on legs  PSYCH: flat affect      LABS:                        8.3    11.4  )-----------( 304      ( 15 Colten 2017 06:25 )             26.0     06-15    142  |  101  |  17.0  ----------------------------<  90  3.7   |  29.0  |  1.13    Ca    8.3<L>      15 Jun 2017 06:25              MEDICATIONS  (STANDING):  enoxaparin Injectable 40milliGRAM(s) SubCutaneous every 24 hours  docusate sodium 100milliGRAM(s) Oral three times a day  piperacillin/tazobactam IVPB. 3.375Gram(s) IV Intermittent every 8 hours  levothyroxine 150MICROGram(s) Oral daily  valproic acid 1000milliGRAM(s) Oral every 12 hours  pantoprazole    Tablet 40milliGRAM(s) Oral before breakfast    MEDICATIONS  (PRN):  acetaminophen   Tablet 650milliGRAM(s) Oral every 6 hours PRN For Temp greater than 38.5 C (101.3 F)      RADIOLOGY & ADDITIONAL TESTS:

## 2017-06-16 NOTE — PROGRESS NOTE ADULT - SUBJECTIVE AND OBJECTIVE BOX
[facility]  INFECTIOUS DISEASES AND INTERNAL MEDICINE OF [place]  =======================================================  Ritesh Lang MD  Diplomates American Board of Internal Medicine and Infectious Diseases  =======================================================      SAMIRA NUGENT [id number]    Follow up: Leukocytosis; Pleural effusion  s/p chest tube on 6/2/17  s/p VATS 6/9/17    Patient is awake, comfortable  Afebrile  no meaningful communication  Chest tubes are out    Allergies:  No Known Allergies      Antibiotics:  piperacillin/tazobactam IVPB. 3.375Gram(s) IV Intermittent every 8 hours      REVIEW OF SYSTEMS:  Unable to obtain due to patient's condition. Patient non verbal      Physical Exam:  Vital Signs Last 24 Hrs  T(C): 37.1, Max: 37.1 (06-16 @ 09:57)  T(F): 98.7, Max: 98.7 (06-16 @ 09:57)  HR: 80 (77 - 90)  BP: 105/59 (101/63 - 111/63)  RR: 18 (16 - 18)  SpO2: 96% (96% - 96%)      GEN: Non verbal. NAD  HEENT: normocephalic and atraumatic. EOMI. PERRL.    NECK: Supple.   LUNGS: Decreased breath sounds b/l base.  HEART: Regular rate and rhythm.  ABDOMEN: Soft, nontender, and nondistended.  Positive bowel sounds.    : No CVA tenderness  EXTREMITIES: Without any edema.  MSK: no joint swelling  NEUROLOGIC: awake, no meaningful communication  SKIN: dark pigmented lesions B/L LE.      Labs:  06-15    142  |  101  |  17.0  ----------------------------<  90  3.7   |  29.0  |  1.13    Ca    8.3<L>      15 Colten 2017 06:25                            8.3    11.4  )-----------( 304      ( 15 Colten 2017 06:25 )             26.0             RECENT CULTURES:  06-11 @ 11:51 .Blood Blood     No growth at 48 hours      06-09 @ 11:56 .Body Fluid Coag Negative Staphylococcus    Coag Negative Staphylococcus Growing in broth media only  No WBC's seen.  No organisms seen      06-03 @ 21:38 .Body Fluid Pleural Fluid     No growth at 1 week.      06-03 @ 19:40 .Blood Blood     No growth at 5 days.      06-03 @ 14:19 .Sputum Sputum     Few Routine respiratory grayson present  Few White blood cells  Few Gram positive cocci in pairs      06-03 @ 01:48 .Body Fluid Pleural Fluid     No growth at 4 days.  Culture in progress  Few White blood cells  No organisms seen      06-02 @ 22:08 .Blood Blood-Peripheral     No growth at 5 days.      06-01 @ 05:20 .Blood Blood     No growth at 5 days.      05-31 @ 22:12 .Blood Blood     No growth at 5 days.      05-27 @ 10:56 .Blood Blood     No growth at 5 days.      05-27 @ 06:21 .Urine Clean Catch (Midstream)     <10,000 CFU/ml Yeast cells      05-23 @ 18:18 .Body Fluid Pleural Fluid     No growth at 1 week.      05-23 @ 08:43 .Body Fluid Pleural Fluid     No fungus isolated at 2 weeks.  Culture in progress      05-23 @ 08:42 .Body Fluid Pleural Fluid     No growth at 5 days.  Few WBC's  No organisms seen      05-23 @ 02:01 .Blood Blood     No growth at 5 days.      05-23 @ 01:48 .Blood Blood     No growth at 5 days.        EXAM:  CHEST SINGLE VIEW FRONTAL                        PROCEDURE DATE:  06/16/2017    INTERPRETATION:  CHEST AP PORTABLE:  History: Cough.   Date and time of exam: 6/16/2017 4:31 AM.  Technique: A single AP view of the chest was obtained.  Comparison exam: 6/13/2017.  Findings:  Persistent right pleural effusion. Small left pleural effusion and left   basilar atelectasis, unchanged. No evidence of pneumothorax..  Impression:  Stable exam without significant change since the previous study..

## 2017-06-16 NOTE — PROGRESS NOTE ADULT - PROBLEM SELECTOR PLAN 1
s/p Chest tube  s/p VATS 6/9/17  Pleural fluid culture with no growth  Blood cultures no growth  Continue Zosyn  End Date June 27th  CXR with persistent Rt pleural effusion, may need thoracocentesis, d/w Dr Portillo

## 2017-06-17 DIAGNOSIS — Z29.9 ENCOUNTER FOR PROPHYLACTIC MEASURES, UNSPECIFIED: ICD-10-CM

## 2017-06-17 DIAGNOSIS — K59.00 CONSTIPATION, UNSPECIFIED: ICD-10-CM

## 2017-06-17 DIAGNOSIS — D64.9 ANEMIA, UNSPECIFIED: ICD-10-CM

## 2017-06-17 LAB
ANION GAP SERPL CALC-SCNC: 12 MMOL/L — SIGNIFICANT CHANGE UP (ref 5–17)
BUN SERPL-MCNC: 15 MG/DL — SIGNIFICANT CHANGE UP (ref 8–20)
CALCIUM SERPL-MCNC: 8.6 MG/DL — SIGNIFICANT CHANGE UP (ref 8.6–10.2)
CHLORIDE SERPL-SCNC: 102 MMOL/L — SIGNIFICANT CHANGE UP (ref 98–107)
CO2 SERPL-SCNC: 28 MMOL/L — SIGNIFICANT CHANGE UP (ref 22–29)
CREAT SERPL-MCNC: 0.96 MG/DL — SIGNIFICANT CHANGE UP (ref 0.5–1.3)
GLUCOSE SERPL-MCNC: 88 MG/DL — SIGNIFICANT CHANGE UP (ref 70–115)
HCT VFR BLD CALC: 26.9 % — LOW (ref 42–52)
HGB BLD-MCNC: 8.7 G/DL — LOW (ref 14–18)
MCHC RBC-ENTMCNC: 28.4 PG — SIGNIFICANT CHANGE UP (ref 27–31)
MCHC RBC-ENTMCNC: 32.3 G/DL — SIGNIFICANT CHANGE UP (ref 32–36)
MCV RBC AUTO: 87.9 FL — SIGNIFICANT CHANGE UP (ref 80–94)
PLATELET # BLD AUTO: 360 K/UL — SIGNIFICANT CHANGE UP (ref 150–400)
POTASSIUM SERPL-MCNC: 4.1 MMOL/L — SIGNIFICANT CHANGE UP (ref 3.5–5.3)
POTASSIUM SERPL-SCNC: 4.1 MMOL/L — SIGNIFICANT CHANGE UP (ref 3.5–5.3)
RBC # BLD: 3.06 M/UL — LOW (ref 4.6–6.2)
RBC # FLD: 15.7 % — HIGH (ref 11–15.6)
SODIUM SERPL-SCNC: 142 MMOL/L — SIGNIFICANT CHANGE UP (ref 135–145)
WBC # BLD: 11.4 K/UL — HIGH (ref 4.8–10.8)
WBC # FLD AUTO: 11.4 K/UL — HIGH (ref 4.8–10.8)

## 2017-06-17 PROCEDURE — 99233 SBSQ HOSP IP/OBS HIGH 50: CPT

## 2017-06-17 RX ORDER — SACCHAROMYCES BOULARDII 250 MG
250 POWDER IN PACKET (EA) ORAL
Qty: 0 | Refills: 0 | Status: COMPLETED | OUTPATIENT
Start: 2017-06-17 | End: 2017-06-27

## 2017-06-17 RX ORDER — SENNA PLUS 8.6 MG/1
2 TABLET ORAL AT BEDTIME
Qty: 0 | Refills: 0 | Status: DISCONTINUED | OUTPATIENT
Start: 2017-06-17 | End: 2017-06-19

## 2017-06-17 RX ORDER — SIMETHICONE 80 MG/1
80 TABLET, CHEWABLE ORAL THREE TIMES A DAY
Qty: 0 | Refills: 0 | Status: DISCONTINUED | OUTPATIENT
Start: 2017-06-17 | End: 2017-06-19

## 2017-06-17 RX ADMIN — PIPERACILLIN AND TAZOBACTAM 25 GRAM(S): 4; .5 INJECTION, POWDER, LYOPHILIZED, FOR SOLUTION INTRAVENOUS at 13:36

## 2017-06-17 RX ADMIN — Medication 150 MICROGRAM(S): at 06:28

## 2017-06-17 RX ADMIN — SENNA PLUS 2 TABLET(S): 8.6 TABLET ORAL at 22:16

## 2017-06-17 RX ADMIN — PIPERACILLIN AND TAZOBACTAM 25 GRAM(S): 4; .5 INJECTION, POWDER, LYOPHILIZED, FOR SOLUTION INTRAVENOUS at 22:15

## 2017-06-17 RX ADMIN — SIMETHICONE 80 MILLIGRAM(S): 80 TABLET, CHEWABLE ORAL at 22:16

## 2017-06-17 RX ADMIN — PIPERACILLIN AND TAZOBACTAM 25 GRAM(S): 4; .5 INJECTION, POWDER, LYOPHILIZED, FOR SOLUTION INTRAVENOUS at 06:25

## 2017-06-17 RX ADMIN — Medication 100 MILLIGRAM(S): at 22:16

## 2017-06-17 RX ADMIN — Medication 1000 MILLIGRAM(S): at 17:36

## 2017-06-17 RX ADMIN — Medication 1000 MILLIGRAM(S): at 06:28

## 2017-06-17 RX ADMIN — ENOXAPARIN SODIUM 40 MILLIGRAM(S): 100 INJECTION SUBCUTANEOUS at 22:16

## 2017-06-17 RX ADMIN — Medication 100 MILLIGRAM(S): at 06:29

## 2017-06-17 RX ADMIN — PANTOPRAZOLE SODIUM 40 MILLIGRAM(S): 20 TABLET, DELAYED RELEASE ORAL at 06:29

## 2017-06-17 NOTE — PROGRESS NOTE ADULT - SUBJECTIVE AND OBJECTIVE BOX
Patient is a 47y old  Male who presents with a chief complaint of wont eat (27 May 2017 05:11)    Patient seen and examined at bedside. No acute distress and no acute overnight events. He speaks very little. Asked to be left alone. Seen with sister at bedside.     ROS: No chest pain, palpitations, sob, light headedness/dizziness, difficulty breathing/cough, fevers/chills, abdominal pain, n/v, diarrhea/constipation, dysuria or increased urinary frequency.,     Vital Signs Last 24 Hrs  T(C): 37.2, Max: 37.2 (06-17 @ 17:11)  T(F): 98.9, Max: 98.9 (06-17 @ 17:11)  HR: 81 (81 - 97)  BP: 114/62 (108/65 - 114/62)  BP(mean): --  RR: 18 (18 - 18)  SpO2: 97% (96% - 97%)    PHYSICAL EXAM:    GENERAL: NAD   HEENT: PERRL, +EOMI, MMM  CVS: s1s2nl, no m/r/g. RRR   Lungs - decreased AE b/l bases. Left flank region with dressing intact after CT removed   Abd: soft, nt, slightly distended with tympanic sounds LUQ bs +  EXTREMITIES: no c/c/e.   SKIN: hyperpigmentation on legs  PSYCH: flat affect      LABS:                         8.7    11.4  )-----------( 360      ( 17 Jun 2017 07:35 )             26.9   06-17    142  |  102  |  15.0  ----------------------------<  88  4.1   |  28.0  |  0.96    Ca    8.6      17 Jun 2017 07:35    MEDICATIONS  (STANDING):  enoxaparin Injectable 40milliGRAM(s) SubCutaneous every 24 hours  docusate sodium 100milliGRAM(s) Oral three times a day  piperacillin/tazobactam IVPB. 3.375Gram(s) IV Intermittent every 8 hours  levothyroxine 150MICROGram(s) Oral daily  valproic acid 1000milliGRAM(s) Oral every 12 hours  pantoprazole    Tablet 40milliGRAM(s) Oral before breakfast  simethicone 80milliGRAM(s) Chew three times a day  saccharomyces boulardii 250milliGRAM(s) Oral two times a day    MEDICATIONS  (PRN):  acetaminophen   Tablet 650milliGRAM(s) Oral every 6 hours PRN For Temp greater than 38.5 C (101.3 F)        RADIOLOGY & ADDITIONAL TESTS:

## 2017-06-17 NOTE — PROGRESS NOTE ADULT - ASSESSMENT
48 yo M with h/o schizoaffective disorder, selective mutism, recurrent pleural effusion (transudative) here with anroexia and pleural effusion. 48 yo M with h/o schizoaffective disorder, selective mutism, recurrent pleural effusion (transudative) represents from [place] for anorexia and refusal to take PO medications. He was last discharged after thoracentesis (1400cc) of transudative pleural effusion in left lateral region. Upon readmission, repeat chest xray showed recurrent moderate left pleural effusion. On 5/29, pt had seizure episode 2/2 noncompliance with PO medications, then transitioned to IV valproate. MRI brain x 2 were negative for acute infarct. Pt noted to have worsening leukocytosis, elevated CRP and procalcitonin. Per ID, vanc and zosyn were started. CT chest showed worsening left pleural effusion. CTS placed chest tube on 6/4. Pt had rapid response on 6/6, likely due to clozapine toxicity and was monitored overnight in MICU. On 6/9, pt had VATS which did not show evidence of infection and partially trapped lung. Pleurodesis was performed and had 1500cc drained. Had last chest tube removed on 6/13.

## 2017-06-17 NOTE — PROGRESS NOTE ADULT - PROBLEM SELECTOR PLAN 1
s/p Chest tube  s/p VATS 6/9/17  Pleural fluid culture with no growth - all c/s ng  cont tx as planned  Blood cultures no growth  Continue Zosyn  End Date June 27th

## 2017-06-17 NOTE — PROGRESS NOTE ADULT - SUBJECTIVE AND OBJECTIVE BOX
[facility]  INFECTIOUS DISEASES AND INTERNAL MEDICINE [facility]  =======================================================  Ritesh Lang MD  Diplomates American Board of Internal Medicine and Infectious Diseases  =======================================================      SAMIRA NUGENT [id number]    Follow up: Leukocytosis; Pleural effusion  s/p chest tube on 6/2/17  s/p VATS 6/9/17    Patient is awake, comfortable  Afebrile  no meaningful communication  Chest tubes are out  all c/s neg  Allergies:  No Known Allergies      Antibiotics:  piperacillin/tazobactam IVPB. 3.375Gram(s) IV Intermittent every 8 hours      REVIEW OF SYSTEMS:  Unable to obtain due to patient's condition. Patient non verbal      Physical Exam:  Vital Signs Last 24 Hrs  T(C): 37.1, Max: 37.1 (06-16 @ 09:57)  T(F): 98.7, Max: 98.7 (06-16 @ 09:57)  HR: 80 (77 - 90)  BP: 105/59 (101/63 - 111/63)  RR: 18 (16 - 18)  SpO2: 96% (96% - 96%)      GEN: Non verbal. NAD  HEENT: normocephalic and atraumatic. EOMI. PERRL.    NECK: Supple.   LUNGS: Decreased breath sounds b/l base.  HEART: Regular rate and rhythm.  ABDOMEN: Soft, nontender, and nondistended.  Positive bowel sounds.    : No CVA tenderness  EXTREMITIES: Without any edema.  MSK: no joint swelling  NEUROLOGIC: awake, no meaningful communication  SKIN: dark pigmented lesions B/L LE.      Labs:  06-15    142  |  101  |  17.0  ----------------------------<  90  3.7   |  29.0  |  1.13    Ca    8.3<L>      15 Colten 2017 06:25                            8.3    11.4  )-----------( 304      ( 15 Colten 2017 06:25 )             26.0             RECENT CULTURES:  06-11 @ 11:51 .Blood Blood     No growth at 48 hours      06-09 @ 11:56 .Body Fluid Coag Negative Staphylococcus    Coag Negative Staphylococcus Growing in broth media only  No WBC's seen.  No organisms seen      06-03 @ 21:38 .Body Fluid Pleural Fluid     No growth at 1 week.      06-03 @ 19:40 .Blood Blood     No growth at 5 days.      06-03 @ 14:19 .Sputum Sputum     Few Routine respiratory grayson present  Few White blood cells  Few Gram positive cocci in pairs      06-03 @ 01:48 .Body Fluid Pleural Fluid     No growth at 4 days.  Culture in progress  Few White blood cells  No organisms seen      06-02 @ 22:08 .Blood Blood-Peripheral     No growth at 5 days.      06-01 @ 05:20 .Blood Blood     No growth at 5 days.      05-31 @ 22:12 .Blood Blood     No growth at 5 days.      05-27 @ 10:56 .Blood Blood     No growth at 5 days.      05-27 @ 06:21 .Urine Clean Catch (Midstream)     <10,000 CFU/ml Yeast cells      05-23 @ 18:18 .Body Fluid Pleural Fluid     No growth at 1 week.      05-23 @ 08:43 .Body Fluid Pleural Fluid     No fungus isolated at 2 weeks.  Culture in progress      05-23 @ 08:42 .Body Fluid Pleural Fluid     No growth at 5 days.  Few WBC's  No organisms seen      05-23 @ 02:01 .Blood Blood     No growth at 5 days.      05-23 @ 01:48 .Blood Blood     No growth at 5 days.        EXAM:  CHEST SINGLE VIEW FRONTAL                        PROCEDURE DATE:  06/16/2017    INTERPRETATION:  CHEST AP PORTABLE:  History: Cough.   Date and time of exam: 6/16/2017 4:31 AM.  Technique: A single AP view of the chest was obtained.  Comparison exam: 6/13/2017.  Findings:  Persistent right pleural effusion. Small left pleural effusion and left   basilar atelectasis, unchanged. No evidence of pneumothorax..  Impression:  Stable exam without significant change since the previous study..

## 2017-06-17 NOTE — PROGRESS NOTE ADULT - PROBLEM SELECTOR PLAN 3
C/w valproate  sister concerned regarding tfts, noted prior as normal tsh and low free t4/t3 - subclinical  c/w rest of meds

## 2017-06-17 NOTE — PROGRESS NOTE ADULT - PROBLEM SELECTOR PLAN 1
Afebrile. with left sided pleural effusion: S/p VATS with pleurodesis. WIth stable leucocytosis   Repeat blood cultures neg. on 6/11  C/w zosyn until june 27 per ID Dr Moleler, added florastor today   s/p CT removed on 6/13, repeat cxr noted. s/p vats on 6/19  CT surgery on board - will await rec

## 2017-06-18 LAB
FERRITIN SERPL-MCNC: 368.4 NG/ML — SIGNIFICANT CHANGE UP (ref 30–400)
FOLATE SERPL-MCNC: 13.3 NG/ML — SIGNIFICANT CHANGE UP (ref 4–16)
IRON SATN MFR SERPL: 21 % — SIGNIFICANT CHANGE UP (ref 16–55)
IRON SATN MFR SERPL: 45 UG/DL — LOW (ref 59–158)
RBC # BLD: 3.03 M/UL — LOW (ref 4.6–6.2)
RETICS #: 53.3 K/UL — SIGNIFICANT CHANGE UP (ref 25–125)
RETICS/RBC NFR: 1.8 % — SIGNIFICANT CHANGE UP (ref 0.5–2.6)
TIBC SERPL-MCNC: 217 UG/DL — LOW (ref 220–430)
TRANSFERRIN SERPL-MCNC: 152 MG/DL — LOW (ref 180–329)
VIT B12 SERPL-MCNC: 837 PG/ML — SIGNIFICANT CHANGE UP (ref 180–914)

## 2017-06-18 PROCEDURE — 99233 SBSQ HOSP IP/OBS HIGH 50: CPT

## 2017-06-18 RX ADMIN — PIPERACILLIN AND TAZOBACTAM 25 GRAM(S): 4; .5 INJECTION, POWDER, LYOPHILIZED, FOR SOLUTION INTRAVENOUS at 05:25

## 2017-06-18 RX ADMIN — SIMETHICONE 80 MILLIGRAM(S): 80 TABLET, CHEWABLE ORAL at 05:26

## 2017-06-18 RX ADMIN — SIMETHICONE 80 MILLIGRAM(S): 80 TABLET, CHEWABLE ORAL at 21:55

## 2017-06-18 RX ADMIN — Medication 1000 MILLIGRAM(S): at 17:40

## 2017-06-18 RX ADMIN — Medication 250 MILLIGRAM(S): at 05:26

## 2017-06-18 RX ADMIN — Medication 1000 MILLIGRAM(S): at 05:27

## 2017-06-18 RX ADMIN — ENOXAPARIN SODIUM 40 MILLIGRAM(S): 100 INJECTION SUBCUTANEOUS at 21:55

## 2017-06-18 RX ADMIN — PANTOPRAZOLE SODIUM 40 MILLIGRAM(S): 20 TABLET, DELAYED RELEASE ORAL at 05:26

## 2017-06-18 RX ADMIN — PIPERACILLIN AND TAZOBACTAM 25 GRAM(S): 4; .5 INJECTION, POWDER, LYOPHILIZED, FOR SOLUTION INTRAVENOUS at 21:54

## 2017-06-18 RX ADMIN — Medication 250 MILLIGRAM(S): at 17:40

## 2017-06-18 RX ADMIN — PIPERACILLIN AND TAZOBACTAM 25 GRAM(S): 4; .5 INJECTION, POWDER, LYOPHILIZED, FOR SOLUTION INTRAVENOUS at 12:15

## 2017-06-18 RX ADMIN — SENNA PLUS 2 TABLET(S): 8.6 TABLET ORAL at 21:55

## 2017-06-18 RX ADMIN — SIMETHICONE 80 MILLIGRAM(S): 80 TABLET, CHEWABLE ORAL at 12:17

## 2017-06-18 RX ADMIN — Medication 150 MICROGRAM(S): at 05:26

## 2017-06-18 NOTE — PROGRESS NOTE ADULT - ASSESSMENT
48 yo M with h/o schizoaffective disorder, selective mutism, recurrent pleural effusion (transudative) here with anroexia and pleural effusion. 48 yo M with h/o schizoaffective disorder, selective mutism, recurrent pleural effusion (transudative) represents from [place] for anorexia and refusal to take PO medications. He was last discharged after thoracentesis (1400cc) of transudative pleural effusion in left lateral region. Upon readmission, repeat chest xray showed recurrent moderate left pleural effusion. On 5/29, pt had seizure episode 2/2 noncompliance with PO medications, then transitioned to IV valproate. MRI brain x 2 were negative for acute infarct. Pt noted to have worsening leukocytosis, elevated CRP and procalcitonin. Per ID, vanc and zosyn were started. CT chest showed worsening left pleural effusion. CTS placed chest tube on 6/4. Pt had rapid response on 6/6, likely due to clozapine toxicity and was monitored overnight in MICU. On 6/9, pt had VATS which did not show evidence of infection and partially trapped lung. Pleurodesis was performed and had 1500cc drained. Had last chest tube removed on 6/13.

## 2017-06-18 NOTE — PROGRESS NOTE ADULT - SUBJECTIVE AND OBJECTIVE BOX
Patient is a 47y old  Male who presents with a chief complaint of wont eat (27 May 2017 05:11)    Patient seen and examined at bedside. No acute distress and no acute overnight events. spoke a little bit more today, said hello, slightly more interactive with the aide at bedside. Last bm this am     ROS: No chest pain, palpitations, sob, light headedness/dizziness, difficulty breathing/cough, fevers/chills, abdominal pain, n/v, diarrhea/constipation, dysuria or increased urinary frequency.,     Vital Signs Last 24 Hrs  T(C): 37.1, Max: 37.6 (06-17 @ 23:15)  T(F): 98.7, Max: 99.6 (06-17 @ 23:15)  HR: 71 (71 - 86)  BP: 103/63 (99/65 - 114/62)  BP(mean): --  RR: 18 (18 - 20)  SpO2: 92% (92% - 97%)    PHYSICAL EXAM:    GENERAL: NAD   HEENT: PERRL, +EOMI, MMM  CVS: s1s2nl, no m/r/g. RRR   Lungs - decreased AE b/l bases. Left flank region with dressing intact after CT removed   Abd: soft, nt, slightly distended with tympanic sounds LUQ bs +  EXTREMITIES: no c/c/e.   SKIN: hyperpigmentation on legs  PSYCH: flat affect      LABS:                                    8.7    11.4  )-----------( 360      ( 17 Jun 2017 07:35 )             26.9   06-17    142  |  102  |  15.0  ----------------------------<  88  4.1   |  28.0  |  0.96    Ca    8.6      17 Jun 2017 07:35        MEDICATIONS  (STANDING):  enoxaparin Injectable 40milliGRAM(s) SubCutaneous every 24 hours  docusate sodium 100milliGRAM(s) Oral three times a day  piperacillin/tazobactam IVPB. 3.375Gram(s) IV Intermittent every 8 hours  levothyroxine 150MICROGram(s) Oral daily  valproic acid 1000milliGRAM(s) Oral every 12 hours  pantoprazole    Tablet 40milliGRAM(s) Oral before breakfast  simethicone 80milliGRAM(s) Chew three times a day  senna 2Tablet(s) Oral at bedtime  saccharomyces boulardii 250milliGRAM(s) Oral two times a day    MEDICATIONS  (PRN):  acetaminophen   Tablet 650milliGRAM(s) Oral every 6 hours PRN For Temp greater than 38.5 C (101.3 F)          RADIOLOGY & ADDITIONAL TESTS:

## 2017-06-18 NOTE — PROGRESS NOTE ADULT - PROBLEM SELECTOR PLAN 1
Afebrile. with left sided pleural effusion: S/p VATS with pleurodesis. WIth stable leucocytosis   Repeat blood cultures neg. on 6/11  C/w zosyn until june 27 per ID Dr Moeller, added florastor today   s/p CT removed on 6/13, repeat cxr noted. s/p vats on 6/19  CT surgery on board - d/w NP today, no further intervention for right sided effusion nor for left.   change dressing to left chest wall wound s/p tube removal

## 2017-06-19 PROCEDURE — 99233 SBSQ HOSP IP/OBS HIGH 50: CPT

## 2017-06-19 PROCEDURE — 71010: CPT | Mod: 26

## 2017-06-19 RX ORDER — HALOPERIDOL DECANOATE 100 MG/ML
2 INJECTION INTRAMUSCULAR
Qty: 0 | Refills: 0 | Status: DISCONTINUED | OUTPATIENT
Start: 2017-06-19 | End: 2017-06-22

## 2017-06-19 RX ORDER — DIVALPROEX SODIUM 500 MG/1
500 TABLET, DELAYED RELEASE ORAL
Qty: 0 | Refills: 0 | Status: DISCONTINUED | OUTPATIENT
Start: 2017-06-19 | End: 2017-06-23

## 2017-06-19 RX ADMIN — Medication 150 MICROGRAM(S): at 05:39

## 2017-06-19 RX ADMIN — Medication 250 MILLIGRAM(S): at 05:39

## 2017-06-19 RX ADMIN — DIVALPROEX SODIUM 500 MILLIGRAM(S): 500 TABLET, DELAYED RELEASE ORAL at 18:16

## 2017-06-19 RX ADMIN — SIMETHICONE 80 MILLIGRAM(S): 80 TABLET, CHEWABLE ORAL at 05:40

## 2017-06-19 RX ADMIN — PIPERACILLIN AND TAZOBACTAM 25 GRAM(S): 4; .5 INJECTION, POWDER, LYOPHILIZED, FOR SOLUTION INTRAVENOUS at 13:06

## 2017-06-19 RX ADMIN — PIPERACILLIN AND TAZOBACTAM 25 GRAM(S): 4; .5 INJECTION, POWDER, LYOPHILIZED, FOR SOLUTION INTRAVENOUS at 22:05

## 2017-06-19 RX ADMIN — PANTOPRAZOLE SODIUM 40 MILLIGRAM(S): 20 TABLET, DELAYED RELEASE ORAL at 05:40

## 2017-06-19 RX ADMIN — Medication 250 MILLIGRAM(S): at 18:17

## 2017-06-19 RX ADMIN — Medication 1000 MILLIGRAM(S): at 05:40

## 2017-06-19 RX ADMIN — HALOPERIDOL DECANOATE 2 MILLIGRAM(S): 100 INJECTION INTRAMUSCULAR at 18:16

## 2017-06-19 RX ADMIN — ENOXAPARIN SODIUM 40 MILLIGRAM(S): 100 INJECTION SUBCUTANEOUS at 22:06

## 2017-06-19 RX ADMIN — PIPERACILLIN AND TAZOBACTAM 25 GRAM(S): 4; .5 INJECTION, POWDER, LYOPHILIZED, FOR SOLUTION INTRAVENOUS at 05:39

## 2017-06-19 NOTE — PROGRESS NOTE ADULT - ASSESSMENT
48 yo M with h/o schizoaffective disorder, selective mutism, recurrent pleural effusion (transudative) here with anroexia and pleural effusion. 48 yo M with h/o schizoaffective disorder, selective mutism, recurrent pleural effusion (transudative) represents from [place] for anorexia and refusal to take PO medications. He was last discharged after thoracentesis (1400cc) of transudative pleural effusion in left lateral region. Upon readmission, repeat chest xray showed recurrent moderate left pleural effusion. On 5/29, pt had seizure episode 2/2 noncompliance with PO medications, then transitioned to IV valproate. MRI brain x 2 were negative for acute infarct. Pt noted to have worsening leukocytosis, elevated CRP and procalcitonin. Per ID, vanc and zosyn were started. CT chest showed worsening left pleural effusion. CTS placed chest tube on 6/4. Pt had rapid response on 6/6, likely due to clozapine toxicity and was monitored overnight in MICU. On 6/9, pt had VATS which did not show evidence of infection and partially trapped lung. Pleurodesis was performed and had 1500cc drained. Had last chest tube removed on 6/13. Repeat cxr shows no change,plan to do one today as well

## 2017-06-19 NOTE — PROGRESS NOTE ADULT - PROBLEM SELECTOR PLAN 6
mary hoover and started simethicone on 6/17 - per bedside aide, had a large bm overnight, will d/c entire regimen

## 2017-06-19 NOTE — PROGRESS NOTE ADULT - PROBLEM SELECTOR PLAN 1
Afebrile. with left sided pleural effusion: S/p VATS with pleurodesis. WIth stable leucocytosis   Repeat blood cultures neg. on 6/11  C/w zosyn until june 27 per ID Dr Moeller, c/w florastor   s/p CT removed on 6/13, repeat cxr noted. s/p vats on 6/19  will repeat another cxr today given change in physicale exam findings   CT surgery on board - no further intervention

## 2017-06-19 NOTE — PROGRESS NOTE ADULT - SUBJECTIVE AND OBJECTIVE BOX
Patient is a 47y old  Male who presents with a chief complaint of wont eat (27 May 2017 05:11)    Patient seen and examined at bedside. No acute distress and no acute overnight events. Patient did not speak much today.     ROS: No chest pain, palpitations, sob, light headedness/dizziness, difficulty breathing/cough, fevers/chills    Vital Signs Last 24 Hrs  T(C): 37.2, Max: 37.3 (06-19 @ 00:00)  T(F): 99, Max: 99.2 (06-19 @ 00:00)  HR: 82 (76 - 82)  BP: 124/68 (103/59 - 124/68)  BP(mean): --  RR: 18 (18 - 81)  SpO2: 92% (92% - 94%)    PHYSICAL EXAM:    GENERAL: NAD   HEENT: PERRL, +EOMI, MMM  CVS: s1s2nl, no m/r/g. RRR   Lungs - b/l bases with decreased air entry. Minimal crackles to RLL. Left side with occlusive dressing noted under axillary region   Abd: soft, nt, slightly distended with tympanic sounds LUQ bs +  EXTREMITIES: no c/c/e.   SKIN: hyperpigmentation on legs  PSYCH: flat affect      LABS:    no new labs                             8.7    11.4  )-----------( 360      ( 17 Jun 2017 07:35 )             26.9   06-17    142  |  102  |  15.0  ----------------------------<  88  4.1   |  28.0  |  0.96    Ca    8.6      17 Jun 2017 07:35    MEDICATIONS  (STANDING):  enoxaparin Injectable 40milliGRAM(s) SubCutaneous every 24 hours  piperacillin/tazobactam IVPB. 3.375Gram(s) IV Intermittent every 8 hours  levothyroxine 150MICROGram(s) Oral daily  pantoprazole    Tablet 40milliGRAM(s) Oral before breakfast  saccharomyces boulardii 250milliGRAM(s) Oral two times a day  haloperidol     Tablet 2milliGRAM(s) Oral two times a day  diVALproex DR 500milliGRAM(s) Oral two times a day    MEDICATIONS  (PRN):  acetaminophen   Tablet 650milliGRAM(s) Oral every 6 hours PRN For Temp greater than 38.5 C (101.3 F)  artificial  tears Solution 1Drop(s) Both EYES every 8 hours PRN Dry Eyes            RADIOLOGY & ADDITIONAL TESTS:

## 2017-06-19 NOTE — PROGRESS NOTE ADULT - PROBLEM SELECTOR PLAN 2
swallow eval noted - needs assistance with feeds; spoke to family, concerned that his swallowing is affected given recurrent pna episodes   d/w Speech, will order mbs after a repeat eval today where he was coughing a few seconds after eating.

## 2017-06-20 LAB
ANION GAP SERPL CALC-SCNC: 15 MMOL/L — SIGNIFICANT CHANGE UP (ref 5–17)
ANISOCYTOSIS BLD QL: SLIGHT — SIGNIFICANT CHANGE UP
BUN SERPL-MCNC: 16 MG/DL — SIGNIFICANT CHANGE UP (ref 8–20)
CALCIUM SERPL-MCNC: 8.9 MG/DL — SIGNIFICANT CHANGE UP (ref 8.6–10.2)
CHLORIDE SERPL-SCNC: 101 MMOL/L — SIGNIFICANT CHANGE UP (ref 98–107)
CO2 SERPL-SCNC: 25 MMOL/L — SIGNIFICANT CHANGE UP (ref 22–29)
CREAT SERPL-MCNC: 1.19 MG/DL — SIGNIFICANT CHANGE UP (ref 0.5–1.3)
CULTURE RESULTS: SIGNIFICANT CHANGE UP
GLUCOSE SERPL-MCNC: 77 MG/DL — SIGNIFICANT CHANGE UP (ref 70–115)
HCT VFR BLD CALC: 27.7 % — LOW (ref 42–52)
HGB BLD-MCNC: 9 G/DL — LOW (ref 14–18)
HYPOCHROMIA BLD QL: SLIGHT — SIGNIFICANT CHANGE UP
LYMPHOCYTES # BLD AUTO: 14 % — LOW (ref 20–55)
MACROCYTES BLD QL: SLIGHT — SIGNIFICANT CHANGE UP
MAGNESIUM SERPL-MCNC: 1.8 MG/DL — SIGNIFICANT CHANGE UP (ref 1.6–2.6)
MCHC RBC-ENTMCNC: 28 PG — SIGNIFICANT CHANGE UP (ref 27–31)
MCHC RBC-ENTMCNC: 32.5 G/DL — SIGNIFICANT CHANGE UP (ref 32–36)
MCV RBC AUTO: 86 FL — SIGNIFICANT CHANGE UP (ref 80–94)
MICROCYTES BLD QL: SLIGHT — SIGNIFICANT CHANGE UP
MONOCYTES NFR BLD AUTO: 4 % — SIGNIFICANT CHANGE UP (ref 3–10)
MYELOCYTES NFR BLD: 3 % — HIGH (ref 0–0)
NEUTROPHILS NFR BLD AUTO: 79 % — HIGH (ref 37–73)
OVALOCYTES BLD QL SMEAR: SLIGHT — SIGNIFICANT CHANGE UP
PHOSPHATE SERPL-MCNC: 4.1 MG/DL — SIGNIFICANT CHANGE UP (ref 2.4–4.7)
PLAT MORPH BLD: NORMAL — SIGNIFICANT CHANGE UP
PLATELET # BLD AUTO: 532 K/UL — HIGH (ref 150–400)
POIKILOCYTOSIS BLD QL AUTO: SLIGHT — SIGNIFICANT CHANGE UP
POTASSIUM SERPL-MCNC: 3.8 MMOL/L — SIGNIFICANT CHANGE UP (ref 3.5–5.3)
POTASSIUM SERPL-SCNC: 3.8 MMOL/L — SIGNIFICANT CHANGE UP (ref 3.5–5.3)
RBC # BLD: 3.22 M/UL — LOW (ref 4.6–6.2)
RBC # FLD: 15.8 % — HIGH (ref 11–15.6)
RBC BLD AUTO: ABNORMAL
SODIUM SERPL-SCNC: 141 MMOL/L — SIGNIFICANT CHANGE UP (ref 135–145)
SPECIMEN SOURCE: SIGNIFICANT CHANGE UP
WBC # BLD: 12.4 K/UL — HIGH (ref 4.8–10.8)
WBC # FLD AUTO: 12.4 K/UL — HIGH (ref 4.8–10.8)

## 2017-06-20 PROCEDURE — 99233 SBSQ HOSP IP/OBS HIGH 50: CPT

## 2017-06-20 PROCEDURE — 99232 SBSQ HOSP IP/OBS MODERATE 35: CPT

## 2017-06-20 PROCEDURE — 74230 X-RAY XM SWLNG FUNCJ C+: CPT | Mod: 26

## 2017-06-20 RX ADMIN — ENOXAPARIN SODIUM 40 MILLIGRAM(S): 100 INJECTION SUBCUTANEOUS at 22:37

## 2017-06-20 RX ADMIN — PIPERACILLIN AND TAZOBACTAM 25 GRAM(S): 4; .5 INJECTION, POWDER, LYOPHILIZED, FOR SOLUTION INTRAVENOUS at 12:50

## 2017-06-20 RX ADMIN — HALOPERIDOL DECANOATE 2 MILLIGRAM(S): 100 INJECTION INTRAMUSCULAR at 17:36

## 2017-06-20 RX ADMIN — DIVALPROEX SODIUM 500 MILLIGRAM(S): 500 TABLET, DELAYED RELEASE ORAL at 05:35

## 2017-06-20 RX ADMIN — Medication 150 MICROGRAM(S): at 05:35

## 2017-06-20 RX ADMIN — PIPERACILLIN AND TAZOBACTAM 25 GRAM(S): 4; .5 INJECTION, POWDER, LYOPHILIZED, FOR SOLUTION INTRAVENOUS at 22:43

## 2017-06-20 RX ADMIN — Medication 250 MILLIGRAM(S): at 17:36

## 2017-06-20 RX ADMIN — Medication 250 MILLIGRAM(S): at 05:35

## 2017-06-20 RX ADMIN — PANTOPRAZOLE SODIUM 40 MILLIGRAM(S): 20 TABLET, DELAYED RELEASE ORAL at 05:36

## 2017-06-20 RX ADMIN — PIPERACILLIN AND TAZOBACTAM 25 GRAM(S): 4; .5 INJECTION, POWDER, LYOPHILIZED, FOR SOLUTION INTRAVENOUS at 05:35

## 2017-06-20 RX ADMIN — HALOPERIDOL DECANOATE 2 MILLIGRAM(S): 100 INJECTION INTRAMUSCULAR at 05:36

## 2017-06-20 RX ADMIN — DIVALPROEX SODIUM 500 MILLIGRAM(S): 500 TABLET, DELAYED RELEASE ORAL at 17:36

## 2017-06-20 NOTE — PROGRESS NOTE ADULT - ASSESSMENT
Depakote reduced haldol started at lower dose  will monitor for response to medication adjustments  nothing new today no changes
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
## 2017-06-20 NOTE — SWALLOW VFSS/MBS ASSESSMENT ADULT - ORAL PHASE
Uncontrolled bolus / spillover in kobe-pharynx Delayed oral transit time/Uncontrolled bolus / spillover in kobe-pharynx Within functional limits

## 2017-06-20 NOTE — PROGRESS NOTE ADULT - PROBLEM SELECTOR PLAN 1
s/p Chest tube  s/p VATS 6/9/17  Pleural fluid culture with no growth  Blood cultures no growth  Continue Zosyn; End Date June 27th s/p Chest tube  s/p VATS 6/9/17  Pleural fluid culture with no growth  Blood cultures no growth  Continue Zosyn; End Date June 27th  REPEAT xray in next 1 - 2 days.

## 2017-06-20 NOTE — PROGRESS NOTE ADULT - SUBJECTIVE AND OBJECTIVE BOX
[facility]  INFECTIOUS DISEASES AND [facility]NAL [facility]IC[facility] OF [place] [facility]  =======================================================  Ritesh Lang MD  Diplomates American Board of Internal Medicine and Infectious Diseases  =======================================================    SAMIRA NUGENT [id number]    Follow up: Leukocytosis; Pleural effusion, empyema  s/p chest tube on 6/2/17, s/p VATS 6/9/17    patient seen and examined in follow up.  Chart and labs reviewed.     =======================================================  Allergies:  Allergy Status Unknown  Chicken (Other (Unknown))      =======================================================  Medications:  enoxaparin Injectable 40milliGRAM(s) SubCutaneous every 24 hours  piperacillin/tazobactam IVPB. 3.375Gram(s) IV Intermittent every 8 hours  levothyroxine 150MICROGram(s) Oral daily  acetaminophen   Tablet 650milliGRAM(s) Oral every 6 hours PRN  pantoprazole    Tablet 40milliGRAM(s) Oral before breakfast  saccharomyces boulardii 250milliGRAM(s) Oral two times a day  haloperidol     Tablet 2milliGRAM(s) Oral two times a day  diVALproex DR 500milliGRAM(s) Oral two times a day  artificial  tears Solution 1Drop(s) Both EYES every 8 hours PRN     =======================================================     REVIEW OF SYSTEMS:  CONSTITUTIONAL:  No Fever or chills  HEENT:   No diplopia or blurred vision.  No earache, sore throat or runny nose.  CARDIOVASCULAR:  No pressure, squeezing, strangling, tightness, heaviness or aching about the chest, neck, axilla or epigastrium.  RESPIRATORY:  No cough, shortness of breath, PND or orthopnea.  GASTROINTESTINAL:  No nausea, vomiting or diarrhea.  GENITOURINARY:  No dysuria, frequency or urgency. No Blood in urine  MUSCULOSKELETAL:  no joint aches, no muscle pain  SKIN:  No change in skin, hair or nails.  NEUROLOGIC:  No paresthesias, fasciculations, seizures or weakness.  PSYCHIATRIC:  No disorder of thought or mood.  ENDOCRINE:  No heat or cold intolerance, polyuria or polydipsia.  HEMATOLOGICAL:  No easy bruising or bleeding.     =======================================================     Physical Exam:  ICU Vital Signs Last 24 Hrs  T(C): 36.7, Max: 36.9 (06-19 @ 16:00)  T(F): 98.1, Max: 98.5 (06-19 @ 16:00)  HR: 85 (81 - 85)  BP: 110/64 (101/62 - 110/64)  RR: 18 (18 - 18)  SpO2: 97% (94% - 97%)    GEN: NAD, pleasant  HEENT: normocephalic and atraumatic. EOMI. CASSANDRA.    NECK: Supple. No carotid bruits.  No lymphadenopathy or thyromegaly.  LUNGS: Clear to auscultation.  HEART: Regular rate and rhythm without murmur.  ABDOMEN: Soft, nontender, and nondistended.  Positive bowel sounds.    : No CVA tenderness  EXTREMITIES: Without any cyanosis, clubbing, rash, lesions or edema.  MSK: no joint swelling  NEUROLOGIC: Cranial nerves II through XII are grossly intact.  PSYCHIATRIC: Appropriate affect .  SKIN: No ulceration or induration present.    =======================================================    Labs:  06-20    141  |  101  |  16.0  ----------------------------<  77  3.8   |  25.0  |  1.19    Ca    8.9      20 Jun 2017 08:32  Phos  4.1     06-20  Mg     1.8     06-20                            9.0    12.4  )-----------( 532      ( 20 Jun 2017 08:32 )             27.7         CAPILLARY BLOOD GLUCOSE        RECENT CULTURES:  06-11 @ 11:51 .Blood Blood     No growth at 5 days.        06-09 @ 21:58 .Body Fluid     No growth at 1 week.        06-09 @ 11:56 .Body Fluid Coag Negative Staphylococcus    Coag Negative Staphylococcus Growing in broth media only    No WBC's seen.  No organisms seen [facility]  INFECTIOUS DISEASES AND [facility] [place] [facility]  =======================================================  Ritesh Moeller MD [facility]   Jeferson Lang MD  Diplomates American Board of Internal Medicine and Infectious Diseases  =======================================================    SAMIRA NUGENT [id number]    Follow up: Leukocytosis; Pleural effusion, empyema  s/p chest tube on 6/2/17, s/p VATS 6/9/17    patient seen and examined in follow up.  Chart and labs reviewed.   patient minimally verbal.  Aide at bedside.   Sister at bedside.     =======================================================  Allergies:  Allergy Status Unknown  Chicken (Other (Unknown))      =======================================================  Medications:  enoxaparin Injectable 40milliGRAM(s) SubCutaneous every 24 hours  piperacillin/tazobactam IVPB. 3.375Gram(s) IV Intermittent every 8 hours  levothyroxine 150MICROGram(s) Oral daily  acetaminophen   Tablet 650milliGRAM(s) Oral every 6 hours PRN  pantoprazole    Tablet 40milliGRAM(s) Oral before breakfast  saccharomyces boulardii 250milliGRAM(s) Oral two times a day  haloperidol     Tablet 2milliGRAM(s) Oral two times a day  diVALproex DR 500milliGRAM(s) Oral two times a day  artificial  tears Solution 1Drop(s) Both EYES every 8 hours PRN     =======================================================     REVIEW OF SYSTEMS:    UNABLE TO OBTAIN due to condition    =======================================================     Physical Exam:  ICU Vital Signs Last 24 Hrs  T(C): 36.7, Max: 36.9 (06-19 @ 16:00)  T(F): 98.1, Max: 98.5 (06-19 @ 16:00)  HR: 85 (81 - 85)  BP: 110/64 (101/62 - 110/64)  RR: 18 (18 - 18)  SpO2: 97% (94% - 97%)    GEN: NAD, pleasant, thin  HEENT: normocephalic and atraumatic. EOMI. CASSANDRA.    NECK: Supple. No carotid bruits.  No lymphadenopathy or thyromegaly.  LUNGS: Clear to auscultation anteriorly . LEFT CHEST mid-ax line with island dressing in place  HEART: Regular rate and rhythm without murmur.  ABDOMEN: Soft, nontender, and nondistended.  Positive bowel sounds.    : No CVA tenderness  EXTREMITIES: Without any cyanosis, clubbing, rash, lesions or edema.  MSK: no joint swelling  NEUROLOGIC: Cranial nerves II through XII are grossly intact.  PSYCHIATRIC: Appropriate affect .  SKIN: No ulceration or induration present.    =======================================================    Labs:  06-20    141  |  101  |  16.0  ----------------------------<  77  3.8   |  25.0  |  1.19    Ca    8.9      20 Jun 2017 08:32  Phos  4.1     06-20  Mg     1.8     06-20                            9.0    12.4  )-----------( 532      ( 20 Jun 2017 08:32 )             27.7         CAPILLARY BLOOD GLUCOSE        RECENT CULTURES:  06-11 @ 11:51 .Blood Blood     No growth at 5 days.        06-09 @ 21:58 .Body Fluid     No growth at 1 week.        06-09 @ 11:56 .Body Fluid Coag Negative Staphylococcus    Coag Negative Staphylococcus Growing in broth media only    No WBC's seen.  No organisms seen

## 2017-06-20 NOTE — SWALLOW VFSS/MBS ASSESSMENT ADULT - NS SWALLOW VFSS REC ASPIR MON
change of breathing pattern/oral hygiene/fever/position upright (90Y)/cough/gurgly voice/throat clearing/pneumonia/upper respiratory infection

## 2017-06-20 NOTE — SWALLOW VFSS/MBS ASSESSMENT ADULT - DIAGNOSTIC IMPRESSIONS
Mild oral dysphagia marked by reduced lingual coordination; Pharyngeal stage WFL, no penetration, no aspiration observed.

## 2017-06-20 NOTE — PROGRESS NOTE ADULT - PROBLEM SELECTOR PLAN 1
Afebrile. with left sided pleural effusion: S/p VATS with pleurodesis. WIth stable leucocytosis, seen today at 12.4, thrombocytosis noted - likely reactive   Repeat blood cultures neg. on 6/11  C/w zosyn until june 27 per ID Dr Moeller, c/w florastor   s/p CT removed on 6/13, repeat cxr noted. s/p vats on 6/19  repeat cxr improved, rpt again in 3 days  CT surgery on board - no further intervention

## 2017-06-20 NOTE — PROGRESS NOTE ADULT - ASSESSMENT
46 yo M with h/o schizoaffective disorder, selective mutism, recurrent pleural effusion (transudative) here with anroexia and pleural effusion. 46 yo M with h/o schizoaffective disorder, selective mutism, recurrent pleural effusion (transudative) represents from [place] for anorexia and refusal to take PO medications. He was last discharged after thoracentesis (1400cc) of transudative pleural effusion in left lateral region. Upon readmission, repeat chest xray showed recurrent moderate left pleural effusion. On 5/29, pt had seizure episode 2/2 noncompliance with PO medications, then transitioned to IV valproate. MRI brain x 2 were negative for acute infarct. Pt noted to have worsening leukocytosis, elevated CRP and procalcitonin. Per ID, vanc and zosyn were started. CT chest showed worsening left pleural effusion. CTS placed chest tube on 6/4. Pt had rapid response on 6/6, likely due to clozapine toxicity and was monitored overnight in MICU. On 6/9, pt had VATS which did not show evidence of infection and partially trapped lung. Pleurodesis was performed and had 1500cc drained. Had last chest tube removed on 6/13. Repeat cxr shows no change, recently done again with improvement of RLL

## 2017-06-20 NOTE — PROGRESS NOTE ADULT - SUBJECTIVE AND OBJECTIVE BOX
more alert mood improved observed laughing at TV show ate breakfast per aide at bedside  smiling broadly offers no complaints nods in response to questions remains electively mute

## 2017-06-21 DIAGNOSIS — D72.823 LEUKEMOID REACTION: ICD-10-CM

## 2017-06-21 LAB
ANISOCYTOSIS BLD QL: SLIGHT — SIGNIFICANT CHANGE UP
BASOPHILS # BLD AUTO: 0 K/UL — SIGNIFICANT CHANGE UP (ref 0–0.2)
EOSINOPHIL # BLD AUTO: 0.1 K/UL — SIGNIFICANT CHANGE UP (ref 0–0.5)
EOSINOPHIL NFR BLD AUTO: 1 % — SIGNIFICANT CHANGE UP (ref 0–6)
HCT VFR BLD CALC: 29.7 % — LOW (ref 42–52)
HGB BLD-MCNC: 9.6 G/DL — LOW (ref 14–18)
HYPOCHROMIA BLD QL: SLIGHT — SIGNIFICANT CHANGE UP
LYMPHOCYTES # BLD AUTO: 29 % — SIGNIFICANT CHANGE UP (ref 20–55)
LYMPHOCYTES # BLD AUTO: 3.3 K/UL — SIGNIFICANT CHANGE UP (ref 1–4.8)
MACROCYTES BLD QL: SLIGHT — SIGNIFICANT CHANGE UP
MCHC RBC-ENTMCNC: 28.2 PG — SIGNIFICANT CHANGE UP (ref 27–31)
MCHC RBC-ENTMCNC: 32.3 G/DL — SIGNIFICANT CHANGE UP (ref 32–36)
MCV RBC AUTO: 87.4 FL — SIGNIFICANT CHANGE UP (ref 80–94)
MICROCYTES BLD QL: SLIGHT — SIGNIFICANT CHANGE UP
MONOCYTES # BLD AUTO: 0.6 K/UL — SIGNIFICANT CHANGE UP (ref 0–0.8)
MONOCYTES NFR BLD AUTO: 5 % — SIGNIFICANT CHANGE UP (ref 3–10)
MYELOCYTES NFR BLD: 3 % — HIGH (ref 0–0)
NEUTROPHILS # BLD AUTO: 8 K/UL — SIGNIFICANT CHANGE UP (ref 1.8–8)
NEUTROPHILS NFR BLD AUTO: 61 % — SIGNIFICANT CHANGE UP (ref 37–73)
PLAT MORPH BLD: NORMAL — SIGNIFICANT CHANGE UP
PLATELET # BLD AUTO: 572 K/UL — HIGH (ref 150–400)
POIKILOCYTOSIS BLD QL AUTO: SLIGHT — SIGNIFICANT CHANGE UP
PROCALCITONIN SERPL-MCNC: 0.07 NG/ML — SIGNIFICANT CHANGE UP (ref 0–0.04)
RBC # BLD: 3.4 M/UL — LOW (ref 4.6–6.2)
RBC # FLD: 15.9 % — HIGH (ref 11–15.6)
RBC BLD AUTO: ABNORMAL
VARIANT LYMPHS # BLD: 1 % — SIGNIFICANT CHANGE UP (ref 0–6)
WBC # BLD: 13 K/UL — HIGH (ref 4.8–10.8)
WBC # FLD AUTO: 13 K/UL — HIGH (ref 4.8–10.8)

## 2017-06-21 PROCEDURE — 99232 SBSQ HOSP IP/OBS MODERATE 35: CPT

## 2017-06-21 PROCEDURE — 99233 SBSQ HOSP IP/OBS HIGH 50: CPT

## 2017-06-21 RX ORDER — HALOPERIDOL DECANOATE 100 MG/ML
2 INJECTION INTRAMUSCULAR EVERY 8 HOURS
Qty: 0 | Refills: 0 | Status: DISCONTINUED | OUTPATIENT
Start: 2017-06-21 | End: 2017-06-21

## 2017-06-21 RX ORDER — HALOPERIDOL DECANOATE 100 MG/ML
2 INJECTION INTRAMUSCULAR EVERY 8 HOURS
Qty: 0 | Refills: 0 | Status: DISCONTINUED | OUTPATIENT
Start: 2017-06-21 | End: 2017-06-29

## 2017-06-21 RX ADMIN — HALOPERIDOL DECANOATE 2 MILLIGRAM(S): 100 INJECTION INTRAMUSCULAR at 05:40

## 2017-06-21 RX ADMIN — DIVALPROEX SODIUM 500 MILLIGRAM(S): 500 TABLET, DELAYED RELEASE ORAL at 17:14

## 2017-06-21 RX ADMIN — ENOXAPARIN SODIUM 40 MILLIGRAM(S): 100 INJECTION SUBCUTANEOUS at 21:55

## 2017-06-21 RX ADMIN — Medication 1 DROP(S): at 07:55

## 2017-06-21 RX ADMIN — PANTOPRAZOLE SODIUM 40 MILLIGRAM(S): 20 TABLET, DELAYED RELEASE ORAL at 05:41

## 2017-06-21 RX ADMIN — PIPERACILLIN AND TAZOBACTAM 25 GRAM(S): 4; .5 INJECTION, POWDER, LYOPHILIZED, FOR SOLUTION INTRAVENOUS at 21:55

## 2017-06-21 RX ADMIN — PIPERACILLIN AND TAZOBACTAM 25 GRAM(S): 4; .5 INJECTION, POWDER, LYOPHILIZED, FOR SOLUTION INTRAVENOUS at 05:40

## 2017-06-21 RX ADMIN — Medication 150 MICROGRAM(S): at 05:40

## 2017-06-21 RX ADMIN — HALOPERIDOL DECANOATE 2 MILLIGRAM(S): 100 INJECTION INTRAMUSCULAR at 17:13

## 2017-06-21 RX ADMIN — Medication 250 MILLIGRAM(S): at 17:14

## 2017-06-21 RX ADMIN — PIPERACILLIN AND TAZOBACTAM 25 GRAM(S): 4; .5 INJECTION, POWDER, LYOPHILIZED, FOR SOLUTION INTRAVENOUS at 13:23

## 2017-06-21 RX ADMIN — DIVALPROEX SODIUM 500 MILLIGRAM(S): 500 TABLET, DELAYED RELEASE ORAL at 05:40

## 2017-06-21 RX ADMIN — Medication 250 MILLIGRAM(S): at 05:40

## 2017-06-21 NOTE — PROGRESS NOTE ADULT - SUBJECTIVE AND OBJECTIVE BOX
[facility]  INFECTIOUS DISEASES AND [facility]IC[facility] [place] [facility]  =======================================================  Ritesh Lang MD  Diplomates American Board of Internal Medicine and Infectious Diseases  =======================================================    SAMIRA NUGENT [id number]    Follow up: Leukocytosis; Pleural effusion, empyema  s/p chest tube on 6/2/17, s/p VATS 6/9/17    patient seen and examined in follow up.  Chart and labs reviewed.   no new culture findings.    Labs show WBC elevated to 13.     =======================================================  Allergies:  Allergy Status Unknown  Chicken (Other (Unknown))      =======================================================  MEDICATIONS  (STANDING):  enoxaparin Injectable 40milliGRAM(s) SubCutaneous every 24 hours  piperacillin/tazobactam IVPB. 3.375Gram(s) IV Intermittent every 8 hours  levothyroxine 150MICROGram(s) Oral daily  pantoprazole    Tablet 40milliGRAM(s) Oral before breakfast  saccharomyces boulardii 250milliGRAM(s) Oral two times a day  haloperidol     Tablet 2milliGRAM(s) Oral two times a day  diVALproex DR 500milliGRAM(s) Oral two times a day       =======================================================     REVIEW OF SYSTEMS:    UNABLE TO OBTAIN due to condition    =======================================================     Physical Exam:  Vital Signs Last 24 Hrs  T(C): 36.4, Max: 36.4 (06-20 @ 16:12)  T(F): 97.5, Max: 97.5 (06-20 @ 16:12)  HR: 78 (75 - 84)  BP: 112/74 (103/67 - 112/74)  RR: 18 (18 - 18)  SpO2: 97% (96% - 97%)    GEN: NAD, pleasant, thin  HEENT: normocephalic and atraumatic. EOMI. CASSANDRA.    NECK: Supple. No carotid bruits.  No lymphadenopathy or thyromegaly.  LUNGS: Clear to auscultation anteriorly . LEFT CHEST mid-ax line with island dressing in place  HEART: Regular rate and rhythm without murmur.  ABDOMEN: Soft, nontender, and nondistended.  Positive bowel sounds.    : No CVA tenderness  EXTREMITIES: Without any cyanosis, clubbing, rash, lesions or edema.  MSK: no joint swelling  NEUROLOGIC: Cranial nerves II through XII are grossly intact.  PSYCHIATRIC: Appropriate affect .  SKIN: No ulceration or induration present.    =======================================================    Labs:    =======================================================    Labs:  06-20    141  |  101  |  16.0  ----------------------------<  77  3.8   |  25.0  |  1.19    Ca    8.9      20 Jun 2017 08:32  Phos  4.1     06-20  Mg     1.8     06-20                            9.6    13.0  )-----------( 572      ( 21 Jun 2017 07:47 )             29.7            RECENT CULTURES:  06-11 @ 11:51 .Blood Blood     No growth at 5 days.        06-09 @ 21:58 .Body Fluid     No growth at 1 week.        06-09 @ 11:56 .Body Fluid Coag Negative Staphylococcus    Coag Negative Staphylococcus Growing in broth media only    No WBC's seen.  No organisms seen

## 2017-06-21 NOTE — PROGRESS NOTE ADULT - PROBLEM SELECTOR PLAN 1
s/p Chest tube, s/p VATS 6/9/17  Pleural fluid culture, with CoNS from broth only.  Blood cultures no growth  - CHECK PROCALCITONIN due to ELEVATED WBC    Continue Zosyn; End Date June 27th  REPEAT xray in next 1 - 2 days. s/p Chest tube, s/p VATS 6/9/17  Pleural fluid culture, with CoNS from broth only.  Blood cultures no growth      Continue Zosyn; End Date June 27th  REPEAT xray in next 1 - 2 days.

## 2017-06-21 NOTE — PROGRESS NOTE ADULT - SUBJECTIVE AND OBJECTIVE BOX
Patient is a 47y old  Male who presents with a chief complaint of wont eat (27 May 2017 05:11)    Patient seen and examined at bedside. No acute distress and no acute overnight events. Patient seated in chair with brother and sister at bedside. Started attempting to punch me when I was speaking to his family. Also started screaming and speaking with loud voices to the remote control     ROS: No chest pain, palpitations, sob, light headedness/dizziness, difficulty breathing/cough, fevers/chills    Vital Signs Last 24 Hrs  T(C): 36.4, Max: 36.4 (06-20 @ 16:12)  T(F): 97.5, Max: 97.5 (06-20 @ 16:12)  HR: 78 (75 - 84)  BP: 112/74 (103/67 - 112/74)  BP(mean): --  RR: 18 (18 - 18)  SpO2: 97% (96% - 97%)    PHYSICAL EXAM:    GENERAL: NAD   HEENT: PERRL, +EOMI, MMM  CVS: s1s2nl, no m/r/g. RRR   Lungs - Decreased air entry to Right lower base, improved to left base. left dressing intact under axillary region on the left.   Abd: soft, nt, bs+  EXTREMITIES: no c/c/e.   SKIN: hyperpigmentation on legs  PSYCH: flat affect      LABS:                        9.6    13.0  )-----------( 572      ( 21 Jun 2017 07:47 )             29.7   06-20    141  |  101  |  16.0  ----------------------------<  77  3.8   |  25.0  |  1.19    Ca    8.9      20 Jun 2017 08:32  Phos  4.1     06-20  Mg     1.8     06-20    MEDICATIONS  (STANDING):  enoxaparin Injectable 40milliGRAM(s) SubCutaneous every 24 hours  piperacillin/tazobactam IVPB. 3.375Gram(s) IV Intermittent every 8 hours  levothyroxine 150MICROGram(s) Oral daily  pantoprazole    Tablet 40milliGRAM(s) Oral before breakfast  saccharomyces boulardii 250milliGRAM(s) Oral two times a day  haloperidol     Tablet 2milliGRAM(s) Oral two times a day  diVALproex DR 500milliGRAM(s) Oral two times a day    MEDICATIONS  (PRN):  acetaminophen   Tablet 650milliGRAM(s) Oral every 6 hours PRN For Temp greater than 38.5 C (101.3 F)  artificial  tears Solution 1Drop(s) Both EYES every 8 hours PRN Dry Eyes        RADIOLOGY & ADDITIONAL TESTS:

## 2017-06-21 NOTE — PROGRESS NOTE ADULT - PROBLEM SELECTOR PLAN 1
S/p VATS with pleurodesis. WIth stable slightly increasing leucocytosis and reactive thrombocytosis noted   f/up cbc with diff and procalcitonin   monitor site of insertion of tube on the left   Seen by ID   C/w zosyn until june 27 per ID Dr Moeller, c/w florastor   s/p CT removed on 6/13, repeat cxr noted. s/p vats on 6/19  repeat cxr improved, rpt again in 3 days  CT surgery on board - no further intervention.

## 2017-06-21 NOTE — PROGRESS NOTE ADULT - ASSESSMENT
48 yo M with h/o schizoaffective disorder, selective mutism, recurrent pleural effusion (transudative) here with anroexia and pleural effusion. 48 yo M with h/o schizoaffective disorder, selective mutism, recurrent pleural effusion (transudative) represents from [place] for anorexia and refusal to take PO medications. He was last discharged after thoracentesis (1400cc) of transudative pleural effusion in left lateral region. Upon readmission, repeat chest xray showed recurrent moderate left pleural effusion. On 5/29, pt had seizure episode 2/2 noncompliance with PO medications, then transitioned to IV valproate. MRI brain x 2 were negative for acute infarct. Pt noted to have worsening leukocytosis, elevated CRP and procalcitonin. Per ID, vanc and zosyn were started. CT chest showed worsening left pleural effusion. CTS placed chest tube on 6/4. Pt had rapid response on 6/6, likely due to clozapine toxicity and was monitored overnight in MICU. On 6/9, pt had VATS which did not show evidence of infection and partially trapped lung. Pleurodesis was performed and had 1500cc drained. Had last chest tube removed on 6/13. Repeat cxr shows no change, recently done again with improvement of RLL 48 yo M with h/o schizoaffective disorder, selective mutism, recurrent pleural effusion (transudative) represents from [place] for anorexia and refusal to take PO medications. He was last discharged after thoracentesis (1400cc) of transudative pleural effusion in left lateral region. Upon readmission, repeat chest xray showed recurrent moderate left pleural effusion. On 5/29, pt had seizure episode 2/2 noncompliance with PO medications, then transitioned to IV valproate. MRI brain x 2 were negative for acute infarct. Pt noted to have worsening leukocytosis, elevated CRP and procalcitonin. Per ID, vanc and zosyn were started. CT chest showed worsening left pleural effusion. CTS placed chest tube on 6/4. Pt had rapid response on 6/6, likely due to clozapine toxicity and was monitored overnight in MICU. On 6/9, pt had VATS which did not show evidence of infection and partially trapped lung. Pleurodesis was performed and had 1500cc drained. Had last chest tube removed on 6/13. Repeat cxr shows no change, recently done again with improvement of RLL

## 2017-06-21 NOTE — PROGRESS NOTE ADULT - PROBLEM SELECTOR PLAN 3
C/w valproate, dose changed by psych today, c/w haldol standing dose   will add a haldol IVP as needed

## 2017-06-22 LAB
ANISOCYTOSIS BLD QL: SLIGHT — SIGNIFICANT CHANGE UP
EOSINOPHIL # BLD AUTO: 0.1 K/UL — SIGNIFICANT CHANGE UP (ref 0–0.5)
HCT VFR BLD CALC: 29 % — LOW (ref 42–52)
HGB BLD-MCNC: 9.4 G/DL — LOW (ref 14–18)
HYPOCHROMIA BLD QL: SLIGHT — SIGNIFICANT CHANGE UP
LYMPHOCYTES # BLD AUTO: 19 % — LOW (ref 20–55)
LYMPHOCYTES # BLD AUTO: 2.6 K/UL — SIGNIFICANT CHANGE UP (ref 1–4.8)
MCHC RBC-ENTMCNC: 28.4 PG — SIGNIFICANT CHANGE UP (ref 27–31)
MCHC RBC-ENTMCNC: 32.4 G/DL — SIGNIFICANT CHANGE UP (ref 32–36)
MCV RBC AUTO: 87.6 FL — SIGNIFICANT CHANGE UP (ref 80–94)
METAMYELOCYTES # FLD: 1 % — HIGH (ref 0–0)
MONOCYTES # BLD AUTO: 0.3 K/UL — SIGNIFICANT CHANGE UP (ref 0–0.8)
MONOCYTES NFR BLD AUTO: 2 % — LOW (ref 3–10)
MYELOCYTES NFR BLD: 3 % — HIGH (ref 0–0)
NEUTROPHILS # BLD AUTO: 9.2 K/UL — HIGH (ref 1.8–8)
NEUTROPHILS NFR BLD AUTO: 66 % — SIGNIFICANT CHANGE UP (ref 37–73)
NEUTS BAND # BLD: 9 % — HIGH (ref 0–8)
PLAT MORPH BLD: NORMAL — SIGNIFICANT CHANGE UP
PLATELET # BLD AUTO: 524 K/UL — HIGH (ref 150–400)
POIKILOCYTOSIS BLD QL AUTO: SLIGHT — SIGNIFICANT CHANGE UP
PROCALCITONIN SERPL-MCNC: 0.06 NG/ML — SIGNIFICANT CHANGE UP (ref 0–0.04)
RBC # BLD: 3.31 M/UL — LOW (ref 4.6–6.2)
RBC # FLD: 16.4 % — HIGH (ref 11–15.6)
RBC BLD AUTO: ABNORMAL
WBC # BLD: 13.1 K/UL — HIGH (ref 4.8–10.8)
WBC # FLD AUTO: 13.1 K/UL — HIGH (ref 4.8–10.8)

## 2017-06-22 PROCEDURE — 99232 SBSQ HOSP IP/OBS MODERATE 35: CPT

## 2017-06-22 PROCEDURE — 99233 SBSQ HOSP IP/OBS HIGH 50: CPT

## 2017-06-22 PROCEDURE — 71250 CT THORAX DX C-: CPT | Mod: 26

## 2017-06-22 RX ORDER — SIMETHICONE 80 MG/1
80 TABLET, CHEWABLE ORAL EVERY 8 HOURS
Qty: 0 | Refills: 0 | Status: DISCONTINUED | OUTPATIENT
Start: 2017-06-22 | End: 2017-06-29

## 2017-06-22 RX ORDER — HALOPERIDOL DECANOATE 100 MG/ML
5 INJECTION INTRAMUSCULAR
Qty: 0 | Refills: 0 | Status: DISCONTINUED | OUTPATIENT
Start: 2017-06-22 | End: 2017-06-29

## 2017-06-22 RX ADMIN — PIPERACILLIN AND TAZOBACTAM 25 GRAM(S): 4; .5 INJECTION, POWDER, LYOPHILIZED, FOR SOLUTION INTRAVENOUS at 21:48

## 2017-06-22 RX ADMIN — Medication 250 MILLIGRAM(S): at 05:15

## 2017-06-22 RX ADMIN — HALOPERIDOL DECANOATE 2 MILLIGRAM(S): 100 INJECTION INTRAMUSCULAR at 05:16

## 2017-06-22 RX ADMIN — PANTOPRAZOLE SODIUM 40 MILLIGRAM(S): 20 TABLET, DELAYED RELEASE ORAL at 05:15

## 2017-06-22 RX ADMIN — ENOXAPARIN SODIUM 40 MILLIGRAM(S): 100 INJECTION SUBCUTANEOUS at 21:48

## 2017-06-22 RX ADMIN — PIPERACILLIN AND TAZOBACTAM 25 GRAM(S): 4; .5 INJECTION, POWDER, LYOPHILIZED, FOR SOLUTION INTRAVENOUS at 12:04

## 2017-06-22 RX ADMIN — Medication 250 MILLIGRAM(S): at 16:50

## 2017-06-22 RX ADMIN — HALOPERIDOL DECANOATE 5 MILLIGRAM(S): 100 INJECTION INTRAMUSCULAR at 16:51

## 2017-06-22 RX ADMIN — PIPERACILLIN AND TAZOBACTAM 25 GRAM(S): 4; .5 INJECTION, POWDER, LYOPHILIZED, FOR SOLUTION INTRAVENOUS at 05:15

## 2017-06-22 RX ADMIN — Medication 150 MICROGRAM(S): at 05:16

## 2017-06-22 RX ADMIN — DIVALPROEX SODIUM 500 MILLIGRAM(S): 500 TABLET, DELAYED RELEASE ORAL at 05:17

## 2017-06-22 RX ADMIN — DIVALPROEX SODIUM 500 MILLIGRAM(S): 500 TABLET, DELAYED RELEASE ORAL at 16:51

## 2017-06-22 NOTE — PROGRESS NOTE ADULT - SUBJECTIVE AND OBJECTIVE BOX
Patient is a 47y old  Male who presents with a chief complaint of wont eat (27 May 2017 05:11)    Patient seen and examined at bedside. No acute distress and no acute overnight events. Patient laying in bed with family at bedside, awake, pleasant and calm today    ROS: No chest pain, palpitations, sob, light headedness/dizziness, difficulty breathing/cough, fevers/chills    Vital Signs Last 24 Hrs  T(C): 36.8, Max: 37.3 (06-21 @ 16:26)  T(F): 98.3, Max: 99.1 (06-21 @ 16:26)  HR: 78 (78 - 86)  BP: 123/77 (111/67 - 123/77)  BP(mean): --  RR: 18 (18 - 20)  SpO2: 97% (97% - 99%)    PHYSICAL EXAM:    GENERAL: NAD   HEENT: PERRL, +EOMI, MMM  CVS: s1s2nl, no m/r/g. RRR   Lungs - Decreased air entry to Right lower base, improved to left base. left dressing intact under axillary region on the left.   Abd: soft, nt, bs+  EXTREMITIES: no c/c/e.   SKIN: hyperpigmentation on legs  PSYCH: flat affect      LABS:                        9.4    13.1  )-----------( 524      ( 22 Jun 2017 09:28 )             29.0   Procalcitonin, Serum (06.22.17 @ 09:28)    Procalcitonin, Serum: 0.06: Procalcitonin (PCT) Interpretation (ng/mL)     MEDICATIONS  (STANDING):  enoxaparin Injectable 40milliGRAM(s) SubCutaneous every 24 hours  piperacillin/tazobactam IVPB. 3.375Gram(s) IV Intermittent every 8 hours  levothyroxine 150MICROGram(s) Oral daily  pantoprazole    Tablet 40milliGRAM(s) Oral before breakfast  saccharomyces boulardii 250milliGRAM(s) Oral two times a day  diVALproex DR 500milliGRAM(s) Oral two times a day  haloperidol     Tablet 5milliGRAM(s) Oral two times a day    MEDICATIONS  (PRN):  acetaminophen   Tablet 650milliGRAM(s) Oral every 6 hours PRN For Temp greater than 38.5 C (101.3 F)  artificial  tears Solution 1Drop(s) Both EYES every 8 hours PRN Dry Eyes  haloperidol    Injectable 2milliGRAM(s) IntraMuscular every 8 hours PRN Agitation    RADIOLOGY & ADDITIONAL TESTS:

## 2017-06-22 NOTE — PROGRESS NOTE ADULT - SUBJECTIVE AND OBJECTIVE BOX
[facility]  INFECTIOUS DISEASES AND [facility] [place] [facility]  =======================================================  Ritesh Lang MD  Diplomates American Board of Internal Medicine and Infectious Diseases  =======================================================    SAMIRA NUGENT [id number]  Follow up: Leukocytosis; Pleural effusion, empyema  s/p chest tube on 6/2/17, s/p VATS 6/9/17    patient seen and examined in follow up.  Chart and labs reviewed.   still with elevated WBC of 13.     Low grade temperatures. No fevers noted.     =======================================================  Allergies:  Allergy Status Unknown  Chicken (Other (Unknown))      =======================================================  Medications:  enoxaparin Injectable 40milliGRAM(s) SubCutaneous every 24 hours  piperacillin/tazobactam IVPB. 3.375Gram(s) IV Intermittent every 8 hours  levothyroxine 150MICROGram(s) Oral daily  acetaminophen   Tablet 650milliGRAM(s) Oral every 6 hours PRN  pantoprazole    Tablet 40milliGRAM(s) Oral before breakfast  saccharomyces boulardii 250milliGRAM(s) Oral two times a day  diVALproex DR 500milliGRAM(s) Oral two times a day  artificial  tears Solution 1Drop(s) Both EYES every 8 hours PRN  haloperidol    Injectable 2milliGRAM(s) IntraMuscular every 8 hours PRN  haloperidol     Tablet 5milliGRAM(s) Oral two times a day       =======================================================     REVIEW OF SYSTEMS:  Unable to obtain  =======================================================     Physical Exam:  ICU Vital Signs Last 24 Hrs  T(C): 36.8, Max: 37.3 (06-21 @ 16:26)  T(F): 98.3, Max: 99.1 (06-21 @ 16:26)  HR: 78 (78 - 86)  BP: 123/77 (111/67 - 123/77)     RR: 18 (18 - 20)  SpO2: 97% (97% - 99%)    GEN: NAD, pleasant, thin  HEENT: normocephalic and atraumatic. EOMI. CASSANDRA.    NECK: Supple. No carotid bruits.  No lymphadenopathy or thyromegaly.  LUNGS: POOR INSP effort. LEFT CHEST mid-ax line sutures in place under dressing; no draiange; with island dressing in place  HEART: Regular rate and rhythm without murmur.  ABDOMEN: Soft, nontender, and nondistended.  Positive bowel sounds.    : No CVA tenderness  EXTREMITIES: Without any cyanosis, clubbing, rash, lesions or edema.  MSK: no joint swelling  NEUROLOGIC: Cranial nerves II through XII are grossly intact.  PSYCHIATRIC: Appropriate affect .  SKIN: No ulceration or induration present.    =======================================================    Labs:                              9.4    13.1  )-----------( 524      ( 22 Jun 2017 09:28 )             29.0        CAPILLARY BLOOD GLUCOSE        RECENT CULTURES:  06-11 @ 11:51 .Blood Blood     No growth at 5 days.        06-09 @ 21:58 .Body Fluid     No growth at 1 week.        06-09 @ 11:56 .Body Fluid Coag Negative Staphylococcus    Coag Negative Staphylococcus Growing in broth media only    No WBC's seen.  No organisms seen

## 2017-06-22 NOTE — PROGRESS NOTE ADULT - PROBLEM SELECTOR PLAN 1
S/p VATS with pleurodesis. WIth stable slightly increasing leucocytosis and reactive thrombocytosis (improved) noted  f/up cbc with diff and procalcitonin noted and per ID - suggest CT non contrast of the chest   monitor site of insertion of tube on the left   C/w zosyn until june 27 per ID Dr Moeller, c/w [place]   CT surgery on board - no further intervention.

## 2017-06-22 NOTE — PROGRESS NOTE ADULT - ASSESSMENT
46 yo M with h/o schizoaffective disorder, selective mutism, recurrent pleural effusion (transudative) represents from [place] for anorexia and refusal to take PO medications. He was last discharged after thoracentesis (1400cc) of transudative pleural effusion in left lateral region. Upon readmission, repeat chest xray showed recurrent moderate left pleural effusion. On 5/29, pt had seizure episode 2/2 noncompliance with PO medications, then transitioned to IV valproate which is also being used for his mood d/o. MRI brain x 2 were negative for acute infarct. Pt noted to have worsening leukocytosis, elevated CRP and procalcitonin. Per ID, vanc and zosyn were started. CT chest showed worsening left pleural effusion. CTS placed chest tube on 6/4. Pt had rapid response on 6/6, likely due to clozapine toxicity and was monitored overnight in MICU. On 6/9, pt had VATS which did not show evidence of infection and partially trapped lung. Pleurodesis was performed and had 1500cc drained. Had last chest tube removed on 6/13 from the left (the right fell out in transport???). Repeat cxr shows no change, recently done again with improvement of RLL; however, wbc of 13 - ID suggesting a non con ct chest to evaluate for interval change. Patient had been constipated, had been on bowel regimen, then had a large loose bm and was subsequently taken off of stool softeners

## 2017-06-22 NOTE — PROGRESS NOTE ADULT - PROBLEM SELECTOR PLAN 4
Appreciate [facility] recs.  haldol oral dose increased, c/w depakon and Haldol prn IM doses ordered as well

## 2017-06-22 NOTE — PROGRESS NOTE ADULT - PROBLEM SELECTOR PLAN 1
s/p Chest tube, s/p VATS 6/9/17  Pleural fluid culture, with CoNS from broth only.  Blood cultures no growth  Continue Zosyn; End Date June 27th; Check CT Chest today to eval for interval change.

## 2017-06-23 LAB
ANION GAP SERPL CALC-SCNC: 15 MMOL/L — SIGNIFICANT CHANGE UP (ref 5–17)
APTT BLD: 36.1 SEC — SIGNIFICANT CHANGE UP (ref 27.5–37.4)
APTT BLD: 38.9 SEC — HIGH (ref 27.5–37.4)
BLD GP AB SCN SERPL QL: SIGNIFICANT CHANGE UP
BUN SERPL-MCNC: 18 MG/DL — SIGNIFICANT CHANGE UP (ref 8–20)
CALCIUM SERPL-MCNC: 9.1 MG/DL — SIGNIFICANT CHANGE UP (ref 8.6–10.2)
CHLORIDE SERPL-SCNC: 102 MMOL/L — SIGNIFICANT CHANGE UP (ref 98–107)
CO2 SERPL-SCNC: 24 MMOL/L — SIGNIFICANT CHANGE UP (ref 22–29)
CREAT SERPL-MCNC: 0.97 MG/DL — SIGNIFICANT CHANGE UP (ref 0.5–1.3)
CULTURE RESULTS: SIGNIFICANT CHANGE UP
GLUCOSE SERPL-MCNC: 90 MG/DL — SIGNIFICANT CHANGE UP (ref 70–115)
GRAM STN FLD: SIGNIFICANT CHANGE UP
HCT VFR BLD CALC: 27.8 % — LOW (ref 42–52)
HGB BLD-MCNC: 8.9 G/DL — LOW (ref 14–18)
INR BLD: 1.19 RATIO — HIGH (ref 0.88–1.16)
INR BLD: 1.21 RATIO — HIGH (ref 0.88–1.16)
MAGNESIUM SERPL-MCNC: 1.8 MG/DL — SIGNIFICANT CHANGE UP (ref 1.6–2.6)
MCHC RBC-ENTMCNC: 28.3 PG — SIGNIFICANT CHANGE UP (ref 27–31)
MCHC RBC-ENTMCNC: 32 G/DL — SIGNIFICANT CHANGE UP (ref 32–36)
MCV RBC AUTO: 88.5 FL — SIGNIFICANT CHANGE UP (ref 80–94)
PHOSPHATE SERPL-MCNC: 4.1 MG/DL — SIGNIFICANT CHANGE UP (ref 2.4–4.7)
PLATELET # BLD AUTO: 569 K/UL — HIGH (ref 150–400)
POTASSIUM SERPL-MCNC: 3.7 MMOL/L — SIGNIFICANT CHANGE UP (ref 3.5–5.3)
POTASSIUM SERPL-SCNC: 3.7 MMOL/L — SIGNIFICANT CHANGE UP (ref 3.5–5.3)
PROTHROM AB SERPL-ACNC: 13.1 SEC — HIGH (ref 9.8–12.7)
PROTHROM AB SERPL-ACNC: 13.4 SEC — HIGH (ref 9.8–12.7)
RBC # BLD: 3.14 M/UL — LOW (ref 4.6–6.2)
RBC # FLD: 16.7 % — HIGH (ref 11–15.6)
SODIUM SERPL-SCNC: 141 MMOL/L — SIGNIFICANT CHANGE UP (ref 135–145)
SPECIMEN SOURCE: SIGNIFICANT CHANGE UP
SPECIMEN SOURCE: SIGNIFICANT CHANGE UP
TYPE + AB SCN PNL BLD: SIGNIFICANT CHANGE UP
WBC # BLD: 14.8 K/UL — HIGH (ref 4.8–10.8)
WBC # FLD AUTO: 14.8 K/UL — HIGH (ref 4.8–10.8)

## 2017-06-23 PROCEDURE — 99233 SBSQ HOSP IP/OBS HIGH 50: CPT

## 2017-06-23 PROCEDURE — 99232 SBSQ HOSP IP/OBS MODERATE 35: CPT

## 2017-06-23 RX ORDER — DIVALPROEX SODIUM 500 MG/1
750 TABLET, DELAYED RELEASE ORAL AT BEDTIME
Qty: 0 | Refills: 0 | Status: DISCONTINUED | OUTPATIENT
Start: 2017-06-23 | End: 2017-06-29

## 2017-06-23 RX ORDER — DIVALPROEX SODIUM 500 MG/1
500 TABLET, DELAYED RELEASE ORAL DAILY
Qty: 0 | Refills: 0 | Status: DISCONTINUED | OUTPATIENT
Start: 2017-06-23 | End: 2017-06-29

## 2017-06-23 RX ADMIN — Medication 150 MICROGRAM(S): at 06:46

## 2017-06-23 RX ADMIN — HALOPERIDOL DECANOATE 5 MILLIGRAM(S): 100 INJECTION INTRAMUSCULAR at 12:28

## 2017-06-23 RX ADMIN — Medication 250 MILLIGRAM(S): at 06:46

## 2017-06-23 RX ADMIN — PIPERACILLIN AND TAZOBACTAM 25 GRAM(S): 4; .5 INJECTION, POWDER, LYOPHILIZED, FOR SOLUTION INTRAVENOUS at 06:45

## 2017-06-23 RX ADMIN — DIVALPROEX SODIUM 750 MILLIGRAM(S): 500 TABLET, DELAYED RELEASE ORAL at 21:24

## 2017-06-23 RX ADMIN — HALOPERIDOL DECANOATE 5 MILLIGRAM(S): 100 INJECTION INTRAMUSCULAR at 18:20

## 2017-06-23 RX ADMIN — PIPERACILLIN AND TAZOBACTAM 25 GRAM(S): 4; .5 INJECTION, POWDER, LYOPHILIZED, FOR SOLUTION INTRAVENOUS at 21:25

## 2017-06-23 RX ADMIN — PIPERACILLIN AND TAZOBACTAM 25 GRAM(S): 4; .5 INJECTION, POWDER, LYOPHILIZED, FOR SOLUTION INTRAVENOUS at 15:42

## 2017-06-23 RX ADMIN — PANTOPRAZOLE SODIUM 40 MILLIGRAM(S): 20 TABLET, DELAYED RELEASE ORAL at 06:46

## 2017-06-23 RX ADMIN — Medication 250 MILLIGRAM(S): at 18:20

## 2017-06-23 RX ADMIN — DIVALPROEX SODIUM 500 MILLIGRAM(S): 500 TABLET, DELAYED RELEASE ORAL at 06:46

## 2017-06-23 RX ADMIN — Medication 650 MILLIGRAM(S): at 23:30

## 2017-06-23 RX ADMIN — DIVALPROEX SODIUM 500 MILLIGRAM(S): 500 TABLET, DELAYED RELEASE ORAL at 15:42

## 2017-06-23 NOTE — PROGRESS NOTE ADULT - PROBLEM SELECTOR PLAN 1
s/p left Chest tube, s/p VATS 6/9/17  Pleural fluid culture, with CoNS from broth only.  Blood cultures no growth  CT chest with loculated RIGHT effusion and decreased aeration  Continue Zosyn

## 2017-06-23 NOTE — PROGRESS NOTE ADULT - SUBJECTIVE AND OBJECTIVE BOX
Patient is a 47y old  Male who presents with a chief complaint of wont eat (27 May 2017 05:11)    Patient seen and examined at bedside. No acute distress and no acute overnight events. Patient seated in chair with sister at bedside. Inappropriate behavior, tried to lean on me, made a few derogatory gestures to me.     ROS: No chest pain, palpitations, sob, light headedness/dizziness, difficulty breathing/cough, fevers/chills    Vital Signs Last 24 Hrs  T(C): 36.7, Max: 36.7 (06-22 @ 16:12)  T(F): 98.1, Max: 98.1 (06-22 @ 16:12)  HR: 96 (76 - 102)  BP: 104/62 (104/62 - 122/65)  BP(mean): --  RR: 18 (18 - 18)  SpO2: 96% (95% - 98%)    PHYSICAL EXAM:    GENERAL: NAD   HEENT: PERRL, +EOMI, MMM  CVS: s1s2nl, no m/r/g. RRR   Lungs - Decreased air entry to Right lower base, improved to left base. left dressing intact under axillary region on the left - sutures noted, no surrounding erythema   Abd: soft, nt, bs+  EXTREMITIES: no c/c/e.   SKIN: hyperpigmentation on legs  PSYCH: flat affect      LABS:                                   8.9    14.8  )-----------( 569      ( 23 Jun 2017 08:16 )             27.8     06-23    141  |  102  |  18.0  ----------------------------<  90  3.7   |  24.0  |  0.97    Ca    9.1      23 Jun 2017 08:16  Phos  4.1     06-23  Mg     1.8     06-23    MEDICATIONS  (STANDING):  piperacillin/tazobactam IVPB. 3.375Gram(s) IV Intermittent every 8 hours  levothyroxine 150MICROGram(s) Oral daily  pantoprazole    Tablet 40milliGRAM(s) Oral before breakfast  saccharomyces boulardii 250milliGRAM(s) Oral two times a day  haloperidol     Tablet 5milliGRAM(s) Oral two times a day  simethicone drops 80milliGRAM(s) Oral every 8 hours  diVALproex DR 500milliGRAM(s) Oral daily  diVALproex DR 750milliGRAM(s) Oral at bedtime    MEDICATIONS  (PRN):  acetaminophen   Tablet 650milliGRAM(s) Oral every 6 hours PRN For Temp greater than 38.5 C (101.3 F)  artificial  tears Solution 1Drop(s) Both EYES every 8 hours PRN Dry Eyes  haloperidol    Injectable 2milliGRAM(s) IntraMuscular every 8 hours PRN Agitation      RADIOLOGY & ADDITIONAL TESTS:    CT chest:  IMPRESSION:      The examination the lung volumes with large right hand moderate to large   left partially loculated pleural effusion and compressive atelectasis.    Small pericardial effusion

## 2017-06-23 NOTE — PROGRESS NOTE ADULT - ASSESSMENT
46 yo M with h/o schizoaffective disorder, selective mutism, recurrent pleural effusion (transudative) represents from [place] for anorexia and refusal to take PO medications. He was last discharged after thoracentesis (1400cc) of transudative pleural effusion in left lateral region. Upon readmission, repeat chest xray showed recurrent moderate left pleural effusion. On 5/29, pt had seizure episode 2/2 noncompliance with PO medications, then transitioned to IV valproate which is also being used for his mood d/o and seizure. MRI brain x 2 were negative for acute infarct. Pt noted to have worsening leukocytosis, elevated CRP and procalcitonin. Per ID, vanc and zosyn were started. CT chest showed worsening left pleural effusion. CTS placed chest tube on 6/4. Pt had rapid response on 6/6, likely due to clozapine toxicity and was monitored overnight in MICU. On 6/9, pt had VATS which did not show evidence of infection and partially trapped lung. Pleurodesis was performed and had 1500cc drained. Had last chest tube removed on 6/13 from the left (the right fell out in transport???). Repeat cxr shows no change, recently done again with improvement of RLL; however, wbc of 13 - ID suggesting a non con ct chest to evaluate for interval change which showed a large loculated right sided effusion and in light of elevating wbc, needed to be drained. Planned for IR drainage and placement of CT today

## 2017-06-23 NOTE — PROGRESS NOTE ADULT - SUBJECTIVE AND OBJECTIVE BOX
[facility]  INFECTIOUS DISEASES AND INTERNAL MEDICINE OF [place] [facility]  =======================================================  Ritesh Lang MD  Diplomates American Board of Internal Medicine and Infectious Diseases  =======================================================    SAMIRA NUGENT [id number]  Follow up: Leukocytosis; Pleural effusion, empyema  s/p left chest tube on 6/2/17, s/p VATS 6/9/17    patient seen and examined in follow up.  Chart and labs reviewed.     WBC continues to elevate. Repeat CT scan appreciated.   per primary team, CT surgery re-consulted    =======================================================  Allergies:  Allergy Status Unknown  Chicken (Other (Unknown))      =======================================================  MEDICATIONS  (STANDING):  enoxaparin Injectable 40milliGRAM(s) SubCutaneous every 24 hours  piperacillin/tazobactam IVPB. 3.375Gram(s) IV Intermittent every 8 hours  levothyroxine 150MICROGram(s) Oral daily  pantoprazole    Tablet 40milliGRAM(s) Oral before breakfast  saccharomyces boulardii 250milliGRAM(s) Oral two times a day  diVALproex DR 500milliGRAM(s) Oral two times a day  haloperidol     Tablet 5milliGRAM(s) Oral two times a day  simethicone drops 80milliGRAM(s) Oral every 8 hours       =======================================================     REVIEW OF SYSTEMS:  Unable to obtain  =======================================================     Physical Exam:  Vital Signs Last 24 Hrs  T(C): 36.7, Max: 36.7 (06-22 @ 16:12)  T(F): 98, Max: 98.1 (06-22 @ 16:12)  HR: 102 (83 - 102)  BP: 122/65 (113/58 - 122/65)  BP(mean): --  RR: 18 (18 - 18)  SpO2: 98% (95% - 98%)    GEN: NAD, pleasant, thin  HEENT: normocephalic and atraumatic. EOMI. CASSANDRA.    NECK: Supple. No carotid bruits.  No lymphadenopathy or thyromegaly.  LUNGS: POOR INSP effort. LEFT CHEST mid-ax line sutures in place under dressing; no draiange; with island dressing in place  HEART: Regular rate and rhythm without murmur.  ABDOMEN: Soft, nontender, and nondistended.  Positive bowel sounds.    : No CVA tenderness  EXTREMITIES: Without any cyanosis, clubbing, rash, lesions or edema.  MSK: no joint swelling  NEUROLOGIC: Cranial nerves II through XII are grossly intact.  PSYCHIATRIC: Appropriate affect .  SKIN: No ulceration or induration present.    =======================================================       =======================================================    Labs:  06-23    141  |  102  |  18.0  ----------------------------<  90  3.7   |  24.0  |  0.97    Ca    9.1      23 Jun 2017 08:16  Phos  4.1     06-23  Mg     1.8     06-23                            8.9    14.8  )-----------( 569      ( 23 Jun 2017 08:16 )             27.8              CAPILLARY BLOOD GLUCOSE        RECENT CULTURES:  06-11 @ 11:51 .Blood Blood     No growth at 5 days.        06-09 @ 21:58 .Body Fluid     No growth at 1 week.        06-09 @ 11:56 .Body Fluid Coag Negative Staphylococcus    Coag Negative Staphylococcus Growing in broth media only    No WBC's seen.  No organisms seen

## 2017-06-23 NOTE — PROGRESS NOTE ADULT - PROBLEM SELECTOR PLAN 3
on depakon for mood disorder but also being used as an AED after last seizure on 5/19  depakon increased to 500 am and 750 dose pm

## 2017-06-23 NOTE — PROGRESS NOTE ADULT - PROBLEM SELECTOR PLAN 4
Appreciate [facility] recs.  haldol oral dose increased a few days ago, c/w depakon and Haldol prn IM doses ordered as well  he is more sedentary but still has sporadic bouts of misbehavior

## 2017-06-23 NOTE — PROGRESS NOTE ADULT - PROBLEM SELECTOR PLAN 1
S/p VATS with pleurodesis. WIth increasing wbc, no fever, ? empyema  Seen by ID, ct chest noted   Plan for IR guided CT palcement on the right now   monitor site of insertion of tube on the left (post prior ct)   C/w zosyn until june 27 per ID Dr Moeller, c/w sammi

## 2017-06-24 LAB
ANION GAP SERPL CALC-SCNC: 19 MMOL/L — HIGH (ref 5–17)
ANISOCYTOSIS BLD QL: SLIGHT — SIGNIFICANT CHANGE UP
BUN SERPL-MCNC: 22 MG/DL — HIGH (ref 8–20)
CALCIUM SERPL-MCNC: 9.1 MG/DL — SIGNIFICANT CHANGE UP (ref 8.6–10.2)
CHLORIDE SERPL-SCNC: 103 MMOL/L — SIGNIFICANT CHANGE UP (ref 98–107)
CO2 SERPL-SCNC: 21 MMOL/L — LOW (ref 22–29)
CREAT SERPL-MCNC: 1.01 MG/DL — SIGNIFICANT CHANGE UP (ref 0.5–1.3)
GLUCOSE SERPL-MCNC: 117 MG/DL — HIGH (ref 70–115)
HCT VFR BLD CALC: 29.2 % — LOW (ref 42–52)
HGB BLD-MCNC: 9.5 G/DL — LOW (ref 14–18)
HYPOCHROMIA BLD QL: SLIGHT — SIGNIFICANT CHANGE UP
LYMPHOCYTES # BLD AUTO: 6 % — LOW (ref 20–55)
MACROCYTES BLD QL: SLIGHT — SIGNIFICANT CHANGE UP
MAGNESIUM SERPL-MCNC: 1.8 MG/DL — SIGNIFICANT CHANGE UP (ref 1.8–2.6)
MCHC RBC-ENTMCNC: 28.6 PG — SIGNIFICANT CHANGE UP (ref 27–31)
MCHC RBC-ENTMCNC: 32.5 G/DL — SIGNIFICANT CHANGE UP (ref 32–36)
MCV RBC AUTO: 88 FL — SIGNIFICANT CHANGE UP (ref 80–94)
METAMYELOCYTES # FLD: 1 % — HIGH (ref 0–0)
MICROCYTES BLD QL: SLIGHT — SIGNIFICANT CHANGE UP
MONOCYTES NFR BLD AUTO: 11 % — HIGH (ref 3–10)
MYELOCYTES NFR BLD: 5 % — HIGH (ref 0–0)
NEUTROPHILS NFR BLD AUTO: 71 % — SIGNIFICANT CHANGE UP (ref 37–73)
NEUTS BAND # BLD: 2 % — SIGNIFICANT CHANGE UP (ref 0–8)
OVALOCYTES BLD QL SMEAR: SLIGHT — SIGNIFICANT CHANGE UP
PHOSPHATE SERPL-MCNC: 4.3 MG/DL — SIGNIFICANT CHANGE UP (ref 2.4–4.7)
PLAT MORPH BLD: NORMAL — SIGNIFICANT CHANGE UP
PLATELET # BLD AUTO: 549 K/UL — HIGH (ref 150–400)
POIKILOCYTOSIS BLD QL AUTO: SLIGHT — SIGNIFICANT CHANGE UP
POTASSIUM SERPL-MCNC: 3.8 MMOL/L — SIGNIFICANT CHANGE UP (ref 3.5–5.3)
POTASSIUM SERPL-SCNC: 3.8 MMOL/L — SIGNIFICANT CHANGE UP (ref 3.5–5.3)
RBC # BLD: 3.32 M/UL — LOW (ref 4.6–6.2)
RBC # FLD: 17.2 % — HIGH (ref 11–15.6)
RBC BLD AUTO: ABNORMAL
SODIUM SERPL-SCNC: 143 MMOL/L — SIGNIFICANT CHANGE UP (ref 135–145)
VARIANT LYMPHS # BLD: 4 % — SIGNIFICANT CHANGE UP (ref 0–6)
WBC # BLD: 17.4 K/UL — HIGH (ref 4.8–10.8)
WBC # FLD AUTO: 17.4 K/UL — HIGH (ref 4.8–10.8)

## 2017-06-24 PROCEDURE — 82140 ASSAY OF AMMONIA: CPT

## 2017-06-24 PROCEDURE — 80048 BASIC METABOLIC PNL TOTAL CA: CPT

## 2017-06-24 PROCEDURE — 85027 COMPLETE CBC AUTOMATED: CPT

## 2017-06-24 PROCEDURE — C1729: CPT

## 2017-06-24 PROCEDURE — 87116 MYCOBACTERIA CULTURE: CPT

## 2017-06-24 PROCEDURE — 70551 MRI BRAIN STEM W/O DYE: CPT

## 2017-06-24 PROCEDURE — 80053 COMPREHEN METABOLIC PANEL: CPT

## 2017-06-24 PROCEDURE — 82042 OTHER SOURCE ALBUMIN QUAN EA: CPT

## 2017-06-24 PROCEDURE — 70450 CT HEAD/BRAIN W/O DYE: CPT

## 2017-06-24 PROCEDURE — 85014 HEMATOCRIT: CPT

## 2017-06-24 PROCEDURE — 87075 CULTR BACTERIA EXCEPT BLOOD: CPT

## 2017-06-24 PROCEDURE — 85610 PROTHROMBIN TIME: CPT

## 2017-06-24 PROCEDURE — 86140 C-REACTIVE PROTEIN: CPT

## 2017-06-24 PROCEDURE — 84436 ASSAY OF TOTAL THYROXINE: CPT

## 2017-06-24 PROCEDURE — 84443 ASSAY THYROID STIM HORMONE: CPT

## 2017-06-24 PROCEDURE — C1887: CPT

## 2017-06-24 PROCEDURE — 96375 TX/PRO/DX INJ NEW DRUG ADDON: CPT | Mod: XU

## 2017-06-24 PROCEDURE — 87102 FUNGUS ISOLATION CULTURE: CPT

## 2017-06-24 PROCEDURE — 83986 ASSAY PH BODY FLUID NOS: CPT

## 2017-06-24 PROCEDURE — 82040 ASSAY OF SERUM ALBUMIN: CPT

## 2017-06-24 PROCEDURE — 87015 SPECIMEN INFECT AGNT CONCNTJ: CPT

## 2017-06-24 PROCEDURE — 80164 ASSAY DIPROPYLACETIC ACD TOT: CPT

## 2017-06-24 PROCEDURE — 83615 LACTATE (LD) (LDH) ENZYME: CPT

## 2017-06-24 PROCEDURE — 36600 WITHDRAWAL OF ARTERIAL BLOOD: CPT

## 2017-06-24 PROCEDURE — 87040 BLOOD CULTURE FOR BACTERIA: CPT

## 2017-06-24 PROCEDURE — 87205 SMEAR GRAM STAIN: CPT

## 2017-06-24 PROCEDURE — 80061 LIPID PANEL: CPT

## 2017-06-24 PROCEDURE — 84145 PROCALCITONIN (PCT): CPT

## 2017-06-24 PROCEDURE — 87070 CULTURE OTHR SPECIMN AEROBIC: CPT

## 2017-06-24 PROCEDURE — 82435 ASSAY OF BLOOD CHLORIDE: CPT

## 2017-06-24 PROCEDURE — 87206 SMEAR FLUORESCENT/ACID STAI: CPT

## 2017-06-24 PROCEDURE — 82945 GLUCOSE OTHER FLUID: CPT

## 2017-06-24 PROCEDURE — 74176 CT ABD & PELVIS W/O CONTRAST: CPT

## 2017-06-24 PROCEDURE — 36415 COLL VENOUS BLD VENIPUNCTURE: CPT

## 2017-06-24 PROCEDURE — 82803 BLOOD GASES ANY COMBINATION: CPT

## 2017-06-24 PROCEDURE — 84132 ASSAY OF SERUM POTASSIUM: CPT

## 2017-06-24 PROCEDURE — 82947 ASSAY GLUCOSE BLOOD QUANT: CPT

## 2017-06-24 PROCEDURE — 99232 SBSQ HOSP IP/OBS MODERATE 35: CPT

## 2017-06-24 PROCEDURE — 84155 ASSAY OF PROTEIN SERUM: CPT

## 2017-06-24 PROCEDURE — 83605 ASSAY OF LACTIC ACID: CPT

## 2017-06-24 PROCEDURE — 93880 EXTRACRANIAL BILAT STUDY: CPT

## 2017-06-24 PROCEDURE — 71250 CT THORAX DX C-: CPT

## 2017-06-24 PROCEDURE — 84157 ASSAY OF PROTEIN OTHER: CPT

## 2017-06-24 PROCEDURE — 99233 SBSQ HOSP IP/OBS HIGH 50: CPT

## 2017-06-24 PROCEDURE — 71045 X-RAY EXAM CHEST 1 VIEW: CPT

## 2017-06-24 PROCEDURE — 84295 ASSAY OF SERUM SODIUM: CPT

## 2017-06-24 PROCEDURE — 85730 THROMBOPLASTIN TIME PARTIAL: CPT

## 2017-06-24 PROCEDURE — 89051 BODY FLUID CELL COUNT: CPT

## 2017-06-24 PROCEDURE — 82330 ASSAY OF CALCIUM: CPT

## 2017-06-24 PROCEDURE — 88305 TISSUE EXAM BY PATHOLOGIST: CPT

## 2017-06-24 PROCEDURE — 84480 ASSAY TRIIODOTHYRONINE (T3): CPT

## 2017-06-24 PROCEDURE — 99285 EMERGENCY DEPT VISIT HI MDM: CPT | Mod: 25

## 2017-06-24 PROCEDURE — 93306 TTE W/DOPPLER COMPLETE: CPT

## 2017-06-24 PROCEDURE — 88112 CYTOPATH CELL ENHANCE TECH: CPT

## 2017-06-24 PROCEDURE — 96374 THER/PROPH/DIAG INJ IV PUSH: CPT | Mod: XU

## 2017-06-24 RX ORDER — ENOXAPARIN SODIUM 100 MG/ML
40 INJECTION SUBCUTANEOUS DAILY
Qty: 0 | Refills: 0 | Status: DISCONTINUED | OUTPATIENT
Start: 2017-06-24 | End: 2017-06-29

## 2017-06-24 RX ORDER — ACETAMINOPHEN 500 MG
650 TABLET ORAL ONCE
Qty: 0 | Refills: 0 | Status: COMPLETED | OUTPATIENT
Start: 2017-06-24 | End: 2017-06-24

## 2017-06-24 RX ADMIN — PIPERACILLIN AND TAZOBACTAM 25 GRAM(S): 4; .5 INJECTION, POWDER, LYOPHILIZED, FOR SOLUTION INTRAVENOUS at 22:02

## 2017-06-24 RX ADMIN — PIPERACILLIN AND TAZOBACTAM 25 GRAM(S): 4; .5 INJECTION, POWDER, LYOPHILIZED, FOR SOLUTION INTRAVENOUS at 06:13

## 2017-06-24 RX ADMIN — HALOPERIDOL DECANOATE 5 MILLIGRAM(S): 100 INJECTION INTRAMUSCULAR at 06:14

## 2017-06-24 RX ADMIN — Medication 250 MILLIGRAM(S): at 18:11

## 2017-06-24 RX ADMIN — Medication 650 MILLIGRAM(S): at 09:33

## 2017-06-24 RX ADMIN — PIPERACILLIN AND TAZOBACTAM 25 GRAM(S): 4; .5 INJECTION, POWDER, LYOPHILIZED, FOR SOLUTION INTRAVENOUS at 15:25

## 2017-06-24 RX ADMIN — DIVALPROEX SODIUM 500 MILLIGRAM(S): 500 TABLET, DELAYED RELEASE ORAL at 12:47

## 2017-06-24 RX ADMIN — ENOXAPARIN SODIUM 40 MILLIGRAM(S): 100 INJECTION SUBCUTANEOUS at 12:47

## 2017-06-24 RX ADMIN — Medication 650 MILLIGRAM(S): at 10:10

## 2017-06-24 RX ADMIN — HALOPERIDOL DECANOATE 5 MILLIGRAM(S): 100 INJECTION INTRAMUSCULAR at 18:11

## 2017-06-24 RX ADMIN — Medication 250 MILLIGRAM(S): at 06:14

## 2017-06-24 RX ADMIN — PANTOPRAZOLE SODIUM 40 MILLIGRAM(S): 20 TABLET, DELAYED RELEASE ORAL at 06:14

## 2017-06-24 RX ADMIN — DIVALPROEX SODIUM 750 MILLIGRAM(S): 500 TABLET, DELAYED RELEASE ORAL at 22:03

## 2017-06-24 RX ADMIN — Medication 150 MICROGRAM(S): at 06:14

## 2017-06-24 NOTE — PROGRESS NOTE ADULT - PROBLEM SELECTOR PLAN 1
S/p Lt VATS with pleurodesis. WIth increasing wbc, low grade fever, ? empyema  Seen by ID,   s/p IR guided CT placement on the right on 6/24 --> no labs sent by IR  monitor site of insertion of tube on the left (post prior ct)   C/w zosyn until june 27 per ID Dr Moeller, c/w florastor  will repeat CXR today  Will ask for Thoracic Sx input

## 2017-06-24 NOTE — PROGRESS NOTE ADULT - SUBJECTIVE AND OBJECTIVE BOX
[facility]  INFECTIOUS DISEASES AND [facility]  =======================================================  Ritesh Lang MD  Diplomates American Board of Internal Medicine and Infectious Diseases  =======================================================    SAMIRA NUGENT [id number]  Follow up: Leukocytosis; Pleural effusion, empyema  s/p left chest tube on 6/2/17, s/p VATS 6/9/17  PT ON IV ZOSYN TO COMPLETE JUNE 27  WBC 17K    patient seen and examined in follow up.  Chart and labs reviewed.          =======================================================  Allergies:  Allergy Status Unknown  Chicken (Other (Unknown))      =======================================================  MEDICATIONS  (STANDING):  enoxaparin Injectable 40milliGRAM(s) SubCutaneous every 24 hours  piperacillin/tazobactam IVPB. 3.375Gram(s) IV Intermittent every 8 hours  levothyroxine 150MICROGram(s) Oral daily  pantoprazole    Tablet 40milliGRAM(s) Oral before breakfast  saccharomyces boulardii 250milliGRAM(s) Oral two times a day  diVALproex DR 500milliGRAM(s) Oral two times a day  haloperidol     Tablet 5milliGRAM(s) Oral two times a day  simethicone drops 80milliGRAM(s) Oral every 8 hours       =======================================================     REVIEW OF SYSTEMS:  Unable to obtain  =======================================================     Physical Exam:  Vital Signs Last 24 Hrs  T(C): 37, Max: 38.4 (06-23 @ 23:20)  T(F): 98.6, Max: 101.2 (06-23 @ 23:20)  HR: 88 (76 - 108)  BP: 99/65 (99/65 - 115/74)  BP(mean): --  RR: 19 (18 - 24)  SpO2: 97% (91% - 98%))    GEN: AWAKE NON VERBAL  HEENT: normocephalic and atraumatic. EOMI. CASSANDRA.    NECK: Supple. No carotid bruits.  No lymphadenopathy or thyromegaly.  LUNGS: POOR INSP effort. LEFT CHEST mid-ax line sutures in place under dressing; no draiange; with island dressing in place  HEART: Regular rate and rhythm without murmur.  ABDOMEN: Soft, nontender, and nondistended.  Positive bowel sounds.    : No CVA tenderness  EXTREMITIES: Without any cyanosis, clubbing, rash, lesions or edema.  MSK: no joint swelling  NEUROLOGIC: Cranial nerves II through XII are grossly intact.  PSYCHIATRIC: NON VERBAL   SKIN: No ulceration or induration present.    =======================================================       =======================================================    Labs:  06-23                          9.5    17.4  )-----------( 549      ( 24 Jun 2017 08:53 )             29.2   06-24    143  |  103  |  22.0<H>  ----------------------------<  117<H>  3.8   |  21.0<L>  |  1.01    Ca    9.1      24 Jun 2017 08:53  Phos  4.3     06-24  Mg     1.8     06-24        RECENT CULTURES:  06-11 @ 11:51 .Blood Blood     No growth at 5 days.        06-09 @ 21:58 .Body Fluid     No growth at 1 week.        06-09 @ 11:56 .Body Fluid Coag Negative Staphylococcus    Coag Negative Staphylococcus Growing in broth media only    No WBC's seen.  No organisms seen

## 2017-06-24 NOTE — PROGRESS NOTE ADULT - SUBJECTIVE AND OBJECTIVE BOX
SAMIRA NUGENT    [id number]    47y      Male    CC: recurrent pleural effusion, anorexia, Low grade fever    INTERVAL HPI/OVERNIGHT EVENTS: No acute events, s/p Rt chest tube placement by IR - 1300CC drained    chest tube output - 600cc over yesterday  No output today     REVIEW OF SYSTEMS:  pt - poor verbal output , does not provide any history, obtained from RN and aide at bedside    Vital Signs Last 24 Hrs  T(C): 37, Max: 38.4 (06-23 @ 23:20)  T(F): 98.6, Max: 101.2 (06-23 @ 23:20)  HR: 84 (76 - 108)  BP: 99/65 (99/65 - 115/74)  BP(mean): --  RR: 19 (18 - 24)  SpO2: 98% (91% - 98%)    PHYSICAL EXAM:    GENERAL: NAD  HEENT: PERRL, +EOMI  NECK: soft, Supple, No JVD,   CHEST/LUNG: Clear to percussion bilaterally; No wheezing  HEART: S1S2+, Regular rate and rhythm; No murmurs, rubs, or gallops  ABDOMEN: Soft, Nontender, Nondistended; Bowel sounds present  EXTREMITIES: No clubbing, cyanosis, or edema  NEURO:  no focal deficits        LABS:                        9.5    17.4  )-----------( 549      ( 24 Jun 2017 08:53 )             29.2     06-23    141  |  102  |  18.0  ----------------------------<  90  3.7   |  24.0  |  0.97    Ca    9.1      23 Jun 2017 08:16  Phos  4.1     06-23  Mg     1.8     06-23      PT/INR - ( 23 Jun 2017 13:24 )   PT: 13.4 sec;   INR: 1.21 ratio         PTT - ( 23 Jun 2017 13:24 )  PTT:38.9 sec        MEDICATIONS  (STANDING):  piperacillin/tazobactam IVPB. 3.375Gram(s) IV Intermittent every 8 hours  levothyroxine 150MICROGram(s) Oral daily  pantoprazole    Tablet 40milliGRAM(s) Oral before breakfast  saccharomyces boulardii 250milliGRAM(s) Oral two times a day  haloperidol     Tablet 5milliGRAM(s) Oral two times a day  simethicone drops 80milliGRAM(s) Oral every 8 hours  diVALproex DR 500milliGRAM(s) Oral daily  diVALproex DR 750milliGRAM(s) Oral at bedtime  enoxaparin Injectable 40milliGRAM(s) SubCutaneous daily    MEDICATIONS  (PRN):  acetaminophen   Tablet 650milliGRAM(s) Oral every 6 hours PRN For Temp greater than 38.5 C (101.3 F)  artificial  tears Solution 1Drop(s) Both EYES every 8 hours PRN Dry Eyes  haloperidol    Injectable 2milliGRAM(s) IntraMuscular every 8 hours PRN Agitation      RADIOLOGY & ADDITIONAL TESTS:  CXR - reviewed   CT chest - reviewed

## 2017-06-24 NOTE — PROGRESS NOTE ADULT - PROBLEM SELECTOR PLAN 3
on depakon for mood disorder but also being used as an AED after last seizure on 5/19. depakon increased to 500 am and 750 dose pm

## 2017-06-24 NOTE — PROGRESS NOTE ADULT - ASSESSMENT
48 yo M with h/o schizoaffective disorder, selective mutism, recurrent pleural effusion (transudative) represents from [place] for anorexia and refusal to take PO medications. He was last discharged after thoracentesis (1400cc) of transudative pleural effusion in left lateral region. Upon readmission, repeat chest xray showed recurrent moderate left pleural effusion. On 5/29, pt had seizure episode 2/2 noncompliance with PO medications, then transitioned to IV valproate which is also being used for his mood d/o and seizure. MRI brain x 2 were negative for acute infarct. Pt noted to have worsening leukocytosis, elevated CRP and procalcitonin. Per ID, vanc and zosyn were started. CT chest showed worsening left pleural effusion. CTS placed chest tube on 6/4. Pt had rapid response on 6/6, likely due to clozapine toxicity and was monitored overnight in MICU. On 6/9, pt had VATS which did not show evidence of infection and partially trapped lung. Pleurodesis was performed and had 1500cc drained. Had last chest tube removed on 6/13 from the left (the right fell out in transport???). Repeat cxr shows no change, recently done again with improvement of RLL; however, wbc of 13 - ID suggesting a non con ct chest to evaluate for interval change which showed a large loculated right sided effusion and in light of elevating wbc, needed to be drained. s/p IR drainage and placement of rt Chest Tube on 6/24.

## 2017-06-24 NOTE — PROGRESS NOTE ADULT - ASSESSMENT
48 y/o M with s/p VATS for empyema on 6/9/17;   with WBC elevation ON ZOSYN PLAN WS TO COMPLETE 6/27

## 2017-06-24 NOTE — PROGRESS NOTE ADULT - PROBLEM SELECTOR PLAN 4
Appreciate [facility] recs.  haldol oral dose increased a few days ago, c/w depakon and Haldol prn IM doses ordered as well  he is more sedentary but still has sporadic bouts of misbehavior

## 2017-06-25 LAB
ANION GAP SERPL CALC-SCNC: 16 MMOL/L — SIGNIFICANT CHANGE UP (ref 5–17)
BASOPHILS # BLD AUTO: 0 K/UL — SIGNIFICANT CHANGE UP (ref 0–0.2)
BASOPHILS NFR BLD AUTO: 0.2 % — SIGNIFICANT CHANGE UP (ref 0–2)
BUN SERPL-MCNC: 19 MG/DL — SIGNIFICANT CHANGE UP (ref 8–20)
CALCIUM SERPL-MCNC: 9.1 MG/DL — SIGNIFICANT CHANGE UP (ref 8.6–10.2)
CHLORIDE SERPL-SCNC: 101 MMOL/L — SIGNIFICANT CHANGE UP (ref 98–107)
CO2 SERPL-SCNC: 23 MMOL/L — SIGNIFICANT CHANGE UP (ref 22–29)
CREAT SERPL-MCNC: 0.98 MG/DL — SIGNIFICANT CHANGE UP (ref 0.5–1.3)
EOSINOPHIL # BLD AUTO: 0.6 K/UL — HIGH (ref 0–0.5)
EOSINOPHIL NFR BLD AUTO: 4 % — SIGNIFICANT CHANGE UP (ref 0–5)
GLUCOSE SERPL-MCNC: 87 MG/DL — SIGNIFICANT CHANGE UP (ref 70–115)
HCT VFR BLD CALC: 29 % — LOW (ref 42–52)
HGB BLD-MCNC: 9.4 G/DL — LOW (ref 14–18)
LYMPHOCYTES # BLD AUTO: 1.6 K/UL — SIGNIFICANT CHANGE UP (ref 1–4.8)
LYMPHOCYTES # BLD AUTO: 11.3 % — LOW (ref 20–55)
MCHC RBC-ENTMCNC: 28.4 PG — SIGNIFICANT CHANGE UP (ref 27–31)
MCHC RBC-ENTMCNC: 32.4 G/DL — SIGNIFICANT CHANGE UP (ref 32–36)
MCV RBC AUTO: 87.6 FL — SIGNIFICANT CHANGE UP (ref 80–94)
MONOCYTES # BLD AUTO: 1.8 K/UL — HIGH (ref 0–0.8)
MONOCYTES NFR BLD AUTO: 12.3 % — HIGH (ref 3–10)
NEUTROPHILS # BLD AUTO: 10.1 K/UL — HIGH (ref 1.8–8)
NEUTROPHILS NFR BLD AUTO: 70 % — SIGNIFICANT CHANGE UP (ref 37–73)
PLATELET # BLD AUTO: 483 K/UL — HIGH (ref 150–400)
POTASSIUM SERPL-MCNC: 4 MMOL/L — SIGNIFICANT CHANGE UP (ref 3.5–5.3)
POTASSIUM SERPL-SCNC: 4 MMOL/L — SIGNIFICANT CHANGE UP (ref 3.5–5.3)
RBC # BLD: 3.31 M/UL — LOW (ref 4.6–6.2)
RBC # FLD: 16.8 % — HIGH (ref 11–15.6)
SODIUM SERPL-SCNC: 140 MMOL/L — SIGNIFICANT CHANGE UP (ref 135–145)
WBC # BLD: 14.4 K/UL — HIGH (ref 4.8–10.8)
WBC # FLD AUTO: 14.4 K/UL — HIGH (ref 4.8–10.8)

## 2017-06-25 PROCEDURE — 99232 SBSQ HOSP IP/OBS MODERATE 35: CPT

## 2017-06-25 PROCEDURE — 99233 SBSQ HOSP IP/OBS HIGH 50: CPT

## 2017-06-25 RX ADMIN — PIPERACILLIN AND TAZOBACTAM 25 GRAM(S): 4; .5 INJECTION, POWDER, LYOPHILIZED, FOR SOLUTION INTRAVENOUS at 14:47

## 2017-06-25 RX ADMIN — ENOXAPARIN SODIUM 40 MILLIGRAM(S): 100 INJECTION SUBCUTANEOUS at 12:39

## 2017-06-25 RX ADMIN — Medication 150 MICROGRAM(S): at 05:03

## 2017-06-25 RX ADMIN — DIVALPROEX SODIUM 500 MILLIGRAM(S): 500 TABLET, DELAYED RELEASE ORAL at 14:46

## 2017-06-25 RX ADMIN — Medication 650 MILLIGRAM(S): at 18:25

## 2017-06-25 RX ADMIN — HALOPERIDOL DECANOATE 5 MILLIGRAM(S): 100 INJECTION INTRAMUSCULAR at 18:24

## 2017-06-25 RX ADMIN — DIVALPROEX SODIUM 750 MILLIGRAM(S): 500 TABLET, DELAYED RELEASE ORAL at 21:51

## 2017-06-25 RX ADMIN — Medication 250 MILLIGRAM(S): at 18:24

## 2017-06-25 RX ADMIN — PIPERACILLIN AND TAZOBACTAM 25 GRAM(S): 4; .5 INJECTION, POWDER, LYOPHILIZED, FOR SOLUTION INTRAVENOUS at 05:03

## 2017-06-25 RX ADMIN — PIPERACILLIN AND TAZOBACTAM 25 GRAM(S): 4; .5 INJECTION, POWDER, LYOPHILIZED, FOR SOLUTION INTRAVENOUS at 21:51

## 2017-06-25 RX ADMIN — Medication 650 MILLIGRAM(S): at 12:42

## 2017-06-25 RX ADMIN — Medication 250 MILLIGRAM(S): at 05:03

## 2017-06-25 RX ADMIN — HALOPERIDOL DECANOATE 5 MILLIGRAM(S): 100 INJECTION INTRAMUSCULAR at 05:03

## 2017-06-25 RX ADMIN — PANTOPRAZOLE SODIUM 40 MILLIGRAM(S): 20 TABLET, DELAYED RELEASE ORAL at 05:03

## 2017-06-25 NOTE — PROGRESS NOTE ADULT - SUBJECTIVE AND OBJECTIVE BOX
SAMIRA NUGENT    [id number]    47y      Male    CC: recurrent pleural effusion    INTERVAL HPI/OVERNIGHT EVENTS: no acute events    REVIEW OF SYSTEMS:    pt non communicative with examiner      Vital Signs Last 24 Hrs  T(C): 36.5, Max: 37.1 (06-25 @ 00:24)  T(F): 97.7, Max: 98.8 (06-25 @ 00:24)  HR: 87 (81 - 87)  BP: 106/64 (101/64 - 106/64)  BP(mean): --  RR: 189 (18 - 189)  SpO2: 99% (99% - 99%)    I&O's Summary  I & Os for 24h ending 25 Jun 2017 07:00  =============================================  IN: 300 ml / OUT: 390 ml / NET: -90 ml    I & Os for current day (as of 25 Jun 2017 09:55)  =============================================  IN: 0 ml / OUT: 60 ml / NET: -60 ml    PHYSICAL EXAM:    GENERAL: NAD, loss of subcut fat  HEENT: PERRL, +EOMI  NECK: soft, Supple, No JVD,   CHEST/LUNG: Clear to percussion bilaterally; No wheezing , Rt chest tube draining - clear straw colored fluid  HEART: S1S2+, Regular rate and rhythm; No murmurs, rubs, or gallops  ABDOMEN: Soft, Nontender, Nondistended; Bowel sounds present  EXTREMITIES:   No clubbing, cyanosis, or edema  SKIN: No rashes or lesions  NEURO: Awake, alert, no focal deficits        LABS:                        9.4    14.4  )-----------( 483      ( 25 Jun 2017 08:34 )             29.0     06-25    140  |  101  |  19.0  ----------------------------<  87  4.0   |  23.0  |  0.98    Ca    9.1      25 Jun 2017 08:34  Phos  4.3     06-24  Mg     1.8     06-24      PT/INR - ( 23 Jun 2017 13:24 )   PT: 13.4 sec;   INR: 1.21 ratio         PTT - ( 23 Jun 2017 13:24 )  PTT:38.9 sec        MEDICATIONS  (STANDING):  piperacillin/tazobactam IVPB. 3.375Gram(s) IV Intermittent every 8 hours  levothyroxine 150MICROGram(s) Oral daily  pantoprazole    Tablet 40milliGRAM(s) Oral before breakfast  saccharomyces boulardii 250milliGRAM(s) Oral two times a day  haloperidol     Tablet 5milliGRAM(s) Oral two times a day  simethicone drops 80milliGRAM(s) Oral every 8 hours  diVALproex DR 500milliGRAM(s) Oral daily  diVALproex DR 750milliGRAM(s) Oral at bedtime  enoxaparin Injectable 40milliGRAM(s) SubCutaneous daily    MEDICATIONS  (PRN):  acetaminophen   Tablet 650milliGRAM(s) Oral every 6 hours PRN For Temp greater than 38.5 C (101.3 F)  artificial  tears Solution 1Drop(s) Both EYES every 8 hours PRN Dry Eyes  haloperidol    Injectable 2milliGRAM(s) IntraMuscular every 8 hours PRN Agitation      RADIOLOGY & ADDITIONAL TESTS:

## 2017-06-25 NOTE — PROGRESS NOTE ADULT - ASSESSMENT
·	48 yo M with h/o schizoaffective disorder, selective mutism, recurrent pleural effusion (transudative) represents from [place] for anorexia and refusal to take PO medications. He was last discharged after thoracentesis (1400cc) of transudative pleural effusion in left lateral region. Upon readmission, repeat chest xray showed recurrent moderate left pleural effusion. On 5/29, pt had seizure episode 2/2 noncompliance with PO medications, then transitioned to IV valproate which is also being used for his mood d/o and seizure. MRI brain x 2 were negative for acute infarct. Pt noted to have worsening leukocytosis, elevated CRP and procalcitonin. Per ID, vanc and zosyn were started. CT chest showed worsening left pleural effusion. CTS placed chest tube on 6/4. Pt had rapid response on 6/6, likely due to clozapine toxicity and was monitored overnight in MICU. On 6/9, pt had VATS which did not show evidence of infection and partially trapped lung. Pleurodesis was performed and had 1500cc drained. Had last chest tube removed on 6/13 from the left (the right fell out in transport???). Repeat cxr shows no change, recently done again with improvement of RLL; however, wbc of 13 - ID suggesting a non con ct chest to evaluate for interval change which showed a large loculated right sided effusion and in light of elevating wbc, needed to be drained. s/p IR drainage and placement of rt Chest Tube on 6/24.

## 2017-06-25 NOTE — PROGRESS NOTE ADULT - ASSESSMENT
48 y/o M with s/p VATS for empyema on 6/9/17;   with WBC elevation ON ZOSYN PLAN WS TO COMPLETE 6/27  WILL FOLLOW UP  OVERALL STABLE  WBC DOWN TO 14K

## 2017-06-25 NOTE — PROGRESS NOTE ADULT - SUBJECTIVE AND OBJECTIVE BOX
[facility]  INFECTIOUS DISEASES AND INTERNAL MEDICINE [facility] [place] [facility]  =======================================================  Ritesh Lang MD  Diplomates American Board of Internal Medicine and Infectious Diseases  =======================================================    SAMIRA NUGENT [id number]  Follow up: Leukocytosis; Pleural effusion, empyema  s/p left chest tube on 6/2/17, s/p VATS 6/9/17  PT ON IV ZOSYN TO COMPLETE JUNE 27  WBC 17K    patient seen and examined in follow up.  Chart and labs reviewed.          =======================================================  Allergies:  Allergy Status Unknown  Chicken (Other (Unknown))      =======================================================  MEDICATIONS  (STANDING):  enoxaparin Injectable 40milliGRAM(s) SubCutaneous every 24 hours  piperacillin/tazobactam IVPB. 3.375Gram(s) IV Intermittent every 8 hours  levothyroxine 150MICROGram(s) Oral daily  pantoprazole    Tablet 40milliGRAM(s) Oral before breakfast  saccharomyces boulardii 250milliGRAM(s) Oral two times a day  diVALproex DR 500milliGRAM(s) Oral two times a day  haloperidol     Tablet 5milliGRAM(s) Oral two times a day  simethicone drops 80milliGRAM(s) Oral every 8 hours       =======================================================     REVIEW OF SYSTEMS:  Unable to obtain  =======================================================     Physical Exam:  Vital Signs Last 24 Hrs  T(C): 36.5, Max: 37.1 (06-25 @ 00:24)  T(F): 97.7, Max: 98.8 (06-25 @ 00:24)  HR: 82 (81 - 87)  BP: 106/64 (101/64 - 106/64)  BP(mean): --  RR: 189 (18 - 189)  SpO2: 93% (93% - 99%)      GEN: AWAKE NON VERBAL  HEENT: normocephalic and atraumatic. EOMI. CASSANDRA.    NECK: Supple. No carotid bruits.  No lymphadenopathy or thyromegaly.  LUNGS: POOR INSP effort. LEFT CHEST mid-ax line sutures in place under dressing; no draiange; with island dressing in place  HEART: Regular rate and rhythm without murmur.  ABDOMEN: Soft, nontender, and nondistended.  Positive bowel sounds.    : No CVA tenderness  EXTREMITIES: Without any cyanosis, clubbing, rash, lesions or edema.  MSK: no joint swelling  NEUROLOGIC: Cranial nerves II through XII are grossly intact.  PSYCHIATRIC: NON VERBAL   SKIN: No ulceration or induration present.    =======================================================       =======================================================    Labs:                         9.4    14.4  )-----------( 483      ( 25 Jun 2017 08:34 )             29.0

## 2017-06-25 NOTE — PROGRESS NOTE ADULT - PROBLEM SELECTOR PLAN 4
Appreciate [facility] recs.  haldol oral dose increased a few days ago, c/w depakon and Haldol prn IM doses ordered as well  he is more sedentary but still has sporadic bouts of misbehavior

## 2017-06-25 NOTE — PROGRESS NOTE ADULT - PROBLEM SELECTOR PLAN 1
S/p Lt VATS with pleurodesis. With increasing wbc, low grade fever, ? empyema  Seen by trish ANDERSON till 6/27  s/p IR guided CT placement on the right on 6/24 --> no labs sent  C/w zosyn until june 27 per ID Dr Moeller, c/w florastor  thoracic sx following   monitor daily Chest tube output

## 2017-06-26 LAB
ALBUMIN SERPL ELPH-MCNC: 2.9 G/DL — LOW (ref 3.3–5.2)
ALP SERPL-CCNC: 73 U/L — SIGNIFICANT CHANGE UP (ref 40–120)
ALT FLD-CCNC: 32 U/L — SIGNIFICANT CHANGE UP
ANION GAP SERPL CALC-SCNC: 15 MMOL/L — SIGNIFICANT CHANGE UP (ref 5–17)
ANISOCYTOSIS BLD QL: SLIGHT — SIGNIFICANT CHANGE UP
AST SERPL-CCNC: 22 U/L — SIGNIFICANT CHANGE UP
BASOPHILS # BLD AUTO: 0 K/UL — SIGNIFICANT CHANGE UP (ref 0–0.2)
BILIRUB SERPL-MCNC: 0.1 MG/DL — LOW (ref 0.4–2)
BUN SERPL-MCNC: 20 MG/DL — SIGNIFICANT CHANGE UP (ref 8–20)
CALCIUM SERPL-MCNC: 9 MG/DL — SIGNIFICANT CHANGE UP (ref 8.6–10.2)
CHLORIDE SERPL-SCNC: 103 MMOL/L — SIGNIFICANT CHANGE UP (ref 98–107)
CO2 SERPL-SCNC: 23 MMOL/L — SIGNIFICANT CHANGE UP (ref 22–29)
CREAT SERPL-MCNC: 0.99 MG/DL — SIGNIFICANT CHANGE UP (ref 0.5–1.3)
EOSINOPHIL # BLD AUTO: 0.7 K/UL — HIGH (ref 0–0.5)
EOSINOPHIL NFR BLD AUTO: 4 % — SIGNIFICANT CHANGE UP (ref 0–6)
ERYTHROCYTE [SEDIMENTATION RATE] IN BLOOD: 58 MM/HR — HIGH (ref 0–20)
GLUCOSE SERPL-MCNC: 89 MG/DL — SIGNIFICANT CHANGE UP (ref 70–115)
HCT VFR BLD CALC: 29.7 % — LOW (ref 42–52)
HGB BLD-MCNC: 9.4 G/DL — LOW (ref 14–18)
HYPOCHROMIA BLD QL: SLIGHT — SIGNIFICANT CHANGE UP
LYMPHOCYTES # BLD AUTO: 1.4 K/UL — SIGNIFICANT CHANGE UP (ref 1–4.8)
LYMPHOCYTES # BLD AUTO: 9 % — LOW (ref 20–55)
MACROCYTES BLD QL: SLIGHT — SIGNIFICANT CHANGE UP
MCHC RBC-ENTMCNC: 27.6 PG — SIGNIFICANT CHANGE UP (ref 27–31)
MCHC RBC-ENTMCNC: 31.6 G/DL — LOW (ref 32–36)
MCV RBC AUTO: 87.4 FL — SIGNIFICANT CHANGE UP (ref 80–94)
MICROCYTES BLD QL: SLIGHT — SIGNIFICANT CHANGE UP
MONOCYTES # BLD AUTO: 2.5 K/UL — HIGH (ref 0–0.8)
MONOCYTES NFR BLD AUTO: 15 % — HIGH (ref 3–10)
MYELOCYTES NFR BLD: 2 % — HIGH (ref 0–0)
NEUTROPHILS # BLD AUTO: 11.7 K/UL — HIGH (ref 1.8–8)
NEUTROPHILS NFR BLD AUTO: 67 % — SIGNIFICANT CHANGE UP (ref 37–73)
NEUTS BAND # BLD: 2 % — SIGNIFICANT CHANGE UP (ref 0–8)
PLAT MORPH BLD: NORMAL — SIGNIFICANT CHANGE UP
PLATELET # BLD AUTO: 517 K/UL — HIGH (ref 150–400)
POIKILOCYTOSIS BLD QL AUTO: SLIGHT — SIGNIFICANT CHANGE UP
POLYCHROMASIA BLD QL SMEAR: SLIGHT — SIGNIFICANT CHANGE UP
POTASSIUM SERPL-MCNC: 3.8 MMOL/L — SIGNIFICANT CHANGE UP (ref 3.5–5.3)
POTASSIUM SERPL-SCNC: 3.8 MMOL/L — SIGNIFICANT CHANGE UP (ref 3.5–5.3)
PROCALCITONIN SERPL-MCNC: <0.05 NG/ML — HIGH (ref 0–0.04)
PROT SERPL-MCNC: 6.9 G/DL — SIGNIFICANT CHANGE UP (ref 6.6–8.7)
RBC # BLD: 3.4 M/UL — LOW (ref 4.6–6.2)
RBC # FLD: 16.7 % — HIGH (ref 11–15.6)
RBC BLD AUTO: ABNORMAL
SODIUM SERPL-SCNC: 141 MMOL/L — SIGNIFICANT CHANGE UP (ref 135–145)
VARIANT LYMPHS # BLD: 1 % — SIGNIFICANT CHANGE UP (ref 0–6)
WBC # BLD: 16.6 K/UL — HIGH (ref 4.8–10.8)
WBC # FLD AUTO: 16.6 K/UL — HIGH (ref 4.8–10.8)

## 2017-06-26 PROCEDURE — 71010: CPT | Mod: 26

## 2017-06-26 PROCEDURE — 99232 SBSQ HOSP IP/OBS MODERATE 35: CPT

## 2017-06-26 PROCEDURE — 99233 SBSQ HOSP IP/OBS HIGH 50: CPT

## 2017-06-26 RX ADMIN — Medication 250 MILLIGRAM(S): at 06:19

## 2017-06-26 RX ADMIN — ENOXAPARIN SODIUM 40 MILLIGRAM(S): 100 INJECTION SUBCUTANEOUS at 13:17

## 2017-06-26 RX ADMIN — Medication 150 MICROGRAM(S): at 06:19

## 2017-06-26 RX ADMIN — DIVALPROEX SODIUM 750 MILLIGRAM(S): 500 TABLET, DELAYED RELEASE ORAL at 22:29

## 2017-06-26 RX ADMIN — HALOPERIDOL DECANOATE 5 MILLIGRAM(S): 100 INJECTION INTRAMUSCULAR at 06:19

## 2017-06-26 RX ADMIN — PIPERACILLIN AND TAZOBACTAM 25 GRAM(S): 4; .5 INJECTION, POWDER, LYOPHILIZED, FOR SOLUTION INTRAVENOUS at 06:20

## 2017-06-26 RX ADMIN — PANTOPRAZOLE SODIUM 40 MILLIGRAM(S): 20 TABLET, DELAYED RELEASE ORAL at 06:21

## 2017-06-26 RX ADMIN — PIPERACILLIN AND TAZOBACTAM 25 GRAM(S): 4; .5 INJECTION, POWDER, LYOPHILIZED, FOR SOLUTION INTRAVENOUS at 22:31

## 2017-06-26 RX ADMIN — Medication 250 MILLIGRAM(S): at 13:17

## 2017-06-26 RX ADMIN — HALOPERIDOL DECANOATE 5 MILLIGRAM(S): 100 INJECTION INTRAMUSCULAR at 15:12

## 2017-06-26 RX ADMIN — DIVALPROEX SODIUM 500 MILLIGRAM(S): 500 TABLET, DELAYED RELEASE ORAL at 13:17

## 2017-06-26 NOTE — PROGRESS NOTE ADULT - PROBLEM SELECTOR PLAN 4
Appreciate [facility] recs.  haldol oral dose increased a few days ago, c/w depakon and Haldol prn IM doses ordered as well  he is more sedentary but still has sporadic bouts of misbehavior

## 2017-06-26 NOTE — PROGRESS NOTE ADULT - ASSESSMENT
46 y/o M with s/p VATS for empyema on 6/9/17;   with WBC elevation ON ZOSYN PLAN WS TO COMPLETE 6/27   OVERALL STABLE

## 2017-06-26 NOTE — PROGRESS NOTE ADULT - ASSESSMENT
·	46 yo M with h/o schizoaffective disorder, selective mutism, recurrent pleural effusion (transudative) represents from [place] for anorexia and refusal to take PO medications. He was last discharged after thoracentesis (1400cc) of transudative pleural effusion in left lateral region. Upon readmission, repeat chest xray showed recurrent moderate left pleural effusion. On 5/29, pt had seizure episode 2/2 noncompliance with PO medications, then transitioned to IV valproate which is also being used for his mood d/o and seizure. MRI brain x 2 were negative for acute infarct. Pt noted to have worsening leukocytosis, elevated CRP and procalcitonin. Per ID, vanc and zosyn were started. CT chest showed worsening left pleural effusion. CTS placed chest tube on 6/4. Pt had rapid response on 6/6, likely due to clozapine toxicity and was monitored overnight in MICU. On 6/9, pt had VATS which did not show evidence of infection and partially trapped lung. Pleurodesis was performed and had 1500cc drained. Had last chest tube removed on 6/13 from the left (the right fell out in transport???). Repeat cxr shows no change, recently done again with improvement of RLL; however, wbc of 13 - ID suggesting a non con ct chest to evaluate for interval change which showed a large loculated right sided effusion and in light of elevating wbc, needed to be drained. s/p IR drainage and placement of rt Chest Tube on 6/24.

## 2017-06-26 NOTE — PROGRESS NOTE ADULT - SUBJECTIVE AND OBJECTIVE BOX
[facility]  INFECTIOUS DISEASES AND INTERNAL MEDICINE OF [place] [facility]  =======================================================  Ritesh Moeller MD [facility] MD  Diplomates American Board of Internal Medicine and Infectious Diseases  =======================================================    SAMIRA NUGENT [id number]  Follow up: Leukocytosis; Pleural effusion, empyema  s/p left chest tube on 6/2/17, s/p VATS 6/9/1    patient seen and examined in follow up.  Chart and labs reviewed.     =======================================================  Allergies:  Allergy Status Unknown  Chicken (Other (Unknown))      =======================================================  Medications:  piperacillin/tazobactam IVPB. 3.375Gram(s) IV Intermittent every 8 hours  levothyroxine 150MICROGram(s) Oral daily  acetaminophen   Tablet 650milliGRAM(s) Oral every 6 hours PRN  pantoprazole    Tablet 40milliGRAM(s) Oral before breakfast  saccharomyces boulardii 250milliGRAM(s) Oral two times a day  artificial  tears Solution 1Drop(s) Both EYES every 8 hours PRN  haloperidol    Injectable 2milliGRAM(s) IntraMuscular every 8 hours PRN  haloperidol     Tablet 5milliGRAM(s) Oral two times a day  simethicone drops 80milliGRAM(s) Oral every 8 hours  diVALproex DR 500milliGRAM(s) Oral daily  diVALproex DR 750milliGRAM(s) Oral at bedtime  enoxaparin Injectable 40milliGRAM(s) SubCutaneous daily     =======================================================       REVIEW OF SYSTEMS:  Unable to obtain  =======================================================     Physical Exam:  ICU Vital Signs Last 24 Hrs  T(C): 36.4, Max: 36.9 (06-25 @ 18:22)  T(F): 97.6, Max: 98.4 (06-25 @ 18:22)  HR: 98 (77 - 98)  BP: 118/75 (106/71 - 122/76)  RR: 18 (14 - 20)  SpO2: 97% (95% - 97%)      GEN: AWAKE NON VERBAL  HEENT: normocephalic and atraumatic. EOMI. CASSANDRA.    NECK: Supple. No carotid bruits.  No lymphadenopathy or thyromegaly.  LUNGS: POOR INSP effort. LEFT CHEST mid-ax line sutures in place under dressing; no draiange; with island dressing in place  HEART: Regular rate and rhythm without murmur.  ABDOMEN: Soft, nontender, and nondistended.  Positive bowel sounds.    : No CVA tenderness  EXTREMITIES: Without any cyanosis, clubbing, rash, lesions or edema.  MSK: no joint swelling  NEUROLOGIC: Cranial nerves II through XII are grossly intact.  PSYCHIATRIC: NON VERBAL   SKIN: No ulceration or induration present.         =======================================================    Labs:  06-26    141  |  103  |  20.0  ----------------------------<  89  3.8   |  23.0  |  0.99    Ca    9.0      26 Jun 2017 07:34    TPro  6.9  /  Alb  2.9<L>  /  TBili  0.1<L>  /  DBili  x   /  AST  22  /  ALT  32  /  AlkPhos  73  06-26                          9.4    16.6  )-----------( 517      ( 26 Jun 2017 07:34 )             29.7           LIVER FUNCTIONS - ( 26 Jun 2017 07:34 )  Alb: 2.9 g/dL / Pro: 6.9 g/dL / ALK PHOS: 73 U/L / ALT: 32 U/L / AST: 22 U/L / GGT: x               CAPILLARY BLOOD GLUCOSE        RECENT CULTURES:  06-23 @ 14:43 .Body Fluid Pleural Fluid     No growth at 3 days.  Culture in progress    Few White blood cells  No organisms seen    ================================     EXAM:  CHEST SINGLE VIEW FRONTAL                          PROCEDURE DATE:  06/26/2017        INTERPRETATION:  History: Pleural effusion    Technique:  AP portable    Comparisons:  Chest x-ray dated 6/19/2017    Findings: Pigtail catheter is present overlying the right diaphragm. The   right pleural effusion has resolved. Density in the left lower lobe and   retrocardiac space shows some clearing and increased aeration.    Small left pleural effusion. Airspace disease left lower lobe. The   pulmonary vasculature and aorta are normal for age. Heart size is   unremarkable. The thorax is normal for age.    Impression: Right pleural effusion has resolved. Smaller left pleural   effusion. Airspace disease left lower lobe has improved.         MARIELENA MCCRACKEN M.D., ATTENDING RADIOLOGIST  This document has been electronically signed. Jun 26 2017 11:37AM [facility]  INFECTIOUS DISEASES AND INTERNAL MEDICINE OF [facility] [facility]  =======================================================  Ritesh Moeller MD [facility] MD  Diplomates American Board of Internal Medicine and Infectious Diseases  =======================================================    SAMIRA NUGENT [id number]  Follow up: Leukocytosis; Pleural effusion, empyema  s/p left chest tube on 6/2/17, s/p VATS 6/9/1  patient seen and examined in follow up.  Chart and labs reviewed.       =======================================================    Allergies:  Allergy Status Unknown  Chicken (Other (Unknown))      =======================================================    Medications:  piperacillin/tazobactam IVPB. 3.375Gram(s) IV Intermittent every 8 hours  levothyroxine 150MICROGram(s) Oral daily  acetaminophen   Tablet 650milliGRAM(s) Oral every 6 hours PRN  pantoprazole    Tablet 40milliGRAM(s) Oral before breakfast  saccharomyces boulardii 250milliGRAM(s) Oral two times a day  artificial  tears Solution 1Drop(s) Both EYES every 8 hours PRN  haloperidol    Injectable 2milliGRAM(s) IntraMuscular every 8 hours PRN  haloperidol     Tablet 5milliGRAM(s) Oral two times a day  simethicone drops 80milliGRAM(s) Oral every 8 hours  diVALproex DR 500milliGRAM(s) Oral daily  diVALproex DR 750milliGRAM(s) Oral at bedtime  enoxaparin Injectable 40milliGRAM(s) SubCutaneous daily     =======================================================    REVIEW OF SYSTEMS:  Unable to obtain    =======================================================   Physical Exam:  ICU Vital Signs Last 24 Hrs  T(C): 36.4, Max: 36.9 (06-25 @ 18:22)  T(F): 97.6, Max: 98.4 (06-25 @ 18:22)  HR: 98 (77 - 98)  BP: 118/75 (106/71 - 122/76)  RR: 18 (14 - 20)  SpO2: 97% (95% - 97%)      GEN: AWAKE NON VERBAL  HEENT: normocephalic and atraumatic. EOMI. CASSANDRA.    NECK: Supple. No carotid bruits.  No lymphadenopathy or thyromegaly.  LUNGS: POOR INSP effort. LEFT CHEST mid-ax line sutures in place under dressing;   right chest tube in place; champagne fluid in tubing    HEART: Regular rate and rhythm without murmur.  ABDOMEN: Soft, nontender, and nondistended.  Positive bowel sounds.    : No CVA tenderness  EXTREMITIES: Without any cyanosis, clubbing, rash, lesions or edema.  MSK: no joint swelling  NEUROLOGIC: Cranial nerves II through XII are grossly intact.  PSYCHIATRIC: NON VERBAL   SKIN: No ulceration or induration present.     =======================================================    Labs:  06-26    141  |  103  |  20.0  ----------------------------<  89  3.8   |  23.0  |  0.99    Ca    9.0      26 Jun 2017 07:34    TPro  6.9  /  Alb  2.9<L>  /  TBili  0.1<L>  /  DBili  x   /  AST  22  /  ALT  32  /  AlkPhos  73  06-26                          9.4    16.6  )-----------( 517      ( 26 Jun 2017 07:34 )             29.7           LIVER FUNCTIONS - ( 26 Jun 2017 07:34 )  Alb: 2.9 g/dL / Pro: 6.9 g/dL / ALK PHOS: 73 U/L / ALT: 32 U/L / AST: 22 U/L / GGT: x               CAPILLARY BLOOD GLUCOSE        RECENT CULTURES:  06-23 @ 14:43 .Body Fluid Pleural Fluid     No growth at 3 days.  Culture in progress    Few White blood cells  No organisms seen    ================================     EXAM:  CHEST SINGLE VIEW FRONTAL                          PROCEDURE DATE:  06/26/2017        INTERPRETATION:  History: Pleural effusion    Technique:  AP portable    Comparisons:  Chest x-ray dated 6/19/2017    Findings: Pigtail catheter is present overlying the right diaphragm. The   right pleural effusion has resolved. Density in the left lower lobe and   retrocardiac space shows some clearing and increased aeration.    Small left pleural effusion. Airspace disease left lower lobe. The   pulmonary vasculature and aorta are normal for age. Heart size is   unremarkable. The thorax is normal for age.    Impression: Right pleural effusion has resolved. Smaller left pleural   effusion. Airspace disease left lower lobe has improved.      MARIELENA MCCRACKEN M.D., ATTENDING RADIOLOGIST  This document has been electronically signed. Jun 26 2017 11:37AM

## 2017-06-26 NOTE — PROGRESS NOTE ADULT - PROBLEM SELECTOR PLAN 1
s/p left Chest tube, s/p VATS 6/9/17  initial Pleural fluid culture, with CoNS from broth only.  Blood cultures no growth  CT chest with loculated RIGHT effusion and decreased aeration s/p drainage; Fluid culture negative. Continue Zosyn through 6/27/17

## 2017-06-26 NOTE — PROGRESS NOTE ADULT - PROBLEM SELECTOR PLAN 1
S/p Lt VATS with pleurodesis. With increasing wbc, low grade fever, ? empyema  Seen by trish ANDERSON till 6/27  s/p IR guided CT placement on the right on 6/24 --> no labs sent  C/w zosyn until june 27 per ID Dr Moeller, c/w florastor  thoracic sx following  -   ? cause of recurrent pleural effusion - work up so far - negative - exudative.   Will add ESR, VARSHA - but doubt any vasculitis  monitor daily Chest tube output

## 2017-06-27 LAB
HCT VFR BLD CALC: 29.7 % — LOW (ref 42–52)
HGB BLD-MCNC: 9.6 G/DL — LOW (ref 14–18)
MCHC RBC-ENTMCNC: 28.2 PG — SIGNIFICANT CHANGE UP (ref 27–31)
MCHC RBC-ENTMCNC: 32.3 G/DL — SIGNIFICANT CHANGE UP (ref 32–36)
MCV RBC AUTO: 87.4 FL — SIGNIFICANT CHANGE UP (ref 80–94)
PLATELET # BLD AUTO: 437 K/UL — HIGH (ref 150–400)
RBC # BLD: 3.4 M/UL — LOW (ref 4.6–6.2)
RBC # FLD: 16.4 % — HIGH (ref 11–15.6)
WBC # BLD: 13.8 K/UL — HIGH (ref 4.8–10.8)
WBC # FLD AUTO: 13.8 K/UL — HIGH (ref 4.8–10.8)

## 2017-06-27 PROCEDURE — 99233 SBSQ HOSP IP/OBS HIGH 50: CPT

## 2017-06-27 RX ADMIN — Medication 250 MILLIGRAM(S): at 06:18

## 2017-06-27 RX ADMIN — DIVALPROEX SODIUM 500 MILLIGRAM(S): 500 TABLET, DELAYED RELEASE ORAL at 12:22

## 2017-06-27 RX ADMIN — PIPERACILLIN AND TAZOBACTAM 25 GRAM(S): 4; .5 INJECTION, POWDER, LYOPHILIZED, FOR SOLUTION INTRAVENOUS at 13:59

## 2017-06-27 RX ADMIN — HALOPERIDOL DECANOATE 5 MILLIGRAM(S): 100 INJECTION INTRAMUSCULAR at 21:43

## 2017-06-27 RX ADMIN — DIVALPROEX SODIUM 750 MILLIGRAM(S): 500 TABLET, DELAYED RELEASE ORAL at 21:43

## 2017-06-27 RX ADMIN — PIPERACILLIN AND TAZOBACTAM 25 GRAM(S): 4; .5 INJECTION, POWDER, LYOPHILIZED, FOR SOLUTION INTRAVENOUS at 06:18

## 2017-06-27 RX ADMIN — Medication 150 MICROGRAM(S): at 06:18

## 2017-06-27 RX ADMIN — Medication 250 MILLIGRAM(S): at 12:21

## 2017-06-27 RX ADMIN — PIPERACILLIN AND TAZOBACTAM 25 GRAM(S): 4; .5 INJECTION, POWDER, LYOPHILIZED, FOR SOLUTION INTRAVENOUS at 21:43

## 2017-06-27 RX ADMIN — HALOPERIDOL DECANOATE 5 MILLIGRAM(S): 100 INJECTION INTRAMUSCULAR at 06:18

## 2017-06-27 RX ADMIN — PANTOPRAZOLE SODIUM 40 MILLIGRAM(S): 20 TABLET, DELAYED RELEASE ORAL at 06:18

## 2017-06-27 RX ADMIN — ENOXAPARIN SODIUM 40 MILLIGRAM(S): 100 INJECTION SUBCUTANEOUS at 12:22

## 2017-06-27 NOTE — PROGRESS NOTE ADULT - ASSESSMENT
·	48 yo M with h/o schizoaffective disorder, selective mutism, recurrent pleural effusion (transudative) represents from [place] for anorexia and refusal to take PO medications. He was last discharged after thoracentesis (1400cc) of transudative pleural effusion in left lateral region. Upon readmission, repeat chest xray showed recurrent moderate left pleural effusion. On 5/29, pt had seizure episode 2/2 noncompliance with PO medications, then transitioned to IV valproate which is also being used for his mood d/o and seizure. MRI brain x 2 were negative for acute infarct. Pt noted to have worsening leukocytosis, elevated CRP and procalcitonin. Per ID, vanc and zosyn were started. CT chest showed worsening left pleural effusion. CTS placed chest tube on 6/4. Pt had rapid response on 6/6, likely due to clozapine toxicity and was monitored overnight in MICU. On 6/9, pt had VATS which did not show evidence of infection and partially trapped lung. Pleurodesis was performed and had 1500cc drained. Had last chest tube removed on 6/13 from the left (the right fell out in transport???). Repeat cxr shows no change, recently done again with improvement of RLL; however, wbc of 13 - ID suggesting a non con ct chest to evaluate for interval change which showed a large loculated right sided effusion and in light of elevating wbc, needed to be drained. s/p IR drainage and placement of rt Chest Tube on 6/24.

## 2017-06-27 NOTE — PROGRESS NOTE ADULT - PROBLEM SELECTOR PLAN 1
S/p Lt VATS with pleurodesis. With increasing wbc, low grade fever, ? empyema  Seen by trish ANDERSON till 6/27  s/p IR guided CT placement on the right on 6/24 --> no labs sent  C/w zosyn until june 27 per ID Dr Moeller, c/w florastor  thoracic sx following  -   ? cause of recurrent pleural effusion - work up so far - negative - exudative.   Will add ESR, VARSHA - but doubt any vasculitis  monitor daily Chest tube output - 150ml today

## 2017-06-27 NOTE — PROGRESS NOTE ADULT - SUBJECTIVE AND OBJECTIVE BOX
SAMIRA NUGENT    [id number]    47y      Male    CC: Recurrent pleural effusion, Failure to thrive    INTERVAL HPI/OVERNIGHT EVENTS: no acute events     REVIEW OF SYSTEMS:    unable to obtain ROS 2/2 non-verbal      Vital Signs Last 24 Hrs  T(C): 36.4 (27 Jun 2017 08:29), Max: 36.6 (26 Jun 2017 17:18)  T(F): 97.5 (27 Jun 2017 08:29), Max: 97.9 (26 Jun 2017 17:18)  HR: 85 (27 Jun 2017 08:29) (85 - 95)  BP: 109/67 (27 Jun 2017 08:29) (101/63 - 109/67)  BP(mean): --  RR: 18 (27 Jun 2017 08:29) (18 - 18)  SpO2: 98% (27 Jun 2017 08:29) (96% - 98%)    PHYSICAL EXAM:    GENERAL: NAD, appears comfortable, loss of subcut fat  HEENT: PERRL, +EOMI  CHEST/LUNG: AE decreased on RT, No wheezing  HEART: S1S2+, Regular rate and rhythm; No murmurs, rubs, or gallops  ABDOMEN: Soft, Nontender, Nondistended  EXTREMITIES:   No clubbing, cyanosis, or edema  NEURO: AAOX3, no focal deficits      06-26 @ 07:01  -  06-27 @ 07:00  --------------------------------------------------------  IN: 350 mL / OUT: 150 mL / NET: 200 mL        LABS:                        9.6    13.8  )-----------( 437      ( 27 Jun 2017 09:41 )             29.7     06-26    141  |  103  |  20.0  ----------------------------<  89  3.8   |  23.0  |  0.99    Ca    9.0      26 Jun 2017 07:34    TPro  6.9  /  Alb  2.9<L>  /  TBili  0.1<L>  /  DBili  x   /  AST  22  /  ALT  32  /  AlkPhos  73  06-26            MEDICATIONS  (STANDING):  piperacillin/tazobactam IVPB. 3.375 Gram(s) IV Intermittent every 8 hours  levothyroxine 150 MICROGram(s) Oral daily  pantoprazole    Tablet 40 milliGRAM(s) Oral before breakfast  haloperidol     Tablet 5 milliGRAM(s) Oral two times a day  simethicone drops 80 milliGRAM(s) Oral every 8 hours  diVALproex  milliGRAM(s) Oral daily  diVALproex  milliGRAM(s) Oral at bedtime  enoxaparin Injectable 40 milliGRAM(s) SubCutaneous daily    MEDICATIONS  (PRN):  acetaminophen   Tablet 650 milliGRAM(s) Oral every 6 hours PRN For Temp greater than 38.5 C (101.3 F)  artificial  tears Solution 1 Drop(s) Both EYES every 8 hours PRN Dry Eyes  haloperidol    Injectable 2 milliGRAM(s) IntraMuscular every 8 hours PRN Agitation      RADIOLOGY & ADDITIONAL TESTS:    CXR - 6/26 - No pleural effusion on Rt

## 2017-06-28 LAB
ANA TITR SER: NEGATIVE — SIGNIFICANT CHANGE UP
CULTURE RESULTS: SIGNIFICANT CHANGE UP
SPECIMEN SOURCE: SIGNIFICANT CHANGE UP

## 2017-06-28 PROCEDURE — 99233 SBSQ HOSP IP/OBS HIGH 50: CPT

## 2017-06-28 RX ADMIN — ENOXAPARIN SODIUM 40 MILLIGRAM(S): 100 INJECTION SUBCUTANEOUS at 12:34

## 2017-06-28 RX ADMIN — HALOPERIDOL DECANOATE 5 MILLIGRAM(S): 100 INJECTION INTRAMUSCULAR at 05:01

## 2017-06-28 RX ADMIN — DIVALPROEX SODIUM 750 MILLIGRAM(S): 500 TABLET, DELAYED RELEASE ORAL at 21:51

## 2017-06-28 RX ADMIN — DIVALPROEX SODIUM 500 MILLIGRAM(S): 500 TABLET, DELAYED RELEASE ORAL at 12:34

## 2017-06-28 RX ADMIN — HALOPERIDOL DECANOATE 5 MILLIGRAM(S): 100 INJECTION INTRAMUSCULAR at 17:39

## 2017-06-28 RX ADMIN — Medication 150 MICROGRAM(S): at 05:00

## 2017-06-28 RX ADMIN — PANTOPRAZOLE SODIUM 40 MILLIGRAM(S): 20 TABLET, DELAYED RELEASE ORAL at 05:00

## 2017-06-28 NOTE — PROGRESS NOTE ADULT - PROBLEM SELECTOR PLAN 3
Coy Sousa 1967 . male who presents for routine immunizations. he denies any symptoms , reactions or allergies that would exclude them from being immunized today. Risks and adverse reactions were discussed and the VIS was given to them. All questions were addressed. Order placed for TDAP,  per Verbal Order from Dr Miguel Alvarenga with read back. Patient was observed for appropriate time per protocol post injection. There were no reactions observed. Raf Reynoso.  Edgardo Guerra LPN Consistently elevated INR  Trend PT/PTT  Check mixing study   Elevated fibrinogen  No evidence of cirrhosis on CT abd/pelvis

## 2017-06-28 NOTE — PROGRESS NOTE ADULT - PROBLEM SELECTOR PLAN 1
S/p Lt VATS with pleurodesis. ? empyema  Seen by ID, zosyn till 6/27 - completed   s/p IR guided CT placement on the right on 6/24 --> no labs sent  thoracic sx following  -   ? cause of recurrent pleural effusion - work up so far - negative - exudative.   monitor daily Chest tube output - 150ml - 200 daily  6/28 Discussed with Dr Portillo, may plan to proceed with VATS on rt side if family agreeable.

## 2017-06-28 NOTE — PROGRESS NOTE ADULT - SUBJECTIVE AND OBJECTIVE BOX
SAMIRA NUGENT    [id number]    47y      Male    CC: recurrent bilateral pleural effusions    INTERVAL HPI/OVERNIGHT EVENTS: no acute events    REVIEW OF SYSTEMS:    unable to obtain ROS      Vital Signs Last 24 Hrs  T(C): 36.7 (28 Jun 2017 07:50), Max: 36.9 (27 Jun 2017 16:49)  T(F): 98.1 (28 Jun 2017 07:50), Max: 98.4 (27 Jun 2017 16:49)  HR: 85 (28 Jun 2017 07:50) (85 - 101)  BP: 102/59 (28 Jun 2017 07:50) (102/59 - 123/77)  BP(mean): --  RR: 19 (28 Jun 2017 07:50) (19 - 22)  SpO2: 97% (28 Jun 2017 07:50) (93% - 97%)    PHYSICAL EXAM:    GENERAL: NAD, appears comfortable, loss of subcut fat  HEENT: PERRL, +EOMI  CHEST/LUNG: AE decreased on RT, No wheezing  HEART: S1S2+, Regular rate and rhythm; No murmurs, rubs, or gallops  ABDOMEN: Soft, Nontender, Nondistended  EXTREMITIES:   No clubbing, cyanosis, or edema  NEURO: AAOX3, no focal deficits      06-27 @ 07:01  -  06-28 @ 07:00  --------------------------------------------------------  IN: 350 mL / OUT: 240 mL / NET: 110 mL        LABS:                        9.6    13.8  )-----------( 437      ( 27 Jun 2017 09:41 )             29.7                   MEDICATIONS  (STANDING):  levothyroxine 150 MICROGram(s) Oral daily  pantoprazole    Tablet 40 milliGRAM(s) Oral before breakfast  haloperidol     Tablet 5 milliGRAM(s) Oral two times a day  simethicone drops 80 milliGRAM(s) Oral every 8 hours  diVALproex  milliGRAM(s) Oral daily  diVALproex  milliGRAM(s) Oral at bedtime  enoxaparin Injectable 40 milliGRAM(s) SubCutaneous daily    MEDICATIONS  (PRN):  acetaminophen   Tablet 650 milliGRAM(s) Oral every 6 hours PRN For Temp greater than 38.5 C (101.3 F)  artificial  tears Solution 1 Drop(s) Both EYES every 8 hours PRN Dry Eyes  haloperidol    Injectable 2 milliGRAM(s) IntraMuscular every 8 hours PRN Agitation      RADIOLOGY & ADDITIONAL TESTS:

## 2017-06-28 NOTE — PROGRESS NOTE ADULT - ASSESSMENT
46 yo M with h/o schizoaffective disorder, selective mutism, recurrent pleural effusion (transudative) represents from [place] for anorexia and refusal to take PO medications. He was last discharged after thoracentesis (1400cc) of transudative pleural effusion in left lateral region. Upon readmission, repeat chest xray showed recurrent moderate left pleural effusion. On 5/29, pt had seizure episode 2/2 noncompliance with PO medications, then transitioned to IV valproate which is also being used for his mood d/o and seizure. MRI brain x 2 were negative for acute infarct. Pt noted to have worsening leukocytosis, elevated CRP and procalcitonin. Per ID, vanc and zosyn were started. CT chest showed worsening left pleural effusion. CTS placed chest tube on 6/4. Pt had rapid response on 6/6, likely due to clozapine toxicity and was monitored overnight in MICU. On 6/9, pt had VATS which did not show evidence of infection and partially trapped lung. Pleurodesis was performed and had 1500cc drained. Had last chest tube removed on 6/13 from the left (the right fell out in transport???). Repeat cxr shows no change, recently done again with improvement of RLL; however, wbc of 13 - ID suggesting a non con ct chest to evaluate for interval change which showed a large loculated right sided effusion and in light of elevating wbc, needed to be drained. s/p IR drainage and placement of rt Chest Tube on 6/24. drained ~2lit. Chest tube output - 150ml - 200 daily  6/28 Discussed with Dr Portillo, may plan to proceed with VATS on rt side if family

## 2017-06-28 NOTE — PROGRESS NOTE ADULT - SUBJECTIVE AND OBJECTIVE BOX
Patient is a 47y old male schizophrenic basically deaf mute male from [facility] who   returns to [facility] to complete treatment of a previously large left pleural effusion. He  is scheduled to have a FLEXIBLE BRONCHOSCOPY, RIGHT VIDEO ASSISTED  THORACOSCOPIC SURGERY   (27 May 2017 05:11)      PAST MEDICAL HISTORY:  Hypothyroidism  Schizophrenic      PAST SURGICAL HISTORY:  Interventional radiology drainage of pleural effusions.  Flexible bronchoscopy with left VATS & decortication.      MEDICATIONS  (STANDING):  levothyroxine 150 MICROGram(s) Oral daily  pantoprazole    Tablet 40 milliGRAM(s) Oral before breakfast  haloperidol     Tablet 5 milliGRAM(s) Oral two times a day  simethicone drops 80 milliGRAM(s) Oral every 8 hours  diVALproex  milliGRAM(s) Oral daily  diVALproex  milliGRAM(s) Oral at bedtime  enoxaparin Injectable 40 milliGRAM(s) SubCutaneous daily    MEDICATIONS  (PRN):  acetaminophen   Tablet 650 milliGRAM(s) Oral every 6 hours PRN For Temp greater than 38.5 C (101.3 F)  artificial  tears Solution 1 Drop(s) Both EYES every 8 hours PRN Dry Eyes  haloperidol    Injectable 2 milliGRAM(s) IntraMuscular every 8 hours PRN Agitation      Allergies:  No known drug allergies      SOCIAL HISTORY:    The patient is an inpatient at [facility] and he does not smoke                                  drink alcohol or use illicit drugs.                          9.6    13.8  )-----------( 437      ( 2017 09:41 )             29.7       PT/INR - ( 2017 13:24 )   PT: 13.4 sec;   INR: 1.21 ratio         PTT - ( 2017 13:24 )  PTT:38.9 sec    [id number]  Sinus tachycardia  Nonspecific T wave abnormality  Abnormal ECG    TT ECHO:  - May 19, 2017  Summary:   2. The left atrium is normal in size  3 Left ventricular ejection fraction, by visual estimation is 50 to 55%  4.The left atrium is normal in size.  5. Normal right ventricular size and function  6. No significant valvular abnormality.        EXAM:  CHEST SINGLE VIEW FRONTAL   2017                         Impression: Right pleural effusion has resolved. Smaller left pleural   effusion. Airspace disease left lower lobe has improved.    ASA # =  3    Mallampati # =

## 2017-06-29 ENCOUNTER — APPOINTMENT (OUTPATIENT)
Dept: THORACIC SURGERY | Facility: HOSPITAL | Age: 47
End: 2017-06-29
Payer: MEDICAID

## 2017-06-29 ENCOUNTER — RESULT REVIEW (OUTPATIENT)
Age: 47
End: 2017-06-29

## 2017-06-29 LAB
ANION GAP SERPL CALC-SCNC: 13 MMOL/L — SIGNIFICANT CHANGE UP (ref 5–17)
BLD GP AB SCN SERPL QL: SIGNIFICANT CHANGE UP
BUN SERPL-MCNC: 22 MG/DL — HIGH (ref 8–20)
CALCIUM SERPL-MCNC: 9.4 MG/DL — SIGNIFICANT CHANGE UP (ref 8.6–10.2)
CHLORIDE SERPL-SCNC: 102 MMOL/L — SIGNIFICANT CHANGE UP (ref 98–107)
CO2 SERPL-SCNC: 26 MMOL/L — SIGNIFICANT CHANGE UP (ref 22–29)
CREAT SERPL-MCNC: 0.98 MG/DL — SIGNIFICANT CHANGE UP (ref 0.5–1.3)
GLUCOSE SERPL-MCNC: 85 MG/DL — SIGNIFICANT CHANGE UP (ref 70–115)
GRAM STN FLD: SIGNIFICANT CHANGE UP
HCT VFR BLD CALC: 28.2 % — LOW (ref 42–52)
HGB BLD-MCNC: 9.2 G/DL — LOW (ref 14–18)
MCHC RBC-ENTMCNC: 28.4 PG — SIGNIFICANT CHANGE UP (ref 27–31)
MCHC RBC-ENTMCNC: 32.6 G/DL — SIGNIFICANT CHANGE UP (ref 32–36)
MCV RBC AUTO: 87 FL — SIGNIFICANT CHANGE UP (ref 80–94)
PLATELET # BLD AUTO: 377 K/UL — SIGNIFICANT CHANGE UP (ref 150–400)
POTASSIUM SERPL-MCNC: 4.3 MMOL/L — SIGNIFICANT CHANGE UP (ref 3.5–5.3)
POTASSIUM SERPL-SCNC: 4.3 MMOL/L — SIGNIFICANT CHANGE UP (ref 3.5–5.3)
RBC # BLD: 3.24 M/UL — LOW (ref 4.6–6.2)
RBC # FLD: 16.2 % — HIGH (ref 11–15.6)
SODIUM SERPL-SCNC: 141 MMOL/L — SIGNIFICANT CHANGE UP (ref 135–145)
SPECIMEN SOURCE: SIGNIFICANT CHANGE UP
TYPE + AB SCN PNL BLD: SIGNIFICANT CHANGE UP
WBC # BLD: 10.9 K/UL — HIGH (ref 4.8–10.8)
WBC # FLD AUTO: 10.9 K/UL — HIGH (ref 4.8–10.8)

## 2017-06-29 PROCEDURE — 88342 IMHCHEM/IMCYTCHM 1ST ANTB: CPT | Mod: 26

## 2017-06-29 PROCEDURE — 32650 THORACOSCOPY W/PLEURODESIS: CPT | Mod: AS,78

## 2017-06-29 PROCEDURE — 71010: CPT | Mod: 26

## 2017-06-29 PROCEDURE — 32651 THORACOSCOPY REMOVE CORTEX: CPT | Mod: 78

## 2017-06-29 PROCEDURE — 99232 SBSQ HOSP IP/OBS MODERATE 35: CPT

## 2017-06-29 PROCEDURE — 32650 THORACOSCOPY W/PLEURODESIS: CPT | Mod: 78

## 2017-06-29 PROCEDURE — 88305 TISSUE EXAM BY PATHOLOGIST: CPT | Mod: 26

## 2017-06-29 PROCEDURE — 32609 THORACOSCOPY W/BX PLEURA: CPT | Mod: AS,78

## 2017-06-29 PROCEDURE — 32651 THORACOSCOPY REMOVE CORTEX: CPT | Mod: AS,78

## 2017-06-29 PROCEDURE — 32609 THORACOSCOPY W/BX PLEURA: CPT | Mod: 78

## 2017-06-29 PROCEDURE — 99233 SBSQ HOSP IP/OBS HIGH 50: CPT

## 2017-06-29 PROCEDURE — 88341 IMHCHEM/IMCYTCHM EA ADD ANTB: CPT | Mod: 26

## 2017-06-29 RX ORDER — SIMETHICONE 80 MG/1
80 TABLET, CHEWABLE ORAL EVERY 8 HOURS
Qty: 0 | Refills: 0 | Status: DISCONTINUED | OUTPATIENT
Start: 2017-06-29 | End: 2017-06-29

## 2017-06-29 RX ORDER — ACETAMINOPHEN 500 MG
650 TABLET ORAL EVERY 6 HOURS
Qty: 0 | Refills: 0 | Status: DISCONTINUED | OUTPATIENT
Start: 2017-06-29 | End: 2017-07-10

## 2017-06-29 RX ORDER — HYDROMORPHONE HYDROCHLORIDE 2 MG/ML
0.5 INJECTION INTRAMUSCULAR; INTRAVENOUS; SUBCUTANEOUS
Qty: 0 | Refills: 0 | Status: DISCONTINUED | OUTPATIENT
Start: 2017-06-29 | End: 2017-06-29

## 2017-06-29 RX ORDER — LEVOTHYROXINE SODIUM 125 MCG
150 TABLET ORAL DAILY
Qty: 0 | Refills: 0 | Status: DISCONTINUED | OUTPATIENT
Start: 2017-06-29 | End: 2017-07-10

## 2017-06-29 RX ORDER — SODIUM CHLORIDE 9 MG/ML
1000 INJECTION INTRAMUSCULAR; INTRAVENOUS; SUBCUTANEOUS
Qty: 0 | Refills: 0 | Status: DISCONTINUED | OUTPATIENT
Start: 2017-06-29 | End: 2017-06-29

## 2017-06-29 RX ORDER — HALOPERIDOL DECANOATE 100 MG/ML
5 INJECTION INTRAMUSCULAR
Qty: 0 | Refills: 0 | Status: DISCONTINUED | OUTPATIENT
Start: 2017-06-29 | End: 2017-07-10

## 2017-06-29 RX ORDER — DIVALPROEX SODIUM 500 MG/1
750 TABLET, DELAYED RELEASE ORAL AT BEDTIME
Qty: 0 | Refills: 0 | Status: DISCONTINUED | OUTPATIENT
Start: 2017-06-29 | End: 2017-06-30

## 2017-06-29 RX ORDER — SIMETHICONE 80 MG/1
80 TABLET, CHEWABLE ORAL EVERY 8 HOURS
Qty: 0 | Refills: 0 | Status: COMPLETED | OUTPATIENT
Start: 2017-06-29 | End: 2017-06-30

## 2017-06-29 RX ORDER — ENOXAPARIN SODIUM 100 MG/ML
40 INJECTION SUBCUTANEOUS DAILY
Qty: 0 | Refills: 0 | Status: DISCONTINUED | OUTPATIENT
Start: 2017-06-30 | End: 2017-07-10

## 2017-06-29 RX ORDER — PANTOPRAZOLE SODIUM 20 MG/1
40 TABLET, DELAYED RELEASE ORAL
Qty: 0 | Refills: 0 | Status: DISCONTINUED | OUTPATIENT
Start: 2017-06-29 | End: 2017-07-10

## 2017-06-29 RX ORDER — ONDANSETRON 8 MG/1
4 TABLET, FILM COATED ORAL ONCE
Qty: 0 | Refills: 0 | Status: DISCONTINUED | OUTPATIENT
Start: 2017-06-29 | End: 2017-06-29

## 2017-06-29 RX ORDER — DIVALPROEX SODIUM 500 MG/1
500 TABLET, DELAYED RELEASE ORAL DAILY
Qty: 0 | Refills: 0 | Status: DISCONTINUED | OUTPATIENT
Start: 2017-06-29 | End: 2017-07-10

## 2017-06-29 RX ADMIN — HYDROMORPHONE HYDROCHLORIDE 0.5 MILLIGRAM(S): 2 INJECTION INTRAMUSCULAR; INTRAVENOUS; SUBCUTANEOUS at 16:45

## 2017-06-29 RX ADMIN — DIVALPROEX SODIUM 750 MILLIGRAM(S): 500 TABLET, DELAYED RELEASE ORAL at 22:40

## 2017-06-29 RX ADMIN — HALOPERIDOL DECANOATE 5 MILLIGRAM(S): 100 INJECTION INTRAMUSCULAR at 16:46

## 2017-06-29 RX ADMIN — HALOPERIDOL DECANOATE 5 MILLIGRAM(S): 100 INJECTION INTRAMUSCULAR at 05:43

## 2017-06-29 RX ADMIN — SIMETHICONE 80 MILLIGRAM(S): 80 TABLET, CHEWABLE ORAL at 22:40

## 2017-06-29 RX ADMIN — HYDROMORPHONE HYDROCHLORIDE 0.5 MILLIGRAM(S): 2 INJECTION INTRAMUSCULAR; INTRAVENOUS; SUBCUTANEOUS at 16:48

## 2017-06-29 RX ADMIN — HYDROMORPHONE HYDROCHLORIDE 0.5 MILLIGRAM(S): 2 INJECTION INTRAMUSCULAR; INTRAVENOUS; SUBCUTANEOUS at 13:00

## 2017-06-29 RX ADMIN — Medication 150 MICROGRAM(S): at 05:43

## 2017-06-29 RX ADMIN — DIVALPROEX SODIUM 500 MILLIGRAM(S): 500 TABLET, DELAYED RELEASE ORAL at 16:46

## 2017-06-29 RX ADMIN — HYDROMORPHONE HYDROCHLORIDE 0.5 MILLIGRAM(S): 2 INJECTION INTRAMUSCULAR; INTRAVENOUS; SUBCUTANEOUS at 13:01

## 2017-06-29 RX ADMIN — PANTOPRAZOLE SODIUM 40 MILLIGRAM(S): 20 TABLET, DELAYED RELEASE ORAL at 05:44

## 2017-06-29 NOTE — BRIEF OPERATIVE NOTE - OPERATION/FINDINGS
Right pleural fluid 14 fr drainage catheter placement under US guidance.
Pleural effusion, pleural implant
pleural effusion, pleural lesion,

## 2017-06-29 NOTE — BRIEF OPERATIVE NOTE - POST-OP DX
Pleural effusion  06/09/2017  bilateral  Active  Vazquez, Shmuel SCHROEDER  Pleural effusion on right  06/23/2017    Active  Komal Starr
Pleural effusion  06/09/2017  bilateral  Active  Vazquez, Shmuel SCHROEDER
Pleural effusion  06/09/2017  bilateral  Active  Vazquez, Shmuel SCHROEDER  Pleural effusion on right  06/23/2017    Active  Komal Starr

## 2017-06-29 NOTE — PROGRESS NOTE ADULT - PROBLEM SELECTOR PLAN 1
S/p Lt VATS with pleurodesis. ? empyema  Seen by ID, zosyn till 6/27 - completed   s/p IR guided CT placement on the right on 6/24   thoracic sx following  -   ? cause of recurrent pleural effusion - work up so far - negative - exudative.   monitor daily Chest tube output - 150ml - 200 daily  awaiting VATSx on rt today.

## 2017-06-29 NOTE — PROGRESS NOTE ADULT - ASSESSMENT
48 yo M with h/o schizoaffective disorder, selective mutism, recurrent pleural effusion (transudative) represents from [place] for anorexia and refusal to take PO medications. He was last discharged after thoracentesis (1400cc) of transudative pleural effusion in left lateral region. Upon readmission, repeat chest xray showed recurrent moderate left pleural effusion. On 5/29, pt had seizure episode 2/2 noncompliance with PO medications, then transitioned to IV valproate which is also being used for his mood d/o and seizure. MRI brain x 2 were negative for acute infarct. Pt noted to have worsening leukocytosis, elevated CRP and procalcitonin. Per ID, vanc and zosyn were started. CT chest showed worsening left pleural effusion. CTS placed chest tube on 6/4. Pt had rapid response on 6/6, likely due to clozapine toxicity and was monitored overnight in MICU. On 6/9, pt had VATS which did not show evidence of infection and partially trapped lung. Pleurodesis was performed and had 1500cc drained. Had last chest tube removed on 6/13 from the left (the right fell out in transport???). Repeat cxr shows no change, recently done again with improvement of RLL; however, wbc of 13 - ID suggesting a non con ct chest to evaluate for interval change which showed a large loculated right sided effusion and in light of elevating wbc, needed to be drained. s/p IR drainage and placement of rt Chest Tube on 6/24. drained ~2lit. Chest tube output - 150ml - 200 daily  6/28 Discussed with Dr Portillo, may plan to proceed with VATS on rt side. 6/29 RT VATS today

## 2017-06-29 NOTE — BRIEF OPERATIVE NOTE - PROCEDURE
Drainage of pleural effusion  06/23/2017    Active  WILLOW
VATS (video-assisted thoracoscopic surgery)  06/09/2017  flex bronchoscopy, left vats, drainage of effusion, pleural biopsy, doxycycline pleurodesis, insertion of right 14F pigtail catheter. partial pulmonary decortication  Active  [id number]
VATS (video-assisted thoracoscopic surgery)  06/29/2017  RIGHT VATS, drainage of effusion, partial pulmonary decortication, pleural biopsy, chemical pleurodesis  Active  [id number]

## 2017-06-29 NOTE — PROGRESS NOTE ADULT - PROBLEM SELECTOR PLAN 1
s/p left Chest tube, s/p VATS 6/9/17  initial Pleural fluid culture, with CoNS from broth only.  Blood cultures no growth  CT chest with loculated RIGHT effusion and decreased aeration s/p drainage; Fluid culture negative. COMPLETE Zosyn on6/27/17  PENDING VATS for RIGHT effusion with pleurodesis 6/29/17 s/p left Chest tube, s/p VATS 6/9/17  initial Pleural fluid culture, with CoNS from broth only.  Blood cultures no growth  CT chest with loculated RIGHT effusion and decreased aeration s/p drainage; Fluid culture negative. COMPLETE Zosyn on6/27/17  STATU POST VATS for RIGHT effusion with pleurodesis 6/29/17  - Will follow up Pleural biopsy

## 2017-06-29 NOTE — PROGRESS NOTE ADULT - ASSESSMENT
48 y/o M with s/p VATS for empyema on 6/9/17;  with WBC elevation now resolved  Completed ZOSYN on 6/27   new right effusion; pending VATS and pleurodesis

## 2017-06-29 NOTE — PROGRESS NOTE ADULT - SUBJECTIVE AND OBJECTIVE BOX
SAMIRA NUGENT    [id number]    47y      Male    CC: recurrent bilateral pleural effusion s/p Lt VATS, pleurodesis, Rt chest tube    INTERVAL HPI/OVERNIGHT EVENTS: no acute events    REVIEW OF SYSTEMS:    pt does not provide any ROS    Vital Signs Last 24 Hrs  T(C): 36.3 (29 Jun 2017 08:00), Max: 36.5 (28 Jun 2017 23:35)  T(F): 97.4 (29 Jun 2017 08:00), Max: 97.7 (28 Jun 2017 23:35)  HR: 79 (29 Jun 2017 08:00) (79 - 94)  BP: 97/59 (29 Jun 2017 08:00) (97/59 - 108/66)  BP(mean): --  RR: 18 (29 Jun 2017 08:00) (18 - 18)  SpO2: 98% (29 Jun 2017 08:00) (94% - 98%)    PHYSICAL EXAM:    GENERAL: NAD, Loss of sub cut fat  HEENT: PERRL, +EOMI  NECK: soft, Supple, No JVD,   CHEST/LUNG: AE decreased on Rt.   HEART: S1S2+, Regular rate and rhythm; No murmurs, rubs, or gallops  ABDOMEN: Soft, Nontender, Nondistended;   EXTREMITIES: No clubbing, cyanosis, or edema  NEURO: AAOX3, no focal deficits        06-28 @ 07:01  -  06-29 @ 07:00  --------------------------------------------------------  IN: 0 mL / OUT: 50 mL / NET: -50 mL        LABS:                        9.2    10.9  )-----------( 377      ( 29 Jun 2017 08:51 )             28.2     06-29    141  |  102  |  22.0<H>  ----------------------------<  85  4.3   |  26.0  |  0.98    Ca    9.4      29 Jun 2017 08:51              MEDICATIONS  (STANDING):  levothyroxine 150 MICROGram(s) Oral daily  pantoprazole    Tablet 40 milliGRAM(s) Oral before breakfast  haloperidol     Tablet 5 milliGRAM(s) Oral two times a day  simethicone drops 80 milliGRAM(s) Oral every 8 hours  diVALproex  milliGRAM(s) Oral daily  diVALproex  milliGRAM(s) Oral at bedtime  enoxaparin Injectable 40 milliGRAM(s) SubCutaneous daily    MEDICATIONS  (PRN):  acetaminophen   Tablet 650 milliGRAM(s) Oral every 6 hours PRN For Temp greater than 38.5 C (101.3 F)  artificial  tears Solution 1 Drop(s) Both EYES every 8 hours PRN Dry Eyes  haloperidol    Injectable 2 milliGRAM(s) IntraMuscular every 8 hours PRN Agitation      RADIOLOGY & ADDITIONAL TESTS:

## 2017-06-29 NOTE — PROGRESS NOTE ADULT - SUBJECTIVE AND OBJECTIVE BOX
[facility]  INFECTIOUS DISEASES AND INTERNAL MEDICINE OF [place] [facility]  =======================================================  Ritesh Lang MD  Diplomates American Board of Internal Medicine and Infectious Diseases  =======================================================    SAMIRA NUGENT [id number]  Follow up: Leukocytosis; Pleural effusion, empyema  s/p left chest tube on 6/2/17, s/p VATS 6/9/1    patient seen and examined in follow up.  Chart and labs reviewed.     repeat cultures of fluid remain negative.  WBC count decreasing.  Procalcitonin test is negative    =======================================================    Allergies:  Allergy Status Unknown  Chicken (Other (Unknown))      =======================================================    MEDICATIONS  (STANDING):  levothyroxine 150 MICROGram(s) Oral daily  pantoprazole    Tablet 40 milliGRAM(s) Oral before breakfast  haloperidol     Tablet 5 milliGRAM(s) Oral two times a day  simethicone drops 80 milliGRAM(s) Oral every 8 hours  diVALproex  milliGRAM(s) Oral daily  diVALproex  milliGRAM(s) Oral at bedtime  enoxaparin Injectable 40 milliGRAM(s) SubCutaneous daily       =======================================================    REVIEW OF SYSTEMS:  Unable to obtain    =======================================================   Physical Exam:  Vital Signs Last 24 Hrs  T(C): 36.3 (29 Jun 2017 08:00), Max: 36.5 (28 Jun 2017 23:35)  T(F): 97.4 (29 Jun 2017 08:00), Max: 97.7 (28 Jun 2017 23:35)  HR: 79 (29 Jun 2017 08:00) (79 - 94)  BP: 97/59 (29 Jun 2017 08:00) (97/59 - 108/66)  RR: 18 (29 Jun 2017 08:00) (18 - 18)  SpO2: 98% (29 Jun 2017 08:00) (94% - 98%)    GEN: AWAKE NON VERBAL  HEENT: normocephalic and atraumatic. EOMI. CASSANDRA.    NECK: Supple. No carotid bruits.  No lymphadenopathy or thyromegaly.  LUNGS: POOR INSP effort. LEFT CHEST mid-ax line sutures in place under dressing;   right chest tube in place; champagne fluid in tubing  HEART: Regular rate and rhythm without murmur.  ABDOMEN: Soft, nontender, and nondistended.  Positive bowel sounds.    : No CVA tenderness  EXTREMITIES: Without any cyanosis, clubbing, rash, lesions or edema.  MSK: no joint swelling  NEUROLOGIC: Cranial nerves II through XII are grossly intact.  PSYCHIATRIC: NON VERBAL   SKIN: No ulceration or induration present.     =======================================================      06-29    141  |  102  |  22.0<H>  ----------------------------<  85  4.3   |  26.0  |  0.98    Ca    9.4      29 Jun 2017 08:51                            9.2    10.9  )-----------( 377      ( 29 Jun 2017 08:51 )             28.2            RECENT CULTURES:  06-23 @ 14:43 .Body Fluid Pleural Fluid     No growth at 3 days.  Culture in progress    Few White blood cells  No organisms seen    ================================     EXAM:  CHEST SINGLE VIEW FRONTAL                          PROCEDURE DATE:  06/26/2017        INTERPRETATION:  History: Pleural effusion    Technique:  AP portable    Comparisons:  Chest x-ray dated 6/19/2017    Findings: Pigtail catheter is present overlying the right diaphragm. The   right pleural effusion has resolved. Density in the left lower lobe and   retrocardiac space shows some clearing and increased aeration.    Small left pleural effusion. Airspace disease left lower lobe. The   pulmonary vasculature and aorta are normal for age. Heart size is   unremarkable. The thorax is normal for age.    Impression: Right pleural effusion has resolved. Smaller left pleural   effusion. Airspace disease left lower lobe has improved.      MARIELENA MCCRACKEN M.D., ATTENDING RADIOLOGIST  This document has been electronically signed. Jun 26 2017 11:37AM [facility]  INFECTIOUS DISEASES AND INTERNAL MEDICINE OF [place]  =======================================================  Ritesh Lang MD  Diplomates American Board of Internal Medicine and Infectious Diseases  =======================================================    SAMIRA NUGENT [id number]  Follow up: Leukocytosis; Pleural effusion, empyema  s/p left chest tube on 6/2/17, s/p VATS 6/9/1    patient seen and examined in follow up.  Chart and labs reviewed.     repeat cultures of fluid remain negative.  WBC count decreasing.  Procalcitonin test is negative    PATIENT seen in PACU #2 s/p VATS    =======================================================    Allergies:  Allergy Status Unknown  Chicken (Other (Unknown))      =======================================================    MEDICATIONS  (STANDING):  levothyroxine 150 MICROGram(s) Oral daily  pantoprazole    Tablet 40 milliGRAM(s) Oral before breakfast  haloperidol     Tablet 5 milliGRAM(s) Oral two times a day  simethicone drops 80 milliGRAM(s) Oral every 8 hours  diVALproex  milliGRAM(s) Oral daily  diVALproex  milliGRAM(s) Oral at bedtime  enoxaparin Injectable 40 milliGRAM(s) SubCutaneous daily       =======================================================    REVIEW OF SYSTEMS:  Unable to obtain    =======================================================   Physical Exam:  Vital Signs Last 24 Hrs  T(C): 36.3 (29 Jun 2017 08:00), Max: 36.5 (28 Jun 2017 23:35)  T(F): 97.4 (29 Jun 2017 08:00), Max: 97.7 (28 Jun 2017 23:35)  HR: 79 (29 Jun 2017 08:00) (79 - 94)  BP: 97/59 (29 Jun 2017 08:00) (97/59 - 108/66)  RR: 18 (29 Jun 2017 08:00) (18 - 18)  SpO2: 98% (29 Jun 2017 08:00) (94% - 98%)    GEN: AWAKE NON VERBAL, mild agitation  HEENT: normocephalic and atraumatic. EOMI. CASSANDRA.    NECK: Supple. No carotid bruits.  No lymphadenopathy or thyromegaly.  LUNGS: RIGHT sided chest tube x 2 drains  HEART: Regular rate and rhythm without murmur.  ABDOMEN: Soft, nontender, and nondistended.  Positive bowel sounds.    : No CVA tenderness  EXTREMITIES: Without any cyanosis, clubbing, rash, lesions or edema.  MSK: no joint swelling  NEUROLOGIC: Cranial nerves II through XII are grossly intact.  PSYCHIATRIC: NON VERBAL   SKIN: No ulceration or induration present.     =======================================================      06-29    141  |  102  |  22.0<H>  ----------------------------<  85  4.3   |  26.0  |  0.98    Ca    9.4      29 Jun 2017 08:51                            9.2    10.9  )-----------( 377      ( 29 Jun 2017 08:51 )             28.2            RECENT CULTURES:  06-23 @ 14:43 .Body Fluid Pleural Fluid     No growth at 3 days.  Culture in progress    Few White blood cells  No organisms seen    ================================     EXAM:  CHEST SINGLE VIEW FRONTAL                          PROCEDURE DATE:  06/26/2017        INTERPRETATION:  History: Pleural effusion    Technique:  AP portable    Comparisons:  Chest x-ray dated 6/19/2017    Findings: Pigtail catheter is present overlying the right diaphragm. The   right pleural effusion has resolved. Density in the left lower lobe and   retrocardiac space shows some clearing and increased aeration.    Small left pleural effusion. Airspace disease left lower lobe. The   pulmonary vasculature and aorta are normal for age. Heart size is   unremarkable. The thorax is normal for age.    Impression: Right pleural effusion has resolved. Smaller left pleural   effusion. Airspace disease left lower lobe has improved.      MARIELENA MCCRACKEN M.D., ATTENDING RADIOLOGIST  This document has been electronically signed. Jun 26 2017 11:37AM

## 2017-06-30 ENCOUNTER — TRANSCRIPTION ENCOUNTER (OUTPATIENT)
Age: 47
End: 2017-06-30

## 2017-06-30 LAB
ANION GAP SERPL CALC-SCNC: 13 MMOL/L — SIGNIFICANT CHANGE UP (ref 5–17)
ANISOCYTOSIS BLD QL: SLIGHT — SIGNIFICANT CHANGE UP
BASOPHILS # BLD AUTO: 0 K/UL — SIGNIFICANT CHANGE UP (ref 0–0.2)
BUN SERPL-MCNC: 21 MG/DL — HIGH (ref 8–20)
CALCIUM SERPL-MCNC: 9.4 MG/DL — SIGNIFICANT CHANGE UP (ref 8.6–10.2)
CHLORIDE SERPL-SCNC: 102 MMOL/L — SIGNIFICANT CHANGE UP (ref 98–107)
CO2 SERPL-SCNC: 25 MMOL/L — SIGNIFICANT CHANGE UP (ref 22–29)
CREAT SERPL-MCNC: 0.92 MG/DL — SIGNIFICANT CHANGE UP (ref 0.5–1.3)
CULTURE RESULTS: SIGNIFICANT CHANGE UP
EOSINOPHIL # BLD AUTO: 0 K/UL — SIGNIFICANT CHANGE UP (ref 0–0.5)
GLUCOSE SERPL-MCNC: 86 MG/DL — SIGNIFICANT CHANGE UP (ref 70–115)
HCT VFR BLD CALC: 28.6 % — LOW (ref 42–52)
HGB BLD-MCNC: 9.2 G/DL — LOW (ref 14–18)
HYPOCHROMIA BLD QL: SLIGHT — SIGNIFICANT CHANGE UP
LYMPHOCYTES # BLD AUTO: 19 % — LOW (ref 20–55)
LYMPHOCYTES # BLD AUTO: 3.4 K/UL — SIGNIFICANT CHANGE UP (ref 1–4.8)
MACROCYTES BLD QL: SLIGHT — SIGNIFICANT CHANGE UP
MCHC RBC-ENTMCNC: 28 PG — SIGNIFICANT CHANGE UP (ref 27–31)
MCHC RBC-ENTMCNC: 32.2 G/DL — SIGNIFICANT CHANGE UP (ref 32–36)
MCV RBC AUTO: 87.2 FL — SIGNIFICANT CHANGE UP (ref 80–94)
MICROCYTES BLD QL: SLIGHT — SIGNIFICANT CHANGE UP
MONOCYTES # BLD AUTO: 1 K/UL — HIGH (ref 0–0.8)
MONOCYTES NFR BLD AUTO: 5 % — SIGNIFICANT CHANGE UP (ref 3–10)
NEUTROPHILS # BLD AUTO: 13.5 K/UL — HIGH (ref 1.8–8)
NEUTROPHILS NFR BLD AUTO: 72 % — SIGNIFICANT CHANGE UP (ref 37–73)
NEUTS BAND # BLD: 2 % — SIGNIFICANT CHANGE UP (ref 0–8)
PLAT MORPH BLD: NORMAL — SIGNIFICANT CHANGE UP
PLATELET # BLD AUTO: 358 K/UL — SIGNIFICANT CHANGE UP (ref 150–400)
POIKILOCYTOSIS BLD QL AUTO: SLIGHT — SIGNIFICANT CHANGE UP
POLYCHROMASIA BLD QL SMEAR: SLIGHT — SIGNIFICANT CHANGE UP
POTASSIUM SERPL-MCNC: 4.2 MMOL/L — SIGNIFICANT CHANGE UP (ref 3.5–5.3)
POTASSIUM SERPL-SCNC: 4.2 MMOL/L — SIGNIFICANT CHANGE UP (ref 3.5–5.3)
RBC # BLD: 3.28 M/UL — LOW (ref 4.6–6.2)
RBC # FLD: 15.9 % — HIGH (ref 11–15.6)
RBC BLD AUTO: ABNORMAL
SODIUM SERPL-SCNC: 140 MMOL/L — SIGNIFICANT CHANGE UP (ref 135–145)
SPECIMEN SOURCE: SIGNIFICANT CHANGE UP
VARIANT LYMPHS # BLD: 2 % — SIGNIFICANT CHANGE UP (ref 0–6)
WBC # BLD: 18.1 K/UL — HIGH (ref 4.8–10.8)
WBC # FLD AUTO: 18.1 K/UL — HIGH (ref 4.8–10.8)

## 2017-06-30 PROCEDURE — 99233 SBSQ HOSP IP/OBS HIGH 50: CPT

## 2017-06-30 RX ORDER — CLOTRIMAZOLE AND BETAMETHASONE DIPROPIONATE 10; .5 MG/G; MG/G
1 CREAM TOPICAL
Qty: 0 | Refills: 0 | Status: DISCONTINUED | OUTPATIENT
Start: 2017-06-30 | End: 2017-07-10

## 2017-06-30 RX ORDER — DIVALPROEX SODIUM 500 MG/1
500 TABLET, DELAYED RELEASE ORAL AT BEDTIME
Qty: 0 | Refills: 0 | Status: DISCONTINUED | OUTPATIENT
Start: 2017-06-30 | End: 2017-07-10

## 2017-06-30 RX ORDER — ZINC OXIDE 200 MG/G
1 OINTMENT TOPICAL THREE TIMES A DAY
Qty: 0 | Refills: 0 | Status: DISCONTINUED | OUTPATIENT
Start: 2017-06-30 | End: 2017-07-10

## 2017-06-30 RX ADMIN — SIMETHICONE 80 MILLIGRAM(S): 80 TABLET, CHEWABLE ORAL at 05:10

## 2017-06-30 RX ADMIN — CLOTRIMAZOLE AND BETAMETHASONE DIPROPIONATE 1 APPLICATION(S): 10; .5 CREAM TOPICAL at 17:25

## 2017-06-30 RX ADMIN — Medication 150 MICROGRAM(S): at 05:10

## 2017-06-30 RX ADMIN — DIVALPROEX SODIUM 500 MILLIGRAM(S): 500 TABLET, DELAYED RELEASE ORAL at 14:43

## 2017-06-30 RX ADMIN — PANTOPRAZOLE SODIUM 40 MILLIGRAM(S): 20 TABLET, DELAYED RELEASE ORAL at 05:10

## 2017-06-30 RX ADMIN — DIVALPROEX SODIUM 500 MILLIGRAM(S): 500 TABLET, DELAYED RELEASE ORAL at 22:06

## 2017-06-30 RX ADMIN — HALOPERIDOL DECANOATE 5 MILLIGRAM(S): 100 INJECTION INTRAMUSCULAR at 05:10

## 2017-06-30 RX ADMIN — ENOXAPARIN SODIUM 40 MILLIGRAM(S): 100 INJECTION SUBCUTANEOUS at 22:06

## 2017-06-30 RX ADMIN — HALOPERIDOL DECANOATE 5 MILLIGRAM(S): 100 INJECTION INTRAMUSCULAR at 17:23

## 2017-06-30 NOTE — PROGRESS NOTE ADULT - PROBLEM SELECTOR PLAN 1
Now s/p Right VATS/Pleurodesis.  Maintain CT x 2 to suction through the weekend.  CXR in AM.  PRN Percocet for pain control.  Medical management as per Primary team.  Discussed with Dr. Portillo and CT surgery team in AM rounds.

## 2017-06-30 NOTE — PROGRESS NOTE ADULT - SUBJECTIVE AND OBJECTIVE BOX
Chief Complaint:  rash as per nurse      Assessment:  patient with eyes open, pleasant and cooperative, nodding head to pain in rash site, rash present between groin area, shiny red, bilateral groins      Plan:  fungal dermatitis, add Lotrisone bid

## 2017-06-30 NOTE — PROGRESS NOTE ADULT - SUBJECTIVE AND OBJECTIVE BOX
call received from Dr Traore conveying family concern that Depakote maybe culprit in pleural effusions This had previously been explored and discussed with both sister from [place] and brother at bedside I was in process of tapering Depakote when patient became more elevated hypersexual grabbed aide's breast and after discussion with his sister and at her request Depakote dose was again increased Previous investigation via computer search and discussion with Dr Coughlin clinical pharmacist revealed a low reported incidence of pleural effusion on valproic acid  (< 0.55%)  Nonetheless, I will again begin dose reduction with plan to taper and discontinue. further pharmacologic changes will be the responsibility of psychiatrist at [place].

## 2017-06-30 NOTE — PROGRESS NOTE ADULT - SUBJECTIVE AND OBJECTIVE BOX
Subjective: Pt lying in bed.  Non verbal.  NAD noted.      Tele:  SR                        T(F): 98.8 (17 @ 15:35), Max: 98.8 (17 @ 15:35)  HR: 85 (17 @ 08:36) (85 - 95)  BP: 117/75 (17 @ 05:04) (112/68 - 118/76)  RR: 18 (17 @ 08:36) (15 - 18)  SpO2: 98% (17 @ 08:36) (96% - 99%) on 3 liters nasal O2.          Daily     Daily Weight in k (2017 06:41)      Allergies:  Chicken (Unknown)          140  |  102  |  21.0<H>  ----------------------------<  86  4.2   |  25.0  |  0.92    Ca    9.4      2017 07:22                                 9.2    18.1  )-----------( 358      ( 2017 07:22 )             28.6                      CXR:     I&O's Detail    2017 07:01  -  2017 07:00  --------------------------------------------------------  IN:    Oral Fluid: 320 mL    sodium chloride 0.9%: 400 mL  Total IN: 720 mL    OUT:    Chest Tube: 300 mL    Chest Tube: 118 mL  Total OUT: 418 mL    Total NET: 302 mL          CHEST TUBE:  [x ] YES [ ] NO  OUTPUT:  #1  0ml overnight and 300ml over the last 24 hours    AIR LEAKS:  [ ] YES [x] NO            Active Medications:  levothyroxine 150 MICROGram(s) Oral daily  acetaminophen   Tablet 650 milliGRAM(s) Oral every 6 hours PRN  pantoprazole    Tablet 40 milliGRAM(s) Oral before breakfast  artificial  tears Solution 1 Drop(s) Both EYES every 8 hours PRN  haloperidol     Tablet 5 milliGRAM(s) Oral two times a day  diVALproex  milliGRAM(s) Oral daily  enoxaparin Injectable 40 milliGRAM(s) SubCutaneous daily  oxyCODONE  5 mG/acetaminophen 325 mG 1 Tablet(s) Oral every 4 hours PRN  diVALproex  milliGRAM(s) Oral at bedtime  zinc oxide 40%/lanolin Ointment 1 Application(s) Topical three times a day  clotrimazole/betamethasone Cream 1 Application(s) Topical two times a day      Physical Exam:    Neuro: Nonverbal, Non interactive.  Closes eyes when spoken to.    Pulm: Diminished BLL.  Right CT x 2.  No air leak.  CT sites without crepitus.  DSD c/d/i.    CV: RRR.  +S1+S2.    Abd: Soft/NT/ND.  +BS.    Extremities: No edema BLE.  +pp.    Incision(s): Right VATS site with dsd c/d/i.       PAST MEDICAL & SURGICAL HISTORY:  Hypothyroidism, unspecified type  Psychiatric disorder  No significant past surgical history

## 2017-06-30 NOTE — PROGRESS NOTE ADULT - PROBLEM SELECTOR PLAN 1
S/p b/l VATS with pleurodesis. ? doubt empyema  Seen by ID, zosyn till 6/27 - completed   s/p IR guided CT placement on the right on 6/24   thoracic sx following  -   ? cause of recurrent pleural effusion - work up so far - negative - exudative.   f/u pleural biopsy and cultures sent on 6/29  chest tube management per thoracic SX

## 2017-06-30 NOTE — PROGRESS NOTE ADULT - SUBJECTIVE AND OBJECTIVE BOX
SAMIRA NUGENT    [id number]    47y      Male    CC: Recurrent Bilateral pleural effusions s/p bilateral VATSx    INTERVAL HPI/OVERNIGHT EVENTS: Underwent Rt VATSx on 6/29. doing well post -procedure. tolerated food per Aide at bedside    REVIEW OF SYSTEMS:    unable to obtain 2/2 pt non-verbal       Vital Signs Last 24 Hrs  T(C): 36.3 (30 Jun 2017 05:04), Max: 36.7 (29 Jun 2017 18:15)  T(F): 97.3 (30 Jun 2017 05:04), Max: 98.1 (29 Jun 2017 18:15)  HR: 85 (30 Jun 2017 08:36) (85 - 95)  BP: 117/75 (30 Jun 2017 05:04) (112/68 - 118/76)  BP(mean): --  RR: 18 (30 Jun 2017 08:36) (15 - 18)  SpO2: 98% (30 Jun 2017 08:36) (96% - 99%)    PHYSICAL EXAM:    GENERAL: NAD, drowsy   NECK: soft, Supple, No JVD,   CHEST/LUNG: Clear to percussion bilaterally; No wheezing  2 rt chest tubes+  HEART: S1S2+, Regular rate and rhythm  ABDOMEN: Soft, Nontender, Nondistended; Bowel sounds present  EXTREMITIES:   No clubbing, cyanosis, or edema  SKIN: No rashes or lesions      06-29 @ 07:01  -  06-30 @ 07:00  --------------------------------------------------------  IN: 720 mL / OUT: 418 mL / NET: 302 mL        LABS:                        9.2    18.1  )-----------( 358      ( 30 Jun 2017 07:22 )             28.6     06-30    140  |  102  |  21.0<H>  ----------------------------<  86  4.2   |  25.0  |  0.92    Ca    9.4      30 Jun 2017 07:22              MEDICATIONS  (STANDING):  levothyroxine 150 MICROGram(s) Oral daily  pantoprazole    Tablet 40 milliGRAM(s) Oral before breakfast  haloperidol     Tablet 5 milliGRAM(s) Oral two times a day  diVALproex  milliGRAM(s) Oral daily  enoxaparin Injectable 40 milliGRAM(s) SubCutaneous daily  diVALproex  milliGRAM(s) Oral at bedtime    MEDICATIONS  (PRN):  acetaminophen   Tablet 650 milliGRAM(s) Oral every 6 hours PRN For Temp greater than 38.5 C (101.3 F)  artificial  tears Solution 1 Drop(s) Both EYES every 8 hours PRN Dry Eyes  oxyCODONE  5 mG/acetaminophen 325 mG 1 Tablet(s) Oral every 4 hours PRN Moderate Pain (4 - 6)      RADIOLOGY & ADDITIONAL TESTS:    CXR - Rt chest tubes +

## 2017-06-30 NOTE — PROGRESS NOTE ADULT - ASSESSMENT
48 yo M with h/o schizoaffective disorder, selective mutism, recurrent pleural effusion (transudative) represents from [place] for anorexia and refusal to take PO medications. He was last discharged after thoracentesis (1400cc) of transudative pleural effusion in left lateral region. Upon readmission, repeat chest xray showed recurrent moderate left pleural effusion. On 5/29, pt had seizure episode 2/2 noncompliance with PO medications, then transitioned to IV valproate which is also being used for his mood d/o and seizure. MRI brain x 2 were negative for acute infarct. Pt noted to have worsening leukocytosis, elevated CRP and procalcitonin. Per ID, vanc and zosyn were started. CT chest showed worsening left pleural effusion. CTS placed chest tube on 6/4. Pt had rapid response on 6/6, likely due to clozapine toxicity and was monitored overnight in MICU. On 6/9, pt had VATS which did not show evidence of infection and partially trapped lung. Pleurodesis was performed and had 1500cc drained. Had last chest tube removed on 6/13 from the left (the right fell out in transport???). Repeat cxr shows no change, recently done again with improvement of RLL; however, wbc of 13 - ID suggesting a non con ct chest to evaluate for interval change which showed a large loculated right sided effusion and in light of elevating wbc, needed to be drained. s/p IR drainage and placement of rt Chest Tube on 6/24. drained ~2lit. Chest tube output - 150ml - 200 daily  6/28 Discussed with Dr Portillo, may plan to proceed with VATS on rt side. 6/29 underwent RT VATS uneventful, intraop- clear fluid , no purulent material per Dr Portillo. 6/30 - has rt chest tubes - CTSx following - plan to possibly take tubes out monday.   Family anxious about possible DC after CT are out. See Dr Warren note.  Changes were made in is Depakote again, per family request.

## 2017-06-30 NOTE — PROGRESS NOTE ADULT - ASSESSMENT
47M h/o scizophrenia, autism, mutism, hypothyroidism, PNA, FTT, sepsis, s/p L VATS decort/pleurodesis on 6/9.   Now s/p RVATS Pleurodesis from 6/29.

## 2017-06-30 NOTE — PROGRESS NOTE ADULT - PROBLEM SELECTOR PLAN 4
Appreciate [facility] recs.  Plan to wean off of Depakote 2/2 family anxious regarding possibly Depakote being the causative factor for b/l pleural effusion. Although this is very rare.

## 2017-07-01 LAB
ANION GAP SERPL CALC-SCNC: 14 MMOL/L — SIGNIFICANT CHANGE UP (ref 5–17)
BUN SERPL-MCNC: 19 MG/DL — SIGNIFICANT CHANGE UP (ref 8–20)
CALCIUM SERPL-MCNC: 9.2 MG/DL — SIGNIFICANT CHANGE UP (ref 8.6–10.2)
CHLORIDE SERPL-SCNC: 102 MMOL/L — SIGNIFICANT CHANGE UP (ref 98–107)
CO2 SERPL-SCNC: 24 MMOL/L — SIGNIFICANT CHANGE UP (ref 22–29)
CREAT SERPL-MCNC: 0.73 MG/DL — SIGNIFICANT CHANGE UP (ref 0.5–1.3)
GLUCOSE SERPL-MCNC: 99 MG/DL — SIGNIFICANT CHANGE UP (ref 70–115)
HCT VFR BLD CALC: 25.7 % — LOW (ref 42–52)
HGB BLD-MCNC: 8.2 G/DL — LOW (ref 14–18)
MCHC RBC-ENTMCNC: 27.7 PG — SIGNIFICANT CHANGE UP (ref 27–31)
MCHC RBC-ENTMCNC: 31.9 G/DL — LOW (ref 32–36)
MCV RBC AUTO: 86.8 FL — SIGNIFICANT CHANGE UP (ref 80–94)
PLATELET # BLD AUTO: 312 K/UL — SIGNIFICANT CHANGE UP (ref 150–400)
POTASSIUM SERPL-MCNC: 4.5 MMOL/L — SIGNIFICANT CHANGE UP (ref 3.5–5.3)
POTASSIUM SERPL-SCNC: 4.5 MMOL/L — SIGNIFICANT CHANGE UP (ref 3.5–5.3)
RBC # BLD: 2.96 M/UL — LOW (ref 4.6–6.2)
RBC # FLD: 16.2 % — HIGH (ref 11–15.6)
SODIUM SERPL-SCNC: 140 MMOL/L — SIGNIFICANT CHANGE UP (ref 135–145)
WBC # BLD: 11.9 K/UL — HIGH (ref 4.8–10.8)
WBC # FLD AUTO: 11.9 K/UL — HIGH (ref 4.8–10.8)

## 2017-07-01 PROCEDURE — 71010: CPT | Mod: 26

## 2017-07-01 PROCEDURE — 99233 SBSQ HOSP IP/OBS HIGH 50: CPT

## 2017-07-01 RX ORDER — COLCHICINE 0.6 MG
0.6 TABLET ORAL DAILY
Qty: 0 | Refills: 0 | Status: DISCONTINUED | OUTPATIENT
Start: 2017-07-01 | End: 2017-07-10

## 2017-07-01 RX ORDER — ASCORBIC ACID 60 MG
500 TABLET,CHEWABLE ORAL DAILY
Qty: 0 | Refills: 0 | Status: DISCONTINUED | OUTPATIENT
Start: 2017-07-01 | End: 2017-07-10

## 2017-07-01 RX ORDER — FERROUS SULFATE 325(65) MG
325 TABLET ORAL DAILY
Qty: 0 | Refills: 0 | Status: DISCONTINUED | OUTPATIENT
Start: 2017-07-01 | End: 2017-07-10

## 2017-07-01 RX ADMIN — PANTOPRAZOLE SODIUM 40 MILLIGRAM(S): 20 TABLET, DELAYED RELEASE ORAL at 05:41

## 2017-07-01 RX ADMIN — HALOPERIDOL DECANOATE 5 MILLIGRAM(S): 100 INJECTION INTRAMUSCULAR at 05:41

## 2017-07-01 RX ADMIN — CLOTRIMAZOLE AND BETAMETHASONE DIPROPIONATE 1 APPLICATION(S): 10; .5 CREAM TOPICAL at 17:02

## 2017-07-01 RX ADMIN — HALOPERIDOL DECANOATE 5 MILLIGRAM(S): 100 INJECTION INTRAMUSCULAR at 17:02

## 2017-07-01 RX ADMIN — Medication 500 MILLIGRAM(S): at 17:02

## 2017-07-01 RX ADMIN — ZINC OXIDE 1 APPLICATION(S): 200 OINTMENT TOPICAL at 00:42

## 2017-07-01 RX ADMIN — ENOXAPARIN SODIUM 40 MILLIGRAM(S): 100 INJECTION SUBCUTANEOUS at 12:03

## 2017-07-01 RX ADMIN — CLOTRIMAZOLE AND BETAMETHASONE DIPROPIONATE 1 APPLICATION(S): 10; .5 CREAM TOPICAL at 05:41

## 2017-07-01 RX ADMIN — ZINC OXIDE 1 APPLICATION(S): 200 OINTMENT TOPICAL at 21:50

## 2017-07-01 RX ADMIN — DIVALPROEX SODIUM 500 MILLIGRAM(S): 500 TABLET, DELAYED RELEASE ORAL at 12:03

## 2017-07-01 RX ADMIN — ZINC OXIDE 1 APPLICATION(S): 200 OINTMENT TOPICAL at 05:42

## 2017-07-01 RX ADMIN — Medication 325 MILLIGRAM(S): at 17:02

## 2017-07-01 RX ADMIN — Medication 150 MICROGRAM(S): at 05:41

## 2017-07-01 RX ADMIN — DIVALPROEX SODIUM 500 MILLIGRAM(S): 500 TABLET, DELAYED RELEASE ORAL at 21:51

## 2017-07-01 NOTE — PROGRESS NOTE ADULT - PROBLEM SELECTOR PLAN 4
Appreciate [facility] recs.  c/w tristan for now Appreciate [facility] recs.  c/w tristan for now  anna/w haldol

## 2017-07-01 NOTE — PROGRESS NOTE ADULT - SUBJECTIVE AND OBJECTIVE BOX
CC: Recurrent Bilateral pleural effusions s/p bilateral VATSx    Patient seen and examined at bedside. No acute distress and no acute overnight events. No complaints. Patient is at bedside with aide.     ROS: Says no to all questions - No chest pain, palpitations, sob, light headedness/dizziness, difficulty breathing/cough, fevers/chills, abdominal pain, n/v, diarrhea/constipation, dysuria or increased urinary frequency.     Vital Signs Last 24 Hrs  T(C): 36.9 (01 Jul 2017 12:00), Max: 37.2 (01 Jul 2017 05:33)  T(F): 98.4 (01 Jul 2017 12:00), Max: 98.9 (01 Jul 2017 05:33)  HR: 74 (01 Jul 2017 12:00) (70 - 90)  BP: 104/60 (01 Jul 2017 12:00) (102/58 - 112/56)  BP(mean): --  RR: 18 (01 Jul 2017 12:00) (18 - 18)  SpO2: 97% (01 Jul 2017 12:00) (96% - 98%)    PHYSICAL EXAM:    GENERAL: NAD, AAOX3  NECK: soft, Supple, No JVD,   CHEST/LUNG: Clear to percussion bilaterally with some minor crackles noted at base; No wheezing  2 rt chest tubes+  HEART: S1S2+, Regular rate and rhythm, no m/r/g   ABDOMEN: Soft, Nontender, Nondistended; Bowel sounds present  EXTREMITIES:   No clubbing, cyanosis, or edema  SKIN: No rashes or lesions      Labs:                         8.2    11.9  )-----------( 312      ( 01 Jul 2017 07:48 )             25.7   07-01    140  |  102  |  19.0  ----------------------------<  99  4.5   |  24.0  |  0.73    Ca    9.2      01 Jul 2017 07:48        Chest tube with approx 40cc noted from overnight, serosanguinous     MEDICATIONS  (STANDING):  levothyroxine 150 MICROGram(s) Oral daily  pantoprazole    Tablet 40 milliGRAM(s) Oral before breakfast  haloperidol     Tablet 5 milliGRAM(s) Oral two times a day  diVALproex  milliGRAM(s) Oral daily  enoxaparin Injectable 40 milliGRAM(s) SubCutaneous daily  diVALproex  milliGRAM(s) Oral at bedtime  zinc oxide 40%/lanolin Ointment 1 Application(s) Topical three times a day  clotrimazole/betamethasone Cream 1 Application(s) Topical two times a day    MEDICATIONS  (PRN):  acetaminophen   Tablet 650 milliGRAM(s) Oral every 6 hours PRN For Temp greater than 38.5 C (101.3 F)  artificial  tears Solution 1 Drop(s) Both EYES every 8 hours PRN Dry Eyes  oxyCODONE  5 mG/acetaminophen 325 mG 1 Tablet(s) Oral every 4 hours PRN Moderate Pain (4 - 6)      RADIOLOGY & ADDITIONAL TESTS:    CXR - noted CC: Recurrent Bilateral pleural effusions s/p bilateral VATSx    Patient seen and examined at bedside. No acute distress and no acute overnight events. No complaints. Patient is at bedside with aide.     ROS: Says no to all questions - No chest pain, palpitations, sob, light headedness/dizziness, difficulty breathing/cough, fevers/chills, abdominal pain, n/v, diarrhea/constipation, dysuria or increased urinary frequency.     Tele: Desat 84%, overnight SR     Vital Signs Last 24 Hrs  T(C): 36.9 (01 Jul 2017 12:00), Max: 37.2 (01 Jul 2017 05:33)  T(F): 98.4 (01 Jul 2017 12:00), Max: 98.9 (01 Jul 2017 05:33)  HR: 74 (01 Jul 2017 12:00) (70 - 90)  BP: 104/60 (01 Jul 2017 12:00) (102/58 - 112/56)  BP(mean): --  RR: 18 (01 Jul 2017 12:00) (18 - 18)  SpO2: 97% (01 Jul 2017 12:00) (96% - 98%)    PHYSICAL EXAM:    GENERAL: NAD, AAOX3  NECK: soft, Supple, No JVD,   CHEST/LUNG: Clear to percussion bilaterally with some minor crackles noted at base; No wheezing  2 rt chest tubes+  HEART: S1S2+, Regular rate and rhythm, no m/r/g   ABDOMEN: Soft, Nontender, Nondistended; Bowel sounds present  EXTREMITIES:   No clubbing, cyanosis, or edema  SKIN: No rashes or lesions      Labs:                         8.2    11.9  )-----------( 312      ( 01 Jul 2017 07:48 )             25.7   07-01    140  |  102  |  19.0  ----------------------------<  99  4.5   |  24.0  |  0.73    Ca    9.2      01 Jul 2017 07:48        Chest tube with approx 40cc noted from overnight, serosanguinous     MEDICATIONS  (STANDING):  levothyroxine 150 MICROGram(s) Oral daily  pantoprazole    Tablet 40 milliGRAM(s) Oral before breakfast  haloperidol     Tablet 5 milliGRAM(s) Oral two times a day  diVALproex  milliGRAM(s) Oral daily  enoxaparin Injectable 40 milliGRAM(s) SubCutaneous daily  diVALproex  milliGRAM(s) Oral at bedtime  zinc oxide 40%/lanolin Ointment 1 Application(s) Topical three times a day  clotrimazole/betamethasone Cream 1 Application(s) Topical two times a day    MEDICATIONS  (PRN):  acetaminophen   Tablet 650 milliGRAM(s) Oral every 6 hours PRN For Temp greater than 38.5 C (101.3 F)  artificial  tears Solution 1 Drop(s) Both EYES every 8 hours PRN Dry Eyes  oxyCODONE  5 mG/acetaminophen 325 mG 1 Tablet(s) Oral every 4 hours PRN Moderate Pain (4 - 6)      RADIOLOGY & ADDITIONAL TESTS:    CXR - noted

## 2017-07-01 NOTE — PROGRESS NOTE ADULT - ASSESSMENT
46 yo M with h/o schizoaffective disorder, selective mutism, recurrent pleural effusion (transudative) represents from [place] for anorexia and refusal to take PO medications. He was last discharged after thoracentesis (1400cc) of transudative pleural effusion in left lateral region. Upon readmission, repeat chest xray showed recurrent moderate left pleural effusion. On 5/29, pt had seizure episode 2/2 noncompliance with PO medications, then transitioned to IV valproate which is also being used for his mood d/o and seizure. MRI brain x 2 were negative for acute infarct. Pt noted to have worsening leukocytosis, elevated CRP and procalcitonin. Per ID, vanc and zosyn were started. CT chest showed worsening left pleural effusion. CTS placed chest tube on 6/4. Pt had rapid response on 6/6, likely due to clozapine toxicity and was monitored overnight in MICU. On 6/9, pt had VATS which did not show evidence of infection and partially trapped lung. Pleurodesis was performed and had 1500cc drained. Had last chest tube removed on 6/13 from the left (the right fell out in transport???). Repeat cxr shows no change, recently done again with improvement of RLL; however, wbc of 13 - ID suggesting a non con ct chest to evaluate for interval change which showed a large loculated right sided effusion and in light of elevating wbc, needed to be drained. s/p IR drainage and placement of rt Chest Tube on 6/24. drained ~2lit. Chest tube output - 150ml - 200 daily  6/28 Discussed with Dr Portillo, may plan to proceed with VATS on rt side. 6/29 underwent RT VATS uneventful, intraop- clear fluid , no purulent material per Dr Portillo. 6/30 - has rt chest tubes - CTSx following - plan to possibly take tubes out monday.   Family anxious about possible DC after CT are out. See Dr Warren note.  Changes were made in is Depakote again, per family request.

## 2017-07-01 NOTE — PROGRESS NOTE ADULT - PROBLEM SELECTOR PLAN 1
S/p b/l VATS with pleurodesis. Right sided CT in place draining and plan for repeat xray in am  Todays xray with new perihilar opacity - will f/up in am, no fevers and wbc trending down   Seen by ID, zosyn till 6/27 - completed   s/p IR guided CT placement on the right on 6/24   thoracic sx following  -   ? cause of recurrent pleural effusion - work up so far - negative - exudative.   f/u pleural biopsy and cultures sent on 6/29  chest tube management per thoracic SX  anti histone ab to be ordered   will start colchicine to treat for a pleurisy pic and monitor output

## 2017-07-02 LAB
ANION GAP SERPL CALC-SCNC: 12 MMOL/L — SIGNIFICANT CHANGE UP (ref 5–17)
BUN SERPL-MCNC: 20 MG/DL — SIGNIFICANT CHANGE UP (ref 8–20)
CALCIUM SERPL-MCNC: 9.4 MG/DL — SIGNIFICANT CHANGE UP (ref 8.6–10.2)
CHLORIDE SERPL-SCNC: 97 MMOL/L — LOW (ref 98–107)
CO2 SERPL-SCNC: 26 MMOL/L — SIGNIFICANT CHANGE UP (ref 22–29)
CREAT SERPL-MCNC: 0.78 MG/DL — SIGNIFICANT CHANGE UP (ref 0.5–1.3)
GLUCOSE SERPL-MCNC: 83 MG/DL — SIGNIFICANT CHANGE UP (ref 70–115)
HCT VFR BLD CALC: 28.2 % — LOW (ref 42–52)
HGB BLD-MCNC: 9 G/DL — LOW (ref 14–18)
MAGNESIUM SERPL-MCNC: 1.8 MG/DL — SIGNIFICANT CHANGE UP (ref 1.6–2.6)
MCHC RBC-ENTMCNC: 28 PG — SIGNIFICANT CHANGE UP (ref 27–31)
MCHC RBC-ENTMCNC: 31.9 G/DL — LOW (ref 32–36)
MCV RBC AUTO: 87.9 FL — SIGNIFICANT CHANGE UP (ref 80–94)
PHOSPHATE SERPL-MCNC: 3.7 MG/DL — SIGNIFICANT CHANGE UP (ref 2.4–4.7)
PLATELET # BLD AUTO: 378 K/UL — SIGNIFICANT CHANGE UP (ref 150–400)
POTASSIUM SERPL-MCNC: 4.2 MMOL/L — SIGNIFICANT CHANGE UP (ref 3.5–5.3)
POTASSIUM SERPL-SCNC: 4.2 MMOL/L — SIGNIFICANT CHANGE UP (ref 3.5–5.3)
RBC # BLD: 3.21 M/UL — LOW (ref 4.6–6.2)
RBC # FLD: 16.5 % — HIGH (ref 11–15.6)
SODIUM SERPL-SCNC: 135 MMOL/L — SIGNIFICANT CHANGE UP (ref 135–145)
WBC # BLD: 9.6 K/UL — SIGNIFICANT CHANGE UP (ref 4.8–10.8)
WBC # FLD AUTO: 9.6 K/UL — SIGNIFICANT CHANGE UP (ref 4.8–10.8)

## 2017-07-02 PROCEDURE — 99232 SBSQ HOSP IP/OBS MODERATE 35: CPT

## 2017-07-02 PROCEDURE — 71010: CPT | Mod: 26

## 2017-07-02 PROCEDURE — 71010: CPT | Mod: 26,77

## 2017-07-02 PROCEDURE — 99233 SBSQ HOSP IP/OBS HIGH 50: CPT

## 2017-07-02 RX ADMIN — Medication 500 MILLIGRAM(S): at 11:27

## 2017-07-02 RX ADMIN — CLOTRIMAZOLE AND BETAMETHASONE DIPROPIONATE 1 APPLICATION(S): 10; .5 CREAM TOPICAL at 18:06

## 2017-07-02 RX ADMIN — ZINC OXIDE 1 APPLICATION(S): 200 OINTMENT TOPICAL at 06:11

## 2017-07-02 RX ADMIN — Medication 150 MICROGRAM(S): at 06:10

## 2017-07-02 RX ADMIN — ENOXAPARIN SODIUM 40 MILLIGRAM(S): 100 INJECTION SUBCUTANEOUS at 11:27

## 2017-07-02 RX ADMIN — DIVALPROEX SODIUM 500 MILLIGRAM(S): 500 TABLET, DELAYED RELEASE ORAL at 11:27

## 2017-07-02 RX ADMIN — ZINC OXIDE 1 APPLICATION(S): 200 OINTMENT TOPICAL at 21:19

## 2017-07-02 RX ADMIN — CLOTRIMAZOLE AND BETAMETHASONE DIPROPIONATE 1 APPLICATION(S): 10; .5 CREAM TOPICAL at 06:10

## 2017-07-02 RX ADMIN — PANTOPRAZOLE SODIUM 40 MILLIGRAM(S): 20 TABLET, DELAYED RELEASE ORAL at 06:10

## 2017-07-02 RX ADMIN — DIVALPROEX SODIUM 500 MILLIGRAM(S): 500 TABLET, DELAYED RELEASE ORAL at 21:17

## 2017-07-02 RX ADMIN — Medication 325 MILLIGRAM(S): at 11:27

## 2017-07-02 RX ADMIN — Medication 0.6 MILLIGRAM(S): at 11:27

## 2017-07-02 RX ADMIN — HALOPERIDOL DECANOATE 5 MILLIGRAM(S): 100 INJECTION INTRAMUSCULAR at 06:10

## 2017-07-02 RX ADMIN — HALOPERIDOL DECANOATE 5 MILLIGRAM(S): 100 INJECTION INTRAMUSCULAR at 18:06

## 2017-07-02 NOTE — PROGRESS NOTE ADULT - SUBJECTIVE AND OBJECTIVE BOX
CC: Recurrent Bilateral pleural effusions s/p bilateral VATSx    Patient seen and examined at bedside. No acute distress and no acute overnight events. No complaints. Patient is at bedside with aide. Smiled more and cooperated with physical exam by sitting up    ROS: Says no to all questions - No chest pain, palpitations, sob, light headedness/dizziness, difficulty breathing/cough, fevers/chills, abdominal pain, n/v, diarrhea/constipation, dysuria or increased urinary frequency.     Tele: SR     Vital Signs Last 24 Hrs  T(C): 36.5 (02 Jul 2017 11:33), Max: 36.7 (01 Jul 2017 21:47)  T(F): 97.7 (02 Jul 2017 11:33), Max: 98 (01 Jul 2017 21:47)  HR: 84 (02 Jul 2017 11:33) (64 - 85)  BP: 122/68 (02 Jul 2017 11:33) (100/68 - 122/68)  BP(mean): --  RR: 18 (02 Jul 2017 11:33) (16 - 18)  SpO2: 98% (02 Jul 2017 11:33) (96% - 98%)    PHYSICAL EXAM:    GENERAL: NAD, AAOX3  NECK: soft, Supple, No JVD,   CHEST/LUNG: Clear to percussion bilaterally with some minor crackles noted at base; No wheezing  2 rt chest tubes+  HEART: S1S2+, Regular rate and rhythm, no m/r/g   ABDOMEN: Soft, Nontender, Nondistended; Bowel sounds present  EXTREMITIES:   No clubbing, cyanosis, or edema  SKIN: No rashes or lesions      Labs:                                    9.0    9.6   )-----------( 378      ( 02 Jul 2017 08:02 )             28.2   07-02    135  |  97<L>  |  20.0  ----------------------------<  83  4.2   |  26.0  |  0.78    Ca    9.4      02 Jul 2017 08:02  Phos  3.7     07-02  Mg     1.8     07-02    Chest tube with chest tube 1 and 2 with no output     MEDICATIONS  (STANDING):  levothyroxine 150 MICROGram(s) Oral daily  pantoprazole    Tablet 40 milliGRAM(s) Oral before breakfast  haloperidol     Tablet 5 milliGRAM(s) Oral two times a day  diVALproex  milliGRAM(s) Oral daily  enoxaparin Injectable 40 milliGRAM(s) SubCutaneous daily  diVALproex  milliGRAM(s) Oral at bedtime  zinc oxide 40%/lanolin Ointment 1 Application(s) Topical three times a day  clotrimazole/betamethasone Cream 1 Application(s) Topical two times a day  colchicine 0.6 milliGRAM(s) Oral daily  ferrous    sulfate 325 milliGRAM(s) Oral daily  ascorbic acid 500 milliGRAM(s) Oral daily    MEDICATIONS  (PRN):  acetaminophen   Tablet 650 milliGRAM(s) Oral every 6 hours PRN For Temp greater than 38.5 C (101.3 F)  artificial  tears Solution 1 Drop(s) Both EYES every 8 hours PRN Dry Eyes  oxyCODONE  5 mG/acetaminophen 325 mG 1 Tablet(s) Oral every 4 hours PRN Moderate Pain (4 - 6)        RADIOLOGY & ADDITIONAL TESTS:    CXR - noted no change

## 2017-07-02 NOTE — PROGRESS NOTE ADULT - SUBJECTIVE AND OBJECTIVE BOX
Subjective: Pt lying in bed.  [facility] aid at bedside.  Pt non verbal.        Tele:  SR                        T(F): 97.8 (17 @ 06:03), Max: 98.4 (17 @ 12:00)  HR: 64 (17 @ 06:03) (64 - 85)  BP: 108/72 (17 @ 06:03) (100/68 - 108/72)  RR: 18 (17 @ 06:03) (16 - 18)  SpO2: 98% (17 @ 06:03) (96% - 98%) on 2 liters nasal O2.          Daily     Daily Weight in k.1 (2017 05:36)        Allergy Status Unknown  Chicken (Other (Unknown))          135  |  97<L>  |  20.0  ----------------------------<  83  4.2   |  26.0  |  0.78    Ca    9.4      2017 08:02  Phos  3.7       Mg     1.8                                      8.2    11.9  )-----------( 312      ( 2017 07:48 )             25.7               CAPILLARY BLOOD GLUCOSE               CXR:    I&O's Detail    2017 07:01  -  2017 07:00  --------------------------------------------------------  IN:  Total IN: 0 mL    OUT:  Total OUT: 0 mL    Total NET: 0 mL          CHEST TUBE:  [ ] YES [ ] NO  OUTPUT:     per 24 hours    AIR LEAKS:  [ ] YES [ ] NO      JERRI DRAIN:   [ ] YES [ ] NO  OUTPUT:     per 24 hours    EPICARDIAL WIRES:  [ ] YES [ ] NO      BOWEL MOVEMENT:  [ ] YES [ ] NO        Active Medications:  levothyroxine 150 MICROGram(s) Oral daily  acetaminophen   Tablet 650 milliGRAM(s) Oral every 6 hours PRN  pantoprazole    Tablet 40 milliGRAM(s) Oral before breakfast  artificial  tears Solution 1 Drop(s) Both EYES every 8 hours PRN  haloperidol     Tablet 5 milliGRAM(s) Oral two times a day  diVALproex  milliGRAM(s) Oral daily  enoxaparin Injectable 40 milliGRAM(s) SubCutaneous daily  oxyCODONE  5 mG/acetaminophen 325 mG 1 Tablet(s) Oral every 4 hours PRN  diVALproex  milliGRAM(s) Oral at bedtime  zinc oxide 40%/lanolin Ointment 1 Application(s) Topical three times a day  clotrimazole/betamethasone Cream 1 Application(s) Topical two times a day  colchicine 0.6 milliGRAM(s) Oral daily  ferrous    sulfate 325 milliGRAM(s) Oral daily  ascorbic acid 500 milliGRAM(s) Oral daily      Physical Exam:    Neuro:     Pulm:     CV:     Abd:     Extremities:    Incision(s):     Assessment:  47y Male                       PAST MEDICAL & SURGICAL HISTORY:  Hypothyroidism, unspecified type  Psychiatric disorder  No significant past surgical history        Plan: Subjective: Pt lying in bed.  [facility] aid at bedside.  Pt non verbal.  Difficult to assess.  Unable to follow direction.      Tele:  SR                        T(F): 97.8 (17 @ 06:03), Max: 98.4 (17 @ 12:00)  HR: 64 (17 @ 06:03) (64 - 85)  BP: 108/72 (17 @ 06:03) (100/68 - 108/72)  RR: 18 (17 @ 06:03) (16 - 18)  SpO2: 98% (17 @ 06:03) (96% - 98%) on 2 liters nasal O2.          Daily     Daily Weight in k.1 (2017 05:36)        Allergy Status Unknown  Chicken (Other (Unknown))          135  |  97<L>  |  20.0  ----------------------------<  83  4.2   |  26.0  |  0.78    Ca    9.4      2017 08:02  Phos  3.7       Mg     1.8                                      8.2    11.9  )-----------( 312      ( 2017 07:48 )             25.7                       CXR:    Xray Chest 1 View AP/PA. (17 @ 05:39) >  IMPRESSION:    Support Devices:  Unchanged  Heart:  Unremarkable  Mediastinum:  Unremarkable  Lungs/Airways: Right perihilar opacities and trace effusions, stable.  Bones/Soft tissues: Unremarkable    JARAD RODGERS M.D., ATTENDING RADIOLOGIST  This document hasbeen electronically signed. 2017  9:03AM    I&O's Detail    2017 07:01  -  2017 07:00  --------------------------------------------------------  IN:  Total IN: 0 mL    OUT:  Total OUT: 0 mL    Total NET: 0 mL          CHEST TUBE:  [ x] YES [ ] NO  OUTPUT: Right #1 0ml overnight and 0ml over the last 24 hours    No air leak noted.                                                                  Right #2  0ml overnight and 0ml over the last 24 hours.  No air leak noted.  JERRI DRAIN:   [ ] YES [ ] NO  OUTPUT:     per 24 hours      Active Medications:  levothyroxine 150 MICROGram(s) Oral daily  acetaminophen   Tablet 650 milliGRAM(s) Oral every 6 hours PRN  pantoprazole    Tablet 40 milliGRAM(s) Oral before breakfast  artificial  tears Solution 1 Drop(s) Both EYES every 8 hours PRN  haloperidol     Tablet 5 milliGRAM(s) Oral two times a day  diVALproex  milliGRAM(s) Oral daily  enoxaparin Injectable 40 milliGRAM(s) SubCutaneous daily  oxyCODONE  5 mG/acetaminophen 325 mG 1 Tablet(s) Oral every 4 hours PRN  diVALproex  milliGRAM(s) Oral at bedtime  zinc oxide 40%/lanolin Ointment 1 Application(s) Topical three times a day  clotrimazole/betamethasone Cream 1 Application(s) Topical two times a day  colchicine 0.6 milliGRAM(s) Oral daily  ferrous    sulfate 325 milliGRAM(s) Oral daily  ascorbic acid 500 milliGRAM(s) Oral daily      Physical Exam:    Neuro: Nonverbal, unable to follow direction.  Closes his eyes when spoken to.    Pulm: Diminished BLL.  Right CT x 2.  No crepitus or airleak.    CV: RRR.  +S1+S2.    Abd: Soft/NT/ND.  +BS.      Extremities: No edema BLE.  +pp.    Incision(s): Right CT sites with DSD.  No air leak or crepitus.         PAST MEDICAL & SURGICAL HISTORY:  Hypothyroidism, unspecified type  Psychiatric disorder  No significant past surgical history

## 2017-07-02 NOTE — PROGRESS NOTE ADULT - SUBJECTIVE AND OBJECTIVE BOX
[facility]  INFECTIOUS DISEASES AND INTERNAL MEDICINE OF [place] [facility]  =======================================================  Ritesh Moeller MD [facility]   Jeferson Lang MD  Diplomates American Board of Internal Medicine and Infectious Diseases  =======================================================    SAMIRA NUGENT [id number]  Follow up: Leukocytosis; Pleural effusion, empyema  s/p left chest tube on 6/2/17, s/p VATS 6/9/1 and Right VATS & Pleurodesis 6/29/17    patient seen and examined in follow up.  Chart and labs reviewed.   all cx remain negative. Afebrile.   WBC count nearly normalized  =======================================================    Allergies:  Allergy Status Unknown  Chicken (Other (Unknown))    =======================================================    MEDICATIONS  (STANDING):  levothyroxine 150 MICROGram(s) Oral daily  pantoprazole    Tablet 40 milliGRAM(s) Oral before breakfast  haloperidol     Tablet 5 milliGRAM(s) Oral two times a day  diVALproex  milliGRAM(s) Oral daily  enoxaparin Injectable 40 milliGRAM(s) SubCutaneous daily  diVALproex  milliGRAM(s) Oral at bedtime  zinc oxide 40%/lanolin Ointment 1 Application(s) Topical three times a day  clotrimazole/betamethasone Cream 1 Application(s) Topical two times a day  colchicine 0.6 milliGRAM(s) Oral daily  ferrous    sulfate 325 milliGRAM(s) Oral daily  ascorbic acid 500 milliGRAM(s) Oral daily       =======================================================    REVIEW OF SYSTEMS:  Unable to obtain    =======================================================   Physical Exam:  Vital Signs Last 24 Hrs  T(C): 36.5 (02 Jul 2017 11:33), Max: 36.7 (01 Jul 2017 21:47)  T(F): 97.7 (02 Jul 2017 11:33), Max: 98 (01 Jul 2017 21:47)  HR: 84 (02 Jul 2017 11:33) (64 - 85)  BP: 122/68 (02 Jul 2017 11:33) (100/68 - 122/68)  BP(mean): --  RR: 18 (02 Jul 2017 11:33) (16 - 18)  SpO2: 98% (02 Jul 2017 11:33) (96% - 98%)    GEN: AWAKE NON VERBAL, mild agitation  HEENT: normocephalic and atraumatic. EOMI. CASSANDRA.    NECK: Supple. No carotid bruits.  No lymphadenopathy or thyromegaly.  LUNGS: RIGHT sided chest tube x 2 drains  HEART: Regular rate and rhythm without murmur.  ABDOMEN: Soft, nontender, and nondistended.  Positive bowel sounds.    : No CVA tenderness  EXTREMITIES: Without any cyanosis, clubbing, rash, lesions or edema.  MSK: no joint swelling  NEUROLOGIC: Cranial nerves II through XII are grossly intact.  PSYCHIATRIC: NON VERBAL   SKIN: No ulceration or induration present.     =======================================================        07-02    135  |  97<L>  |  20.0  ----------------------------<  83  4.2   |  26.0  |  0.78    Ca    9.4      02 Jul 2017 08:02  Phos  3.7     07-02  Mg     1.8     07-02                            8.2    11.9  )-----------( 312      ( 01 Jul 2017 07:48 )             25.7 [facility]  INFECTIOUS DISEAS[facility] AND [facility]  =======================================================  Ritesh Moeller MD [facility]   Jeferson Lang MD  Diplomates American Board of Internal Medicine and Infectious Diseases  =======================================================    SAMIRA NUGENT [id number]  Follow up: Leukocytosis; Pleural effusion, empyema  s/p left chest tube on 6/2/17, s/p VATS 6/9/1 and Right VATS & Pleurodesis 6/29/17    patient seen and examined in follow up.  Chart and labs reviewed.   all cx remain negative. Afebrile.   WBC count nearly normalized  =======================================================    Allergies:  Allergy Status Unknown  Chicken (Other (Unknown))    =======================================================    MEDICATIONS  (STANDING):  levothyroxine 150 MICROGram(s) Oral daily  pantoprazole    Tablet 40 milliGRAM(s) Oral before breakfast  haloperidol     Tablet 5 milliGRAM(s) Oral two times a day  diVALproex  milliGRAM(s) Oral daily  enoxaparin Injectable 40 milliGRAM(s) SubCutaneous daily  diVALproex  milliGRAM(s) Oral at bedtime  zinc oxide 40%/lanolin Ointment 1 Application(s) Topical three times a day  clotrimazole/betamethasone Cream 1 Application(s) Topical two times a day  colchicine 0.6 milliGRAM(s) Oral daily  ferrous    sulfate 325 milliGRAM(s) Oral daily  ascorbic acid 500 milliGRAM(s) Oral daily       =======================================================    REVIEW OF SYSTEMS:  Unable to obtain    =======================================================   Physical Exam:  Vital Signs Last 24 Hrs  T(C): 36.5 (02 Jul 2017 11:33), Max: 36.7 (01 Jul 2017 21:47)  T(F): 97.7 (02 Jul 2017 11:33), Max: 98 (01 Jul 2017 21:47)  HR: 84 (02 Jul 2017 11:33) (64 - 85)  BP: 122/68 (02 Jul 2017 11:33) (100/68 - 122/68)  BP(mean): --  RR: 18 (02 Jul 2017 11:33) (16 - 18)  SpO2: 98% (02 Jul 2017 11:33) (96% - 98%)    GEN: AWAKE NON VERBAL, mild agitation  HEENT: normocephalic and atraumatic. EOMI. CASSANDRA.    NECK: Supple. No carotid bruits.  No lymphadenopathy or thyromegaly.  LUNGS: RIGHT sided chest tube in place.   HEART: Regular rate and rhythm without murmur.  ABDOMEN: Soft, nontender, and nondistended.  Positive bowel sounds.    : No CVA tenderness  EXTREMITIES: Without any cyanosis, clubbing, rash, lesions or edema.  MSK: no joint swelling  NEUROLOGIC: Cranial nerves II through XII are grossly intact.  PSYCHIATRIC: NON VERBAL   SKIN: No ulceration or induration present.     =======================================================        07-02    135  |  97<L>  |  20.0  ----------------------------<  83  4.2   |  26.0  |  0.78    Ca    9.4      02 Jul 2017 08:02  Phos  3.7     07-02  Mg     1.8     07-02                            8.2    11.9  )-----------( 312      ( 01 Jul 2017 07:48 )             25.7

## 2017-07-02 NOTE — PROGRESS NOTE ADULT - PROBLEM SELECTOR PLAN 1
s/p left Chest tube, s/p VATS 6/9/17  initial Pleural fluid culture, with CoNS from broth only.  Blood cultures no growth  CT chest with loculated RIGHT effusion and decreased aeration s/p drainage; Fluid culture negative. COMPLETE Zosyn on6/27/17  STATU POST VATS for RIGHT effusion with pleurodesis 6/29/17  - Will follow up Pleural biopsy

## 2017-07-02 NOTE — PROGRESS NOTE ADULT - SUBJECTIVE AND OBJECTIVE BOX
Right Chest tube #1 removed and dry, sterile, occlusive dressing placed.  Pt tolerated well.  Follow up chest xray done and no pneumothorax seen.

## 2017-07-02 NOTE — PROGRESS NOTE ADULT - PROBLEM SELECTOR PLAN 1
S/p b/l VATS with pleurodesis. Right sided CT x 2 in place draining and plan for repeat xray in am with no drainage, one tube to come out today   Seen by ID, zosyn till 6/27 - completed   s/p IR guided CT placement on the right on 6/24   thoracic sx following  -   ? cause of recurrent pleural effusion - work up so far - negative - exudative.   f/u pleural biopsy and cultures sent on 6/29- added fungal cx to these today  chest tube management per thoracic SX  anti histone ab to be ordered   started on colchicine to treat for a pleurisy pic and monitor output

## 2017-07-02 NOTE — PROGRESS NOTE ADULT - PROBLEM SELECTOR PLAN 1
Now s/p Right VATS/Pleurodesis.  D/C CT #1 today.  Chest xray to follow removal.  Chest tube # 2 to be removed tomorrow.  CXR in AM.  PRN Percocet for pain control.  Medical management as per Primary team. Discussed with Dr. Goins.  Discussed with Dr. Ferrer and CT surgery team in AM rounds.

## 2017-07-02 NOTE — PROGRESS NOTE ADULT - ASSESSMENT
48 yo M with h/o schizoaffective disorder, selective mutism, recurrent pleural effusion (transudative) represents from [place] for anorexia and refusal to take PO medications. He was last discharged after thoracentesis (1400cc) of transudative pleural effusion in left lateral region. Upon readmission, repeat chest xray showed recurrent moderate left pleural effusion. On 5/29, pt had seizure episode 2/2 noncompliance with PO medications, then transitioned to IV valproate which is also being used for his mood d/o and seizure. MRI brain x 2 were negative for acute infarct. Pt noted to have worsening leukocytosis, elevated CRP and procalcitonin. Per ID, vanc and zosyn were started. CT chest showed worsening left pleural effusion. CTS placed chest tube on 6/4. Pt had rapid response on 6/6, likely due to clozapine toxicity and was monitored overnight in MICU. On 6/9, pt had VATS which did not show evidence of infection and partially trapped lung. Pleurodesis was performed and had 1500cc drained. Had last chest tube removed on 6/13 from the left (the right fell out in transport???). Repeat cxr shows no change, recently done again with improvement of RLL; however, wbc of 13 - ID suggesting a non con ct chest to evaluate for interval change which showed a large loculated right sided effusion and in light of elevating wbc, needed to be drained. s/p IR drainage and placement of rt Chest Tube on 6/24. drained ~2lit. Chest tube output - 150ml - 200 daily  6/28 Discussed with Dr Portillo, may plan to proceed with VATS on rt side. 6/29 underwent RT VATS uneventful, intraop- clear fluid , no purulent material per Dr Portillo. 6/30 - has rt chest tubes - CTSx following - plan to possibly take tubes out monday.   Family anxious about possible DC after CT are out.

## 2017-07-02 NOTE — PROGRESS NOTE ADULT - ASSESSMENT
48 y/o M with s/p VATS for empyema on 6/9/17;  with WBC elevation now resolved   Completed ZOSYN on 6/27; s/p V/ATS and pleurodesis 6/29/17

## 2017-07-03 PROCEDURE — 99233 SBSQ HOSP IP/OBS HIGH 50: CPT

## 2017-07-03 PROCEDURE — 71010: CPT | Mod: 26,77

## 2017-07-03 PROCEDURE — 71010: CPT | Mod: 26

## 2017-07-03 RX ADMIN — Medication 0.6 MILLIGRAM(S): at 12:52

## 2017-07-03 RX ADMIN — Medication 325 MILLIGRAM(S): at 12:52

## 2017-07-03 RX ADMIN — HALOPERIDOL DECANOATE 5 MILLIGRAM(S): 100 INJECTION INTRAMUSCULAR at 07:08

## 2017-07-03 RX ADMIN — ZINC OXIDE 1 APPLICATION(S): 200 OINTMENT TOPICAL at 07:11

## 2017-07-03 RX ADMIN — DIVALPROEX SODIUM 500 MILLIGRAM(S): 500 TABLET, DELAYED RELEASE ORAL at 21:06

## 2017-07-03 RX ADMIN — CLOTRIMAZOLE AND BETAMETHASONE DIPROPIONATE 1 APPLICATION(S): 10; .5 CREAM TOPICAL at 07:09

## 2017-07-03 RX ADMIN — ENOXAPARIN SODIUM 40 MILLIGRAM(S): 100 INJECTION SUBCUTANEOUS at 21:05

## 2017-07-03 RX ADMIN — HALOPERIDOL DECANOATE 5 MILLIGRAM(S): 100 INJECTION INTRAMUSCULAR at 17:55

## 2017-07-03 RX ADMIN — CLOTRIMAZOLE AND BETAMETHASONE DIPROPIONATE 1 APPLICATION(S): 10; .5 CREAM TOPICAL at 17:55

## 2017-07-03 RX ADMIN — DIVALPROEX SODIUM 500 MILLIGRAM(S): 500 TABLET, DELAYED RELEASE ORAL at 12:51

## 2017-07-03 RX ADMIN — PANTOPRAZOLE SODIUM 40 MILLIGRAM(S): 20 TABLET, DELAYED RELEASE ORAL at 07:08

## 2017-07-03 RX ADMIN — Medication 500 MILLIGRAM(S): at 12:52

## 2017-07-03 RX ADMIN — Medication 150 MICROGRAM(S): at 07:08

## 2017-07-03 NOTE — PROGRESS NOTE ADULT - PROBLEM SELECTOR PLAN 1
S/p b/l VATS with pleurodesis. Last right sided CT taken out today, repeat cxray noted   Seen by ID, zosyn till 6/27 - completed   ? cause of recurrent pleural effusion - work up so far - negative - exudative.   f/u pleural biopsy and cultures sent on 6/29- added fungal cx to these today  anti histone ab in lab to eval for drug reaction   started on colchicine to treat for a pleurisy pic (day #2)

## 2017-07-03 NOTE — PHYSICAL THERAPY INITIAL EVALUATION ADULT - DIAGNOSIS, PT EVAL
decreased strength, activity tolerance
gait dysfunction due to deconditioning
gait dysfunction due to deconditioning

## 2017-07-03 NOTE — PHYSICAL THERAPY INITIAL EVALUATION ADULT - PATIENT/FAMILY/SIGNIFICANT OTHER GOALS STATEMENT, PT EVAL
get better
pt non-verbal
pt non-verbal; per brother and PPS 1:1, pt needs to be independent for return to [facility]

## 2017-07-03 NOTE — PHYSICAL THERAPY INITIAL EVALUATION ADULT - ADDITIONAL COMMENTS
pt lives at [facility], owns no DME, has no stairs to negotiate
pt mostly non-verbal. per brother and PPS 1:1 haritha pt lives at [facility]. independent with all mobility and ADLs prior and must be to return.
pt mostly non-verbal. per brother and PPS 1:1 haritha pt lives at [facility]. independent with all mobility and ADLs prior and must be to return.

## 2017-07-03 NOTE — PHYSICAL THERAPY INITIAL EVALUATION ADULT - PLANNED THERAPY INTERVENTIONS, PT EVAL
bed mobility training/strengthening/gait training/balance training/transfer training
transfer training/balance training/strengthening/gait training

## 2017-07-03 NOTE — PHYSICAL THERAPY INITIAL EVALUATION ADULT - ORIENTATION, REHAB EVAL
due to pt being non-verbal/person/unable to assess
person/unable to assess/due to pt being non-verbal

## 2017-07-03 NOTE — PHYSICAL THERAPY INITIAL EVALUATION ADULT - MANUAL MUSCLE TESTING RESULTS, REHAB EVAL
except shana LE: grossly 4-/5/no strength deficits were identified
b/l UE/LE grossly 3/5
unable to assess due to pt refusal; moves all limbs against gravity

## 2017-07-03 NOTE — PHYSICAL THERAPY INITIAL EVALUATION ADULT - GENERAL OBSERVATIONS, REHAB EVAL
pt received supine in bed, chest tube, IV line, O2 line, cardiac monitor, agreeable to PT, [place] 1:1 present, RN present
Pt received in bed supine, 1:1 from [place] present.
awake in bed. 2l/min O2 via nc; PPS 1:1 and brother royal at bedside

## 2017-07-03 NOTE — PHYSICAL THERAPY INITIAL EVALUATION ADULT - IMPAIRMENTS FOUND, PT EVAL
gait, locomotion, and balance
muscle strength/aerobic capacity/endurance/gait, locomotion, and balance
aerobic capacity/endurance/gait, locomotion, and balance

## 2017-07-03 NOTE — PROGRESS NOTE ADULT - SUBJECTIVE AND OBJECTIVE BOX
CC: Recurrent Bilateral pleural effusions s/p bilateral VATSx    Patient seen and examined at bedside. No acute distress and no acute overnight events. No complaints. Patient with brother at bedside and CT surgery who is taking out the last Chest tube at this time     Not able to get a good ROS from patient today     Tele: SR     Vital Signs Last 24 Hrs  T(C): 36.6 (03 Jul 2017 07:00), Max: 36.6 (02 Jul 2017 18:25)  T(F): 97.8 (03 Jul 2017 07:00), Max: 97.8 (02 Jul 2017 18:25)  HR: 67 (03 Jul 2017 12:47) (54 - 82)  BP: 102/50 (03 Jul 2017 12:47) (102/50 - 112/68)  BP(mean): --  RR: 18 (03 Jul 2017 12:47) (16 - 18)  SpO2: 96% (03 Jul 2017 12:47) (96% - 99%)    PHYSICAL EXAM:    GENERAL: NAD, AAOX3  NECK: soft, Supple, No JVD,   CHEST/LUNG: Clear to percussion bilaterally with some minor crackles noted at base; No wheezing  2 rt chest tubes+  HEART: S1S2+, Regular rate and rhythm, no m/r/g   ABDOMEN: Soft, Nontender, Nondistended; Bowel sounds present  EXTREMITIES:   No clubbing, cyanosis, or edema  SKIN: No rashes or lesions      Labs:                                    9.0    9.6   )-----------( 378      ( 02 Jul 2017 08:02 )             28.2   07-02    135  |  97<L>  |  20.0  ----------------------------<  83  4.2   |  26.0  |  0.78    Ca    9.4      02 Jul 2017 08:02  Phos  3.7     07-02  Mg     1.8     07-02      Chest tube with chest tube with no output     MEDICATIONS  (STANDING):  levothyroxine 150 MICROGram(s) Oral daily  pantoprazole    Tablet 40 milliGRAM(s) Oral before breakfast  haloperidol     Tablet 5 milliGRAM(s) Oral two times a day  diVALproex  milliGRAM(s) Oral daily  enoxaparin Injectable 40 milliGRAM(s) SubCutaneous daily  diVALproex  milliGRAM(s) Oral at bedtime  zinc oxide 40%/lanolin Ointment 1 Application(s) Topical three times a day  clotrimazole/betamethasone Cream 1 Application(s) Topical two times a day  colchicine 0.6 milliGRAM(s) Oral daily  ferrous    sulfate 325 milliGRAM(s) Oral daily  ascorbic acid 500 milliGRAM(s) Oral daily    MEDICATIONS  (PRN):  acetaminophen   Tablet 650 milliGRAM(s) Oral every 6 hours PRN For Temp greater than 38.5 C (101.3 F)  artificial  tears Solution 1 Drop(s) Both EYES every 8 hours PRN Dry Eyes  oxyCODONE  5 mG/acetaminophen 325 mG 1 Tablet(s) Oral every 4 hours PRN Moderate Pain (4 - 6)      RADIOLOGY & ADDITIONAL TESTS:    CXR - noted no change and e/o chest tube taken out

## 2017-07-03 NOTE — PHYSICAL THERAPY INITIAL EVALUATION ADULT - CRITERIA FOR SKILLED THERAPEUTIC INTERVENTIONS
functional limitations in following categories/impairments found
rehab potential/functional limitations in following categories/impairments found/therapy frequency/anticipated discharge recommendation/predicted duration of therapy intervention
anticipated discharge recommendation/rehab potential/therapy frequency/risk reduction/prevention/functional limitations in following categories/impairments found

## 2017-07-03 NOTE — PHYSICAL THERAPY INITIAL EVALUATION ADULT - ASSISTIVE DEVICE FOR TRANSFER: GAIT, REHAB EVAL
rolling walker
no AD
no device; per brother and PPS 1:1, pt does not do well with an assistive device

## 2017-07-03 NOTE — PROGRESS NOTE ADULT - ASSESSMENT
46 yo M with h/o schizoaffective disorder, selective mutism, recurrent pleural effusion (transudative) represents from [place] for anorexia and refusal to take PO medications. He was last discharged after thoracentesis (1400cc) of transudative pleural effusion in left lateral region. Upon readmission, repeat chest xray showed recurrent moderate left pleural effusion. On 5/29, pt had seizure episode 2/2 noncompliance with PO medications, then transitioned to IV valproate which is also being used for his mood d/o and seizure. MRI brain x 2 were negative for acute infarct. Pt noted to have worsening leukocytosis, elevated CRP and procalcitonin. Per ID, vanc and zosyn were started. CT chest showed worsening left pleural effusion. CTS placed chest tube on 6/4. Pt had rapid response on 6/6, likely due to clozapine toxicity and was monitored overnight in MICU. On 6/9, pt had VATS which did not show evidence of infection and partially trapped lung. Pleurodesis was performed and had 1500cc drained. Had last chest tube removed on 6/13 from the left (the right fell out in transport???). Repeat cxr shows no change, recently done again with improvement of RLL; however, wbc of 13 - ID suggesting a non con ct chest to evaluate for interval change which showed a large loculated right sided effusion and in light of elevating wbc, needed to be drained. s/p IR drainage and placement of rt Chest Tube on 6/24. drained ~2lit. Chest tube output - 150ml - 200 daily  6/28 Discussed with Dr Portillo, may plan to proceed with VATS on rt side. 6/29 underwent RT VATS uneventful, intraop- clear fluid , no purulent material per Dr Portillo. 6/30 - has rt chest tubes - CTSx following - both tubes taken out, last one today, no overnight output  Family anxious about possible DC after CT are out.

## 2017-07-03 NOTE — PHYSICAL THERAPY INITIAL EVALUATION ADULT - GAIT PATTERN USED, PT EVAL
pt began coughing uncontrollably, RN aware, symptoms resolving c sitting rest break
2-point gait
2-point gait

## 2017-07-03 NOTE — PHYSICAL THERAPY INITIAL EVALUATION ADULT - PERTINENT HX OF CURRENT PROBLEM, REHAB EVAL
pt presents to [facility] due to epilepsy, failure to thrive, pleural effusion, s/p IR thoracocentesis
48 y/o male dcd from [facility] on 5/25 after inpatient w/u pleural effusion s/p thoracentesis. CHF was ruled out, cytology pending. Patient also had pysch medication regimen adjusted. Since being dcd to [place] he will not eat and has not taken his medications. now s/p VATS for pleural effusion
46 y/o male dcd from [facility] on 5/25 after inpatient w/u pleural effusion s/p thoracentesis. CHF was ruled out, cytology pending. Patient also had pysch medication regimen adjusted. Since being dcd to [place] he will not eat and has not taken his medications. now s/p VATS for pleural effusion

## 2017-07-03 NOTE — PHYSICAL THERAPY INITIAL EVALUATION ADULT - LEVEL OF INDEPENDENCE: GAIT, REHAB EVAL
minimum assist (75% patients effort)
min assist at times 2/2 impulsivity/contact guard
minimum assist (75% patients effort)

## 2017-07-04 LAB
ANION GAP SERPL CALC-SCNC: 14 MMOL/L — SIGNIFICANT CHANGE UP (ref 5–17)
BASOPHILS # BLD AUTO: 0.1 K/UL — SIGNIFICANT CHANGE UP (ref 0–0.2)
BASOPHILS NFR BLD AUTO: 0.7 % — SIGNIFICANT CHANGE UP (ref 0–2)
BUN SERPL-MCNC: 26 MG/DL — HIGH (ref 8–20)
CALCIUM SERPL-MCNC: 9.1 MG/DL — SIGNIFICANT CHANGE UP (ref 8.6–10.2)
CHLORIDE SERPL-SCNC: 102 MMOL/L — SIGNIFICANT CHANGE UP (ref 98–107)
CO2 SERPL-SCNC: 26 MMOL/L — SIGNIFICANT CHANGE UP (ref 22–29)
CREAT SERPL-MCNC: 0.83 MG/DL — SIGNIFICANT CHANGE UP (ref 0.5–1.3)
CULTURE RESULTS: SIGNIFICANT CHANGE UP
EOSINOPHIL # BLD AUTO: 0.1 K/UL — SIGNIFICANT CHANGE UP (ref 0–0.5)
EOSINOPHIL NFR BLD AUTO: 1.1 % — SIGNIFICANT CHANGE UP (ref 0–6)
GLUCOSE SERPL-MCNC: 82 MG/DL — SIGNIFICANT CHANGE UP (ref 70–115)
HCT VFR BLD CALC: 31.3 % — LOW (ref 42–52)
HGB BLD-MCNC: 10 G/DL — LOW (ref 14–18)
HISTONE AB SER-ACNC: 0.3 UNITS — SIGNIFICANT CHANGE UP (ref 0–0.9)
LYMPHOCYTES # BLD AUTO: 2.4 K/UL — SIGNIFICANT CHANGE UP (ref 1–4.8)
LYMPHOCYTES # BLD AUTO: 22.4 % — SIGNIFICANT CHANGE UP (ref 20–55)
MAGNESIUM SERPL-MCNC: 1.9 MG/DL — SIGNIFICANT CHANGE UP (ref 1.6–2.6)
MCHC RBC-ENTMCNC: 28.3 PG — SIGNIFICANT CHANGE UP (ref 27–31)
MCHC RBC-ENTMCNC: 31.9 G/DL — LOW (ref 32–36)
MCV RBC AUTO: 88.7 FL — SIGNIFICANT CHANGE UP (ref 80–94)
MONOCYTES # BLD AUTO: 1.2 K/UL — HIGH (ref 0–0.8)
MONOCYTES NFR BLD AUTO: 11.5 % — HIGH (ref 3–10)
NEUTROPHILS # BLD AUTO: 6.6 K/UL — SIGNIFICANT CHANGE UP (ref 1.8–8)
NEUTROPHILS NFR BLD AUTO: 62.7 % — SIGNIFICANT CHANGE UP (ref 37–73)
PHOSPHATE SERPL-MCNC: 3.8 MG/DL — SIGNIFICANT CHANGE UP (ref 2.4–4.7)
PLATELET # BLD AUTO: 350 K/UL — SIGNIFICANT CHANGE UP (ref 150–400)
POTASSIUM SERPL-MCNC: 4.3 MMOL/L — SIGNIFICANT CHANGE UP (ref 3.5–5.3)
POTASSIUM SERPL-SCNC: 4.3 MMOL/L — SIGNIFICANT CHANGE UP (ref 3.5–5.3)
RBC # BLD: 3.53 M/UL — LOW (ref 4.6–6.2)
RBC # FLD: 16.3 % — HIGH (ref 11–15.6)
SODIUM SERPL-SCNC: 142 MMOL/L — SIGNIFICANT CHANGE UP (ref 135–145)
SPECIMEN SOURCE: SIGNIFICANT CHANGE UP
WBC # BLD: 10.6 K/UL — SIGNIFICANT CHANGE UP (ref 4.8–10.8)
WBC # FLD AUTO: 10.6 K/UL — SIGNIFICANT CHANGE UP (ref 4.8–10.8)

## 2017-07-04 PROCEDURE — 99233 SBSQ HOSP IP/OBS HIGH 50: CPT

## 2017-07-04 RX ADMIN — CLOTRIMAZOLE AND BETAMETHASONE DIPROPIONATE 1 APPLICATION(S): 10; .5 CREAM TOPICAL at 18:20

## 2017-07-04 RX ADMIN — Medication 500 MILLIGRAM(S): at 12:22

## 2017-07-04 RX ADMIN — Medication 0.6 MILLIGRAM(S): at 12:22

## 2017-07-04 RX ADMIN — DIVALPROEX SODIUM 500 MILLIGRAM(S): 500 TABLET, DELAYED RELEASE ORAL at 21:58

## 2017-07-04 RX ADMIN — Medication 150 MICROGRAM(S): at 05:38

## 2017-07-04 RX ADMIN — ZINC OXIDE 1 APPLICATION(S): 200 OINTMENT TOPICAL at 22:02

## 2017-07-04 RX ADMIN — ZINC OXIDE 1 APPLICATION(S): 200 OINTMENT TOPICAL at 12:26

## 2017-07-04 RX ADMIN — Medication 325 MILLIGRAM(S): at 12:23

## 2017-07-04 RX ADMIN — CLOTRIMAZOLE AND BETAMETHASONE DIPROPIONATE 1 APPLICATION(S): 10; .5 CREAM TOPICAL at 05:39

## 2017-07-04 RX ADMIN — DIVALPROEX SODIUM 500 MILLIGRAM(S): 500 TABLET, DELAYED RELEASE ORAL at 12:23

## 2017-07-04 RX ADMIN — HALOPERIDOL DECANOATE 5 MILLIGRAM(S): 100 INJECTION INTRAMUSCULAR at 18:20

## 2017-07-04 RX ADMIN — HALOPERIDOL DECANOATE 5 MILLIGRAM(S): 100 INJECTION INTRAMUSCULAR at 05:38

## 2017-07-04 RX ADMIN — ENOXAPARIN SODIUM 40 MILLIGRAM(S): 100 INJECTION SUBCUTANEOUS at 21:58

## 2017-07-04 RX ADMIN — PANTOPRAZOLE SODIUM 40 MILLIGRAM(S): 20 TABLET, DELAYED RELEASE ORAL at 05:38

## 2017-07-04 NOTE — PROGRESS NOTE ADULT - PROBLEM SELECTOR PLAN 1
S/p b/l VATS with pleurodesis. Last right sided CT taken out on 7/3 with repeat xray noted   Seen by ID, zosyn till 6/27 - completed   ? cause of recurrent pleural effusion - work up so far - negative - exudative.   f/u pleural biopsy and cultures sent on 6/29- pending fungal addition   anti histone ab in lab to eval for drug reaction   started on colchicine to treat for a pleurisy pic (day #3)

## 2017-07-04 NOTE — PROGRESS NOTE ADULT - SUBJECTIVE AND OBJECTIVE BOX
CC: Recurrent Bilateral pleural effusions s/p bilateral VATSx    Patient seen and examined at bedside. No acute distress and no acute overnight events. No complaints. Patient with aide at the bedside     Ros: no chest pain, palp or sob. No bm today. No dysuria     Off tele     Vital Signs Last 24 Hrs  T(C): 36.6 (04 Jul 2017 08:51), Max: 37.2 (03 Jul 2017 18:15)  T(F): 97.9 (04 Jul 2017 08:51), Max: 98.9 (03 Jul 2017 18:15)  HR: 70 (04 Jul 2017 08:51) (60 - 76)  BP: 106/66 (04 Jul 2017 08:51) (100/60 - 106/66)  BP(mean): --  RR: 18 (04 Jul 2017 08:51) (16 - 18)  SpO2: 92% (04 Jul 2017 05:33) (92% - 97%)    PHYSICAL EXAM:    GENERAL: NAD, AAOX3  NECK: soft, Supple, No JVD,   CHEST/LUNG: Clear to percussion bilaterally with some minor crackles noted at base. Right posterior dressing intact   HEART: S1S2+, Regular rate and rhythm, no m/r/g   ABDOMEN: Soft, Nontender, Nondistended; Bowel sounds present  EXTREMITIES:   No clubbing, cyanosis, or edema  SKIN: No rashes or lesions      Labs:                         10.0   10.6  )-----------( 350      ( 04 Jul 2017 06:51 )             31.3   07-04    142  |  102  |  26.0<H>  ----------------------------<  82  4.3   |  26.0  |  0.83    Ca    9.1      04 Jul 2017 06:50  Phos  3.8     07-04  Mg     1.9     07-04      RADIOLOGY & ADDITIONAL TESTS:    CXR - noted

## 2017-07-04 NOTE — PROGRESS NOTE ADULT - ASSESSMENT
46 yo M with h/o schizoaffective disorder, selective mutism, recurrent pleural effusion (transudative) represents from [place] for anorexia and refusal to take PO medications. He was last discharged after thoracentesis (1400cc) of transudative pleural effusion in left lateral region. Upon readmission, repeat chest xray showed recurrent moderate left pleural effusion. On 5/29, pt had seizure episode 2/2 noncompliance with PO medications, then transitioned to IV valproate which is also being used for his mood d/o and seizure. MRI brain x 2 were negative for acute infarct. Pt noted to have worsening leukocytosis, elevated CRP and procalcitonin. Per ID, vanc and zosyn were started. CT chest showed worsening left pleural effusion. CTS placed chest tube on 6/4. Pt had rapid response on 6/6, likely due to clozapine toxicity and was monitored overnight in MICU. On 6/9, pt had VATS which did not show evidence of infection and partially trapped lung. Pleurodesis was performed and had 1500cc drained. Had last chest tube removed on 6/13 from the left (the right fell out in transport???). Repeat cxr shows no change, recently done again with improvement of RLL; however, wbc of 13 - ID suggesting a non con ct chest to evaluate for interval change which showed a large loculated right sided effusion and in light of elevating wbc, needed to be drained. s/p IR drainage and placement of rt Chest Tube on 6/24. drained ~2lit. Chest tube output - 150ml - 200 daily  6/28 Discussed with Dr Portillo, may plan to proceed with VATS on rt side. 6/29 underwent RT VATS uneventful, intraop- clear fluid , no purulent material per Dr Portillo. 6/30 - has rt chest tubes - CTSx following - both tubes taken out on 7/3   Family anxious about possible DC after CT are out.

## 2017-07-05 LAB
CULTURE RESULTS: SIGNIFICANT CHANGE UP
SPECIMEN SOURCE: SIGNIFICANT CHANGE UP

## 2017-07-05 PROCEDURE — 99232 SBSQ HOSP IP/OBS MODERATE 35: CPT

## 2017-07-05 RX ADMIN — DIVALPROEX SODIUM 500 MILLIGRAM(S): 500 TABLET, DELAYED RELEASE ORAL at 22:44

## 2017-07-05 RX ADMIN — ZINC OXIDE 1 APPLICATION(S): 200 OINTMENT TOPICAL at 05:54

## 2017-07-05 RX ADMIN — Medication 150 MICROGRAM(S): at 05:50

## 2017-07-05 RX ADMIN — HALOPERIDOL DECANOATE 5 MILLIGRAM(S): 100 INJECTION INTRAMUSCULAR at 17:10

## 2017-07-05 RX ADMIN — Medication 325 MILLIGRAM(S): at 12:29

## 2017-07-05 RX ADMIN — HALOPERIDOL DECANOATE 5 MILLIGRAM(S): 100 INJECTION INTRAMUSCULAR at 05:50

## 2017-07-05 RX ADMIN — ZINC OXIDE 1 APPLICATION(S): 200 OINTMENT TOPICAL at 12:00

## 2017-07-05 RX ADMIN — CLOTRIMAZOLE AND BETAMETHASONE DIPROPIONATE 1 APPLICATION(S): 10; .5 CREAM TOPICAL at 05:54

## 2017-07-05 RX ADMIN — ZINC OXIDE 1 APPLICATION(S): 200 OINTMENT TOPICAL at 22:32

## 2017-07-05 RX ADMIN — DIVALPROEX SODIUM 500 MILLIGRAM(S): 500 TABLET, DELAYED RELEASE ORAL at 12:29

## 2017-07-05 RX ADMIN — Medication 500 MILLIGRAM(S): at 12:29

## 2017-07-05 RX ADMIN — PANTOPRAZOLE SODIUM 40 MILLIGRAM(S): 20 TABLET, DELAYED RELEASE ORAL at 05:50

## 2017-07-05 RX ADMIN — CLOTRIMAZOLE AND BETAMETHASONE DIPROPIONATE 1 APPLICATION(S): 10; .5 CREAM TOPICAL at 17:09

## 2017-07-05 RX ADMIN — Medication 0.6 MILLIGRAM(S): at 12:29

## 2017-07-05 RX ADMIN — ENOXAPARIN SODIUM 40 MILLIGRAM(S): 100 INJECTION SUBCUTANEOUS at 12:29

## 2017-07-05 NOTE — PROGRESS NOTE ADULT - SUBJECTIVE AND OBJECTIVE BOX
CC: Recurrent Bilateral pleural effusions s/p bilateral VATSx    Patient seen and examined at bedside. No acute distress and no acute overnight events. No complaints. Patient with aide at the bedside. Last BM yesterday     Ros: no chest pain, palp or sob. No bm today. No dysuria     Off tele     Vital Signs Last 24 Hrs  T(C): 36.6 (05 Jul 2017 07:15), Max: 36.6 (04 Jul 2017 22:05)  T(F): 97.9 (05 Jul 2017 07:15), Max: 97.9 (05 Jul 2017 07:15)  HR: 80 (05 Jul 2017 07:15) (70 - 80)  BP: 104/51 (05 Jul 2017 07:15) (90/50 - 118/56)  BP(mean): --  RR: 16 (05 Jul 2017 07:15) (16 - 18)  SpO2: 97% (05 Jul 2017 07:15) (96% - 98%)    PHYSICAL EXAM:    GENERAL: NAD, AAOX3  NECK: soft, Supple, No JVD,   CHEST/LUNG: Clear to percussion bilaterally. dressing c/d/i  HEART: S1S2+, Regular rate and rhythm, no m/r/g   ABDOMEN: Soft, Nontender, Nondistended; Bowel sounds present  EXTREMITIES:   No clubbing, cyanosis, or edema  SKIN: No rashes or lesions      Labs: no new labs                        10.0   10.6  )-----------( 350      ( 04 Jul 2017 06:51 )             31.3   07-04    142  |  102  |  26.0<H>  ----------------------------<  82  4.3   |  26.0  |  0.83    Ca    9.1      04 Jul 2017 06:50  Phos  3.8     07-04  Mg     1.9     07-04      RADIOLOGY & ADDITIONAL TESTS:    CXR - noted

## 2017-07-05 NOTE — PROGRESS NOTE BEHAVIORAL HEALTH - ORIENTATION OTHER
unable to assess
Patient is non-verbal; unable to assess.
unable to assess
Patient is non-verbal; unable to assess.

## 2017-07-05 NOTE — PROGRESS NOTE ADULT - PROBLEM SELECTOR PLAN 1
S/p b/l VATS with pleurodesis. Last right sided CT taken out on 7/3   Seen by ID, zosyn till 6/27 - completed   ? cause of recurrent pleural effusion - work up so far - negative - exudative.   f/u pleural biopsy and cultures sent on 6/29- pending fungal addition   anti histone ab negative - not likely 2/2 drug rcn  started on colchicine to treat for a pleurisy pic (day #4)

## 2017-07-05 NOTE — PROGRESS NOTE BEHAVIORAL HEALTH - ESTIMATED INTELLIGENCE
Other

## 2017-07-05 NOTE — PROGRESS NOTE ADULT - ASSESSMENT
48 yo M with h/o schizoaffective disorder, selective mutism, recurrent pleural effusion (transudative) represents from [place] for anorexia and refusal to take PO medications. He was last discharged after thoracentesis (1400cc) of transudative pleural effusion in left lateral region. Upon readmission, repeat chest xray showed recurrent moderate left pleural effusion. On 5/29, pt had seizure episode 2/2 noncompliance with PO medications, then transitioned to IV valproate which is also being used for his mood d/o and seizure. MRI brain x 2 were negative for acute infarct. Pt noted to have worsening leukocytosis, elevated CRP and procalcitonin. Per ID, vanc and zosyn were started. CT chest showed worsening left pleural effusion. CTS placed chest tube on 6/4. Pt had rapid response on 6/6, likely due to clozapine toxicity and was monitored overnight in MICU. On 6/9, pt had VATS which did not show evidence of infection and partially trapped lung. Pleurodesis was performed and had 1500cc drained. Had last chest tube removed on 6/13 from the left (the right fell out in transport???). Repeat cxr shows no change, recently done again with improvement of RLL; however, wbc of 13 - ID suggesting a non con ct chest to evaluate for interval change which showed a large loculated right sided effusion and in light of elevating wbc, needed to be drained. s/p IR drainage and placement of rt Chest Tube on 6/24. drained ~2lit. Chest tube output - 150ml - 200 daily  6/28 Discussed with Dr Portillo, may plan to proceed with VATS on rt side. 6/29 underwent RT VATS uneventful, intraop- clear fluid , no purulent material per Dr Portillo. 6/30 - has rt chest tubes - CTSx following - both tubes taken out on 7/3   Family anxious about possible DC after CT are out. Pending placement to [place] vs [facility]

## 2017-07-06 LAB — SURGICAL PATHOLOGY FINAL REPORT - CH: SIGNIFICANT CHANGE UP

## 2017-07-06 RX ADMIN — ZINC OXIDE 1 APPLICATION(S): 200 OINTMENT TOPICAL at 14:00

## 2017-07-06 RX ADMIN — CLOTRIMAZOLE AND BETAMETHASONE DIPROPIONATE 1 APPLICATION(S): 10; .5 CREAM TOPICAL at 17:30

## 2017-07-06 RX ADMIN — ZINC OXIDE 1 APPLICATION(S): 200 OINTMENT TOPICAL at 16:33

## 2017-07-06 RX ADMIN — DIVALPROEX SODIUM 500 MILLIGRAM(S): 500 TABLET, DELAYED RELEASE ORAL at 21:00

## 2017-07-06 RX ADMIN — ENOXAPARIN SODIUM 40 MILLIGRAM(S): 100 INJECTION SUBCUTANEOUS at 12:00

## 2017-07-06 RX ADMIN — PANTOPRAZOLE SODIUM 40 MILLIGRAM(S): 20 TABLET, DELAYED RELEASE ORAL at 05:26

## 2017-07-06 RX ADMIN — HALOPERIDOL DECANOATE 5 MILLIGRAM(S): 100 INJECTION INTRAMUSCULAR at 18:10

## 2017-07-06 RX ADMIN — Medication 325 MILLIGRAM(S): at 18:09

## 2017-07-06 RX ADMIN — Medication 500 MILLIGRAM(S): at 18:09

## 2017-07-06 RX ADMIN — HALOPERIDOL DECANOATE 5 MILLIGRAM(S): 100 INJECTION INTRAMUSCULAR at 05:26

## 2017-07-06 RX ADMIN — CLOTRIMAZOLE AND BETAMETHASONE DIPROPIONATE 1 APPLICATION(S): 10; .5 CREAM TOPICAL at 14:30

## 2017-07-06 RX ADMIN — DIVALPROEX SODIUM 500 MILLIGRAM(S): 500 TABLET, DELAYED RELEASE ORAL at 18:09

## 2017-07-06 RX ADMIN — ZINC OXIDE 1 APPLICATION(S): 200 OINTMENT TOPICAL at 21:34

## 2017-07-06 RX ADMIN — Medication 150 MICROGRAM(S): at 05:26

## 2017-07-06 RX ADMIN — Medication 0.6 MILLIGRAM(S): at 18:09

## 2017-07-06 NOTE — PROGRESS NOTE ADULT - SUBJECTIVE AND OBJECTIVE BOX
CC: Recurrent Bilateral pleural effusions s/p bilateral VATSx    Patient seen and examined at bedside. No acute distress and no acute overnight events. No complaints today. Patient was slightly agitated today and tried to grab me when I went to examine him      Ros: no chest pain, palp or sob. No bm today. No dysuria     Off tele     Vital Signs Last 24 Hrs  T(C): 36.8 (06 Jul 2017 05:25), Max: 37 (05 Jul 2017 22:29)  T(F): 98.2 (06 Jul 2017 05:25), Max: 98.6 (05 Jul 2017 22:29)  HR: 80 (06 Jul 2017 05:25) (80 - 86)  BP: 116/56 (06 Jul 2017 05:25) (103/62 - 116/56)  BP(mean): --  RR: 18 (06 Jul 2017 05:25) (16 - 18)  SpO2: 98% (06 Jul 2017 05:25) (98% - 100%)    PHYSICAL EXAM:    GENERAL: NAD, AAOX3  NECK: soft, Supple, No JVD,   CHEST/LUNG: Clear to percussion bilaterally. dressing c/d/i  HEART: S1S2+, Regular rate and rhythm, no m/r/g   ABDOMEN: Soft, Nontender, Nondistended; Bowel sounds present  EXTREMITIES:   No clubbing, cyanosis, or edema  SKIN: No rashes or lesions      Labs: no new labs                        10.0   10.6  )-----------( 350      ( 04 Jul 2017 06:51 )             31.3   07-04    142  |  102  |  26.0<H>  ----------------------------<  82  4.3   |  26.0  |  0.83    Ca    9.1      04 Jul 2017 06:50  Phos  3.8     07-04  Mg     1.9     07-04      RADIOLOGY & ADDITIONAL TESTS:    CXR - noted

## 2017-07-06 NOTE — PROGRESS NOTE ADULT - SUBJECTIVE AND OBJECTIVE BOX
chart reviewed and patient seen asleep aide reports less agitation cooperative with care medically stable and psychiatrically unchanged awaiting disposition back to [place] vs [facility]

## 2017-07-06 NOTE — OCCUPATIONAL THERAPY INITIAL EVALUATION ADULT - ADDITIONAL COMMENTS
Pt lives in an inpatient psychiatric facility ([facility]) and according to 1:1 from [facility] pt was independent with all ADLs/IADLs PTA.

## 2017-07-06 NOTE — PROGRESS NOTE ADULT - ASSESSMENT
48 yo M with h/o schizoaffective disorder, selective mutism, recurrent pleural effusion (transudative) represents from [place] for anorexia and refusal to take PO medications. He was last discharged after thoracentesis (1400cc) of transudative pleural effusion in left lateral region. Upon readmission, repeat chest xray showed recurrent moderate left pleural effusion. On 5/29, pt had seizure episode 2/2 noncompliance with PO medications, then transitioned to IV valproate which is also being used for his mood d/o and seizure. MRI brain x 2 were negative for acute infarct. Pt noted to have worsening leukocytosis, elevated CRP and procalcitonin. Per ID, vanc and zosyn were started. CT chest showed worsening left pleural effusion. CTS placed chest tube on 6/4. Pt had rapid response on 6/6, likely due to clozapine toxicity and was monitored overnight in MICU. On 6/9, pt had VATS which did not show evidence of infection and partially trapped lung. Pleurodesis was performed and had 1500cc drained. Had last chest tube removed on 6/13 from the left (the right fell out in transport???). Repeat cxr shows no change, recently done again with improvement of RLL; however, wbc of 13 - ID suggesting a non con ct chest to evaluate for interval change which showed a large loculated right sided effusion and in light of elevating wbc, needed to be drained. s/p IR drainage and placement of rt Chest Tube on 6/24. drained ~2lit. Chest tube output - 150ml - 200 daily  6/28 Discussed with Dr Portillo, may plan to proceed with VATS on rt side. 6/29 underwent RT VATS uneventful, intraop- clear fluid , no purulent material per Dr Portillo. 6/30 - has rt chest tubes - CTSx following - both tubes taken out on 7/3   Family anxious about possible DC after CT are out. Pending placement to [place] vs STR

## 2017-07-06 NOTE — PROGRESS NOTE ADULT - PROBLEM SELECTOR PLAN 1
S/p b/l VATS with pleurodesis. Last right sided CT taken out on 7/3   s/p zosyn completed   ? cause of recurrent pleural effusion - work up so far - negative - exudative.   f/u pleural biopsy and cultures sent on 6/29- pending fungal addition   anti histone ab negative - not likely 2/2 drug rcn  started on colchicine to treat for a pleurisy pic (day #5)

## 2017-07-07 PROCEDURE — 99232 SBSQ HOSP IP/OBS MODERATE 35: CPT

## 2017-07-07 RX ADMIN — CLOTRIMAZOLE AND BETAMETHASONE DIPROPIONATE 1 APPLICATION(S): 10; .5 CREAM TOPICAL at 18:40

## 2017-07-07 RX ADMIN — ZINC OXIDE 1 APPLICATION(S): 200 OINTMENT TOPICAL at 05:29

## 2017-07-07 RX ADMIN — ZINC OXIDE 1 APPLICATION(S): 200 OINTMENT TOPICAL at 22:24

## 2017-07-07 RX ADMIN — Medication 325 MILLIGRAM(S): at 11:43

## 2017-07-07 RX ADMIN — DIVALPROEX SODIUM 500 MILLIGRAM(S): 500 TABLET, DELAYED RELEASE ORAL at 22:22

## 2017-07-07 RX ADMIN — Medication 0.6 MILLIGRAM(S): at 14:47

## 2017-07-07 RX ADMIN — CLOTRIMAZOLE AND BETAMETHASONE DIPROPIONATE 1 APPLICATION(S): 10; .5 CREAM TOPICAL at 05:29

## 2017-07-07 RX ADMIN — PANTOPRAZOLE SODIUM 40 MILLIGRAM(S): 20 TABLET, DELAYED RELEASE ORAL at 05:28

## 2017-07-07 RX ADMIN — Medication 500 MILLIGRAM(S): at 11:43

## 2017-07-07 RX ADMIN — HALOPERIDOL DECANOATE 5 MILLIGRAM(S): 100 INJECTION INTRAMUSCULAR at 05:28

## 2017-07-07 RX ADMIN — Medication 150 MICROGRAM(S): at 05:28

## 2017-07-07 RX ADMIN — HALOPERIDOL DECANOATE 5 MILLIGRAM(S): 100 INJECTION INTRAMUSCULAR at 18:36

## 2017-07-07 RX ADMIN — ZINC OXIDE 1 APPLICATION(S): 200 OINTMENT TOPICAL at 14:48

## 2017-07-07 RX ADMIN — ENOXAPARIN SODIUM 40 MILLIGRAM(S): 100 INJECTION SUBCUTANEOUS at 11:43

## 2017-07-07 RX ADMIN — DIVALPROEX SODIUM 500 MILLIGRAM(S): 500 TABLET, DELAYED RELEASE ORAL at 11:44

## 2017-07-07 NOTE — PROGRESS NOTE ADULT - I WAS PHYSICALLY PRESENT FOR THE KEY PORTIONS OF THE EVALUATION AND MANAGEMENT (E/M) SERVICE PROVIDED.  I AGREE WITH THE ABOVE HISTORY, PHYSICAL, AND PLAN WHICH I HAVE REVIEWED AND EDITED WHERE APPROPRIATE

## 2017-07-07 NOTE — PROGRESS NOTE ADULT - PROBLEM SELECTOR PLAN 1
S/p b/l VATS with pleurodesis. Last right sided CT taken out on 7/3   s/p zosyn completed   ? cause of recurrent pleural effusion - work up so far - negative - exudative.   f/u pleural biopsy and cultures sent on 6/29- pending fungal addition   anti histone ab negative - not likely 2/2 drug rcn  started on colchicine to treat for a pleurisy pic (day #6/14 - if cr trends up, can d/c)

## 2017-07-07 NOTE — PROGRESS NOTE ADULT - ASSESSMENT
46 yo M with h/o schizoaffective disorder, selective mutism, recurrent pleural effusion (transudative) represents from [place] for anorexia and refusal to take PO medications. He was last discharged after thoracentesis (1400cc) of transudative pleural effusion in left lateral region. Upon readmission, repeat chest xray showed recurrent moderate left pleural effusion. On 5/29, pt had seizure episode 2/2 noncompliance with PO medications, then transitioned to IV valproate which is also being used for his mood d/o and seizure. MRI brain x 2 were negative for acute infarct. Pt noted to have worsening leukocytosis, elevated CRP and procalcitonin. Per ID, vanc and zosyn were started. CT chest showed worsening left pleural effusion. CTS placed chest tube on 6/4. Pt had rapid response on 6/6, likely due to clozapine toxicity and was monitored overnight in MICU. On 6/9, pt had VATS which did not show evidence of infection and partially trapped lung. Pleurodesis was performed and had 1500cc drained. Had last chest tube removed on 6/13 from the left (the right fell out in transport???). Repeat cxr shows no change, recently done again with improvement of RLL; however, wbc of 13 - ID suggesting a non con ct chest to evaluate for interval change which showed a large loculated right sided effusion and in light of elevating wbc, needed to be drained. s/p IR drainage and placement of rt Chest Tube on 6/24. drained ~2lit. Chest tube output - 150ml - 200 daily  6/28 Discussed with Dr Portillo, may plan to proceed with VATS on rt side. 6/29 underwent RT VATS uneventful, intraop- clear fluid , no purulent material per Dr Portillo. 6/30 - has rt chest tubes - CTSx following - both tubes taken out on 7/3   Family anxious about possible DC after CT are out. Pending placement to [place] vs [facility]. The family appealed the decision to go to [facility] as PT did not recommend it. Feel that he needs to be back to his [facility] as prior. Awaiting decision

## 2017-07-07 NOTE — PROGRESS NOTE ADULT - PROBLEM SELECTOR PROBLEM 7
Electrolyte abnormality
Prophylactic measure

## 2017-07-07 NOTE — PROGRESS NOTE ADULT - SUBJECTIVE AND OBJECTIVE BOX
CC: Recurrent Bilateral pleural effusions s/p bilateral VATSx    Patient seen and examined at bedside. No acute distress and no acute overnight events. No complaints today. Patient was calm today, however, during the exam got agitated     Ros: no chest pain, palp or sob. No bm today. No dysuria     Off tele     Vital Signs Last 24 Hrs  T(C): 36.7 (07 Jul 2017 08:17), Max: 36.7 (06 Jul 2017 21:00)  T(F): 98 (07 Jul 2017 08:17), Max: 98 (06 Jul 2017 21:00)  HR: 88 (07 Jul 2017 09:05) (67 - 88)  BP: 112/67 (07 Jul 2017 08:17) (100/67 - 122/70)  BP(mean): --  RR: 18 (07 Jul 2017 08:17) (16 - 18)  SpO2: 94% (07 Jul 2017 09:05) (94% - 99%)    PHYSICAL EXAM:    GENERAL: NAD, AAOX3  NECK: soft, Supple, No JVD,   CHEST/LUNG: Clear to percussion bilaterally. dressing taken off of right side, no issues   HEART: S1S2+, Regular rate and rhythm, no m/r/g   ABDOMEN: Soft, Nontender, Nondistended; Bowel sounds present  EXTREMITIES:   No clubbing, cyanosis, or edema  SKIN: No rashes or lesions      Labs: no new labs                        10.0   10.6  )-----------( 350      ( 04 Jul 2017 06:51 )             31.3   07-04    142  |  102  |  26.0<H>  ----------------------------<  82  4.3   |  26.0  |  0.83    Ca    9.1      04 Jul 2017 06:50  Phos  3.8     07-04  Mg     1.9     07-04      RADIOLOGY & ADDITIONAL TESTS:    CXR - noted

## 2017-07-08 LAB
ANION GAP SERPL CALC-SCNC: 13 MMOL/L — SIGNIFICANT CHANGE UP (ref 5–17)
BUN SERPL-MCNC: 33 MG/DL — HIGH (ref 8–20)
CALCIUM SERPL-MCNC: 9.2 MG/DL — SIGNIFICANT CHANGE UP (ref 8.6–10.2)
CHLORIDE SERPL-SCNC: 103 MMOL/L — SIGNIFICANT CHANGE UP (ref 98–107)
CO2 SERPL-SCNC: 25 MMOL/L — SIGNIFICANT CHANGE UP (ref 22–29)
CREAT SERPL-MCNC: 1.05 MG/DL — SIGNIFICANT CHANGE UP (ref 0.5–1.3)
GLUCOSE SERPL-MCNC: 98 MG/DL — SIGNIFICANT CHANGE UP (ref 70–115)
HCT VFR BLD CALC: 31.9 % — LOW (ref 42–52)
HGB BLD-MCNC: 10.3 G/DL — LOW (ref 14–18)
MAGNESIUM SERPL-MCNC: 1.9 MG/DL — SIGNIFICANT CHANGE UP (ref 1.6–2.6)
MCHC RBC-ENTMCNC: 28.1 PG — SIGNIFICANT CHANGE UP (ref 27–31)
MCHC RBC-ENTMCNC: 32.3 G/DL — SIGNIFICANT CHANGE UP (ref 32–36)
MCV RBC AUTO: 87.2 FL — SIGNIFICANT CHANGE UP (ref 80–94)
PHOSPHATE SERPL-MCNC: 3.7 MG/DL — SIGNIFICANT CHANGE UP (ref 2.4–4.7)
PLATELET # BLD AUTO: 270 K/UL — SIGNIFICANT CHANGE UP (ref 150–400)
POTASSIUM SERPL-MCNC: 4.3 MMOL/L — SIGNIFICANT CHANGE UP (ref 3.5–5.3)
POTASSIUM SERPL-SCNC: 4.3 MMOL/L — SIGNIFICANT CHANGE UP (ref 3.5–5.3)
RBC # BLD: 3.66 M/UL — LOW (ref 4.6–6.2)
RBC # FLD: 16.6 % — HIGH (ref 11–15.6)
SODIUM SERPL-SCNC: 141 MMOL/L — SIGNIFICANT CHANGE UP (ref 135–145)
WBC # BLD: 8.6 K/UL — SIGNIFICANT CHANGE UP (ref 4.8–10.8)
WBC # FLD AUTO: 8.6 K/UL — SIGNIFICANT CHANGE UP (ref 4.8–10.8)

## 2017-07-08 PROCEDURE — 99233 SBSQ HOSP IP/OBS HIGH 50: CPT

## 2017-07-08 RX ADMIN — PANTOPRAZOLE SODIUM 40 MILLIGRAM(S): 20 TABLET, DELAYED RELEASE ORAL at 06:19

## 2017-07-08 RX ADMIN — CLOTRIMAZOLE AND BETAMETHASONE DIPROPIONATE 1 APPLICATION(S): 10; .5 CREAM TOPICAL at 16:55

## 2017-07-08 RX ADMIN — DIVALPROEX SODIUM 500 MILLIGRAM(S): 500 TABLET, DELAYED RELEASE ORAL at 13:20

## 2017-07-08 RX ADMIN — HALOPERIDOL DECANOATE 5 MILLIGRAM(S): 100 INJECTION INTRAMUSCULAR at 06:19

## 2017-07-08 RX ADMIN — CLOTRIMAZOLE AND BETAMETHASONE DIPROPIONATE 1 APPLICATION(S): 10; .5 CREAM TOPICAL at 06:20

## 2017-07-08 RX ADMIN — DIVALPROEX SODIUM 500 MILLIGRAM(S): 500 TABLET, DELAYED RELEASE ORAL at 22:13

## 2017-07-08 RX ADMIN — ZINC OXIDE 1 APPLICATION(S): 200 OINTMENT TOPICAL at 22:14

## 2017-07-08 RX ADMIN — Medication 150 MICROGRAM(S): at 06:19

## 2017-07-08 RX ADMIN — ENOXAPARIN SODIUM 40 MILLIGRAM(S): 100 INJECTION SUBCUTANEOUS at 13:21

## 2017-07-08 RX ADMIN — Medication 325 MILLIGRAM(S): at 13:21

## 2017-07-08 RX ADMIN — Medication 0.6 MILLIGRAM(S): at 13:21

## 2017-07-08 RX ADMIN — ZINC OXIDE 1 APPLICATION(S): 200 OINTMENT TOPICAL at 16:56

## 2017-07-08 RX ADMIN — Medication 500 MILLIGRAM(S): at 13:21

## 2017-07-08 RX ADMIN — ZINC OXIDE 1 APPLICATION(S): 200 OINTMENT TOPICAL at 06:20

## 2017-07-08 RX ADMIN — HALOPERIDOL DECANOATE 5 MILLIGRAM(S): 100 INJECTION INTRAMUSCULAR at 17:51

## 2017-07-08 NOTE — PROGRESS NOTE ADULT - ASSESSMENT
48 yo M with h/o schizoaffective disorder, selective mutism, recurrent pleural effusion (transudative) represents from [place] for anorexia and refusal to take PO medications. He was last discharged after thoracentesis (1400cc) of transudative pleural effusion in left lateral region. Upon readmission, repeat chest xray showed recurrent moderate left pleural effusion. On 5/29, pt had seizure episode 2/2 noncompliance with PO medications, then transitioned to IV valproate which is also being used for his mood d/o and seizure. MRI brain x 2 were negative for acute infarct. Pt noted to have worsening leukocytosis, elevated CRP and procalcitonin. Per ID, vanc and zosyn were started. CT chest showed worsening left pleural effusion. CTS placed chest tube on 6/4. Pt had rapid response on 6/6, likely due to clozapine toxicity and was monitored overnight in MICU. On 6/9, pt had VATS which did not show evidence of infection and partially trapped lung. Pleurodesis was performed and had 1500cc drained. Had last chest tube removed on 6/13 from the left (the right fell out in transport???). Repeat cxr shows no change, recently done again with improvement of RLL; however, wbc of 13 - ID suggesting a non con ct chest to evaluate for interval change which showed a large loculated right sided effusion and in light of elevating wbc, needed to be drained. s/p IR drainage and placement of rt Chest Tube on 6/24. drained ~2lit. Chest tube output - 150ml - 200 daily  6/28 Discussed with Dr Portillo, may plan to proceed with VATS on rt side. 6/29 underwent RT VATS uneventful, intraop- clear fluid , no purulent material per Dr Portillo. 6/30 - has rt chest tubes - CTSx following - both tubes taken out on 7/3   Family wants patient to go to [facility]

## 2017-07-09 PROCEDURE — 99232 SBSQ HOSP IP/OBS MODERATE 35: CPT

## 2017-07-09 RX ORDER — LANOLIN ALCOHOL/MO/W.PET/CERES
3 CREAM (GRAM) TOPICAL AT BEDTIME
Qty: 0 | Refills: 0 | Status: DISCONTINUED | OUTPATIENT
Start: 2017-07-09 | End: 2017-07-10

## 2017-07-09 RX ADMIN — ENOXAPARIN SODIUM 40 MILLIGRAM(S): 100 INJECTION SUBCUTANEOUS at 13:07

## 2017-07-09 RX ADMIN — CLOTRIMAZOLE AND BETAMETHASONE DIPROPIONATE 1 APPLICATION(S): 10; .5 CREAM TOPICAL at 06:08

## 2017-07-09 RX ADMIN — HALOPERIDOL DECANOATE 5 MILLIGRAM(S): 100 INJECTION INTRAMUSCULAR at 17:03

## 2017-07-09 RX ADMIN — PANTOPRAZOLE SODIUM 40 MILLIGRAM(S): 20 TABLET, DELAYED RELEASE ORAL at 06:22

## 2017-07-09 RX ADMIN — Medication 3 MILLIGRAM(S): at 21:04

## 2017-07-09 RX ADMIN — Medication 150 MICROGRAM(S): at 06:23

## 2017-07-09 RX ADMIN — DIVALPROEX SODIUM 500 MILLIGRAM(S): 500 TABLET, DELAYED RELEASE ORAL at 21:03

## 2017-07-09 RX ADMIN — ZINC OXIDE 1 APPLICATION(S): 200 OINTMENT TOPICAL at 15:34

## 2017-07-09 RX ADMIN — Medication 0.6 MILLIGRAM(S): at 13:06

## 2017-07-09 RX ADMIN — ZINC OXIDE 1 APPLICATION(S): 200 OINTMENT TOPICAL at 22:29

## 2017-07-09 RX ADMIN — Medication 500 MILLIGRAM(S): at 13:06

## 2017-07-09 RX ADMIN — CLOTRIMAZOLE AND BETAMETHASONE DIPROPIONATE 1 APPLICATION(S): 10; .5 CREAM TOPICAL at 16:55

## 2017-07-09 RX ADMIN — ZINC OXIDE 1 APPLICATION(S): 200 OINTMENT TOPICAL at 06:08

## 2017-07-09 RX ADMIN — HALOPERIDOL DECANOATE 5 MILLIGRAM(S): 100 INJECTION INTRAMUSCULAR at 06:22

## 2017-07-09 RX ADMIN — DIVALPROEX SODIUM 500 MILLIGRAM(S): 500 TABLET, DELAYED RELEASE ORAL at 13:06

## 2017-07-09 RX ADMIN — Medication 325 MILLIGRAM(S): at 13:06

## 2017-07-09 NOTE — PROGRESS NOTE ADULT - SUBJECTIVE AND OBJECTIVE BOX
INTERVAL HPI/OVERNIGHT EVENTS:    CC: pleural effusion s/p VATS, schizoaffective disorder    No overnight events, no fevers.     Vital Signs Last 24 Hrs  T(C): 37 (09 Jul 2017 08:48), Max: 37.1 (09 Jul 2017 00:00)  T(F): 98.6 (09 Jul 2017 08:48), Max: 98.8 (09 Jul 2017 00:00)  HR: 64 (09 Jul 2017 08:48) (64 - 88)  BP: 108/66 (09 Jul 2017 08:48) (108/66 - 110/66)  BP(mean): --  RR: 17 (09 Jul 2017 08:48) (15 - 18)  SpO2: 97% (09 Jul 2017 08:48) (96% - 97%)    PHYSICAL EXAM:    GENERAL: Not in distress, alert  CHEST/LUNG: b/l air entry, clear  HEART: Regular   ABDOMEN: Soft, BS+  EXTREMITIES:  No edema    MEDICATIONS  (STANDING):  levothyroxine 150 MICROGram(s) Oral daily  pantoprazole    Tablet 40 milliGRAM(s) Oral before breakfast  haloperidol     Tablet 5 milliGRAM(s) Oral two times a day  diVALproex  milliGRAM(s) Oral daily  enoxaparin Injectable 40 milliGRAM(s) SubCutaneous daily  diVALproex  milliGRAM(s) Oral at bedtime  zinc oxide 40%/lanolin Ointment 1 Application(s) Topical three times a day  clotrimazole/betamethasone Cream 1 Application(s) Topical two times a day  colchicine 0.6 milliGRAM(s) Oral daily  ferrous    sulfate 325 milliGRAM(s) Oral daily  ascorbic acid 500 milliGRAM(s) Oral daily    MEDICATIONS  (PRN):  acetaminophen   Tablet 650 milliGRAM(s) Oral every 6 hours PRN For Temp greater than 38.5 C (101.3 F)  artificial  tears Solution 1 Drop(s) Both EYES every 8 hours PRN Dry Eyes      Allergies    Allergy Status Unknown    Intolerances    Chicken (Other (Unknown))        LABS:                          10.3   8.6   )-----------( 270      ( 08 Jul 2017 11:06 )             31.9     07-08    141  |  103  |  33.0<H>  ----------------------------<  98  4.3   |  25.0  |  1.05    Ca    9.2      08 Jul 2017 11:06  Phos  3.7     07-08  Mg     1.9     07-08            RADIOLOGY & ADDITIONAL TESTS:

## 2017-07-09 NOTE — PROGRESS NOTE ADULT - ASSESSMENT
46 yo M with h/o schizoaffective disorder, selective mutism, recurrent pleural effusion (transudative) represents from [place] for anorexia and refusal to take PO medications. He was last discharged after thoracentesis (1400cc) of transudative pleural effusion in left lateral region. Upon readmission, repeat chest xray showed recurrent moderate left pleural effusion. On 5/29, pt had seizure episode 2/2 noncompliance with PO medications, then transitioned to IV valproate which is also being used for his mood d/o and seizure. MRI brain x 2 were negative for acute infarct. Pt noted to have worsening leukocytosis, elevated CRP and procalcitonin. Per ID, vanc and zosyn were started. CT chest showed worsening left pleural effusion. CTS placed chest tube on 6/4. Pt had rapid response on 6/6, likely due to clozapine toxicity and was monitored overnight in MICU. On 6/9, pt had VATS which did not show evidence of infection and partially trapped lung. Pleurodesis was performed and had 1500cc drained. Had last chest tube removed on 6/13 from the left (the right fell out in transport???). Repeat cxr shows no change, recently done again with improvement of RLL; however, wbc of 13 - ID suggesting a non con ct chest to evaluate for interval change which showed a large loculated right sided effusion and in light of elevating wbc, needed to be drained. s/p IR drainage and placement of rt Chest Tube on 6/24. drained ~2lit. Chest tube output - 150ml - 200 daily  6/28 Discussed with Dr Portillo, may plan to proceed with VATS on rt side. 6/29 underwent RT VATS uneventful, intraop- clear fluid , no purulent material per Dr Portillo. 6/30 - has rt chest tubes - CTSx following - both tubes taken out on 7/3   Family wants patient to go to [facility]

## 2017-07-09 NOTE — PROGRESS NOTE ADULT - PROBLEM SELECTOR PROBLEM 6
Schizoaffective disorder, bipolar type
Constipation
Electrolyte abnormality
Schizoaffective disorder, bipolar type
Constipation

## 2017-07-10 ENCOUNTER — TRANSCRIPTION ENCOUNTER (OUTPATIENT)
Age: 47
End: 2017-07-10

## 2017-07-10 VITALS
HEART RATE: 88 BPM | RESPIRATION RATE: 19 BRPM | DIASTOLIC BLOOD PRESSURE: 84 MMHG | OXYGEN SATURATION: 98 % | SYSTOLIC BLOOD PRESSURE: 122 MMHG

## 2017-07-10 LAB
ANION GAP SERPL CALC-SCNC: 12 MMOL/L — SIGNIFICANT CHANGE UP (ref 5–17)
BUN SERPL-MCNC: 23 MG/DL — HIGH (ref 8–20)
CALCIUM SERPL-MCNC: 9.1 MG/DL — SIGNIFICANT CHANGE UP (ref 8.6–10.2)
CHLORIDE SERPL-SCNC: 104 MMOL/L — SIGNIFICANT CHANGE UP (ref 98–107)
CO2 SERPL-SCNC: 25 MMOL/L — SIGNIFICANT CHANGE UP (ref 22–29)
CREAT SERPL-MCNC: 0.89 MG/DL — SIGNIFICANT CHANGE UP (ref 0.5–1.3)
GLUCOSE SERPL-MCNC: 88 MG/DL — SIGNIFICANT CHANGE UP (ref 70–115)
HCT VFR BLD CALC: 32.8 % — LOW (ref 42–52)
HGB BLD-MCNC: 10.5 G/DL — LOW (ref 14–18)
MAGNESIUM SERPL-MCNC: 1.8 MG/DL — SIGNIFICANT CHANGE UP (ref 1.6–2.6)
MCHC RBC-ENTMCNC: 28.2 PG — SIGNIFICANT CHANGE UP (ref 27–31)
MCHC RBC-ENTMCNC: 32 G/DL — SIGNIFICANT CHANGE UP (ref 32–36)
MCV RBC AUTO: 88.2 FL — SIGNIFICANT CHANGE UP (ref 80–94)
PLATELET # BLD AUTO: 253 K/UL — SIGNIFICANT CHANGE UP (ref 150–400)
POTASSIUM SERPL-MCNC: 3.9 MMOL/L — SIGNIFICANT CHANGE UP (ref 3.5–5.3)
POTASSIUM SERPL-SCNC: 3.9 MMOL/L — SIGNIFICANT CHANGE UP (ref 3.5–5.3)
RBC # BLD: 3.72 M/UL — LOW (ref 4.6–6.2)
RBC # FLD: 17 % — HIGH (ref 11–15.6)
SODIUM SERPL-SCNC: 141 MMOL/L — SIGNIFICANT CHANGE UP (ref 135–145)
WBC # BLD: 7 K/UL — SIGNIFICANT CHANGE UP (ref 4.8–10.8)
WBC # FLD AUTO: 7 K/UL — SIGNIFICANT CHANGE UP (ref 4.8–10.8)

## 2017-07-10 PROCEDURE — 85670 THROMBIN TIME PLASMA: CPT

## 2017-07-10 PROCEDURE — 71250 CT THORAX DX C-: CPT

## 2017-07-10 PROCEDURE — 85610 PROTHROMBIN TIME: CPT

## 2017-07-10 PROCEDURE — 87086 URINE CULTURE/COLONY COUNT: CPT

## 2017-07-10 PROCEDURE — 85732 THROMBOPLASTIN TIME PARTIAL: CPT

## 2017-07-10 PROCEDURE — 84295 ASSAY OF SERUM SODIUM: CPT

## 2017-07-10 PROCEDURE — 85652 RBC SED RATE AUTOMATED: CPT

## 2017-07-10 PROCEDURE — 71046 X-RAY EXAM CHEST 2 VIEWS: CPT

## 2017-07-10 PROCEDURE — 85014 HEMATOCRIT: CPT

## 2017-07-10 PROCEDURE — 82728 ASSAY OF FERRITIN: CPT

## 2017-07-10 PROCEDURE — 83735 ASSAY OF MAGNESIUM: CPT

## 2017-07-10 PROCEDURE — P9016: CPT

## 2017-07-10 PROCEDURE — 80053 COMPREHEN METABOLIC PANEL: CPT

## 2017-07-10 PROCEDURE — 86850 RBC ANTIBODY SCREEN: CPT

## 2017-07-10 PROCEDURE — 82607 VITAMIN B-12: CPT

## 2017-07-10 PROCEDURE — 84132 ASSAY OF SERUM POTASSIUM: CPT

## 2017-07-10 PROCEDURE — 87070 CULTURE OTHR SPECIMN AEROBIC: CPT

## 2017-07-10 PROCEDURE — 87206 SMEAR FLUORESCENT/ACID STAI: CPT

## 2017-07-10 PROCEDURE — 82746 ASSAY OF FOLIC ACID SERUM: CPT

## 2017-07-10 PROCEDURE — 81001 URINALYSIS AUTO W/SCOPE: CPT

## 2017-07-10 PROCEDURE — 84100 ASSAY OF PHOSPHORUS: CPT

## 2017-07-10 PROCEDURE — 87116 MYCOBACTERIA CULTURE: CPT

## 2017-07-10 PROCEDURE — 92610 EVALUATE SWALLOWING FUNCTION: CPT

## 2017-07-10 PROCEDURE — 88305 TISSUE EXAM BY PATHOLOGIST: CPT

## 2017-07-10 PROCEDURE — 92611 MOTION FLUOROSCOPY/SWALLOW: CPT

## 2017-07-10 PROCEDURE — 36415 COLL VENOUS BLD VENIPUNCTURE: CPT

## 2017-07-10 PROCEDURE — 97530 THERAPEUTIC ACTIVITIES: CPT

## 2017-07-10 PROCEDURE — 85027 COMPLETE CBC AUTOMATED: CPT

## 2017-07-10 PROCEDURE — 87102 FUNGUS ISOLATION CULTURE: CPT

## 2017-07-10 PROCEDURE — 82947 ASSAY GLUCOSE BLOOD QUANT: CPT

## 2017-07-10 PROCEDURE — 85730 THROMBOPLASTIN TIME PARTIAL: CPT

## 2017-07-10 PROCEDURE — 82803 BLOOD GASES ANY COMBINATION: CPT

## 2017-07-10 PROCEDURE — 83986 ASSAY PH BODY FLUID NOS: CPT

## 2017-07-10 PROCEDURE — 70450 CT HEAD/BRAIN W/O DYE: CPT

## 2017-07-10 PROCEDURE — 97116 GAIT TRAINING THERAPY: CPT

## 2017-07-10 PROCEDURE — 80048 BASIC METABOLIC PNL TOTAL CA: CPT

## 2017-07-10 PROCEDURE — 71045 X-RAY EXAM CHEST 1 VIEW: CPT

## 2017-07-10 PROCEDURE — 87186 SC STD MICRODIL/AGAR DIL: CPT

## 2017-07-10 PROCEDURE — 85384 FIBRINOGEN ACTIVITY: CPT

## 2017-07-10 PROCEDURE — 85045 AUTOMATED RETICULOCYTE COUNT: CPT

## 2017-07-10 PROCEDURE — 70551 MRI BRAIN STEM W/O DYE: CPT

## 2017-07-10 PROCEDURE — 97166 OT EVAL MOD COMPLEX 45 MIN: CPT

## 2017-07-10 PROCEDURE — 84466 ASSAY OF TRANSFERRIN: CPT

## 2017-07-10 PROCEDURE — 94760 N-INVAS EAR/PLS OXIMETRY 1: CPT

## 2017-07-10 PROCEDURE — 93005 ELECTROCARDIOGRAM TRACING: CPT

## 2017-07-10 PROCEDURE — 88341 IMHCHEM/IMCYTCHM EA ADD ANTB: CPT

## 2017-07-10 PROCEDURE — 84157 ASSAY OF PROTEIN OTHER: CPT

## 2017-07-10 PROCEDURE — 86900 BLOOD TYPING SEROLOGIC ABO: CPT

## 2017-07-10 PROCEDURE — 80164 ASSAY DIPROPYLACETIC ACD TOT: CPT

## 2017-07-10 PROCEDURE — 85611 PROTHROMBIN TEST: CPT

## 2017-07-10 PROCEDURE — 92526 ORAL FUNCTION THERAPY: CPT

## 2017-07-10 PROCEDURE — 82140 ASSAY OF AMMONIA: CPT

## 2017-07-10 PROCEDURE — 87493 C DIFF AMPLIFIED PROBE: CPT

## 2017-07-10 PROCEDURE — 83615 LACTATE (LD) (LDH) ENZYME: CPT

## 2017-07-10 PROCEDURE — 87205 SMEAR GRAM STAIN: CPT

## 2017-07-10 PROCEDURE — 99239 HOSP IP/OBS DSCHRG MGMT >30: CPT

## 2017-07-10 PROCEDURE — 86703 HIV-1/HIV-2 1 RESULT ANTBDY: CPT

## 2017-07-10 PROCEDURE — 83550 IRON BINDING TEST: CPT

## 2017-07-10 PROCEDURE — 86140 C-REACTIVE PROTEIN: CPT

## 2017-07-10 PROCEDURE — 97163 PT EVAL HIGH COMPLEX 45 MIN: CPT

## 2017-07-10 PROCEDURE — 74230 X-RAY XM SWLNG FUNCJ C+: CPT

## 2017-07-10 PROCEDURE — 86901 BLOOD TYPING SEROLOGIC RH(D): CPT

## 2017-07-10 PROCEDURE — 83516 IMMUNOASSAY NONANTIBODY: CPT

## 2017-07-10 PROCEDURE — 82435 ASSAY OF BLOOD CHLORIDE: CPT

## 2017-07-10 PROCEDURE — 97110 THERAPEUTIC EXERCISES: CPT

## 2017-07-10 PROCEDURE — 94660 CPAP INITIATION&MGMT: CPT

## 2017-07-10 PROCEDURE — 84145 PROCALCITONIN (PCT): CPT

## 2017-07-10 PROCEDURE — 87015 SPECIMEN INFECT AGNT CONCNTJ: CPT

## 2017-07-10 PROCEDURE — 89051 BODY FLUID CELL COUNT: CPT

## 2017-07-10 PROCEDURE — 99285 EMERGENCY DEPT VISIT HI MDM: CPT | Mod: 25

## 2017-07-10 PROCEDURE — 80202 ASSAY OF VANCOMYCIN: CPT

## 2017-07-10 PROCEDURE — 88342 IMHCHEM/IMCYTCHM 1ST ANTB: CPT

## 2017-07-10 PROCEDURE — 83010 ASSAY OF HAPTOGLOBIN QUANT: CPT

## 2017-07-10 PROCEDURE — 99231 SBSQ HOSP IP/OBS SF/LOW 25: CPT

## 2017-07-10 PROCEDURE — 82945 GLUCOSE OTHER FLUID: CPT

## 2017-07-10 PROCEDURE — 94770: CPT

## 2017-07-10 PROCEDURE — 82042 OTHER SOURCE ALBUMIN QUAN EA: CPT

## 2017-07-10 PROCEDURE — 87075 CULTR BACTERIA EXCEPT BLOOD: CPT

## 2017-07-10 PROCEDURE — 85379 FIBRIN DEGRADATION QUANT: CPT

## 2017-07-10 PROCEDURE — 86920 COMPATIBILITY TEST SPIN: CPT

## 2017-07-10 PROCEDURE — 36600 WITHDRAWAL OF ARTERIAL BLOOD: CPT

## 2017-07-10 PROCEDURE — P9059: CPT

## 2017-07-10 PROCEDURE — 83605 ASSAY OF LACTIC ACID: CPT

## 2017-07-10 PROCEDURE — 86038 ANTINUCLEAR ANTIBODIES: CPT

## 2017-07-10 PROCEDURE — 87040 BLOOD CULTURE FOR BACTERIA: CPT

## 2017-07-10 PROCEDURE — 82330 ASSAY OF CALCIUM: CPT

## 2017-07-10 RX ORDER — FERROUS SULFATE 325(65) MG
1 TABLET ORAL
Qty: 0 | Refills: 0 | DISCHARGE
Start: 2017-07-10

## 2017-07-10 RX ORDER — VALPROIC ACID (AS SODIUM SALT) 250 MG/5ML
1000 SOLUTION, ORAL ORAL
Qty: 0 | Refills: 0 | COMMUNITY

## 2017-07-10 RX ORDER — LEVOTHYROXINE SODIUM 125 MCG
1 TABLET ORAL
Qty: 0 | Refills: 0 | DISCHARGE
Start: 2017-07-10

## 2017-07-10 RX ORDER — DIVALPROEX SODIUM 500 MG/1
1 TABLET, DELAYED RELEASE ORAL
Qty: 0 | Refills: 0 | DISCHARGE
Start: 2017-07-10

## 2017-07-10 RX ORDER — ACETAMINOPHEN 500 MG
2 TABLET ORAL
Qty: 0 | Refills: 0 | DISCHARGE
Start: 2017-07-10

## 2017-07-10 RX ORDER — ZINC OXIDE 200 MG/G
1 OINTMENT TOPICAL
Qty: 0 | Refills: 0 | DISCHARGE
Start: 2017-07-10

## 2017-07-10 RX ORDER — ASCORBIC ACID 60 MG
1 TABLET,CHEWABLE ORAL
Qty: 0 | Refills: 0 | DISCHARGE
Start: 2017-07-10

## 2017-07-10 RX ORDER — HALOPERIDOL DECANOATE 100 MG/ML
1 INJECTION INTRAMUSCULAR
Qty: 0 | Refills: 0 | COMMUNITY

## 2017-07-10 RX ORDER — LANOLIN ALCOHOL/MO/W.PET/CERES
1 CREAM (GRAM) TOPICAL
Qty: 0 | Refills: 0 | DISCHARGE
Start: 2017-07-10

## 2017-07-10 RX ORDER — CLOTRIMAZOLE AND BETAMETHASONE DIPROPIONATE 10; .5 MG/G; MG/G
1 CREAM TOPICAL
Qty: 0 | Refills: 0 | DISCHARGE
Start: 2017-07-10

## 2017-07-10 RX ORDER — PANTOPRAZOLE SODIUM 20 MG/1
1 TABLET, DELAYED RELEASE ORAL
Qty: 0 | Refills: 0 | DISCHARGE
Start: 2017-07-10

## 2017-07-10 RX ORDER — DOCUSATE SODIUM 100 MG
1 CAPSULE ORAL
Qty: 0 | Refills: 0 | COMMUNITY

## 2017-07-10 RX ORDER — MAGNESIUM HYDROXIDE 400 MG/1
30 TABLET, CHEWABLE ORAL
Qty: 0 | Refills: 0 | COMMUNITY

## 2017-07-10 RX ORDER — HALOPERIDOL DECANOATE 100 MG/ML
1 INJECTION INTRAMUSCULAR
Qty: 0 | Refills: 0 | DISCHARGE
Start: 2017-07-10

## 2017-07-10 RX ORDER — COLCHICINE 0.6 MG
1 TABLET ORAL
Qty: 0 | Refills: 0 | DISCHARGE
Start: 2017-07-10

## 2017-07-10 RX ORDER — LEVOTHYROXINE SODIUM 125 MCG
1 TABLET ORAL
Qty: 0 | Refills: 0 | COMMUNITY

## 2017-07-10 RX ADMIN — Medication 500 MILLIGRAM(S): at 12:29

## 2017-07-10 RX ADMIN — HALOPERIDOL DECANOATE 5 MILLIGRAM(S): 100 INJECTION INTRAMUSCULAR at 05:22

## 2017-07-10 RX ADMIN — ENOXAPARIN SODIUM 40 MILLIGRAM(S): 100 INJECTION SUBCUTANEOUS at 12:29

## 2017-07-10 RX ADMIN — ZINC OXIDE 1 APPLICATION(S): 200 OINTMENT TOPICAL at 05:24

## 2017-07-10 RX ADMIN — Medication 0.6 MILLIGRAM(S): at 12:29

## 2017-07-10 RX ADMIN — CLOTRIMAZOLE AND BETAMETHASONE DIPROPIONATE 1 APPLICATION(S): 10; .5 CREAM TOPICAL at 05:23

## 2017-07-10 RX ADMIN — CLOTRIMAZOLE AND BETAMETHASONE DIPROPIONATE 1 APPLICATION(S): 10; .5 CREAM TOPICAL at 17:28

## 2017-07-10 RX ADMIN — DIVALPROEX SODIUM 500 MILLIGRAM(S): 500 TABLET, DELAYED RELEASE ORAL at 12:29

## 2017-07-10 RX ADMIN — DIVALPROEX SODIUM 500 MILLIGRAM(S): 500 TABLET, DELAYED RELEASE ORAL at 22:19

## 2017-07-10 RX ADMIN — Medication 325 MILLIGRAM(S): at 12:29

## 2017-07-10 RX ADMIN — HALOPERIDOL DECANOATE 5 MILLIGRAM(S): 100 INJECTION INTRAMUSCULAR at 17:30

## 2017-07-10 RX ADMIN — Medication 150 MICROGRAM(S): at 05:22

## 2017-07-10 RX ADMIN — PANTOPRAZOLE SODIUM 40 MILLIGRAM(S): 20 TABLET, DELAYED RELEASE ORAL at 05:22

## 2017-07-10 RX ADMIN — ZINC OXIDE 1 APPLICATION(S): 200 OINTMENT TOPICAL at 17:28

## 2017-07-10 RX ADMIN — ZINC OXIDE 1 APPLICATION(S): 200 OINTMENT TOPICAL at 22:20

## 2017-07-10 NOTE — PROGRESS NOTE BEHAVIORAL HEALTH - AFFECT RANGE
Blunted
Constricted

## 2017-07-10 NOTE — PROGRESS NOTE BEHAVIORAL HEALTH - NS ED BHA REVIEW OF ED CHART AVAILABLE IMAGING REVIEWED
None available
Yes
None available
None available
Yes
None available
Yes
Yes

## 2017-07-10 NOTE — PROGRESS NOTE BEHAVIORAL HEALTH - NSBHFUPTYPE_PSY_A_CORE
Consult Follow Up

## 2017-07-10 NOTE — PROGRESS NOTE BEHAVIORAL HEALTH - NS ED BHA MED ROS CONSTITUTIONAL SYMPTOMS
No complaints
Unable to assess
Unable to assess

## 2017-07-10 NOTE — PROGRESS NOTE BEHAVIORAL HEALTH - NSBHFUPINTERVALCCFT_PSY_A_CORE
electively mute
electively mute smiling appears euphoric
non verbal alert pointing to water faucet oxygen mask in use aide at bedside
asleep
asleep s/p VATS
none
electively mute
mute
none
none  remains mute cooperative with medications  on IV Zosyn
electively mute
electively mute
electively mute smiling nods head 'no" to questions
smiling mute nods head no to questions no obvious distress [place] aides report more restless masturbating at times
remains mute nods head in response to questions
more alert
none
none

## 2017-07-10 NOTE — PROGRESS NOTE BEHAVIORAL HEALTH - BEHAVIOR
Cooperative
Other
Other
Cooperative

## 2017-07-10 NOTE — PROGRESS NOTE BEHAVIORAL HEALTH - NSBHFUPVIOL_PSY_A_CORE
none known
yes
none known

## 2017-07-10 NOTE — PROGRESS NOTE ADULT - PROBLEM SELECTOR PLAN 5
Psychiatry following
continue Levothyroxine   normal TSH
stable thus far. AOCD  anemia panel- noted with mild degree of iron def - will hold off starting iron given acute infection
Appreciate [facility] recs.
Appreciate [facility] recs.
Appreciate [facility] recs.
Continue with IV valproate
Iron supplements.
Iron supplements.
Psychiatry following
Transition to po valproate
Transition to po valproate
continue Levothyroxine   normal TSH
psych re-consult requested, further management as per psych   defer to [facility] provider to restart clozapine via titration.
stable thus far. AOCD  anemia panel- noted with mild degree of iron def -  on ferrous sulfate and vitamin c at this time as there is no likely active infection at this time
stable thus far. AOCD  anemia panel- noted with mild degree of iron def - to start on ferrous sulfate and vitamin c at this time as there is no likely active infection at this time
stable thus far. AOCD  anemia panel- noted with mild degree of iron def - will hold off starting iron given acute infection
stable thus far. AOCD  mild degree of iron deficiency - iron and vitamin c ordered
stable thus far. AOCD  will check anemia panel
stable thus far. AOCD  will check anemia panel- noted with mild degree of iron def - will hold off starting iron given acute infection
Continue iron supplement.
Hold off any antipsychotics until pt more responsive.
Psychiatry following
replete

## 2017-07-10 NOTE — PROGRESS NOTE BEHAVIORAL HEALTH - AXIS III
Pleural effusion; hypothyroidism. left lower lobe pneumonia
Pleural effusion; hypothyroidism. left lower lobe pneumonia seizure
see hospitalist note

## 2017-07-10 NOTE — PROGRESS NOTE BEHAVIORAL HEALTH - SUMMARY
no change  discussed medical issues with brother and sister at bedside
no change
will restart lower dose of haldol fro schizoaffective disorder and taper depakote
remains in control tolerating present combination of medications
no change
no change
hypomanic
no interval change
no change
no history of epilepsy or seizures per [facility] chart Depakote prescribed as mood stabilization therapy  will increase haldol for psychotic behaviors
appears hypomanic difficult to assess due to no verbalization  anticipate discharge back to [place] appears medically stable  on depakote 500 mg po bid
awaiting disposition
medical condition improved  more alert and interactive
stable
will resume haloperidol
due to potent effects of clozapine slow titration is recommended when therapy interrupted also concomitant use of benzodiazepines in initiation phase in particular can cause respiratory depression  This has been discussed with Dr Richardson and at this point would  not restart clozapine and minimize use of any psychoactive medications
at baseline mental state  cooperative with medical care
no change

## 2017-07-10 NOTE — DISCHARGE NOTE ADULT - MEDICATION SUMMARY - MEDICATIONS TO STOP TAKING
I will STOP taking the medications listed below when I get home from the hospital:    Colace 100 mg oral capsule  -- 1 cap(s) by mouth 2 times a day    valproic acid 500 mg oral delayed release capsule  -- 1000 milligram(s) by mouth once a day (at bedtime)    valproic acid 250 mg oral capsule  -- 2 cap(s) by mouth daily at 8:30AM  -- patient also takes 1000mg at 8:30PM    LORazepam 2 mg oral tablet  -- 1 tab(s) by mouth 3 times a day, As Needed    valproic acid 250 mg oral capsule  -- 1000 milligram(s) by mouth daily at 8:30pm  -- patient also takes 500mg in the morning    Milk of Magnesia 8% oral suspension  -- 30 milliliter(s) by mouth once a day (at bedtime)

## 2017-07-10 NOTE — PROGRESS NOTE BEHAVIORAL HEALTH - NS ED BHA REVIEW OF ED CHART AVAILABLE INVESTIGATIONS REVIEWED
None available
Yes

## 2017-07-10 NOTE — PROGRESS NOTE BEHAVIORAL HEALTH - AFFECT QUALITY
Elevated
Elevated/Irritable
Irritable
Irritable/Elevated
Anxious
Depressed
Irritable
Other
Anxious
Elevated

## 2017-07-10 NOTE — PROGRESS NOTE BEHAVIORAL HEALTH - IMPULSE CONTROL
Normal
Other
Other
Other/Normal
Other/Normal
Other
Normal

## 2017-07-10 NOTE — PROGRESS NOTE BEHAVIORAL HEALTH - NS ED BHA MSE GENERAL APPEARANCE
Well developed

## 2017-07-10 NOTE — PROGRESS NOTE BEHAVIORAL HEALTH - NSBHCONSULTOBS_PSY_A_CORE
Constant observation

## 2017-07-10 NOTE — PROGRESS NOTE BEHAVIORAL HEALTH - RELATEDNESS
Fair
Fair/Other
Fair/Other
Other/Fair
Poor
Fair

## 2017-07-10 NOTE — PROGRESS NOTE BEHAVIORAL HEALTH - NSBHFUPINTERVALHXFT_PSY_A_CORE
[facility]
rapid response and ICU transfer events reviewed with dr Richardson restart of clozapine dose without titration in conjunction with use of lorazepam likely etiology of clinical deterioration
told hospitalist to "f--k off" refused to be examined chest tube repositioned  generally cooperative however passively with care
MHTA from [place] reports restless night but following direction Family visited last night Now finally asleep Chest tube draining straw colored fluid In no distress
aide reports patient very sleepy calm
aide reports generally cooperative behavior chest tube draining on right smiling and jovial  compliant with medications  patient actually said "hello"
ambulated briefly yesterday per aide at bedside eating a little more  patient nods "no" in response to questions regarding pain or discomfort
hospitalist note appreciated  no change
more active seems hypersexual touching genitals grabbed aide's breast
sister at bedside visiting from [place] reports patient irritable gave her the finger seems more sedated intake poor asks for depakote to be tapered due to sedation and lackof response mentally
CXR 6/26 reviewed right pleural effusion resolved small effusion on left
chest tube removed CXR no pneumothorax
seen by PT recommendations appreciated taking medications aide reports hypersexual behaviors with frequently masturbation smiling inappropriately non verbal
antibiotic completed plan for VATS today with pleurodesis  aides report he is more alert and responsive
chest tube removed pleural biopsy negative for malignancy
brother at bedside aware of plan for VAT procedure tomorrow Diet advanced discussed with Dr Richardson restart of antipsychotic but not clozapine  patient continues mute
chart reviewed EEG pending no new developments PO intake poor
being treated for pneumonia on 1 to 1 with [facility]  appetite poor calm no behavioral issues per aide  non verbal makes gestures or nods head

## 2017-07-10 NOTE — PROGRESS NOTE ADULT - PROBLEM SELECTOR PROBLEM 1
Pleural effusion on left
SIRS (systemic inflammatory response syndrome)
Altered mental status, unspecified altered mental status type
Altered mental status, unspecified altered mental status type
Empyema
FTT (failure to thrive) in adult
Leukocytosis, unspecified type
Pleural effusion
Pleural effusion on left
R/O FTT (failure to thrive) in adult
SIRS (systemic inflammatory response syndrome)
Nonintractable epilepsy without status epilepticus, unspecified epilepsy type
Pleural effusion
Pleural effusion on left
Pleural effusion on left
SIRS (systemic inflammatory response syndrome)
Schizoaffective disorder, bipolar type

## 2017-07-10 NOTE — PROGRESS NOTE BEHAVIORAL HEALTH - MUSCLE TONE / STRENGTH
Normal muscle tone/strength
Other
Abnormal muscle tone/strength
Normal muscle tone/strength
Normal muscle tone/strength
Other

## 2017-07-10 NOTE — PROGRESS NOTE BEHAVIORAL HEALTH - NSBHCONSULTPRIMARYDISCUSSNO_PSY_A_CORE FT
no changes
no changes
no significant change in management
no changes
no changes
already aware of plan for titration
no changes
nothing new
nothing new
no changes
nothing new
nothing new
no new recommendations
nothing new to add  await medical clearance for return to [place]

## 2017-07-10 NOTE — PROGRESS NOTE BEHAVIORAL HEALTH - BODY HABITUS
Average build
Well nourished
Well nourished
Average build

## 2017-07-10 NOTE — DISCHARGE NOTE ADULT - MEDICATION SUMMARY - MEDICATIONS TO TAKE
I will START or STAY ON the medications listed below when I get home from the hospital:    acetaminophen 325 mg oral tablet  -- 2 tab(s) by mouth every 6 hours, As needed, For Temp greater than 38.5 C (101.3 F)  -- Indication: For Fever    divalproex sodium 500 mg oral delayed release tablet  -- 1 tab(s) by mouth once a day  -- Indication: For Schizoaffective disorder, bipolar type    divalproex sodium 500 mg oral delayed release tablet  -- 1 tab(s) by mouth once a day (at bedtime)  -- Indication: For Schizoaffective disorder, bipolar type    colchicine 0.6 mg oral tablet  -- 1 tab(s) by mouth once a day  -- Indication: For S/p pleurodesis    haloperidol 5 mg oral tablet  -- 1 tab(s) by mouth 2 times a day  -- Indication: For Schizoaffective disorder, bipolar type    betamethasone-clotrimazole 0.05%-1% topical cream  -- 1 application on skin 2 times a day  -- Indication: For topical    zinc oxide topical  -- Apply on skin to affected area 2 times a day  -- Indication: For topical    FeroSul 325 mg (65 mg elemental iron) oral tablet  -- 1 tab(s) by mouth once a day  -- Indication: For Anemia    melatonin 3 mg oral tablet  -- 1 tab(s) by mouth once a day (at bedtime), As needed, Sleep  -- Indication: For INsomnia    ocular lubricant ophthalmic solution  -- 1 drop(s) to each affected eye every 8 hours, As needed, Dry Eyes  -- Indication: For topical    pantoprazole 40 mg oral delayed release tablet  -- 1 tab(s) by mouth once a day (before a meal)  -- Indication: For reflux    levothyroxine 150 mcg (0.15 mg) oral tablet  -- 1 tab(s) by mouth once a day  -- Indication: For Hypothyroidism, unspecified type    ascorbic acid 500 mg oral tablet  -- 1 tab(s) by mouth once a day  -- Indication: For Supplement    cholecalciferol oral tablet  -- 2000 international unit(s) by mouth every other day (at bedtime)  -- Indication: For Supplement

## 2017-07-10 NOTE — PROGRESS NOTE BEHAVIORAL HEALTH - EYE CONTACT
Fair
Other
Other/Fair
Other/Fair
Poor
Fair

## 2017-07-10 NOTE — DISCHARGE NOTE ADULT - HOSPITAL COURSE
46 yo M with h/o schizoaffective disorder, selective mutism, recurrent pleural effusion (transudative) represents from [place] for anorexia and refusal to take PO medications. He was last discharged after thoracentesis (1400cc) of transudative pleural effusion in left lateral region. Upon readmission, repeat chest xray showed recurrent moderate left pleural effusion. On 5/29, pt had seizure episode 2/2 noncompliance with PO medications, then transitioned to IV valproate which is also being used for his mood d/o and seizure. MRI brain x 2 were negative for acute infarct. Pt noted to have worsening leukocytosis, elevated CRP and procalcitonin. Per ID, vanc and zosyn were started. CT chest showed worsening left pleural effusion. CTS placed chest tube on 6/4. Pt had rapid response on 6/6, likely due to clozapine toxicity and was monitored overnight in MICU. On 6/9, pt had VATS which did not show evidence of infection and partially trapped lung. Pleurodesis was performed and had 1500cc drained. Had last chest tube removed on 6/13 from the left (the right fell out in transport???). Repeat cxr shows no change, recently done again with improvement of RLL; however, wbc of 13 - ID suggesting a non con ct chest to evaluate for interval change which showed a large loculated right sided effusion and in light of elevating wbc, needed to be drained. s/p IR drainage and placement of rt Chest Tube on 6/24. drained ~2lit. Chest tube output - 150ml - 200 daily  6/28 : Case was discussed with Dr Portillo, proceeded with VATS on rt side. 6/29 underwent RT VATS uneventful, intraop- clear fluid , no purulent material per Dr Portillo. 6/30 - has rt chest tubes - CTSx following - both tubes taken out on 7/3.  Post procedure his course has been uneventful.   No hypoxia, no fever. His WBC remained normal.  Case discussed with Dr Monroe at [place], he is stable for discharge to [place].      Spent > 35 mins in discharge plan and documentation. 46 yo M with h/o schizoaffective disorder, selective mutism, recurrent pleural effusion (transudative) represents from [place] for anorexia and refusal to take PO medications. He was last discharged after thoracentesis (1400cc) of transudative pleural effusion in left lateral region. Upon readmission, repeat chest xray showed recurrent moderate left pleural effusion. On 5/29, pt had seizure episode 2/2 noncompliance with PO medications, then transitioned to IV valproate which is also being used for his mood d/o and seizure. MRI brain x 2 were negative for acute infarct. Pt noted to have worsening leukocytosis, elevated CRP and procalcitonin. Per ID, vanc and zosyn were started. CT chest showed worsening left pleural effusion. CTS placed chest tube on 6/4. Pt had rapid response on 6/6, likely due to clozapine toxicity and was monitored overnight in MICU. On 6/9, pt had VATS which did not show evidence of infection and partially trapped lung. Pleurodesis was performed and had 1500cc drained. Had last chest tube removed on 6/13 from the left. Repeat cxr shows no change, recently done again with improvement of RLL; however, wbc of 13 - ID suggesting a non con ct chest to evaluate for interval change which showed a large loculated right sided effusion and in light of elevating wbc, needed to be drained. s/p IR drainage and placement of rt Chest Tube on 6/24. drained ~2lit. Chest tube output - 150ml - 200 daily  6/28 : Case was discussed with Dr Portillo, proceeded with VATS on rt side. 6/29 underwent RT VATS uneventful, intraop- clear fluid , no purulent material per Dr Portillo. 6/30 - has rt chest tubes - CTSx following - both tubes taken out on 7/3.  Post procedure his course has been uneventful.   No hypoxia, no fever. His WBC remained normal.  Case discussed with Dr Monroe at [place], he is stable for discharge to [place].      Spent > 35 mins in discharge plan and documentation.

## 2017-07-10 NOTE — PROGRESS NOTE BEHAVIORAL HEALTH - NSBHFUPREASONCONS_PSY_A_CORE
med management/agitation/psychosis
psychosis
psychosis
psychosis/med management
psychosis
med management/psychosis
psychosis
psychosis/med management
med management/psychosis
psychosis
psychosis
psychosis/med management
psychosis
agitation/psychosis
other...
psychosis

## 2017-07-10 NOTE — PROGRESS NOTE BEHAVIORAL HEALTH - NS ED BHA AXIS I PRIMARY CODE FT
F25.0

## 2017-07-10 NOTE — DISCHARGE NOTE ADULT - CARE PROVIDER_API CALL
Armen Portillo), Surgery; Thoracic Surgery  [address] [place] [id number]  Phone: [phone]  Fax: [phone]

## 2017-07-10 NOTE — PROGRESS NOTE BEHAVIORAL HEALTH - NSBHCONSULTMEDS_PSY_A_CORE FT
no change
continue present
haldol 2 mg PO BID  Depakote 500 mg  BID
Depakote 500 mg am 750 mg hs  haldol 5 mg po bid
continue present
continue current
Depakote to be raised to 500 mg am and 750 mg hs  haldol 5 mg po bid
no change
haldol 2 mg PO BID to increase to 5 mg PO BID  Depakote 500 mg PO BID
haldol depakote
continue same
haldol depakote
depacon
haldol depakote
none
continue list per [place]
no new recommendations

## 2017-07-10 NOTE — PROGRESS NOTE ADULT - PROBLEM SELECTOR PROBLEM 4
Leukemoid reaction
Pleural effusion
Schizoaffective disorder, bipolar type
Seizure disorder
FTT (failure to thrive) in adult
Hypothyroidism, unspecified type
Leukemoid reaction
Leukemoid reaction
Nonintractable epilepsy without status epilepticus, unspecified epilepsy type
Pleural effusion
Schizoaffective disorder, bipolar type
Seizure disorder
Seizure disorder
Leukemoid reaction
Nonintractable epilepsy without status epilepticus, unspecified epilepsy type
Pleural effusion
Schizoaffective disorder, bipolar type

## 2017-07-10 NOTE — PROGRESS NOTE ADULT - PROBLEM SELECTOR PROBLEM 2
Altered mental status, unspecified altered mental status type
FTT (failure to thrive) in adult
Pleural effusion on left
Seizure disorder
FTT (failure to thrive) in adult
Leukocytosis, unspecified type
Pleural effusion
Pleural effusion on left
Schizoaffective disorder, bipolar type
Seizure disorder
Skin lesion on examination
FTT (failure to thrive) in adult
Hypothyroidism, unspecified type
INR (international normal ratio) abnormal
Leukocytosis, unspecified type

## 2017-07-10 NOTE — PROGRESS NOTE BEHAVIORAL HEALTH - NSBHPTASSESSDT_PSY_A_CORE
07-Jun-2017 07:17
10-Jul-2017 07:45
13-Jun-2017 12:28
28-Jun-2017 07:58
30-Jun-2017 07:55
26-Jun-2017 07:45
14-Jun-2017 12:32
16-Jun-2017 07:39
19-Jun-2017 07:55
22-Jun-2017 07:30
23-Jun-2017 07:45
03-Jul-2017 07:53
05-Jul-2017 07:55
27-Jun-2017 07:36
29-Jun-2017 07:45
08-Jun-2017 14:56
31-May-2017 11:32
30-May-2017 14:46

## 2017-07-10 NOTE — DISCHARGE NOTE ADULT - MEDICATION SUMMARY - MEDICATIONS TO CHANGE
I will SWITCH the dose or number of times a day I take the medications listed below when I get home from the hospital:    haloperidol 5 mg oral tablet  -- 1 tab(s) by mouth daily at 8:30pm  -- patient also take 10mg in the morning

## 2017-07-10 NOTE — PROGRESS NOTE ADULT - ATTENDING COMMENTS
Will follow
Will follow as needed.
Will follow
Will follow with you.
Will follow
Will follow
NPO after midnight for VATS in am
No changes. Remains ALC. Awaiting decision regarding rehab   Discussed with brother today
Plan d/w brother at bedside in detail. [facility] and its up to me. He also started naming various names from administration and stated that "they said". I informed the brother that irrespective of who he spoke to, as the medical physician caring for Mr Foss, I will clear him medically likely in the am and then he will be seen by PT and PT will decide with me if he needs to go to rehab. He also mentioned that his brother cannot shower alone, cannot go to the bathroom alone, etc. I reiterated the above and also informed him that we cannot predict a readmission or if the fluid will rebuild. He wants me to write down in the d/c paper that patient should not have increased doses of depakon b/c psych and Ct surgeon reported that depakon has a SLIGHT chance of causing the effusions, however, unlikely. I told him I will write exactly that, however, he wants me writing that the depakon is the LIKELY cause and should not increase the dose and refer that to the care of [facility] - I again told him I could not do that.   Plan d/w CM also and PT
This patient should be considered for rehab as he is not back to his prior functional status and his place of disposition does not allow the resources for physical therapy. He did work with PT and was rec to go back to [place] with PT, however, he is unable to shower/self care and ambulate completely by himself. He will not need a prolonged stay and only enough to get him back to his prior functional status.
Transfer to ICU. Ct surgery likely VATS on 6/7/2017
oob to chair and ambulate. pt ordered and consult done
Informed RN to call me when brother comes in so that I can speak to them   OOB to chair and ambulate with assistance
Will follow
Will follow
Stable for discharge to [place]. Discussed with Dr Monroe at [place].
Will Follow
discussed with sister - camryn over phone who lives in [place].
D/w brother and sister. They are requesting HIV testing.

## 2017-07-10 NOTE — DISCHARGE NOTE ADULT - PATIENT PORTAL LINK FT
“You can access the FollowHealth Patient Portal, offered by [facility], by registering with the following website: [email or web address]”

## 2017-07-10 NOTE — PROGRESS NOTE BEHAVIORAL HEALTH - RISK ASSESSMENT
presently calm low risk for intentional self injury
No change
no imminient risk of self injurious behaviors
elevated risk of impulsive behaviors
NA
on 1 to 1 with [facility] staff chest tubes placed
poor impulse control elevated risk of inappropriate behaviors
no imminent risk
elevated risk of belligerence
on 1 to 1 with [place] staff
NA
low risk of suicidal or violent behaviors
no imminent risk of suicidality
NA
low risk in control
1 to 1 with [place] staff patient in critical care and has chest tube possible VAT today by CT surgeon
on 1 to 1 fall and choking precautions
no change

## 2017-07-10 NOTE — PROGRESS NOTE BEHAVIORAL HEALTH - ABNORMAL MOVEMENTS
No abnormal movements

## 2017-07-10 NOTE — DISCHARGE NOTE ADULT - CARE PLAN
Principal Discharge DX:	Pleural effusion  Goal:	Avoid recurrent episodes.  Instructions for follow-up, activity and diet:	Continue current medications, follow up with thoracic surgery.  Secondary Diagnosis:	Hypothyroidism, unspecified type  Instructions for follow-up, activity and diet:	Continue synthroid.  Secondary Diagnosis:	Schizoaffective disorder, bipolar type  Instructions for follow-up, activity and diet:	Continue Haldol.  Secondary Diagnosis:	Seizure disorder  Instructions for follow-up, activity and diet:	Continue medications

## 2017-07-10 NOTE — PROGRESS NOTE BEHAVIORAL HEALTH - NSBHCONSULTOBSREASON_PSY_A_CORE FT
[place] staff
Chest tube
with [place] staff
monitor chest tube [place] staff
[place] staff at risk to pull chest tube
[place] staff
impulsive risk
[place] staff
[place] staff
[place] staff
impulsive behaviors
with [facility] staff
[place] staff
chest tube draining
[place] staff
[place] staff
with [place] staff

## 2017-07-10 NOTE — PROGRESS NOTE ADULT - PROBLEM/PLAN-1
DISPLAY PLAN FREE TEXT

## 2017-07-10 NOTE — PROGRESS NOTE BEHAVIORAL HEALTH - NS ED BHA MED ROS EYES
No complaints
Unable to assess
Unable to assess
No complaints

## 2017-07-10 NOTE — PROGRESS NOTE BEHAVIORAL HEALTH - NSBHATTESTSEENBY_PSY_A_CORE
attending Psychiatrist without NP/Trainee

## 2017-07-10 NOTE — PROGRESS NOTE BEHAVIORAL HEALTH - GAIT / STATION
Other

## 2017-07-10 NOTE — PROGRESS NOTE BEHAVIORAL HEALTH - NSBHCONSULTFOLLOW_PSY_A_CORE
yes
Signing off - call with questions…
yes
Signing off - call with questions…
yes
Signing off - call with questions…
Signing off - call with questions…

## 2017-07-10 NOTE — PROGRESS NOTE BEHAVIORAL HEALTH - NSBHCONSFOLLOWNEEDS_PSY_A_CORE
no psychiatric contraindications to discharge

## 2017-07-10 NOTE — DISCHARGE NOTE ADULT - PLAN OF CARE
Avoid recurrent episodes. Continue current medications, follow up with thoracic surgery. Continue synthroid. Continue Haldol. Continue medications

## 2017-07-10 NOTE — DISCHARGE NOTE ADULT - ADDITIONAL INSTRUCTIONS
Continue medications as instructed, follow up with PMD and psychiatry. Thoracic surgery follow up. Continue medications as instructed, follow up with PMD and psychiatry. Thoracic surgery follow up.  Return to ED should symptoms recur.  May use buckle guard if needed during transport.

## 2017-07-10 NOTE — PROGRESS NOTE BEHAVIORAL HEALTH - NSBHCHARTREVIEWLAB_PSY_A_CORE FT
BUN 33
9.4    16.6  )-----------( 517      ( 26 Jun 2017 07:34 )             29.7     06-26    141  |  103  |  20.0  ----------------------------<  89  3.8   |  23.0  |  0.99    Ca    9.0      26 Jun 2017 07:34    TPro  6.9  /  Alb  2.9<L>  /  TBili  0.1<L>  /  DBili  x   /  AST  22  /  ALT  32  /  AlkPhos  73  06-26    LIVER FUNCTIONS - ( 26 Jun 2017 07:34 )  Alb: 2.9 g/dL / Pro: 6.9 g/dL / ALK PHOS: 73 U/L / ALT: 32 U/L / AST: 22 U/L / GGT: x
K=3.3

## 2017-07-10 NOTE — PROGRESS NOTE ADULT - PROBLEM/PLAN-3
DISPLAY PLAN FREE TEXT

## 2017-07-10 NOTE — PROGRESS NOTE BEHAVIORAL HEALTH - NSBHCONSORIP_PSY_A_CORE
Consult...

## 2017-07-10 NOTE — PROGRESS NOTE ADULT - SUBJECTIVE AND OBJECTIVE BOX
INTERVAL HPI/OVERNIGHT EVENTS:    CC: pleural effusion s/p VATS, schizoaffective disorder    No overnight events, denies complaints.     Vital Signs Last 24 Hrs  T(C): 37.1 (10 Jul 2017 07:22), Max: 37.4 (10 Jul 2017 00:00)  T(F): 98.7 (10 Jul 2017 07:22), Max: 99.3 (10 Jul 2017 00:00)  HR: 70 (10 Jul 2017 07:22) (70 - 89)  BP: 115/64 (10 Jul 2017 07:22) (105/65 - 124/73)  BP(mean): --  RR: 18 (10 Jul 2017 07:22) (18 - 18)  SpO2: 98% (10 Jul 2017 07:22) (96% - 98%)    PHYSICAL EXAM:    GENERAL: Not in distress, alert  CHEST/LUNG: b/l air entry  HEART: Regular   ABDOMEN: Soft, BS+  EXTREMITIES:  No edema    MEDICATIONS  (STANDING):  levothyroxine 150 MICROGram(s) Oral daily  pantoprazole    Tablet 40 milliGRAM(s) Oral before breakfast  haloperidol     Tablet 5 milliGRAM(s) Oral two times a day  diVALproex  milliGRAM(s) Oral daily  enoxaparin Injectable 40 milliGRAM(s) SubCutaneous daily  diVALproex  milliGRAM(s) Oral at bedtime  zinc oxide 40%/lanolin Ointment 1 Application(s) Topical three times a day  clotrimazole/betamethasone Cream 1 Application(s) Topical two times a day  colchicine 0.6 milliGRAM(s) Oral daily  ferrous    sulfate 325 milliGRAM(s) Oral daily  ascorbic acid 500 milliGRAM(s) Oral daily    MEDICATIONS  (PRN):  acetaminophen   Tablet 650 milliGRAM(s) Oral every 6 hours PRN For Temp greater than 38.5 C (101.3 F)  artificial  tears Solution 1 Drop(s) Both EYES every 8 hours PRN Dry Eyes  melatonin 3 milliGRAM(s) Oral at bedtime PRN Sleep      Allergies    Allergy Status Unknown    Intolerances    Chicken (Other (Unknown))        LABS:                          10.5   7.0   )-----------( 253      ( 10 Jul 2017 07:38 )             32.8     07-10    141  |  104  |  23.0<H>  ----------------------------<  88  3.9   |  25.0  |  0.89    Ca    9.1      10 Jul 2017 07:38  Mg     1.8     07-10            RADIOLOGY & ADDITIONAL TESTS:

## 2017-07-10 NOTE — PROGRESS NOTE BEHAVIORAL HEALTH - ATTENTION / CONCENTRATION
Impaired
Other
Impaired
Normal
Other

## 2017-07-10 NOTE — PROGRESS NOTE ADULT - PROBLEM SELECTOR PROBLEM 5
Hypothyroidism, unspecified type
Schizoaffective disorder, bipolar type
FTT (failure to thrive) in adult
Hypothyroidism, unspecified type
Nonintractable epilepsy without status epilepticus, unspecified epilepsy type
Normocytic anemia
Schizoaffective disorder, bipolar type
Electrolyte abnormality
FTT (failure to thrive) in adult
Normocytic anemia
Schizoaffective disorder, bipolar type

## 2017-07-10 NOTE — PROGRESS NOTE ADULT - PROBLEM SELECTOR PROBLEM 3
Schizoaffective disorder, bipolar type
Seizure disorder
Skin lesion on examination
FTT (failure to thrive) in adult
Hypothyroidism, unspecified type
INR (international normal ratio) abnormal
Nonintractable epilepsy without status epilepticus, unspecified epilepsy type
Pleural effusion
Schizoaffective disorder, bipolar type
Seizure disorder
Skin lesion on examination
Skin lesion on examination
FTT (failure to thrive) in adult
Seizure disorder
Seizure disorder

## 2017-07-10 NOTE — PROGRESS NOTE ADULT - PROBLEM/PLAN-4
DISPLAY PLAN FREE TEXT

## 2017-07-10 NOTE — PROGRESS NOTE BEHAVIORAL HEALTH - NSBHLOC_PSY_A_CORE
Alert
Other
Alert
Other
Alert
Lethargic, arousable to verbal stimulus
Other
Alert

## 2017-07-10 NOTE — PROGRESS NOTE BEHAVIORAL HEALTH - PRIMARY DX
Schizoaffective disorder, bipolar type

## 2017-07-10 NOTE — PROGRESS NOTE BEHAVIORAL HEALTH - NSBHFUPMEDSE_PSY_A_CORE
Yes
None known
Yes
None known

## 2017-07-10 NOTE — PROGRESS NOTE BEHAVIORAL HEALTH - NSBHCONSULTFOLLOWAFTERCARE_PSY_A_CORE FT
Subacute rehab at [facility]  await PT evaluation
[facility]
due to protracted hospitalization and weakness consider [facility]
return to [place]
[facility]  may receive PT at [facility] or [facility]
[place]
[facility]
[place]
return to [place]
see above

## 2017-07-10 NOTE — PROGRESS NOTE ADULT - PROVIDER SPECIALTY LIST ADULT
Anesthesia
CT Surgery
Cardiology
Hospitalist
Infectious Disease
Internal Medicine
Neurology
Psychiatry
Thoracic Surgery
Hospitalist
Infectious Disease

## 2017-07-17 ENCOUNTER — APPOINTMENT (OUTPATIENT)
Dept: THORACIC SURGERY | Facility: CLINIC | Age: 47
End: 2017-07-17

## 2017-07-17 ENCOUNTER — OUTPATIENT (OUTPATIENT)
Dept: OUTPATIENT SERVICES | Facility: HOSPITAL | Age: 47
LOS: 1 days | End: 2017-07-17
Payer: COMMERCIAL

## 2017-07-17 VITALS
DIASTOLIC BLOOD PRESSURE: 82 MMHG | OXYGEN SATURATION: 99 % | RESPIRATION RATE: 16 BRPM | WEIGHT: 120 LBS | HEART RATE: 93 BPM | SYSTOLIC BLOOD PRESSURE: 127 MMHG | BODY MASS INDEX: 18.19 KG/M2 | HEIGHT: 68 IN

## 2017-07-17 DIAGNOSIS — J90 PLEURAL EFFUSION, NOT ELSEWHERE CLASSIFIED: ICD-10-CM

## 2017-07-17 PROCEDURE — 71020: CPT | Mod: 26

## 2017-07-17 PROCEDURE — 71046 X-RAY EXAM CHEST 2 VIEWS: CPT

## 2017-07-19 LAB
CULTURE RESULTS: SIGNIFICANT CHANGE UP
SPECIMEN SOURCE: SIGNIFICANT CHANGE UP

## 2017-07-22 LAB
CULTURE RESULTS: SIGNIFICANT CHANGE UP
SPECIMEN SOURCE: SIGNIFICANT CHANGE UP

## 2017-07-24 LAB
CULTURE RESULTS: SIGNIFICANT CHANGE UP
SPECIMEN SOURCE: SIGNIFICANT CHANGE UP

## 2017-09-12 NOTE — PROGRESS NOTE ADULT - ASSESSMENT
48 yo M with h/o schizoaffective disorder, selective mutism, recurrent pleural effusion (transudative) here with anroexia and pleural effusion. [place]

## 2017-11-29 ENCOUNTER — EMERGENCY (EMERGENCY)
Facility: HOSPITAL | Age: 47
LOS: 1 days | Discharge: HOME | End: 2017-11-29
Attending: EMERGENCY MEDICINE | Admitting: EMERGENCY MEDICINE
Payer: COMMERCIAL

## 2017-11-29 VITALS
OXYGEN SATURATION: 99 % | SYSTOLIC BLOOD PRESSURE: 130 MMHG | DIASTOLIC BLOOD PRESSURE: 70 MMHG | HEART RATE: 78 BPM | RESPIRATION RATE: 20 BRPM | TEMPERATURE: 98 F

## 2017-11-29 VITALS
SYSTOLIC BLOOD PRESSURE: 133 MMHG | DIASTOLIC BLOOD PRESSURE: 84 MMHG | TEMPERATURE: 98 F | OXYGEN SATURATION: 99 % | HEART RATE: 77 BPM | WEIGHT: 169.98 LBS | RESPIRATION RATE: 18 BRPM

## 2017-11-29 LAB
ANION GAP SERPL CALC-SCNC: 9 MMOL/L — SIGNIFICANT CHANGE UP (ref 5–17)
BASOPHILS # BLD AUTO: 0 K/UL — SIGNIFICANT CHANGE UP (ref 0–0.2)
BASOPHILS NFR BLD AUTO: 0.3 % — SIGNIFICANT CHANGE UP (ref 0–2)
BUN SERPL-MCNC: 21 MG/DL — HIGH (ref 8–20)
CALCIUM SERPL-MCNC: 9.2 MG/DL — SIGNIFICANT CHANGE UP (ref 8.6–10.2)
CHLORIDE SERPL-SCNC: 100 MMOL/L — SIGNIFICANT CHANGE UP (ref 98–107)
CK SERPL-CCNC: 138 U/L — SIGNIFICANT CHANGE UP (ref 30–200)
CO2 SERPL-SCNC: 29 MMOL/L — SIGNIFICANT CHANGE UP (ref 22–29)
CREAT SERPL-MCNC: 0.82 MG/DL — SIGNIFICANT CHANGE UP (ref 0.5–1.3)
EOSINOPHIL # BLD AUTO: 0.1 K/UL — SIGNIFICANT CHANGE UP (ref 0–0.5)
EOSINOPHIL NFR BLD AUTO: 1 % — SIGNIFICANT CHANGE UP (ref 0–6)
GLUCOSE SERPL-MCNC: 87 MG/DL — SIGNIFICANT CHANGE UP (ref 70–115)
HCT VFR BLD CALC: 40.2 % — LOW (ref 42–52)
HGB BLD-MCNC: 12.9 G/DL — LOW (ref 14–18)
LYMPHOCYTES # BLD AUTO: 1.6 K/UL — SIGNIFICANT CHANGE UP (ref 1–4.8)
LYMPHOCYTES # BLD AUTO: 26.7 % — SIGNIFICANT CHANGE UP (ref 20–55)
MCHC RBC-ENTMCNC: 27.8 PG — SIGNIFICANT CHANGE UP (ref 27–31)
MCHC RBC-ENTMCNC: 32.1 G/DL — SIGNIFICANT CHANGE UP (ref 32–36)
MCV RBC AUTO: 86.6 FL — SIGNIFICANT CHANGE UP (ref 80–94)
MONOCYTES # BLD AUTO: 0.8 K/UL — SIGNIFICANT CHANGE UP (ref 0–0.8)
MONOCYTES NFR BLD AUTO: 12.6 % — HIGH (ref 3–10)
NEUTROPHILS # BLD AUTO: 3.6 K/UL — SIGNIFICANT CHANGE UP (ref 1.8–8)
NEUTROPHILS NFR BLD AUTO: 59.1 % — SIGNIFICANT CHANGE UP (ref 37–73)
PLATELET # BLD AUTO: 196 K/UL — SIGNIFICANT CHANGE UP (ref 150–400)
POTASSIUM SERPL-MCNC: 4.6 MMOL/L — SIGNIFICANT CHANGE UP (ref 3.5–5.3)
POTASSIUM SERPL-SCNC: 4.6 MMOL/L — SIGNIFICANT CHANGE UP (ref 3.5–5.3)
RBC # BLD: 4.64 M/UL — SIGNIFICANT CHANGE UP (ref 4.6–6.2)
RBC # FLD: 14.3 % — SIGNIFICANT CHANGE UP (ref 11–15.6)
SODIUM SERPL-SCNC: 138 MMOL/L — SIGNIFICANT CHANGE UP (ref 135–145)
WBC # BLD: 6.1 K/UL — SIGNIFICANT CHANGE UP (ref 4.8–10.8)
WBC # FLD AUTO: 6.1 K/UL — SIGNIFICANT CHANGE UP (ref 4.8–10.8)

## 2017-11-29 PROCEDURE — 72125 CT NECK SPINE W/O DYE: CPT

## 2017-11-29 PROCEDURE — 72125 CT NECK SPINE W/O DYE: CPT | Mod: 26

## 2017-11-29 PROCEDURE — 85027 COMPLETE CBC AUTOMATED: CPT

## 2017-11-29 PROCEDURE — 36415 COLL VENOUS BLD VENIPUNCTURE: CPT

## 2017-11-29 PROCEDURE — 70450 CT HEAD/BRAIN W/O DYE: CPT

## 2017-11-29 PROCEDURE — 80048 BASIC METABOLIC PNL TOTAL CA: CPT

## 2017-11-29 PROCEDURE — 99285 EMERGENCY DEPT VISIT HI MDM: CPT

## 2017-11-29 PROCEDURE — 82550 ASSAY OF CK (CPK): CPT

## 2017-11-29 PROCEDURE — 99284 EMERGENCY DEPT VISIT MOD MDM: CPT | Mod: 25

## 2017-11-29 PROCEDURE — 93010 ELECTROCARDIOGRAM REPORT: CPT

## 2017-11-29 PROCEDURE — 70450 CT HEAD/BRAIN W/O DYE: CPT | Mod: 26

## 2017-11-29 PROCEDURE — 82962 GLUCOSE BLOOD TEST: CPT

## 2017-11-29 PROCEDURE — 93005 ELECTROCARDIOGRAM TRACING: CPT

## 2017-11-29 NOTE — ED ADULT NURSE NOTE - CHIEF COMPLAINT QUOTE
From [facility] [place] with aide.  Pt does not speak. As per EMS patient fell and was sent with no paperwork. Pt awake and following commands. Blood glucose 75.   Swelling present to bridge of nose.  Scratch to bridge of nose, bleeding stopped at time of arrival.  This RN spoke with Dr. Lamb from [place] [phone].  . As per Dr. Lamb pt had unwitnessed fall.  Was found face down on the floor.  Unknown. LOC.  Takes no blood thinners. Pt is deaf and selectively mute.  Was at baseline mental status prior to transport.  Appears comfortable.  Undressed and placed in yellow gown.  No additional signs of trauma present.  Moves all four extremities without difficulty, no facial droop.

## 2017-11-29 NOTE — ED ADULT TRIAGE NOTE - CHIEF COMPLAINT QUOTE
From [facility] [place] with aide.  Pt does not speak. As per EMS patient fell and was sent with no paperwork. Pt awake and following commands.  Swelling present to bridge of nose.  Scratch to bridge of nose, bleeding stopped at time of arrival.  This RN spoke on the phone with Dr. Lamb from [place]. As per Dr. Lamb pt had unwitnessed fall.  Was found face down on the floor.  Unknown. LOC.  Takes no blood thinners. Pt is deaf and selectively mute.  Was at baseline mental status prior to transport.  Appears comfortable.  Undressed and placed in yellow gown.  No additional signs of trauma present.  Moves all four extremities without difficulty, no facial droop. From [facility] with aide.  Pt does not speak. As per EMS patient fell and was sent with no paperwork. Pt awake and following commands.  Swelling present to bridge of nose.  Scratch to bridge of nose, bleeding stopped at time of arrival.  This RN spoke with Dr. Lamb from [place] [phone].  . As per Dr. Lamb pt had unwitnessed fall.  Was found face down on the floor.  Unknown. LOC.  Takes no blood thinners. Pt is deaf and selectively mute.  Was at baseline mental status prior to transport.  Appears comfortable.  Undressed and placed in yellow gown.  No additional signs of trauma present.  Moves all four extremities without difficulty, no facial droop. From [place] [facility] [place] with aide.  Pt does not speak. As per EMS patient fell and was sent with no paperwork. Pt awake and following commands. Blood glucose 75.   Swelling present to bridge of nose.  Scratch to bridge of nose, bleeding stopped at time of arrival.  This RN spoke with Dr. Lamb from [place] [phone].  . As per Dr. Lamb pt had unwitnessed fall.  Was found face down on the floor.  Unknown. LOC.  Takes no blood thinners. Pt is deaf and selectively mute.  Was at baseline mental status prior to transport.  Appears comfortable.  Undressed and placed in yellow gown.  No additional signs of trauma present.  Moves all four extremities without difficulty, no facial droop.

## 2017-11-29 NOTE — ED ADULT NURSE REASSESSMENT NOTE - NS ED NURSE REASSESS COMMENT FT1
Assumed patient care from HARI Palmer, charting as noted. Received [facility], on a yellow gown. Alert, awake, nonverbal, nonverbal pain/discomfort not present. Patient noted with no iv access, discharged instructions already given by HARI Palmer. Patient awaiting to be pickup by ambulance to [place]., [facility] aide at bedside. Patient not in respiratory distress. To continue to monitor.

## 2017-11-29 NOTE — ED ADULT NURSE NOTE - OBJECTIVE STATEMENT
pt had a unwitnessed fall at [facility]. pt has a bump to bridge of nose with some scratches. pt has a N/A at the bedside from [facility]. caregiver states that the pt is a baseline.

## 2017-11-29 NOTE — ED PROVIDER NOTE - MEDICAL DECISION MAKING DETAILS
return to ed for intractable HA, persistent vomiting, or new onset motor/sensory deficits do not feel need repeat scan under sedation eneurologiically intact mechanical fall in nad return to ed for intractable HA, persistent vomiting, or new onset motor/sensory deficits

## 2017-11-29 NOTE — ED PROVIDER NOTE - ENMT, MLM
Airway patent, + nasal bridge abrasion  Mouth with normal mucosa. Throat has no vesicles, no oropharyngeal exudates and uvula is midline.

## 2017-11-29 NOTE — ED PROVIDER NOTE - OBJECTIVE STATEMENT
pt presents after witnessed mechanical fall slight abrasion to nose. pt nonverbal at baseline he is acting nml per aide

## 2018-01-11 ENCOUNTER — APPOINTMENT (OUTPATIENT)
Dept: DERMATOLOGY | Facility: CLINIC | Age: 48
End: 2018-01-11
Payer: COMMERCIAL

## 2018-01-11 PROCEDURE — 99214 OFFICE O/P EST MOD 30 MIN: CPT

## 2018-02-03 NOTE — ED ADULT NURSE NOTE - IS THE PATIENT ABLE TO BE SCREENED?
Liver enzyme elevation    Steroid-induced hyperglycemia    Anemia    Blood pressure (!) 145/68, pulse 72, temperature 97.7 °F (36.5 °C), temperature source Oral, resp. rate 20, height 6' 3.2\" (1.91 m), weight 160 lb 0.9 oz (72.6 kg), SpO2 95 %. Subjective:  Symptoms:  Improved. She reports shortness of breath and cough. (Having diffuculty bringing up mucous). Diet:  Adequate intake. Activity level: Impaired due to weakness. Pain:  She reports no pain. Objective:  General Appearance:  Comfortable. Vital signs: (most recent): Blood pressure (!) 145/68, pulse 72, temperature 97.7 °F (36.5 °C), temperature source Oral, resp. rate 20, height 6' 3.2\" (1.91 m), weight 160 lb 0.9 oz (72.6 kg), SpO2 95 %. Vital signs are normal.    Output: Producing urine and producing stool. HEENT: Normal HEENT exam.    Lungs: There are decreased breath sounds. (Scattered wheezes all lobes  rocnhi upper lobes)  Heart: Normal rate. Chest: Asymmetric chest wall expansion. Abdomen: Abdomen is soft. There is no abdominal tenderness. Extremities: Normal range of motion. Neurological: Patient is alert. Skin:  Warm. Assessment:    Condition: In stable condition. Improving.   (1) pneumonia  Had chest xray shows some improvement but still with significant residual of RLL pneumonia  2) slight inc LFT's  Liver US showed 2 cm shadow  ?polyps  ? could represent GB carcinoma  sugg MRI liver. I have already ordered this as a future order in the computer. .. 3)sob a little better  Will ck pulse ox after ambulation  Also add mucinex and acapella inhaler to help lossen mucous. ). Plan:   (See above).        Tracey Velez MD  2/3/2018 No

## 2018-03-20 NOTE — PATIENT PROFILE ADULT. - PURPOSEFUL PROACTIVE ROUNDING
yamilet aide/Patient Representative/Patient
Clear bilaterally, pupils equal, round and reactive to light.

## 2018-10-25 PROBLEM — F99 MENTAL DISORDER, NOT OTHERWISE SPECIFIED: Chronic | Status: ACTIVE | Noted: 2017-05-20

## 2018-10-25 PROBLEM — E03.9 HYPOTHYROIDISM, UNSPECIFIED: Chronic | Status: ACTIVE | Noted: 2017-05-20

## 2018-11-26 ENCOUNTER — OUTPATIENT (OUTPATIENT)
Dept: OUTPATIENT SERVICES | Facility: HOSPITAL | Age: 48
LOS: 1 days | End: 2018-11-26
Payer: COMMERCIAL

## 2018-11-26 DIAGNOSIS — K59.00 CONSTIPATION, UNSPECIFIED: ICD-10-CM

## 2018-11-26 PROCEDURE — 74261 CT COLONOGRAPHY DX: CPT

## 2018-11-26 PROCEDURE — 74261 CT COLONOGRAPHY DX: CPT | Mod: 26

## 2019-02-07 NOTE — PROCEDURE NOTE - PROCEDURE DATE TIME, MLM
[FreeTextEntry1] : Late in doing Nucala . In general feeling well no ProAir use. On Symbicort bid and singulair. \par \par Has some snoring issues. Trying to lose weight 22-May-2017 15:41

## 2019-07-19 ENCOUNTER — INPATIENT (INPATIENT)
Facility: HOSPITAL | Age: 49
LOS: 5 days | Discharge: PSYCHIATRIC OR SUBSTANCE USE FACILITY | DRG: 182 | End: 2019-07-25
Attending: HOSPITALIST | Admitting: HOSPITALIST
Payer: MEDICAID

## 2019-07-19 VITALS
HEART RATE: 94 BPM | SYSTOLIC BLOOD PRESSURE: 94 MMHG | RESPIRATION RATE: 16 BRPM | OXYGEN SATURATION: 96 % | DIASTOLIC BLOOD PRESSURE: 59 MMHG | HEIGHT: 71 IN | WEIGHT: 160.06 LBS | TEMPERATURE: 98 F

## 2019-07-19 LAB
ALBUMIN SERPL ELPH-MCNC: 2.6 G/DL — LOW (ref 3.3–5.2)
ALP SERPL-CCNC: 76 U/L — SIGNIFICANT CHANGE UP (ref 40–120)
ALT FLD-CCNC: 34 U/L — SIGNIFICANT CHANGE UP
ANION GAP SERPL CALC-SCNC: 10 MMOL/L — SIGNIFICANT CHANGE UP (ref 5–17)
APTT BLD: 38.9 SEC — HIGH (ref 27.5–36.3)
AST SERPL-CCNC: 26 U/L — SIGNIFICANT CHANGE UP
BILIRUB SERPL-MCNC: <0.2 MG/DL — LOW (ref 0.4–2)
BUN SERPL-MCNC: 16 MG/DL — SIGNIFICANT CHANGE UP (ref 8–20)
CALCIUM SERPL-MCNC: 8.8 MG/DL — SIGNIFICANT CHANGE UP (ref 8.6–10.2)
CHLORIDE SERPL-SCNC: 100 MMOL/L — SIGNIFICANT CHANGE UP (ref 98–107)
CO2 SERPL-SCNC: 25 MMOL/L — SIGNIFICANT CHANGE UP (ref 22–29)
CREAT SERPL-MCNC: 0.92 MG/DL — SIGNIFICANT CHANGE UP (ref 0.5–1.3)
GLUCOSE SERPL-MCNC: 127 MG/DL — HIGH (ref 70–115)
HCT VFR BLD CALC: 27 % — LOW (ref 39–50)
HGB BLD-MCNC: 8 G/DL — LOW (ref 13–17)
INR BLD: 1.44 RATIO — HIGH (ref 0.88–1.16)
MCHC RBC-ENTMCNC: 25.5 PG — LOW (ref 27–34)
MCHC RBC-ENTMCNC: 29.6 GM/DL — LOW (ref 32–36)
MCV RBC AUTO: 86 FL — SIGNIFICANT CHANGE UP (ref 80–100)
OB PNL STL: NEGATIVE — SIGNIFICANT CHANGE UP
PLATELET # BLD AUTO: 278 K/UL — SIGNIFICANT CHANGE UP (ref 150–400)
POTASSIUM SERPL-MCNC: 4.7 MMOL/L — SIGNIFICANT CHANGE UP (ref 3.5–5.3)
POTASSIUM SERPL-SCNC: 4.7 MMOL/L — SIGNIFICANT CHANGE UP (ref 3.5–5.3)
PROT SERPL-MCNC: 7.3 G/DL — SIGNIFICANT CHANGE UP (ref 6.6–8.7)
PROTHROM AB SERPL-ACNC: 16.7 SEC — HIGH (ref 10–12.9)
RBC # BLD: 3.14 M/UL — LOW (ref 4.2–5.8)
RBC # FLD: 15.9 % — HIGH (ref 10.3–14.5)
SODIUM SERPL-SCNC: 135 MMOL/L — SIGNIFICANT CHANGE UP (ref 135–145)
TROPONIN T SERPL-MCNC: <0.01 NG/ML — SIGNIFICANT CHANGE UP (ref 0–0.06)
WBC # BLD: 8.07 K/UL — SIGNIFICANT CHANGE UP (ref 3.8–10.5)
WBC # FLD AUTO: 8.07 K/UL — SIGNIFICANT CHANGE UP (ref 3.8–10.5)

## 2019-07-19 PROCEDURE — 71045 X-RAY EXAM CHEST 1 VIEW: CPT | Mod: 26

## 2019-07-19 PROCEDURE — 99285 EMERGENCY DEPT VISIT HI MDM: CPT

## 2019-07-19 PROCEDURE — 71275 CT ANGIOGRAPHY CHEST: CPT | Mod: 26

## 2019-07-19 PROCEDURE — 74174 CTA ABD&PLVS W/CONTRAST: CPT | Mod: 26

## 2019-07-19 PROCEDURE — 93010 ELECTROCARDIOGRAM REPORT: CPT

## 2019-07-19 RX ORDER — SODIUM CHLORIDE 9 MG/ML
1000 INJECTION INTRAMUSCULAR; INTRAVENOUS; SUBCUTANEOUS ONCE
Refills: 0 | Status: COMPLETED | OUTPATIENT
Start: 2019-07-19 | End: 2019-07-19

## 2019-07-19 RX ORDER — MIDAZOLAM HYDROCHLORIDE 1 MG/ML
2 INJECTION, SOLUTION INTRAMUSCULAR; INTRAVENOUS ONCE
Refills: 0 | Status: DISCONTINUED | OUTPATIENT
Start: 2019-07-19 | End: 2019-07-19

## 2019-07-19 RX ADMIN — MIDAZOLAM HYDROCHLORIDE 2 MILLIGRAM(S): 1 INJECTION, SOLUTION INTRAMUSCULAR; INTRAVENOUS at 20:48

## 2019-07-19 RX ADMIN — SODIUM CHLORIDE 1000 MILLILITER(S): 9 INJECTION INTRAMUSCULAR; INTRAVENOUS; SUBCUTANEOUS at 20:48

## 2019-07-19 NOTE — ED PROVIDER NOTE - PROGRESS NOTE DETAILS
Given the significant and immediate threats to this patient based on initial presentation, the benefits of emergency contrast-enhanced CT imaging without obtaining GFR/creatinine serum level results greatly outweigh the potential risk of harm due to contrast-induced nephropathy. yamilka sign out  seen by ct chest  called and seen by icu, and dr acharya at bedside  results discussed, repeat bp done

## 2019-07-19 NOTE — ED PROVIDER NOTE - PHYSICAL EXAMINATION
Gen: Alert, NAD, pale complexion  Head: NC, AT, PERRL, EOMI, normal lids/conjunctiva pale  Neck: +supple, no tenderness/meningismus/JVD, +Trachea midline  Pulm: Bilateral BS, normal resp effort, no wheeze/stridor/retractions  CV: RRR, no M/R/G, +dist pulses  Abd: soft, NT/ND, +BS, no hepatosplenomegaly  Neuro: baseline neuro status

## 2019-07-19 NOTE — ED ADULT NURSE NOTE - CHIEF COMPLAINT QUOTE
Pt sent in from [place] for low H&H. Hg 7.9. Labs have been trending down since April. Pt pale, also very lethargic. No active bleeding noted. Pt with psych history and selectively mute. Not a great historian

## 2019-07-19 NOTE — ED ADULT NURSE NOTE - NSIMPLEMENTINTERV_GEN_ALL_ED
Implemented All Universal Safety Interventions:  [place] to call system. Call bell, personal items and telephone within reach. Instruct patient to call for assistance. Room bathroom lighting operational. Non-slip footwear when patient is off stretcher. Physically safe environment: no spills, clutter or unnecessary equipment. Stretcher in lowest position, wheels locked, appropriate side rails in place.

## 2019-07-19 NOTE — ED ADULT TRIAGE NOTE - CHIEF COMPLAINT QUOTE
Pt sent in from [place] for low H&H. Hg 7.9. Labs have been trending down since April. Pt pale, also very lethargic. No active bleeding noted. Pt with psych history and selectively mute. Not a great historian

## 2019-07-19 NOTE — ED PROVIDER NOTE - CLINICAL SUMMARY MEDICAL DECISION MAKING FREE TEXT BOX
patient with symptomatic anemia, found to have mediastinal mass with mass effect on heart and aorta, cbc dropped from 10.5 in April to 8 today.  ct surg evaluation

## 2019-07-19 NOTE — ED PROVIDER NOTE - OBJECTIVE STATEMENT
49yoM; with pmh signif for Hypothyroidism, GERD, Behavioral Disorder; now p/w increasing weakness and pallor x1 week. patient with increasing difficulty 49yoM; with pmh signif for Hypothyroidism, GERD, Schizoaffective/Behavioral Disorder with selective mute state after ECT done at age 18; now p/w increasing weakness and pallor x1 week. patient with increasing difficulty ambulating. found to have 3pt drop in H/H from 10 to 7 over 3 month period.  found to be increasing pale and weak. denies focal deficits as per staff.   PMH: Hypothyroidism, GERD, Schizoaffective/Behavioral Disorder  SOCIAL: No tobacco/illicit substance use/EtOH

## 2019-07-19 NOTE — ED ADULT NURSE NOTE - OBJECTIVE STATEMENT
patient was sent here for abnormal labs - low hemoglobin that's been trending down for the past months, patient is from [place] with 1:1 at bedside denies any pain or any symptoms. patient with hx of autism, not responding to questions appropriately.

## 2019-07-20 DIAGNOSIS — J98.59 OTHER DISEASES OF MEDIASTINUM, NOT ELSEWHERE CLASSIFIED: ICD-10-CM

## 2019-07-20 DIAGNOSIS — D64.4 CONGENITAL DYSERYTHROPOIETIC ANEMIA: ICD-10-CM

## 2019-07-20 LAB
ALBUMIN SERPL ELPH-MCNC: 2.7 G/DL — LOW (ref 3.3–5.2)
ALP SERPL-CCNC: 81 U/L — SIGNIFICANT CHANGE UP (ref 40–120)
ALT FLD-CCNC: 30 U/L — SIGNIFICANT CHANGE UP
ANION GAP SERPL CALC-SCNC: 12 MMOL/L — SIGNIFICANT CHANGE UP (ref 5–17)
ANISOCYTOSIS BLD QL: SLIGHT — SIGNIFICANT CHANGE UP
APPEARANCE UR: CLEAR — SIGNIFICANT CHANGE UP
APTT BLD: 39 SEC — HIGH (ref 27.5–36.3)
AST SERPL-CCNC: 21 U/L — SIGNIFICANT CHANGE UP
BASOPHILS # BLD AUTO: 0 K/UL — SIGNIFICANT CHANGE UP (ref 0–0.2)
BASOPHILS # BLD AUTO: 0.09 K/UL — SIGNIFICANT CHANGE UP (ref 0–0.2)
BASOPHILS NFR BLD AUTO: 0 % — SIGNIFICANT CHANGE UP (ref 0–2)
BASOPHILS NFR BLD AUTO: 0.9 % — SIGNIFICANT CHANGE UP (ref 0–2)
BILIRUB SERPL-MCNC: <0.2 MG/DL — LOW (ref 0.4–2)
BILIRUB UR-MCNC: NEGATIVE — SIGNIFICANT CHANGE UP
BLD GP AB SCN SERPL QL: SIGNIFICANT CHANGE UP
BUN SERPL-MCNC: 11 MG/DL — SIGNIFICANT CHANGE UP (ref 8–20)
CALCIUM SERPL-MCNC: 8.8 MG/DL — SIGNIFICANT CHANGE UP (ref 8.6–10.2)
CHLORIDE SERPL-SCNC: 100 MMOL/L — SIGNIFICANT CHANGE UP (ref 98–107)
CO2 SERPL-SCNC: 25 MMOL/L — SIGNIFICANT CHANGE UP (ref 22–29)
COLOR SPEC: YELLOW — SIGNIFICANT CHANGE UP
CREAT SERPL-MCNC: 0.86 MG/DL — SIGNIFICANT CHANGE UP (ref 0.5–1.3)
DIFF PNL FLD: ABNORMAL
ELLIPTOCYTES BLD QL SMEAR: SLIGHT — SIGNIFICANT CHANGE UP
EOSINOPHIL # BLD AUTO: 0.09 K/UL — SIGNIFICANT CHANGE UP (ref 0–0.5)
EOSINOPHIL # BLD AUTO: 0.14 K/UL — SIGNIFICANT CHANGE UP (ref 0–0.5)
EOSINOPHIL NFR BLD AUTO: 0.9 % — SIGNIFICANT CHANGE UP (ref 0–6)
EOSINOPHIL NFR BLD AUTO: 1.7 % — SIGNIFICANT CHANGE UP (ref 0–6)
EPI CELLS # UR: SIGNIFICANT CHANGE UP
FERRITIN SERPL-MCNC: 256 NG/ML — SIGNIFICANT CHANGE UP (ref 30–400)
FOLATE SERPL-MCNC: 17.5 NG/ML — SIGNIFICANT CHANGE UP
GIANT PLATELETS BLD QL SMEAR: PRESENT — SIGNIFICANT CHANGE UP
GIANT PLATELETS BLD QL SMEAR: PRESENT — SIGNIFICANT CHANGE UP
GLUCOSE SERPL-MCNC: 87 MG/DL — SIGNIFICANT CHANGE UP (ref 70–115)
GLUCOSE UR QL: NEGATIVE MG/DL — SIGNIFICANT CHANGE UP
HCT VFR BLD CALC: 23.4 % — LOW (ref 39–50)
HCT VFR BLD CALC: 26 % — LOW (ref 39–50)
HGB BLD-MCNC: 7.1 G/DL — LOW (ref 13–17)
HGB BLD-MCNC: 8 G/DL — LOW (ref 13–17)
HYPOCHROMIA BLD QL: SLIGHT — SIGNIFICANT CHANGE UP
HYPOCHROMIA BLD QL: SLIGHT — SIGNIFICANT CHANGE UP
INR BLD: 1.41 RATIO — HIGH (ref 0.88–1.16)
IRON SATN MFR SERPL: 13 UG/DL — LOW (ref 59–158)
IRON SATN MFR SERPL: 13 UG/DL — LOW (ref 59–158)
IRON SATN MFR SERPL: 6 % — LOW (ref 16–55)
KETONES UR-MCNC: NEGATIVE — SIGNIFICANT CHANGE UP
LACTATE BLDV-MCNC: 0.8 MMOL/L — SIGNIFICANT CHANGE UP (ref 0.5–2)
LACTATE SERPL-SCNC: 1 MMOL/L — SIGNIFICANT CHANGE UP (ref 0.5–2)
LDH SERPL L TO P-CCNC: 284 U/L — HIGH (ref 98–192)
LEUKOCYTE ESTERASE UR-ACNC: NEGATIVE — SIGNIFICANT CHANGE UP
LYMPHOCYTES # BLD AUTO: 1.22 K/UL — SIGNIFICANT CHANGE UP (ref 1–3.3)
LYMPHOCYTES # BLD AUTO: 1.22 K/UL — SIGNIFICANT CHANGE UP (ref 1–3.3)
LYMPHOCYTES # BLD AUTO: 12.5 % — LOW (ref 13–44)
LYMPHOCYTES # BLD AUTO: 14.7 % — SIGNIFICANT CHANGE UP (ref 13–44)
MACROCYTES BLD QL: SLIGHT — SIGNIFICANT CHANGE UP
MANUAL SMEAR VERIFICATION: SIGNIFICANT CHANGE UP
MANUAL SMEAR VERIFICATION: SIGNIFICANT CHANGE UP
MCHC RBC-ENTMCNC: 25.8 PG — LOW (ref 27–34)
MCHC RBC-ENTMCNC: 26.2 PG — LOW (ref 27–34)
MCHC RBC-ENTMCNC: 30.3 GM/DL — LOW (ref 32–36)
MCHC RBC-ENTMCNC: 30.8 GM/DL — LOW (ref 32–36)
MCV RBC AUTO: 85.1 FL — SIGNIFICANT CHANGE UP (ref 80–100)
MCV RBC AUTO: 85.2 FL — SIGNIFICANT CHANGE UP (ref 80–100)
MICROCYTES BLD QL: SLIGHT — SIGNIFICANT CHANGE UP
MONOCYTES # BLD AUTO: 0.93 K/UL — HIGH (ref 0–0.9)
MONOCYTES # BLD AUTO: 1.13 K/UL — HIGH (ref 0–0.9)
MONOCYTES NFR BLD AUTO: 11.2 % — SIGNIFICANT CHANGE UP (ref 2–14)
MONOCYTES NFR BLD AUTO: 11.6 % — SIGNIFICANT CHANGE UP (ref 2–14)
MYELOCYTES NFR BLD: 1.7 % — HIGH (ref 0–0)
NEUTROPHILS # BLD AUTO: 5.85 K/UL — SIGNIFICANT CHANGE UP (ref 1.8–7.4)
NEUTROPHILS # BLD AUTO: 7.21 K/UL — SIGNIFICANT CHANGE UP (ref 1.8–7.4)
NEUTROPHILS NFR BLD AUTO: 70.7 % — SIGNIFICANT CHANGE UP (ref 43–77)
NEUTROPHILS NFR BLD AUTO: 74.1 % — SIGNIFICANT CHANGE UP (ref 43–77)
NITRITE UR-MCNC: NEGATIVE — SIGNIFICANT CHANGE UP
OVALOCYTES BLD QL SMEAR: SLIGHT — SIGNIFICANT CHANGE UP
PH UR: 8 — SIGNIFICANT CHANGE UP (ref 5–8)
PLAT MORPH BLD: ABNORMAL
PLAT MORPH BLD: NORMAL — SIGNIFICANT CHANGE UP
PLATELET # BLD AUTO: 245 K/UL — SIGNIFICANT CHANGE UP (ref 150–400)
PLATELET # BLD AUTO: 280 K/UL — SIGNIFICANT CHANGE UP (ref 150–400)
PLATELET COUNT - ESTIMATE: NORMAL — SIGNIFICANT CHANGE UP
POLYCHROMASIA BLD QL SMEAR: SIGNIFICANT CHANGE UP
POTASSIUM SERPL-MCNC: 4.3 MMOL/L — SIGNIFICANT CHANGE UP (ref 3.5–5.3)
POTASSIUM SERPL-SCNC: 4.3 MMOL/L — SIGNIFICANT CHANGE UP (ref 3.5–5.3)
PROT SERPL-MCNC: 7.3 G/DL — SIGNIFICANT CHANGE UP (ref 6.6–8.7)
PROT UR-MCNC: NEGATIVE MG/DL — SIGNIFICANT CHANGE UP
PROTHROM AB SERPL-ACNC: 16.4 SEC — HIGH (ref 10–12.9)
RBC # BLD: 2.75 M/UL — LOW (ref 4.2–5.8)
RBC # BLD: 3.05 M/UL — LOW (ref 4.2–5.8)
RBC # FLD: 15.9 % — HIGH (ref 10.3–14.5)
RBC # FLD: 15.9 % — HIGH (ref 10.3–14.5)
RBC BLD AUTO: ABNORMAL
RBC BLD AUTO: ABNORMAL
RBC CASTS # UR COMP ASSIST: SIGNIFICANT CHANGE UP /HPF (ref 0–4)
SODIUM SERPL-SCNC: 137 MMOL/L — SIGNIFICANT CHANGE UP (ref 135–145)
SP GR SPEC: 1.01 — SIGNIFICANT CHANGE UP (ref 1.01–1.02)
TIBC SERPL-MCNC: 220 UG/DL — SIGNIFICANT CHANGE UP (ref 220–430)
TRANSFERRIN SERPL-MCNC: 154 MG/DL — LOW (ref 180–329)
TSH SERPL-MCNC: 3.81 UIU/ML — SIGNIFICANT CHANGE UP (ref 0.27–4.2)
UROBILINOGEN FLD QL: NEGATIVE MG/DL — SIGNIFICANT CHANGE UP
VIT B12 SERPL-MCNC: 850 PG/ML — SIGNIFICANT CHANGE UP (ref 232–1245)
WBC # BLD: 8.28 K/UL — SIGNIFICANT CHANGE UP (ref 3.8–10.5)
WBC # BLD: 9.73 K/UL — SIGNIFICANT CHANGE UP (ref 3.8–10.5)
WBC # FLD AUTO: 8.28 K/UL — SIGNIFICANT CHANGE UP (ref 3.8–10.5)
WBC # FLD AUTO: 9.73 K/UL — SIGNIFICANT CHANGE UP (ref 3.8–10.5)
WBC UR QL: NEGATIVE — SIGNIFICANT CHANGE UP

## 2019-07-20 PROCEDURE — 12345: CPT | Mod: NC

## 2019-07-20 PROCEDURE — 99223 1ST HOSP IP/OBS HIGH 75: CPT

## 2019-07-20 RX ORDER — DIVALPROEX SODIUM 500 MG/1
750 TABLET, DELAYED RELEASE ORAL
Qty: 0 | Refills: 0 | DISCHARGE

## 2019-07-20 RX ORDER — LEVOTHYROXINE SODIUM 125 MCG
1 TABLET ORAL
Qty: 0 | Refills: 0 | DISCHARGE

## 2019-07-20 RX ORDER — SODIUM CHLORIDE 9 MG/ML
1000 INJECTION INTRAMUSCULAR; INTRAVENOUS; SUBCUTANEOUS ONCE
Refills: 0 | Status: COMPLETED | OUTPATIENT
Start: 2019-07-20 | End: 2019-07-20

## 2019-07-20 RX ORDER — QUETIAPINE FUMARATE 200 MG/1
200 TABLET, FILM COATED ORAL
Refills: 0 | Status: DISCONTINUED | OUTPATIENT
Start: 2019-07-20 | End: 2019-07-25

## 2019-07-20 RX ORDER — QUETIAPINE FUMARATE 200 MG/1
50 TABLET, FILM COATED ORAL
Refills: 0 | Status: DISCONTINUED | OUTPATIENT
Start: 2019-07-20 | End: 2019-07-25

## 2019-07-20 RX ORDER — DIVALPROEX SODIUM 500 MG/1
750 TABLET, DELAYED RELEASE ORAL
Refills: 0 | Status: DISCONTINUED | OUTPATIENT
Start: 2019-07-20 | End: 2019-07-25

## 2019-07-20 RX ORDER — PIPERACILLIN AND TAZOBACTAM 4; .5 G/20ML; G/20ML
3.38 INJECTION, POWDER, LYOPHILIZED, FOR SOLUTION INTRAVENOUS ONCE
Refills: 0 | Status: COMPLETED | OUTPATIENT
Start: 2019-07-20 | End: 2019-07-20

## 2019-07-20 RX ORDER — LEVOTHYROXINE SODIUM 125 MCG
150 TABLET ORAL DAILY
Refills: 0 | Status: DISCONTINUED | OUTPATIENT
Start: 2019-07-20 | End: 2019-07-25

## 2019-07-20 RX ORDER — PIPERACILLIN AND TAZOBACTAM 4; .5 G/20ML; G/20ML
3.38 INJECTION, POWDER, LYOPHILIZED, FOR SOLUTION INTRAVENOUS EVERY 8 HOURS
Refills: 0 | Status: DISCONTINUED | OUTPATIENT
Start: 2019-07-20 | End: 2019-07-23

## 2019-07-20 RX ORDER — ACETAMINOPHEN 500 MG
650 TABLET ORAL EVERY 6 HOURS
Refills: 0 | Status: DISCONTINUED | OUTPATIENT
Start: 2019-07-20 | End: 2019-07-25

## 2019-07-20 RX ORDER — CHOLECALCIFEROL (VITAMIN D3) 125 MCG
2000 CAPSULE ORAL
Qty: 0 | Refills: 0 | DISCHARGE

## 2019-07-20 RX ORDER — HALOPERIDOL DECANOATE 100 MG/ML
0 INJECTION INTRAMUSCULAR
Qty: 0 | Refills: 0 | DISCHARGE

## 2019-07-20 RX ORDER — SODIUM CHLORIDE 9 MG/ML
1000 INJECTION, SOLUTION INTRAVENOUS
Refills: 0 | Status: DISCONTINUED | OUTPATIENT
Start: 2019-07-20 | End: 2019-07-20

## 2019-07-20 RX ORDER — QUETIAPINE FUMARATE 200 MG/1
250 TABLET, FILM COATED ORAL
Refills: 0 | Status: DISCONTINUED | OUTPATIENT
Start: 2019-07-20 | End: 2019-07-20

## 2019-07-20 RX ORDER — QUETIAPINE FUMARATE 200 MG/1
250 TABLET, FILM COATED ORAL
Qty: 0 | Refills: 0 | DISCHARGE

## 2019-07-20 RX ORDER — SODIUM CHLORIDE 9 MG/ML
1000 INJECTION, SOLUTION INTRAVENOUS
Refills: 0 | Status: DISCONTINUED | OUTPATIENT
Start: 2019-07-20 | End: 2019-07-21

## 2019-07-20 RX ORDER — SACCHAROMYCES BOULARDII 250 MG
250 POWDER IN PACKET (EA) ORAL
Refills: 0 | Status: DISCONTINUED | OUTPATIENT
Start: 2019-07-20 | End: 2019-07-25

## 2019-07-20 RX ORDER — ACETAMINOPHEN 500 MG
1000 TABLET ORAL ONCE
Refills: 0 | Status: COMPLETED | OUTPATIENT
Start: 2019-07-20 | End: 2019-07-20

## 2019-07-20 RX ADMIN — QUETIAPINE FUMARATE 200 MILLIGRAM(S): 200 TABLET, FILM COATED ORAL at 06:29

## 2019-07-20 RX ADMIN — QUETIAPINE FUMARATE 200 MILLIGRAM(S): 200 TABLET, FILM COATED ORAL at 18:41

## 2019-07-20 RX ADMIN — QUETIAPINE FUMARATE 50 MILLIGRAM(S): 200 TABLET, FILM COATED ORAL at 06:29

## 2019-07-20 RX ADMIN — DIVALPROEX SODIUM 750 MILLIGRAM(S): 500 TABLET, DELAYED RELEASE ORAL at 06:14

## 2019-07-20 RX ADMIN — SODIUM CHLORIDE 1000 MILLILITER(S): 9 INJECTION INTRAMUSCULAR; INTRAVENOUS; SUBCUTANEOUS at 04:00

## 2019-07-20 RX ADMIN — Medication 400 MILLIGRAM(S): at 04:00

## 2019-07-20 RX ADMIN — QUETIAPINE FUMARATE 50 MILLIGRAM(S): 200 TABLET, FILM COATED ORAL at 18:41

## 2019-07-20 RX ADMIN — Medication 650 MILLIGRAM(S): at 21:56

## 2019-07-20 RX ADMIN — SODIUM CHLORIDE 125 MILLILITER(S): 9 INJECTION INTRAMUSCULAR; INTRAVENOUS; SUBCUTANEOUS at 03:16

## 2019-07-20 RX ADMIN — PIPERACILLIN AND TAZOBACTAM 25 GRAM(S): 4; .5 INJECTION, POWDER, LYOPHILIZED, FOR SOLUTION INTRAVENOUS at 21:56

## 2019-07-20 RX ADMIN — DIVALPROEX SODIUM 750 MILLIGRAM(S): 500 TABLET, DELAYED RELEASE ORAL at 18:41

## 2019-07-20 RX ADMIN — SODIUM CHLORIDE 70 MILLILITER(S): 9 INJECTION, SOLUTION INTRAVENOUS at 12:14

## 2019-07-20 RX ADMIN — Medication 250 MILLIGRAM(S): at 18:41

## 2019-07-20 RX ADMIN — Medication 150 MICROGRAM(S): at 06:14

## 2019-07-20 RX ADMIN — PIPERACILLIN AND TAZOBACTAM 200 GRAM(S): 4; .5 INJECTION, POWDER, LYOPHILIZED, FOR SOLUTION INTRAVENOUS at 18:41

## 2019-07-20 NOTE — SEPSIS NOTE - RS GEN PE MLT RESP DETAILS PC
clear to auscultation bilaterally/no rhonchi/no rales/no wheezes/respirations non-labored
no rales/no wheezes/clear to auscultation bilaterally/no rhonchi/normal

## 2019-07-20 NOTE — CONSULT NOTE ADULT - ATTENDING COMMENTS
Patient seen and examined. All imaging reviewed. History of right VATS decortication with chemical pleurodesis. I am not sure if he has any procedures performed on the left side. CT chest reviewed, looks like a large anterior mediastinal mass, I do not think this is a collection of blood in the mediastinum. The density of the mass ranges from 55-35. He also has what appear to be metastatic lesions in the lung, the density of the lung lesions is consistent with the density of the anterior mediastinal mass. These findings were not present in 2017. Will order a CXR today to ensure that this has been stable since admission.     Patient cannot communicated with me,  but he appears very comfortable. I talked to his health care proxy, his brother.   He has routine ECHO ordered, along with serum labs for workup of anterior mediastinal mass. I will review the CT chest with radiology tomorrow and see if MRI would be helpful in seeing planes between the mass and the major vascular structures.  Also to see if we can do a CT guided biopsy of the mediastinal mass.

## 2019-07-20 NOTE — SEPSIS NOTE - NSSUBJFT_GEN_A_CORE
Pt seen at bedside for code sepsis. Code sepsis called @ 03:25 for fever and tachycardia. T 101.7 F rectal, . Pt is a 50 y/o male from [facility] presented to the ED for anemia and lethargy/weakness found to have mediastinal mass. PMH Schizoaffective disorder, hx of seizures, anemia, bilateral hearing loss, hypothyroidism. Pt is awake, alert but limited ROS due to pt ?mutism, pt not answering questions. Pt lying in bed calm, he does not appear to be in any signs of acute distress. Pt seen at bedside for code sepsis. Code sepsis called @ 03:25 for fever and tachycardia. T 101.7 F rectal, . Pt is a 50 y/o male from [facility] presented to the ED for anemia and lethargy/weakness found to have mediastinal mass. PMH Schizoaffective disorder, hx of seizures, anemia, bilateral hearing loss, hypothyroidism. Pt is awake, alert but limited ROS due to pt ?mutism, pt not answering questions. Pt in ERHR lying in bed calm, he does not appear to be in any signs of acute distress.

## 2019-07-20 NOTE — SEPSIS NOTE - NSSUBJFT_GEN_A_CORE
CODE SEPSIS 2HR FOLLOW UP NOTE:    05:15 Patient seen and examined at bedside as follow up of recently called CODE SEPSIS. Clinically improved compared to prior exam. Pt sleeping in bed, NAD. Upon awakening pt nods yes or no to questions asked. Denies CP, SOB, pain, abdominal pain or discomfort.

## 2019-07-20 NOTE — SEPSIS NOTE - ASSESSMENT
50 y/o male seen for code sepsis followup.      LABS ----------                        8.0    9.73  )-----------( 280      ( 2019 04:02 )             26.0       07-20    137  |  100  |  11.0  ----------------------------<  87  4.3   |  25.0  |  0.86    Ca    8.8      2019 04:02    TPro  7.3  /  Alb  2.7<L>  /  TBili  <0.2<L>  /  DBili  x   /  AST  21  /  ALT  30  /  AlkPhos  81  07-20      LIVER FUNCTIONS - ( 2019 04:02 )  Alb: 2.7 g/dL / Pro: 7.3 g/dL / ALK PHOS: 81 U/L / ALT: 30 U/L / AST: 21 U/L / GGT: x             LACTATE: 0.8    PT/INR - ( 2019 03:56 )   PT: 16.4 sec;   INR: 1.41 ratio    PTT - ( 2019 03:56 )  PTT:39.0 sec      Urinalysis Basic - ( 2019 03:58 )  Color: Yellow / Appearance: Clear / S.010 / pH: x  Gluc: x / Ketone: Negative  / Bili: Negative / Urobili: Negative mg/dL   Blood: x / Protein: Negative mg/dL / Nitrite: Negative   Leuk Esterase: Negative / RBC: 0-2 /HPF / WBC Negative   Sq Epi: x / Non Sq Epi: Occasional / Bacteria: x          Fever likely related to ? malignancy.   Pending blood cultures/Urine culture results  Continue to monitor and reassess prn 50 y/o male seen for code sepsis followup.      LABS ----------                        8.0    9.73  )-----------( 280      ( 2019 04:02 )             26.0       07-20    137  |  100  |  11.0  ----------------------------<  87  4.3   |  25.0  |  0.86    Ca    8.8      2019 04:02    TPro  7.3  /  Alb  2.7<L>  /  TBili  <0.2<L>  /  DBili  x   /  AST  21  /  ALT  30  /  AlkPhos  81  07-20      LIVER FUNCTIONS - ( 2019 04:02 )  Alb: 2.7 g/dL / Pro: 7.3 g/dL / ALK PHOS: 81 U/L / ALT: 30 U/L / AST: 21 U/L / GGT: x             LACTATE: 0.8    PT/INR - ( 2019 03:56 )   PT: 16.4 sec;   INR: 1.41 ratio    PTT - ( 2019 03:56 )  PTT:39.0 sec      Urinalysis Basic - ( 2019 03:58 )  Color: Yellow / Appearance: Clear / S.010 / pH: x  Gluc: x / Ketone: Negative  / Bili: Negative / Urobili: Negative mg/dL   Blood: x / Protein: Negative mg/dL / Nitrite: Negative   Leuk Esterase: Negative / RBC: 0-2 /HPF / WBC Negative   Sq Epi: x / Non Sq Epi: Occasional / Bacteria: x          Fever possibly related to ? malignancy.   Pending blood cultures/Urine culture results  Continue to monitor and reassess prn  PMD will be notified.

## 2019-07-20 NOTE — CONSULT NOTE ADULT - SUBJECTIVE AND OBJECTIVE BOX
Reason for consult/History of Present Illness:  48y/o M with PMHX of Schizoaffective/Behavioral Disorder with selective mute state after ECT done at age 18, Hypothyroidism, and GERD was sent from [facility] for Low H&H (Hbg 7.9(10)-Labs down trending since April 2019) and increasing fatigue/weakness and pallor. Pt [facility] by [facility] staff and denies focal deficits. No obvious source/active bleeding at this time, Occult blood NEG. Pt with psych history and selectively mute. Not a great historian  Thoracic surgery consulted for mediastinal mass (CT scan: Large anterior mediastinal mass measuring 14.6 x 8.3 cm with mass effect upon the main pulmonary artery trunk which is narrowed, with possible lingular nodular masses measure up to 3 cm presumably   related to metastatic lesions) found on diagnostic imaging. Pt seen and evaluated, chart reviewed. Pt lying in bed supine without obvious signs of cardiopulmonary distress. Vitals: HR 90 NSR, [id number], sat 96% RA. Dr Johnson contacted and informed, AM labs ordered and TTE ordered to rule out tamponade physiology. Pt is selectively mute and not responsive to ROS or able to actively engage in interview.       Past Medical History  Hypothyroidism, unspecified type  Psychiatric disorder    Past Surgical History  No significant past surgical history    SOCIAL HISTORY:  Smoker: unable to obtain  ETOH use: unable to obtain  Ilicit Drug use: unable to obtain  Occupation: Resident of [facility]     Relevant Family History  FAMILY HISTORY:  No pertinent family history in first degree relatives      LABS:                        8.0    8.07  )-----------( 278      ( 19 Jul 2019 20:06 )             27.0     07-19    135  |  100  |  16.0  ----------------------------<  127<H>  4.7   |  25.0  |  0.92    Ca    8.8      19 Jul 2019 20:06    TPro  7.3  /  Alb  2.6<L>  /  TBili  <0.2<L>  /  DBili  x   /  AST  26  /  ALT  34  /  AlkPhos  76  07-19    PT/INR - ( 19 Jul 2019 20:06 )   PT: 16.7 sec;   INR: 1.44 ratio         PTT - ( 19 Jul 2019 20:06 )  PTT:38.9 sec    CARDIAC MARKERS ( 19 Jul 2019 20:06 )  x     / <0.01 ng/mL / x     / x     / x          TTE / ERIN: Pending      MEDICATIONS  (STANDING):    MEDICATIONS  (PRN):    Antiplatelet therapy:                              Allergies: Chicken (Other (Unknown))  No Known Allergies    Vital Signs Last 24 Hrs  T(C): 36.4 (19 Jul 2019 17:43), Max: 36.4 (19 Jul 2019 17:43)  T(F): 97.5 (19 Jul 2019 17:43), Max: 97.5 (19 Jul 2019 17:43)  HR: 94 (19 Jul 2019 17:43) (94 - 94)  BP: 107/57 (19 Jul 2019 22:10) (94/59 - 107/57)  BP(mean): --  RR: 16 (19 Jul 2019 17:43) (16 - 16)  SpO2: 96% (19 Jul 2019 17:43) (96% - 96%)    Physical Exam:   Constitutional: NAD, well-groomed, well-developed  HEENT: EOMI, no drainage or redness  Respiratory: Breath Sounds equal & clear bilaterally to auscultation, no accessory muscle use noted  Cardiovascular: Regular rate, regular rhythm, normal S1, S2; no murmurs or rub  Gastrointestinal: Soft, non-tender, non distended, normal bowel sounds  Extremities: CHEUNG x 4, no peripheral edema, no cyanosis, no clubbing   Vascular: Equal and normal pulses: 2+ peripheral pulses throughout  Neurological: Selectively mute, responded to verbal commands   Musculoskeletal: No joint swelling or deformity; no limitation of movement  Skin: warm, dry, well perfused, no rashes Reason for consult/History of Present Illness:  50y/o M with PMHX of Schizoaffective/Behavioral Disorder with selective mute state after ECT done at age 18, Hypothyroidism, and GERD was sent from [facility] for Low H&H (Hbg 7.9(10)-Labs down trending since April 2019) and increasing fatigue/weakness and pallor. Pt accompanied by [facility] staff and denies focal deficits. No obvious source/active bleeding at this time, Occult blood NEG. Pt with psych history and selectively mute. Not a great historian  Thoracic surgery consulted for mediastinal mass (CT scan: Large anterior mediastinal mass measuring 14.6 x 8.3 cm with mass effect upon the main pulmonary artery trunk which is narrowed, with lingular nodular masses measure up to 3 cm presumably related to metastatic lesions) found on diagnostic imaging. Pt seen and evaluated, chart reviewed. Pt lying in bed supine without obvious signs of cardiopulmonary distress. Vitals: HR 90 NSR, [id number], sat 96% RA. Dr Johnson contacted and informed, AM labs ordered and TTE ordered to rule out tamponade physiology. Pt is selectively mute and not responsive to ROS or able to actively engage in interview.       Past Medical History  Hypothyroidism, unspecified type  Psychiatric disorder    Past Surgical History  No significant past surgical history    SOCIAL HISTORY:  Smoker: unable to obtain  ETOH use: unable to obtain  Ilicit Drug use: unable to obtain  Occupation: Resident of [facility]     Relevant Family History  FAMILY HISTORY:  No pertinent family history in first degree relatives      LABS:                        8.0    8.07  )-----------( 278      ( 19 Jul 2019 20:06 )             27.0     07-19    135  |  100  |  16.0  ----------------------------<  127<H>  4.7   |  25.0  |  0.92    Ca    8.8      19 Jul 2019 20:06    TPro  7.3  /  Alb  2.6<L>  /  TBili  <0.2<L>  /  DBili  x   /  AST  26  /  ALT  34  /  AlkPhos  76  07-19    PT/INR - ( 19 Jul 2019 20:06 )   PT: 16.7 sec;   INR: 1.44 ratio         PTT - ( 19 Jul 2019 20:06 )  PTT:38.9 sec    CARDIAC MARKERS ( 19 Jul 2019 20:06 )  x     / <0.01 ng/mL / x     / x     / x          TTE / ERIN: Pending      MEDICATIONS  (STANDING):    MEDICATIONS  (PRN):    Antiplatelet therapy:                              Allergies: Chicken (Other (Unknown))  No Known Allergies    Vital Signs Last 24 Hrs  T(C): 36.4 (19 Jul 2019 17:43), Max: 36.4 (19 Jul 2019 17:43)  T(F): 97.5 (19 Jul 2019 17:43), Max: 97.5 (19 Jul 2019 17:43)  HR: 94 (19 Jul 2019 17:43) (94 - 94)  BP: 107/57 (19 Jul 2019 22:10) (94/59 - 107/57)  BP(mean): --  RR: 16 (19 Jul 2019 17:43) (16 - 16)  SpO2: 96% (19 Jul 2019 17:43) (96% - 96%)    Physical Exam:   Constitutional: NAD, well-groomed, well-developed  HEENT: EOMI, no drainage or redness  Respiratory: Breath Sounds equal & clear bilaterally to auscultation, no accessory muscle use noted  Cardiovascular: Regular rate, regular rhythm, normal S1, S2; no murmurs or rub  Gastrointestinal: Soft, non-tender, non distended, normal bowel sounds  Extremities: CHEUNG x 4, no peripheral edema, no cyanosis, no clubbing   Vascular: Equal and normal pulses: 2+ peripheral pulses throughout  Neurological: Selectively mute, responded to verbal commands   Musculoskeletal: No joint swelling or deformity; no limitation of movement  Skin: warm, dry, well perfused, no rashes

## 2019-07-20 NOTE — H&P ADULT - HISTORY OF PRESENT ILLNESS
Pt is a 48 yo M presenting from [facility] after having a drop in Hgb and lethargy/weakness. PMH Schizoaffective disorder, hx of seizures, anemia, bilateral hearing loss, hypothyroidism.   Patient is unable to provide any history. All history obtained from medical record and also from patients brother Miguel Angel at [phone].   As per medical record, he has been having a progressively dropping Hgb it was 10.5 on 4/16 and on 7/16 it was 8.5, and also on 7/19 it was 7.9 in outpatient setting. He is noted to be weak and more lethargic than usual. No reported melena or BRBPR. No known past hx of anemia or GI bleed. His FOBT is negative x1 in ED. He is unable to offer any history as stated above. Unable to obtain ROS from patient as he has selective mutism. He does follow commands however. In ED he was noted to have Hgb of 8.0 and also noted to have apical masslike nodules in lung and anterior mediastinal mass with mass effect. No past hx of cancer. He did have hx of plueral effusion and chest tube placement/VATS back in 2017.

## 2019-07-20 NOTE — SEPSIS NOTE - CARDIOVASCULAR DETAILS
tachycardia/positive S1/positive S2/mild tachycardia
positive S2/mild tachycardia/tachycardia/positive S1

## 2019-07-20 NOTE — H&P ADULT - NSHPPHYSICALEXAM_GEN_ALL_CORE
T(C): 36.4 (07-19-19 @ 17:43), Max: 36.4 (07-19-19 @ 17:43)  HR: 94 (07-19-19 @ 17:43) (94 - 94)  BP: 107/57 (07-19-19 @ 22:10) (94/59 - 107/57)  RR: 16 (07-19-19 @ 17:43) (16 - 16)  SpO2: 96% (07-19-19 @ 17:43) (96% - 96%)  Wt(kg): --    Physical Exam:   GENERAL:  well-developed, NAD  HEENT: head NC/AT; EOM intact, PERRLA, conjunctiva & sclera clear; hearing grossly intact, moist mucous membranes  NECK: supple, no JVD  RESPIRATORY: CTA B/L, no wheezing, rales, rhonchi or rubs  CARDIOVASCULAR: S1&S2, RRR, +murmur  ABDOMEN: soft, non-tender, non-distended, + Bowel sounds x4 quadrants, no guarding, rebound or rigidity  MUSCULOSKELETAL:  no clubbing, cyanosis or edema of all 4 extremities  LYMPH: no cervical lymphadenopathy  VASCULAR: Radial pulses 2+ bilaterally, no varicose veins   SKIN: warm and dry, color normal  NEUROLOGIC: Alert, CN2-12 intact w/ no focal deficits, no sensory loss, motor Strength 5/5 in UE & LE B/L  Psych: abnoirmal mood and affect, abnormal behavior

## 2019-07-20 NOTE — CONSULT NOTE ADULT - ASSESSMENT
48y/o M with PMHX of Schizoaffective/Behavioral Disorder with selective mute state after ECT done at age 18, Hypothyroidism, and GERD was sent from [facility] for Low H&H (Hbg 7.9(10)-Labs down trending since April 2019) and increasing fatigue/weakness and pallor. Pt accompanied by [facility] [place] staff and denies focal deficits. No obvious source/active bleeding at this time, Occult blood NEG. Pt with psych history and selectively mute. Not a great historian  Thoracic surgery consulted for mediastinal mass (CT scan: Large anterior mediastinal mass measuring 14.6 x 8.3 cm with mass effect upon the main pulmonary artery trunk which is narrowed, with possible lingular nodular masses measure up to 3 cm presumably   related to metastatic lesions) found on diagnostic imaging. Pt seen and evaluated, chart reviewed. Pt lying in bed supine without obvious signs of cardiopulmonary distress. Vitals: HR 90 NSR, [id number], sat 96% RA. Dr Johnson contacted and informed, AM labs ordered and TTE ordered to rule out tamponade physiology. Pt is selectively mute and not responsive to ROS or able to actively engage in interview. 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 Pt accompanied by [facility] [place] staff and denies focal deficits. No obvious source/active bleeding at this time, Occult blood NEG. Pt with psych history and selectively mute. Not a great historian  Thoracic surgery consulted for mediastinal mass (CT scan: Large anterior mediastinal mass measuring 14.6 x 8.3 cm with mass effect upon the main pulmonary artery trunk which is narrowed, with lingular nodular masses measure up to 3 cm presumably related to metastatic lesions) found on diagnostic imaging. Pt seen and evaluated, chart reviewed. Pt lying in bed supine without obvious signs of cardiopulmonary distress. Vitals: HR 90 NSR, [id number], sat 96% RA. Dr Johnson contacted and informed, AM labs ordered and TTE ordered to rule out tamponade physiology. Pt is selectively mute and not responsive to ROS or able to actively engage in interview.

## 2019-07-20 NOTE — H&P ADULT - ASSESSMENT
Pt is a 48 yo M presenting from [facility] after having a drop in Hgb and lethargy/weakness. PMH Schizoaffective disorder, hx of seizures, anemia, bilateral hearing loss, hypothyroidism.    Anterior mediastinal mass: thoracic surgery consulted.   -hemodynamically stable, he had borderline hypotension earlier but now improved after IVF.   -monitored bed with   -obtain TTE.   -await further thoracic surg rec's  -CT report shows  Large anterior mediastinal mass measuring 14.6 x 8.3 cm with mass effect upon the main pulmonary artery trunk which is narrowed, with possible lingular nodular masses measure up to 3 cm presumably related to metastatic lesions  -f/u CT abd/pelvis    Anemia: no signs of active GI bleeding.   -check iron panel, B12, folate  -repeat FOBT  -trend Hgb.   -consider hematology consult pending workup above.   -could possibly be due to Anemia of chronic disease in setting of malignancy     Schizoaffective disorder: continue Seroquel    History of seizures: continue Depakote    Hypothyroidism check TSH  continue synthroid    DVT ppx: SCD's b/l. Hold pharm dvt ppx due to anemia and pending further FOBT

## 2019-07-20 NOTE — SEPSIS NOTE - ASSESSMENT
A: 48 y/o male admitted for anemia and lethargy/weakness found to have mediastinal mass seen for fever and tachycardia.     STAT LABS ORDERED:   - cbc w/diff  - cmp  - blood cultures x2  - lactate  - PT/PTT/INR  - UA/Urine Culture (ordered pt to be straight cathed for specimen as pt is incontinent)    -1L Sodium Chloride IVF bolus x1 dose.   -Will hold off on ordering antibiotics at this time as unclear source of infection.   -Pt had CTA with contrast done 7/19 which was + mild/moderate atelectatic changes lung bases. Negative for PTX, PNA, pleural effusion.      PLAN OF CARE/ INTERVENTIONS PLANNED:   - fluids as above  - VS q30min x 1hr, then q1hr x 6hrs  - FU labs   - 2hr bedside follow up planned    DISPOSITION:  - Remain at current level of care    Dr. Garcia present at pt bedside during code sepsis, in agreement with current plan of care. A: 50 y/o male admitted for anemia and lethargy/weakness found to have mediastinal mass seen for fever and tachycardia.     STAT LABS ORDERED:   - cbc w/diff  - cmp  - blood cultures x2  - lactate  - PT/PTT/INR  - UA/Urine Culture (ordered pt to be straight cathed for specimen as pt is incontinent)    -1L Sodium Chloride IVF bolus x1 dose.   -Will hold off on ordering antibiotics at this time as unclear source of infection.   -Pt had CTA with contrast done 7/19 which was + mild/moderate atelectatic changes lung bases. Negative for PTX, PNA, pleural effusion.      PLAN OF CARE/ INTERVENTIONS PLANNED:   -Ofirmev 1gram IVPB x1 dose for fever as pt uncooperative and refusing PO and suppository Tylenol.  - fluids as above  - VS q30min x 1hr, then q1hr x 6hrs  - FU labs   - 2hr bedside follow up planned    DISPOSITION:  - Remain at current level of care    Dr. Garcia present at pt bedside during code sepsis, in agreement with current plan of care.

## 2019-07-20 NOTE — CONSULT NOTE ADULT - PROBLEM SELECTOR RECOMMENDATION 9
Large anterior mediastinal mass measuring 14.6 x 8.3 cm with mass effect upon the main pulmonary artery trunk, with lingular nodular masses measure up to 3 cm presumably related to metastatic lesions)  -CT scan: f/u Abd/pelvis CT to r/o metastatic mass  -TTE r/o Tamponade physiology   -AM labs (Trend H/H)  -Possible mediastinal mass biopsy, etiology   Dr. Johnson to review imaging and diagnostic imaging in AM  -Cont supportive care and will follow. Large anterior mediastinal mass measuring 14.6 x 8.3 cm with mass effect upon the main pulmonary artery trunk, with lingular nodular masses measure up to 3 cm presumably related to metastatic lesions)  -CT scan: f/u Abd/pelvis CT to r/o metastatic mass  -TTE r/o Tamponade physiology   -AM labs (Trend H/H)  -Metastatic mediastinal mass: possible biopsy/etiology   Dr. Johnson to review imaging and diagnostic imaging in AM  -Cont supportive care, will follow/advise if pt candidate for intervention at this time. Large anterior mediastinal mass measuring 14.6 x 8.3 cm with mass effect upon the main pulmonary artery trunk, with lingular nodular masses measure up to 3 cm presumably related to metastatic lesions)  -CT scan: f/u Abd/pelvis CT to r/o metastatic mass  -TTE r/o Tamponade physiology   -AM labs (Trend H/H)  -Possible Metastatic mediastinal mass: possible biopsy for etiology   Dr. Johnson to review imaging and diagnostic imaging in AM  -Cont supportive care, will follow/advise if pt candidate for intervention at this time. Large anterior mediastinal mass measuring 14.6 x 8.3 cm with mass effect upon the main pulmonary artery trunk, with lingular nodular masses measure up to 3 cm presumably related to metastatic lesions)  -CT scan: f/u Abd/pelvis CT to r/o metastatic mass  -TTE r/o Tamponade physiology   -AM labs (Trend H/H)  -Possible Metastatic mediastinal mass: possible biopsy for etiology   Dr. Johnson to review imaging and diagnostic imaging in AM  -Cont supportive care, will follow/advise if pt candidate for intervention at this time.  Further plan as per medicine.

## 2019-07-21 LAB
AFP-TM SERPL-MCNC: HIGH NG/ML
ALBUMIN SERPL ELPH-MCNC: 2.4 G/DL — LOW (ref 3.3–5.2)
ALP SERPL-CCNC: 74 U/L — SIGNIFICANT CHANGE UP (ref 40–120)
ALT FLD-CCNC: 25 U/L — SIGNIFICANT CHANGE UP
ANION GAP SERPL CALC-SCNC: 12 MMOL/L — SIGNIFICANT CHANGE UP (ref 5–17)
APTT BLD: 36.7 SEC — HIGH (ref 27.5–36.3)
AST SERPL-CCNC: 19 U/L — SIGNIFICANT CHANGE UP
BILIRUB SERPL-MCNC: <0.2 MG/DL — LOW (ref 0.4–2)
BUN SERPL-MCNC: 13 MG/DL — SIGNIFICANT CHANGE UP (ref 8–20)
CALCIUM SERPL-MCNC: 8.6 MG/DL — SIGNIFICANT CHANGE UP (ref 8.6–10.2)
CHLORIDE SERPL-SCNC: 106 MMOL/L — SIGNIFICANT CHANGE UP (ref 98–107)
CO2 SERPL-SCNC: 24 MMOL/L — SIGNIFICANT CHANGE UP (ref 22–29)
CREAT SERPL-MCNC: 0.98 MG/DL — SIGNIFICANT CHANGE UP (ref 0.5–1.3)
CULTURE RESULTS: NO GROWTH — SIGNIFICANT CHANGE UP
GLUCOSE SERPL-MCNC: 105 MG/DL — SIGNIFICANT CHANGE UP (ref 70–115)
HCG-TM SERPL-ACNC: <1 MIU/ML — SIGNIFICANT CHANGE UP
HCT VFR BLD CALC: 29.4 % — LOW (ref 39–50)
HGB BLD-MCNC: 8.8 G/DL — LOW (ref 13–17)
INR BLD: 1.51 RATIO — HIGH (ref 0.88–1.16)
MAGNESIUM SERPL-MCNC: 2 MG/DL — SIGNIFICANT CHANGE UP (ref 1.8–2.6)
MCHC RBC-ENTMCNC: 26 PG — LOW (ref 27–34)
MCHC RBC-ENTMCNC: 29.9 GM/DL — LOW (ref 32–36)
MCV RBC AUTO: 86.7 FL — SIGNIFICANT CHANGE UP (ref 80–100)
OB PNL STL: NEGATIVE — SIGNIFICANT CHANGE UP
PLATELET # BLD AUTO: 270 K/UL — SIGNIFICANT CHANGE UP (ref 150–400)
POTASSIUM SERPL-MCNC: 3.8 MMOL/L — SIGNIFICANT CHANGE UP (ref 3.5–5.3)
POTASSIUM SERPL-SCNC: 3.8 MMOL/L — SIGNIFICANT CHANGE UP (ref 3.5–5.3)
PROT SERPL-MCNC: 6.8 G/DL — SIGNIFICANT CHANGE UP (ref 6.6–8.7)
PROTHROM AB SERPL-ACNC: 17.6 SEC — HIGH (ref 10–12.9)
RBC # BLD: 3.39 M/UL — LOW (ref 4.2–5.8)
RBC # FLD: 16.1 % — HIGH (ref 10.3–14.5)
SODIUM SERPL-SCNC: 142 MMOL/L — SIGNIFICANT CHANGE UP (ref 135–145)
SPECIMEN SOURCE: SIGNIFICANT CHANGE UP
WBC # BLD: 6.74 K/UL — SIGNIFICANT CHANGE UP (ref 3.8–10.5)
WBC # FLD AUTO: 6.74 K/UL — SIGNIFICANT CHANGE UP (ref 3.8–10.5)

## 2019-07-21 PROCEDURE — 99222 1ST HOSP IP/OBS MODERATE 55: CPT

## 2019-07-21 PROCEDURE — 93306 TTE W/DOPPLER COMPLETE: CPT | Mod: 26

## 2019-07-21 PROCEDURE — 99233 SBSQ HOSP IP/OBS HIGH 50: CPT

## 2019-07-21 PROCEDURE — 99254 IP/OBS CNSLTJ NEW/EST MOD 60: CPT

## 2019-07-21 PROCEDURE — 71045 X-RAY EXAM CHEST 1 VIEW: CPT | Mod: 26

## 2019-07-21 RX ORDER — SODIUM CHLORIDE 9 MG/ML
1000 INJECTION, SOLUTION INTRAVENOUS
Refills: 0 | Status: DISCONTINUED | OUTPATIENT
Start: 2019-07-21 | End: 2019-07-25

## 2019-07-21 RX ADMIN — DIVALPROEX SODIUM 750 MILLIGRAM(S): 500 TABLET, DELAYED RELEASE ORAL at 18:11

## 2019-07-21 RX ADMIN — SODIUM CHLORIDE 100 MILLILITER(S): 9 INJECTION, SOLUTION INTRAVENOUS at 01:00

## 2019-07-21 RX ADMIN — SODIUM CHLORIDE 100 MILLILITER(S): 9 INJECTION, SOLUTION INTRAVENOUS at 14:48

## 2019-07-21 RX ADMIN — Medication 150 MICROGRAM(S): at 05:06

## 2019-07-21 RX ADMIN — PIPERACILLIN AND TAZOBACTAM 25 GRAM(S): 4; .5 INJECTION, POWDER, LYOPHILIZED, FOR SOLUTION INTRAVENOUS at 21:45

## 2019-07-21 RX ADMIN — QUETIAPINE FUMARATE 50 MILLIGRAM(S): 200 TABLET, FILM COATED ORAL at 18:10

## 2019-07-21 RX ADMIN — QUETIAPINE FUMARATE 50 MILLIGRAM(S): 200 TABLET, FILM COATED ORAL at 05:06

## 2019-07-21 RX ADMIN — Medication 250 MILLIGRAM(S): at 05:06

## 2019-07-21 RX ADMIN — Medication 250 MILLIGRAM(S): at 18:10

## 2019-07-21 RX ADMIN — PIPERACILLIN AND TAZOBACTAM 25 GRAM(S): 4; .5 INJECTION, POWDER, LYOPHILIZED, FOR SOLUTION INTRAVENOUS at 14:48

## 2019-07-21 RX ADMIN — DIVALPROEX SODIUM 750 MILLIGRAM(S): 500 TABLET, DELAYED RELEASE ORAL at 05:06

## 2019-07-21 RX ADMIN — QUETIAPINE FUMARATE 200 MILLIGRAM(S): 200 TABLET, FILM COATED ORAL at 18:10

## 2019-07-21 RX ADMIN — Medication 650 MILLIGRAM(S): at 00:52

## 2019-07-21 RX ADMIN — PIPERACILLIN AND TAZOBACTAM 25 GRAM(S): 4; .5 INJECTION, POWDER, LYOPHILIZED, FOR SOLUTION INTRAVENOUS at 05:06

## 2019-07-21 RX ADMIN — QUETIAPINE FUMARATE 200 MILLIGRAM(S): 200 TABLET, FILM COATED ORAL at 05:06

## 2019-07-21 NOTE — PROGRESS NOTE ADULT - SUBJECTIVE AND OBJECTIVE BOX
SAMIRA NUGENT    [id number]    49y      Male    Patient is a 49y old  Male who presents with a chief complaint of Anemia and found to have a mediastinal mass (2019 09:59)      INTERVAL HPI/OVERNIGHT EVENTS:    As per nursing staff he has no fever any more, he was not having any SOB or cough.     REVIEW OF SYSTEMS:    Unable to get much info    Vital Signs Last 24 Hrs  T(C): 36.9 (2019 11:07), Max: 38.4 (2019 21:55)  T(F): 98.4 (2019 11:07), Max: 101.2 (2019 21:55)  HR: 94 (2019 11:07) (92 - 108)  BP: 105/72 (2019 11:07) (87/61 - 133/89)  RR: 17 (2019 11:07) (16 - 18)  SpO2: 95% (2019 11:07) (95% - 99%)    PHYSICAL EXAM:    GENERAL: Middle age male looking comfortable   HEENT: PERRL  NECK: soft, Supple, No JVD,   CHEST/LUNG: Decrease air entry bilaterally; No wheezing  HEART: S1S2+, Regular rate and rhythm; No murmurs  ABDOMEN: Soft, Nontender, Nondistended; Bowel sounds present  EXTREMITIES:  1+ Peripheral Pulses, No edema  SKIN: No rashes or lesions  NEURO: Not participating      LABS:                        8.8    6.74  )-----------( 270      ( 2019 06:43 )             29.4     07-21    142  |  106  |  13.0  ----------------------------<  105  3.8   |  24.0  |  0.98    Ca    8.6      2019 06:43  Mg     2.0     07-21    TPro  6.8  /  Alb  2.4<L>  /  TBili  <0.2<L>  /  DBili  x   /  AST  19  /  ALT  25  /  AlkPhos  74  07-21    PT/INR - ( 2019 06:43 )   PT: 17.6 sec;   INR: 1.51 ratio         PTT - ( 2019 06:43 )  PTT:36.7 sec  Urinalysis Basic - ( 2019 03:58 )    Color: Yellow / Appearance: Clear / S.010 / pH: x  Gluc: x / Ketone: Negative  / Bili: Negative / Urobili: Negative mg/dL   Blood: x / Protein: Negative mg/dL / Nitrite: Negative   Leuk Esterase: Negative / RBC: 0-2 /HPF / WBC Negative   Sq Epi: x / Non Sq Epi: Occasional / Bacteria: x          I&O's Summary    2019 07:  -  2019 07:00  --------------------------------------------------------  IN: 1885 mL / OUT: 0 mL / NET: 1885 mL        MEDICATIONS  (STANDING):  dextrose 5% + sodium chloride 0.9%. 1000 milliLiter(s) (100 mL/Hr) IV Continuous <Continuous>  diVALproex  milliGRAM(s) Oral two times a day  levothyroxine 150 MICROGram(s) Oral daily  piperacillin/tazobactam IVPB.. 3.375 Gram(s) IV Intermittent every 8 hours  QUEtiapine 200 milliGRAM(s) Oral two times a day  QUEtiapine 50 milliGRAM(s) Oral two times a day  saccharomyces boulardii 250 milliGRAM(s) Oral two times a day    MEDICATIONS  (PRN):  acetaminophen   Tablet .. 650 milliGRAM(s) Oral every 6 hours PRN Temp greater or equal to 38C (100.4F)

## 2019-07-21 NOTE — PROGRESS NOTE ADULT - SUBJECTIVE AND OBJECTIVE BOX
Subjective: Pt. seen & examined.    VITAL SIGNS  Vital Signs Last 24 Hrs  T(C): 36.5 (19 @ 05:04), Max: 38.4 (19 @ 21:55)  T(F): 97.7 (19 @ 05:04), Max: 101.2 (19 @ 21:55)  HR: 92 (19 @ 05:04) (70 - 108)  BP: 107/62 (19 @ 05:04) (87/61 - 133/89)  RR: 16 (19 @ 05:04) (16 - 18)  SpO2: 99% (19 @ 05:04) (95% - 99%)  on (O2)              Telemetry:  Sinus rhythm      MEDICATIONS  acetaminophen   Tablet .. 650 milliGRAM(s) Oral every 6 hours PRN  dextrose 5% + sodium chloride 0.9%. 1000 milliLiter(s) IV Continuous <Continuous>  diVALproex  milliGRAM(s) Oral two times a day  levothyroxine 150 MICROGram(s) Oral daily  piperacillin/tazobactam IVPB.. 3.375 Gram(s) IV Intermittent every 8 hours  QUEtiapine 200 milliGRAM(s) Oral two times a day  QUEtiapine 50 milliGRAM(s) Oral two times a day  saccharomyces boulardii 250 milliGRAM(s) Oral two times a day      PHYSICAL EXAM  General: well nourished, well developed, no acute distress  Neurology: alert and oriented x 3, nonfocal, no gross deficits  Respiratory: clear to auscultation bilaterally  CV: regular rate and rhythm, normal S1, S2  Abdomen: soft, nontender, nondistended, positive bowel sounds   Extremities: warm, well perfused. no edema. + DP pulses      I&O's Detail    2019 07:01  -  2019 07:00  --------------------------------------------------------  IN:    dextrose 5% + sodium chloride 0.9%: 490 mL    dextrose 5% + sodium chloride 0.9%.: 700 mL    IV PiggyBack: 175 mL    Oral Fluid: 170 mL    Packed Red Blood Cells: 350 mL  Total IN: 1885 mL    OUT:  Total OUT: 0 mL    Total NET: 1885 mL          Weights:  Daily     Daily Weight in k (2019 04:18)  Admit Wt: Drug Dosing Weight  Height (cm): 180.34 (2019 17:43)  Weight (kg): 72.15309168398798 (2019 17:43)  BMI (kg/m2): 22.3 (2019 17:43)  BSA (m2): 1.92 (2019 17:43)    LABS      142  |  106  |  13.0  ----------------------------<  105  3.8   |  24.0  |  0.98    Ca    8.6      2019 06:43  Mg     2.0         TPro  6.8  /  Alb  2.4<L>  /  TBili  <0.2<L>  /  DBili  x   /  AST  19  /  ALT  25  /  AlkPhos  74                                   8.8    6.74  )-----------( 270      ( 2019 06:43 )             29.4          PT/INR - ( 2019 06:43 )   PT: 17.6 sec;   INR: 1.51 ratio         PTT - ( 2019 06:43 )  PTT:36.7 sec      Bilirubin Total, Serum: <0.2 mg/dL ( @ 06:43)    CAPILLARY BLOOD GLUCOSE               Today's CXR:< from: Xray Chest 1 View AP/PA. (19 @ 19:39) >   EXAM:  XR CHEST AP OR PA 1V                          PROCEDURE DATE:  2019          INTERPRETATION:  AP chest on 2019 at 7:23 PM. Patient has chest   pain.    There is a sizable mass density merging with the left heart border not   evident on film of 2017.    Mass etiology or possible vascular abnormality such as aortic aneurysm to   be considered.    Lungs are unremarkable.    This mass could be assessed with a CT angiogram of the chest.    Case discussed with Dr. Anguiano with read back.     IMPRESSION: Mass density off left heart border arguably new since .   Assessment with CT angiogram would be appropriate.      OWEN WECK M.D., ATTENDING RADIOLOGIST  This document has been electronically signed. 2019  7:48PM        < end of copied text >        PAST MEDICAL & SURGICAL HISTORY:  Hypothyroidism, unspecified type  Psychiatric disorder

## 2019-07-21 NOTE — PROGRESS NOTE ADULT - ASSESSMENT
Pt is a 50 yo M presenting from [facility] after having a drop in Hgb and lethargy/weakness. PMH Schizoaffective disorder, hx of seizures, anemia, bilateral hearing loss, hypothyroidism.    Anterior mediastinal mass: thoracic surgery consulted, hemodynamically stable, he had borderline hypotension earlier but now improved after IVF.   -monitored bed with , TTE done result pending, await further thoracic surg rec's  -CT report shows Large anterior mediastinal mass measuring 14.6 x 8.3 cm with mass effect upon the main pulmonary artery trunk which is narrowed, with possible lingular nodular masses measure up to 3 cm presumably related to metastatic lesions, Thoracic aorta normal caliber without aneurysm or dissection, Large anterior mediastinal mass measuring 14.6 x 8.3 cm with mass effect, upon the main pulmonary artery trunk which is narrowed. No evidence of   pulmonary embolism. This mass could be related to underlying thymic mass,  A couple of lingular nodular masses measure up to 3 cm presumably   related to metastatic lesions, as per CT surgery patient might need an MRI, will discuss with Brother about the MRI as he might need anesthesia for the MRI and IR guided biopsy, will discuss with IR tomorrow as well, patient is also noted to have elevated Alpha Feto protein, will call Oncology consult.       Anemia: no signs of active GI bleeding: check iron panel, B12, folate, repeat FOBT pending, Hb went down to 7  and got 1 units of PRBC transfusion, trend Hgb, will called hematology consult, could be due to Anemia of chronic disease in setting of malignancy, will monitor CBC.     Schizoaffective disorder: continue Seroquel.     History of seizures: continue Depakote    Hypothyroidism: continue synthroid, TSH normal.     DVT ppx: SCD's b/l. Hold pharm dvt ppx due to anemia and pending further FOBT.

## 2019-07-21 NOTE — PROGRESS NOTE ADULT - ASSESSMENT
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 Pt accompanied by [facility] staff and denies focal deficits. No obvious source/active bleeding at this time, Occult blood NEG. Pt with psych history and selectively mute. Not a great historian  Thoracic surgery consulted for mediastinal mass (CT scan: Large anterior mediastinal mass measuring 14.6 x 8.3 cm with mass effect upon the main pulmonary artery trunk which is narrowed, with lingular nodular masses measure up to 3 cm presumably related to metastatic lesions) found on diagnostic imaging. Pt seen and evaluated, chart reviewed. Pt lying in bed supine without obvious signs of cardiopulmonary distress. Vitals: HR 90 NSR, [id number], sat 96% RA. Dr Johnson contacted and informed, AM labs ordered and TTE ordered to rule out tamponade physiology. Pt is selectively mute and not responsive to ROS or able to actively engage in interview.

## 2019-07-21 NOTE — PROGRESS NOTE ADULT - PROBLEM SELECTOR PLAN 1
Large anterior mediastinal mass measuring 14.6 x 8.3 cm with mass effect upon the main pulmonary artery trunk, with lingular nodular masses measure up to 3 cm presumably related to metastatic lesions)  Pending TTE to r/o Tamponade physiology   Awaiting results for lab markers, trend H/H  Imaging reviewed by Dr. Johnson, possible metastatic mediastinal mass will need MRI of the chest & IR for possible guided biopsy for etiology   Continue supportive care as per primary team  Thoracic surgery to follow.  Discussed plan with Dr. Johnson.

## 2019-07-22 DIAGNOSIS — E03.9 HYPOTHYROIDISM, UNSPECIFIED: ICD-10-CM

## 2019-07-22 DIAGNOSIS — F25.0 SCHIZOAFFECTIVE DISORDER, BIPOLAR TYPE: ICD-10-CM

## 2019-07-22 PROCEDURE — 76870 US EXAM SCROTUM: CPT | Mod: 26

## 2019-07-22 PROCEDURE — 99232 SBSQ HOSP IP/OBS MODERATE 35: CPT

## 2019-07-22 RX ADMIN — QUETIAPINE FUMARATE 50 MILLIGRAM(S): 200 TABLET, FILM COATED ORAL at 17:19

## 2019-07-22 RX ADMIN — DIVALPROEX SODIUM 750 MILLIGRAM(S): 500 TABLET, DELAYED RELEASE ORAL at 17:21

## 2019-07-22 RX ADMIN — QUETIAPINE FUMARATE 200 MILLIGRAM(S): 200 TABLET, FILM COATED ORAL at 17:19

## 2019-07-22 RX ADMIN — Medication 250 MILLIGRAM(S): at 06:15

## 2019-07-22 RX ADMIN — Medication 150 MICROGRAM(S): at 06:14

## 2019-07-22 RX ADMIN — SODIUM CHLORIDE 100 MILLILITER(S): 9 INJECTION, SOLUTION INTRAVENOUS at 21:32

## 2019-07-22 RX ADMIN — QUETIAPINE FUMARATE 200 MILLIGRAM(S): 200 TABLET, FILM COATED ORAL at 06:15

## 2019-07-22 RX ADMIN — DIVALPROEX SODIUM 750 MILLIGRAM(S): 500 TABLET, DELAYED RELEASE ORAL at 06:14

## 2019-07-22 RX ADMIN — PIPERACILLIN AND TAZOBACTAM 25 GRAM(S): 4; .5 INJECTION, POWDER, LYOPHILIZED, FOR SOLUTION INTRAVENOUS at 06:15

## 2019-07-22 RX ADMIN — QUETIAPINE FUMARATE 50 MILLIGRAM(S): 200 TABLET, FILM COATED ORAL at 06:14

## 2019-07-22 RX ADMIN — PIPERACILLIN AND TAZOBACTAM 25 GRAM(S): 4; .5 INJECTION, POWDER, LYOPHILIZED, FOR SOLUTION INTRAVENOUS at 21:32

## 2019-07-22 RX ADMIN — PIPERACILLIN AND TAZOBACTAM 25 GRAM(S): 4; .5 INJECTION, POWDER, LYOPHILIZED, FOR SOLUTION INTRAVENOUS at 14:55

## 2019-07-22 RX ADMIN — Medication 250 MILLIGRAM(S): at 17:19

## 2019-07-22 NOTE — CONSULT NOTE ADULT - REASON FOR ADMISSION
Low H/H with increasing weakness/fatigue
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

## 2019-07-22 NOTE — PROGRESS NOTE ADULT - SUBJECTIVE AND OBJECTIVE BOX
SAMIRA NUGENT    [id number]    49y      Male    Patient is a 49y old  Male who presents with a chief complaint of Anemia and found to have a mediastinal mass (22 Jul 2019 10:17)      INTERVAL HPI/OVERNIGHT EVENTS:    As per nursing staff he has no fever any more, he was not having any SOB or cough.      REVIEW OF SYSTEMS:    Unable to get much info        Vital Signs Last 24 Hrs  T(C): 36.7 (22 Jul 2019 15:39), Max: 37.7 (22 Jul 2019 10:11)  T(F): 98.1 (22 Jul 2019 15:39), Max: 99.8 (22 Jul 2019 10:11)  HR: 89 (22 Jul 2019 15:39) (89 - 100)  BP: 86/56 (22 Jul 2019 15:39) (86/56 - 94/61)  BP(mean): --  RR: 16 (22 Jul 2019 15:39) (16 - 16)  SpO2: 94% (22 Jul 2019 15:39) (93% - 98%)    PHYSICAL EXAM:      GENERAL: Middle age male looking comfortable   HEENT: atraumatic   NECK: soft, Supple, No JVD,   CHEST/LUNG: Decrease air entry bilaterally; No wheezing  HEART: S1S2+, Regular rate and rhythm; No murmurs  ABDOMEN: Soft, Nontender, Nondistended; Bowel sounds present  EXTREMITIES:  1+ Peripheral Pulses, No edema  SKIN: No rashes or lesions  NEURO: Not participating        LABS:                        8.8    6.74  )-----------( 270      ( 21 Jul 2019 06:43 )             29.4     07-21    142  |  106  |  13.0  ----------------------------<  105  3.8   |  24.0  |  0.98    Ca    8.6      21 Jul 2019 06:43  Mg     2.0     07-21    TPro  6.8  /  Alb  2.4<L>  /  TBili  <0.2<L>  /  DBili  x   /  AST  19  /  ALT  25  /  AlkPhos  74  07-21    PT/INR - ( 21 Jul 2019 06:43 )   PT: 17.6 sec;   INR: 1.51 ratio         PTT - ( 21 Jul 2019 06:43 )  PTT:36.7 sec        I&O's Summary    21 Jul 2019 07:01  -  22 Jul 2019 07:00  --------------------------------------------------------  IN: 2300 mL / OUT: 301 mL / NET: 1999 mL        MEDICATIONS  (STANDING):  dextrose 5% + sodium chloride 0.9%. 1000 milliLiter(s) (100 mL/Hr) IV Continuous <Continuous>  diVALproex  milliGRAM(s) Oral two times a day  levothyroxine 150 MICROGram(s) Oral daily  piperacillin/tazobactam IVPB.. 3.375 Gram(s) IV Intermittent every 8 hours  QUEtiapine 200 milliGRAM(s) Oral two times a day  QUEtiapine 50 milliGRAM(s) Oral two times a day  saccharomyces boulardii 250 milliGRAM(s) Oral two times a day    MEDICATIONS  (PRN):  acetaminophen   Tablet .. 650 milliGRAM(s) Oral every 6 hours PRN Temp greater or equal to 38C (100.4F)

## 2019-07-22 NOTE — CONSULT NOTE ADULT - SUBJECTIVE AND OBJECTIVE BOX
SAMIRA NUGENT  49y  Male  [id number]      Patient is a 49y old  Male who presents with a chief complaint of Anemia and found to have a mediastinal mass (22 Jul 2019 17:27)    HPI:  Pt is a 50 yo M presenting from [facility] after having a drop in Hgb and lethargy/weakness. PMH Schizoaffective disorder, hx of seizures, anemia, bilateral hearing loss, hypothyroidism.   Patient is unable to provide any history. All history obtained from the medical record.  As per medical record, he has been having a progressively dropping Hgb it was 10.5 on 4/16 and on 7/16 it was 8.5, and also on 7/19 it was 7.9 in outpatient setting. He is noted to be weak and more lethargic than usual. No reported melena or BRBPR. No known past hx of anemia or GI bleed. His FOBT is negative x 1 in ED. He is unable to offer any history as stated above. Unable to obtain ROS from patient as he has selective mutism. He does follow commands however. In ED he was noted to have Hgb of 8.0 and also noted to have apical masslike nodules in lung and anterior mediastinal mass with mass effect. No past hx of cancer. He did have hx of plueral effusion and chest tube placement/VATS back in 2017. (20 Jul 2019 02:51)    PAST MEDICAL & SURGICAL HISTORY:  Hypothyroidism, unspecified type  Psychiatric disorder    FAMILY HISTORY:  No pertinent family history in first degree relatives    REVIEW OF SYSTEMS    Cannot be obtained  Patient  not talking  hx obtained from the chart.    PHYSICAL EXAM:  Looks comfortable  Positive pallor  No jaundice  Lungs: Clear  Heart: RR, + murmur  Abd: No HSM or other masses.    CBC Full  -  ( 21 Jul 2019 06:43 )  WBC Count : 6.74 K/uL  RBC Count : 3.39 M/uL  Hemoglobin : 8.8 g/dL  Hematocrit : 29.4 %  Platelet Count - Automated : 270 K/uL  Mean Cell Volume : 86.7 fl  Mean Cell Hemoglobin : 26.0 pg  Mean Cell Hemoglobin Concentration : 29.9 gm/dL  Auto Neutrophil # : x  Auto Lymphocyte # : x  Auto Monocyte # : x  Auto Eosinophil # : x  Auto Basophil # : x  Auto Neutrophil % : x  Auto Lymphocyte % : x  Auto Monocyte % : x  Auto Eosinophil % : x  Auto Basophil % : x    PT/INR - ( 21 Jul 2019 06:43 )   PT: 17.6 sec;   INR: 1.51 ratio         PTT - ( 21 Jul 2019 06:43 )  PTT:36.7 sec  21 Jul 2019 06:43    142    |  106    |  13.0   ----------------------------<  105    3.8     |  24.0   |  0.98     Ca    8.6        21 Jul 2019 06:43  Mg     2.0       21 Jul 2019 06:43    TPro  6.8    /  Alb  2.4    /  TBili  <0.2   /  DBili  x      /  AST  19     /  ALT  25     /  AlkPhos  74     21 Jul 2019 06:43    14/6 x 8.3 cm anterior mediastinal mass and lingular masses,  largest measuring  3 cm, ? of mets.    AFP 13-14,0000    R/O a germ cell tumor  Bx awaited.    Will also  check LDH    Thank you.

## 2019-07-22 NOTE — PROGRESS NOTE ADULT - SUBJECTIVE AND OBJECTIVE BOX
Subjective:    VITAL SIGNS  Vital Signs Last 24 Hrs  T(C): 36.6 (19 @ 06:08), Max: 37.2 (19 @ 15:36)  T(F): 97.9 (19 @ 06:08), Max: 98.9 (19 @ 15:36)  HR: 92 (19 @ 06:08) (92 - 100)  BP: 90/56 (19 @ 06:08) (90/56 - 111/59)  RR: 16 (19 @ 06:08) (16 - 18)  SpO2: 93% (19 @ 06:08) (93% - 96%)  on (O2)              Telemetry:    LVEF:     MEDICATIONS  acetaminophen   Tablet .. 650 milliGRAM(s) Oral every 6 hours PRN  dextrose 5% + sodium chloride 0.9%. 1000 milliLiter(s) IV Continuous <Continuous>  diVALproex  milliGRAM(s) Oral two times a day  levothyroxine 150 MICROGram(s) Oral daily  piperacillin/tazobactam IVPB.. 3.375 Gram(s) IV Intermittent every 8 hours  QUEtiapine 200 milliGRAM(s) Oral two times a day  QUEtiapine 50 milliGRAM(s) Oral two times a day  saccharomyces boulardii 250 milliGRAM(s) Oral two times a day      PHYSICAL EXAM  General: well nourished, well developed, no acute distress  Neurology: oriented to name, nonfocal, no gross deficits  Respiratory: Diminished bilaterally  CV: regular rate and rhythm, normal S1, S2  Abdomen: soft, nontender, nondistended, positive bowel sounds  Extremities: warm, well perfused. no edema. + DP pulses      I&O's Detail    2019 07:01  -  2019 07:00  --------------------------------------------------------  IN:    dextrose 5% + sodium chloride 0.9%.: 2200 mL    IV PiggyBack: 100 mL  Total IN: 2300 mL    OUT:    Voided: 301 mL  Total OUT: 301 mL    Total NET: 1999 mL          Weights:  Daily     Daily Weight in k.1 (2019 05:17)  Admit Wt: Drug Dosing Weight  Height (cm): 180.34 (2019 17:43)  Weight (kg): 72.06287734861921 (2019 17:43)  BMI (kg/m2): 22.3 (2019 17:43)  BSA (m2): 1.92 (2019 17:43)    LABS      142  |  106  |  13.0  ----------------------------<  105  3.8   |  24.0  |  0.98    Ca    8.6      2019 06:43  Mg     2.0         TPro  6.8  /  Alb  2.4<L>  /  TBili  <0.2<L>  /  DBili  x   /  AST  19  /  ALT  25  /  AlkPhos  74                                   8.8    6.74  )-----------( 270      ( 2019 06:43 )             29.4          PT/INR - ( 2019 06:43 )   PT: 17.6 sec;   INR: 1.51 ratio         PTT - ( 2019 06:43 )  PTT:36.7 sec        CAPILLARY BLOOD GLUCOSE               Today's CXR:< from: Xray Chest 1 View- PORTABLE-Urgent (19 @ 16:33) >  EXAM:  XR CHEST PORTABLE URGENT 1V                          PROCEDURE DATE:  2019          INTERPRETATION:  Exam Date: 2019 4:33 PM    History: Mediastinal mass    Technique: Single frontal portable view of the chest with comparison to  2019    Findings:    The heart is normal in size. Large mediastinal mass extending into the   left hemithorax. Questionable left lower lobe nodular density new from   prior exam. No definite pneumothorax.. The apices and hemidiaphragms are   unremarkable. Degenerative changes of the visualized osseous structures.        Impression:    Persistent mediastinal mass with questionable new left lower lobe nodular   density.    JULIO HARRIS M.D., ATTENDING RADIOLOGIST  This document has been electronically signed. 2019 10:13AM    < end of copied text >    < from: CT Angio Chest w/ IV Cont (19 @ 20:52) >  EXAM:  CT ANGIO ABD PELV (W)AW IC                         EXAM:  CT ANGIO CHEST (W)AW IC                          PROCEDURE DATE:  2019          INTERPRETATION:  [facility] RADIOLOGIST PRELIMINARY REPORT  Reviewed by NAN Chang M.D.  EXAM:    CT Angiography Chest Without And With Contrast     EXAM DATE/TIME:    2019 8:31 PM     CLINICAL HISTORY:    49 years old, male; Angina     TECHNIQUE:    Imaging protocol: Axial computed tomographic angiography images of the   chest   without and with intravenous contrast using CT angiography protocol.   Coronal   and sagittal reformatted images were created and reviewed.    3D rendering: MIP reconstructed images were created and reviewed.     COMPARISON:    CT CHEST 2017 5:48 PM     FINDINGS:    Pulmonary arteries: Normal. No pulmonary emboli.    Aorta: Unremarkable. No aortic aneurysm. No aortic dissection.    Lungs: Apical masslike nodules within the lingula measuring 1.6 and 3.0   cm.   Mild/moderate atelectatic changes lung bases.    Pleural space: Unremarkable. No pneumothorax. No pleural effusion.    Heart: Tiny pericardial effusion.    Mediastinum: Large mass seen in the anterior mediastinum measuring 14.6   x 8.3   cm. Marked mass effect along the main pulmonary artery trunkwhich is   narrowed   secondary to anterior mediastinal mass.    Lymph nodes: Unremarkable. No enlarged lymph nodes.    Bones/joints: Unremarkable. No acute fracture.    Soft tissues: Unremarkable.     IMPRESSION:   1. Thoracic aorta normal caliber without aneurysm or dissection.   2. Large anterior mediastinal mass measuring 14.6 x 8.3 cm with mass   effect   upon the main pulmonary artery trunk which is narrowed. No evidence of   pulmonary embolism. This mass could be related to underlying thymic mass.   3. A couple of lingular nodular masses measure up to 3 cm presumably   related to   metastatic lesions.     Findings discussed with Dr. Bailey 10:30 PM on 19.      < end of copied text >        PAST MEDICAL & SURGICAL HISTORY:  Hypothyroidism, unspecified type  Psychiatric disorder

## 2019-07-22 NOTE — CONSULT NOTE ADULT - SUBJECTIVE AND OBJECTIVE BOX
chart reviewed and [facility] transfer packet appreciated patient is mute unable to provide history  PAST MEDICAL & SURGICAL HISTORY:  Hypothyroidism, unspecified type  Psychiatric disorder schizoaffective DISORDER   Vital Signs Last 24 Hrs  T(C): 36.6 (22 Jul 2019 06:08), Max: 37.2 (21 Jul 2019 15:36)  T(F): 97.9 (22 Jul 2019 06:08), Max: 98.9 (21 Jul 2019 15:36)  HR: 92 (22 Jul 2019 06:08) (92 - 100)  BP: 90/56 (22 Jul 2019 06:08) (90/56 - 111/59)  BP(mean): --  RR: 16 (22 Jul 2019 06:08) (16 - 18)  SpO2: 93% (22 Jul 2019 06:08) (93% - 96%)  MEDICATIONS  (STANDING):  dextrose 5% + sodium chloride 0.9%. 1000 milliLiter(s) (100 mL/Hr) IV Continuous <Continuous>  diVALproex  milliGRAM(s) Oral two times a day  levothyroxine 150 MICROGram(s) Oral daily  piperacillin/tazobactam IVPB.. 3.375 Gram(s) IV Intermittent every 8 hours  QUEtiapine 200 milliGRAM(s) Oral two times a day  QUEtiapine 50 milliGRAM(s) Oral two times a day  saccharomyces boulardii 250 milliGRAM(s) Oral two times a day                        8.8    6.74  )-----------( 270      ( 21 Jul 2019 06:43 )             29.4     07-21    142  |  106  |  13.0  ----------------------------<  105  3.8   |  24.0  |  0.98    Ca    8.6      21 Jul 2019 06:43  Mg     2.0     07-21    TPro  6.8  /  Alb  2.4<L>  /  TBili  <0.2<L>  /  DBili  x   /  AST  19  /  ALT  25  /  AlkPhos  74  07-21    LIVER FUNCTIONS - ( 21 Jul 2019 06:43 )  Alb: 2.4 g/dL / Pro: 6.8 g/dL / ALK PHOS: 74 U/L / ALT: 25 U/L / AST: 19 U/L / GGT: x           PT/INR - ( 21 Jul 2019 06:43 )   PT: 17.6 sec;   INR: 1.51 ratio         PTT - ( 21 Jul 2019 06:43 )  PTT:36.7 sec

## 2019-07-22 NOTE — CONSULT NOTE ADULT - PROBLEM SELECTOR RECOMMENDATION 9
impaired decision making- needs surrogate consent for procedures- advise involvement of family- continue all meds per [facility]

## 2019-07-22 NOTE — PROGRESS NOTE ADULT - ASSESSMENT
Pt is a 48 yo M presenting from [facility] after having a drop in Hgb and lethargy/weakness. PMH Schizoaffective disorder, hx of seizures, anemia, bilateral hearing loss, hypothyroidism.    Anterior mediastinal mass: thoracic surgery consulted, hemodynamically stable, he had borderline hypotension earlier but now improved after IVF, monitored bed with , no arrhythmias d/c cardiac monitor, TTE done  showed Normal global left ventricular systolic function, Left ventricular ejection fraction, by visual estimation, is 60 to 65%, Moderately enlarged right ventricle, Moderately reduced RV systolic function, There appears to be compresion of LA from an extracardiac structure, There is increased velocity across PA, probably related to compression of PA from an extra-cardiac structure. Pulmonic stenosis is unlikely since it was not present on his prior echos, estimated pulmonary artery systolic pressure is 67.0 mmHg assuming a right atrial pressure of 3 mmHg, which is consistent with severe pulmonary hypertension, Small pericardial effusion, CT report shows Large anterior mediastinal mass measuring 14.6 x 8.3 cm with mass effect upon the main pulmonary artery trunk which is narrowed, with possible lingular nodular masses measure up to 3 cm presumably related to metastatic lesions, Thoracic aorta normal caliber without aneurysm or dissection, Large anterior mediastinal mass measuring 14.6 x 8.3 cm with mass effect, upon the main pulmonary artery trunk which is narrowed. No evidence of pulmonary embolism. This mass could be related to underlying thymic mass,  A couple of lingular nodular masses measure up to 3 cm presumably related to metastatic lesions, as per CT surgery patient might need an MRI, discussed with IR patient is going for the biopsy tomorrow, will keep him NPO midnight, as per Radiologist will further decided if patient need MRI after the biosy, patient is also noted to have elevated Alpha Feto protein, Oncology consulted Dr Emery will see the patient, will get US testicles to r/o testicular mass.     Anemia: no signs of active GI bleeding: check iron panel, B12, folate, repeat FOBT pending, Hb went down to 7  and got 1 units of PRBC transfusion, trend Hgb, will called hematology consult, could be due to Anemia of chronic disease in setting of malignancy, will monitor CBC.     Schizoaffective disorder: continue Seroquel.     History of seizures: continue Depakote    Hypothyroidism: continue synthroid, TSH normal.     DVT ppx: SCD's b/l. Hold pharm dvt ppx due to anemia and pending further FOBT.

## 2019-07-22 NOTE — PROGRESS NOTE ADULT - SUBJECTIVE AND OBJECTIVE BOX
Patient's chart reviewed. Serum AFP is very high. This is indicative of malignant germ cell tumor. The origin of malignant germ cell tumor can be gonadal or extra gonadal.  I will have my team do the followin) Order scrotal ultrasound for further workup to see if this tumor started in the chest or in the gonads   2) I discussed the case with Dr. Joyce this morning. Patient will be NPO after midnight for IR core needle biopsy for tomorrow, repeat coags, hold dvt prophylaxsis for tomorrow morning.   3) Will obtain POA contact information and make sure its on the chart   4) Will review the scan with radiology to see MRI would be helpful in determining if this is invading the major vascular structures   5) IR procedure/MRI will be challenging due to the psych issues and also because Patient is deaf and cannot communicate         Kia Johnson MD   Thoracic Surgery Staff

## 2019-07-22 NOTE — PROGRESS NOTE ADULT - ASSESSMENT
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 Pt accompanied by [facility] staff and denies focal deficits. No obvious source/active bleeding at this time, Occult blood NEG. Pt with psych history and selectively mute. Not a great historian  Thoracic surgery consulted for mediastinal mass (CT scan: Large anterior mediastinal mass measuring 14.6 x 8.3 cm with mass effect upon the main pulmonary artery trunk which is narrowed, with lingular nodular masses measure up to 3 cm presumably related to metastatic lesions) found on diagnostic imaging. Pt seen and evaluated, chart reviewed. Pt lying in bed supine without obvious signs of cardiopulmonary distress. Vitals: HR 90 NSR, [id number], sat 96% RA. Dr Johnson contacted and informed, AM labs ordered and TTE ordered to rule out tamponade physiology. Pt is selectively mute and not responsive to ROS or able to actively engage in interview.

## 2019-07-23 LAB
ANION GAP SERPL CALC-SCNC: 10 MMOL/L — SIGNIFICANT CHANGE UP (ref 5–17)
BLD GP AB SCN SERPL QL: SIGNIFICANT CHANGE UP
BUN SERPL-MCNC: 8 MG/DL — SIGNIFICANT CHANGE UP (ref 8–20)
CALCIUM SERPL-MCNC: 8.6 MG/DL — SIGNIFICANT CHANGE UP (ref 8.6–10.2)
CHLORIDE SERPL-SCNC: 105 MMOL/L — SIGNIFICANT CHANGE UP (ref 98–107)
CO2 SERPL-SCNC: 24 MMOL/L — SIGNIFICANT CHANGE UP (ref 22–29)
CREAT SERPL-MCNC: 0.84 MG/DL — SIGNIFICANT CHANGE UP (ref 0.5–1.3)
GLUCOSE SERPL-MCNC: 85 MG/DL — SIGNIFICANT CHANGE UP (ref 70–115)
HCT VFR BLD CALC: 28.1 % — LOW (ref 39–50)
HGB BLD-MCNC: 8.5 G/DL — LOW (ref 13–17)
INR BLD: 1.58 RATIO — HIGH (ref 0.88–1.16)
MCHC RBC-ENTMCNC: 26.3 PG — LOW (ref 27–34)
MCHC RBC-ENTMCNC: 30.2 GM/DL — LOW (ref 32–36)
MCV RBC AUTO: 87 FL — SIGNIFICANT CHANGE UP (ref 80–100)
PLATELET # BLD AUTO: 326 K/UL — SIGNIFICANT CHANGE UP (ref 150–400)
POTASSIUM SERPL-MCNC: 3.8 MMOL/L — SIGNIFICANT CHANGE UP (ref 3.5–5.3)
POTASSIUM SERPL-SCNC: 3.8 MMOL/L — SIGNIFICANT CHANGE UP (ref 3.5–5.3)
PROTHROM AB SERPL-ACNC: 18.4 SEC — HIGH (ref 10–12.9)
RBC # BLD: 3.23 M/UL — LOW (ref 4.2–5.8)
RBC # FLD: 15.9 % — HIGH (ref 10.3–14.5)
SODIUM SERPL-SCNC: 139 MMOL/L — SIGNIFICANT CHANGE UP (ref 135–145)
WBC # BLD: 6.73 K/UL — SIGNIFICANT CHANGE UP (ref 3.8–10.5)
WBC # FLD AUTO: 6.73 K/UL — SIGNIFICANT CHANGE UP (ref 3.8–10.5)

## 2019-07-23 PROCEDURE — 32405: CPT | Mod: 53

## 2019-07-23 PROCEDURE — 99232 SBSQ HOSP IP/OBS MODERATE 35: CPT

## 2019-07-23 PROCEDURE — 77012 CT SCAN FOR NEEDLE BIOPSY: CPT | Mod: 26

## 2019-07-23 RX ORDER — PHYTONADIONE (VIT K1) 5 MG
2.5 TABLET ORAL ONCE
Refills: 0 | Status: COMPLETED | OUTPATIENT
Start: 2019-07-23 | End: 2019-07-23

## 2019-07-23 RX ADMIN — Medication 150 MICROGRAM(S): at 06:25

## 2019-07-23 RX ADMIN — Medication 2.5 MILLIGRAM(S): at 17:59

## 2019-07-23 RX ADMIN — QUETIAPINE FUMARATE 200 MILLIGRAM(S): 200 TABLET, FILM COATED ORAL at 17:59

## 2019-07-23 RX ADMIN — QUETIAPINE FUMARATE 50 MILLIGRAM(S): 200 TABLET, FILM COATED ORAL at 18:00

## 2019-07-23 RX ADMIN — QUETIAPINE FUMARATE 200 MILLIGRAM(S): 200 TABLET, FILM COATED ORAL at 06:25

## 2019-07-23 RX ADMIN — PIPERACILLIN AND TAZOBACTAM 25 GRAM(S): 4; .5 INJECTION, POWDER, LYOPHILIZED, FOR SOLUTION INTRAVENOUS at 06:31

## 2019-07-23 RX ADMIN — Medication 250 MILLIGRAM(S): at 17:59

## 2019-07-23 RX ADMIN — DIVALPROEX SODIUM 750 MILLIGRAM(S): 500 TABLET, DELAYED RELEASE ORAL at 06:25

## 2019-07-23 RX ADMIN — SODIUM CHLORIDE 100 MILLILITER(S): 9 INJECTION, SOLUTION INTRAVENOUS at 13:23

## 2019-07-23 RX ADMIN — DIVALPROEX SODIUM 750 MILLIGRAM(S): 500 TABLET, DELAYED RELEASE ORAL at 18:00

## 2019-07-23 RX ADMIN — QUETIAPINE FUMARATE 50 MILLIGRAM(S): 200 TABLET, FILM COATED ORAL at 06:25

## 2019-07-23 NOTE — PROGRESS NOTE ADULT - SUBJECTIVE AND OBJECTIVE BOX
SAMIRA NUGENT  49y  Male  [id number]      Patient is a 49y old  Male who presents with a chief complaint of Anemia and found to have a mediastinal mass (22 Jul 2019 17:27)    HPI:  Pt is a 48 yo M presenting from [facility] after having a drop in Hgb and lethargy/weakness. PMH Schizoaffective disorder, hx of seizures, anemia, bilateral hearing loss, hypothyroidism.   Patient is unable to provide any history. All history obtained from the medical record.  As per medical record, he has been having a progressively dropping Hgb it was 10.5 on 4/16 and on 7/16 it was 8.5, and also on 7/19 it was 7.9 in outpatient setting. He is noted to be weak and more lethargic than usual. No reported melena or BRBPR. No known past hx of anemia or GI bleed. His FOBT is negative x 1 in ED. He is unable to offer any history as stated above. Unable to obtain ROS from patient as he has selective mutism. He does follow commands however. In ED he was noted to have Hgb of 8.0 and also noted to have apical masslike nodules in lung and anterior mediastinal mass with mass effect. No past hx of cancer. He did have hx of plueral effusion and chest tube placement/VATS back in 2017. (20 Jul 2019 02:51)    PAST MEDICAL & SURGICAL HISTORY:  Hypothyroidism, unspecified type  Psychiatric disorder    FAMILY HISTORY:  No pertinent family history in first degree relatives    REVIEW OF SYSTEMS    Cannot be obtained  Patient  not talking  hx obtained from the chart.    PHYSICAL EXAM:  Looks comfortable  Positive pallor  No jaundice  Lungs: Clear  Heart: RR, + murmur  Abd: No HSM or other masses.    CBC Full  -  ( 21 Jul 2019 06:43 )  WBC Count : 6.74 K/uL  RBC Count : 3.39 M/uL  Hemoglobin : 8.8 g/dL  Hematocrit : 29.4 %  Platelet Count - Automated : 270 K/uL  Mean Cell Volume : 86.7 fl  Mean Cell Hemoglobin : 26.0 pg  Mean Cell Hemoglobin Concentration : 29.9 gm/dL  Auto Neutrophil # : x  Auto Lymphocyte # : x  Auto Monocyte # : x  Auto Eosinophil # : x  Auto Basophil # : x  Auto Neutrophil % : x  Auto Lymphocyte % : x  Auto Monocyte % : x  Auto Eosinophil % : x  Auto Basophil % : x    PT/INR - ( 21 Jul 2019 06:43 )   PT: 17.6 sec;   INR: 1.51 ratio         PTT - ( 21 Jul 2019 06:43 )  PTT:36.7 sec  21 Jul 2019 06:43    142    |  106    |  13.0   ----------------------------<  105    3.8     |  24.0   |  0.98     Ca    8.6        21 Jul 2019 06:43  Mg     2.0       21 Jul 2019 06:43    TPro  6.8    /  Alb  2.4    /  TBili  <0.2   /  DBili  x      /  AST  19     /  ALT  25     /  AlkPhos  74     21 Jul 2019 06:43    CBC Full  -  ( 23 Jul 2019 09:49 )  WBC Count : 6.73 K/uL  RBC Count : 3.23 M/uL  Hemoglobin : 8.5 g/dL  Hematocrit : 28.1 %  Platelet Count - Automated : 326 K/uL  Mean Cell Volume : 87.0 fl  Mean Cell Hemoglobin : 26.3 pg  Mean Cell Hemoglobin Concentration : 30.2 gm/dL  Auto Neutrophil # : x  Auto Lymphocyte # : x  Auto Monocyte # : x  Auto Eosinophil # : x  Auto Basophil # : x  Auto Neutrophil % : x  Auto Lymphocyte % : x  Auto Monocyte % : x  Auto Eosinophil % : x  Auto Basophil % : x    23 Jul 2019 09:49    139    |  105    |  8.0    ----------------------------<  85     3.8     |  24.0   |  0.84     Ca    8.6        23 Jul 2019 09:49    PT/INR - ( 23 Jul 2019 09:49 )   PT: 18.4 sec;   INR: 1.58 ratio         14/6 x 8.3 cm anterior mediastinal mass and lingular masses,  largest measuring  3 cm, ? of mets.    AFP 13-14,0000    R/O a germ cell tumor  Bx awaited.  Likely anemia due to malignancy  no evidence of bleeding and clinical picture not c/w it either  Agree with a trial of vit K for slightly elevated INR  if need complete correction, give 4 units of FFP    Will also  check LDH    Thank you.

## 2019-07-23 NOTE — CHART NOTE - NSCHARTNOTEFT_GEN_A_CORE
Patient currently in IR undergoing biopsy. Discussed with Dr Johnson patient case and plan. On CT scan, it appears the mass is invading the main pulmonary artery, which would exclude surgical intervention from the initial treatment. L scrotal US negative for malignancy. Await biopsy results, heme/onc already following.
Patient is seen and evaluated, waiting for the TTE, patient was noted to have fever spike, blood cultures obtained, on IV fluids, will start patient on zosyn, will continue with current meds.
Pt unable to be adequately sedated by anesthesia for mediastinal mass biopsy. Procedure aborted.    Can be re-attempted with general anesthesia.

## 2019-07-23 NOTE — PROGRESS NOTE ADULT - SUBJECTIVE AND OBJECTIVE BOX
SAMIRA NUGENT    [id number]    49y      Male    Patient is a 49y old  Male who presents with a chief complaint of Anemia and found to have a mediastinal mass (23 Jul 2019 07:54)      INTERVAL HPI/OVERNIGHT EVENTS:      As per nursing staff he has no fever any more, he was not having any SOB or cough, patient not speaking at his baseline      REVIEW OF SYSTEMS:    Unable to get much info    Vital Signs Last 24 Hrs  T(C): 37 (23 Jul 2019 08:54), Max: 37 (23 Jul 2019 08:54)  T(F): 98.6 (23 Jul 2019 08:54), Max: 98.6 (23 Jul 2019 08:54)  HR: 88 (23 Jul 2019 08:54) (66 - 91)  BP: 90/52 (23 Jul 2019 08:54) (86/56 - 108/60)  RR: 18 (23 Jul 2019 08:54) (16 - 18)  SpO2: 99% (23 Jul 2019 08:54) (92% - 99%)    PHYSICAL EXAM:    GENERAL: Middle age male looking comfortable   HEENT: atraumatic   NECK: soft, Supple, No JVD,   CHEST/LUNG: Decrease air entry bilaterally; No wheezing  HEART: S1S2+, Regular rate and rhythm; No murmurs  ABDOMEN: Soft, Nontender, Nondistended; Bowel sounds present  EXTREMITIES:  1+ Peripheral Pulses, No edema  SKIN: No rashes or lesions  NEURO: Not participating    LABS:                        8.5    6.73  )-----------( 326      ( 23 Jul 2019 09:49 )             28.1     07-23    139  |  105  |  8.0  ----------------------------<  85  3.8   |  24.0  |  0.84    Ca    8.6      23 Jul 2019 09:49      PT/INR - ( 23 Jul 2019 09:49 )   PT: 18.4 sec;   INR: 1.58 ratio                 I&O's Summary    22 Jul 2019 07:01  -  23 Jul 2019 07:00  --------------------------------------------------------  IN: 300 mL / OUT: 0 mL / NET: 300 mL        MEDICATIONS  (STANDING):  dextrose 5% + sodium chloride 0.9%. 1000 milliLiter(s) (100 mL/Hr) IV Continuous <Continuous>  diVALproex  milliGRAM(s) Oral two times a day  levothyroxine 150 MICROGram(s) Oral daily  QUEtiapine 200 milliGRAM(s) Oral two times a day  QUEtiapine 50 milliGRAM(s) Oral two times a day  saccharomyces boulardii 250 milliGRAM(s) Oral two times a day    MEDICATIONS  (PRN):  acetaminophen   Tablet .. 650 milliGRAM(s) Oral every 6 hours PRN Temp greater or equal to 38C (100.4F)

## 2019-07-23 NOTE — PROGRESS NOTE ADULT - SUBJECTIVE AND OBJECTIVE BOX
no interval changes Chart reviewed Heme-Onc note appreciated Patient MUTE  Vital Signs Last 24 Hrs  T(C): 36.4 (23 Jul 2019 04:26), Max: 37.7 (22 Jul 2019 10:11)  T(F): 97.6 (23 Jul 2019 04:26), Max: 99.8 (22 Jul 2019 10:11)  HR: 66 (23 Jul 2019 05:17) (66 - 99)  BP: 108/60 (23 Jul 2019 04:26) (86/56 - 108/60)  BP(mean): --  RR: 16 (23 Jul 2019 05:17) (16 - 16)  SpO2: 93% (23 Jul 2019 04:26) (92% - 98%)  MEDICATIONS  (STANDING):  dextrose 5% + sodium chloride 0.9%. 1000 milliLiter(s) (100 mL/Hr) IV Continuous <Continuous>  diVALproex  milliGRAM(s) Oral two times a day  levothyroxine 150 MICROGram(s) Oral daily  piperacillin/tazobactam IVPB.. 3.375 Gram(s) IV Intermittent every 8 hours  QUEtiapine 200 milliGRAM(s) Oral two times a day  QUEtiapine 50 milliGRAM(s) Oral two times a day  saccharomyces boulardii 250 milliGRAM(s) Oral two times a day

## 2019-07-23 NOTE — PROGRESS NOTE ADULT - ASSESSMENT
Pt is a 48 yo M presenting from [facility] after having a drop in Hgb and lethargy/weakness. PMH Schizoaffective disorder, hx of seizures, anemia, bilateral hearing loss, hypothyroidism.    Anterior mediastinal mass: thoracic surgery consulted, hemodynamically stable, he had borderline hypotension earlier but now improved after IVF, monitored bed with , no arrhythmias d/c cardiac monitor, TTE done  showed Normal global left ventricular systolic function, Left ventricular ejection fraction, by visual estimation, is 60 to 65%, Moderately enlarged right ventricle, Moderately reduced RV systolic function, There appears to be compresion of LA from an extracardiac structure, There is increased velocity across PA, probably related to compression of PA from an extra-cardiac structure. Pulmonic stenosis is unlikely since it was not present on his prior echos, estimated pulmonary artery systolic pressure is 67.0 mmHg assuming a right atrial pressure of 3 mmHg, which is consistent with severe pulmonary hypertension, Small pericardial effusion, CT report shows Large anterior mediastinal mass measuring 14.6 x 8.3 cm with mass effect upon the main pulmonary artery trunk which is narrowed, with possible lingular nodular masses measure up to 3 cm presumably related to metastatic lesions, Thoracic aorta normal caliber without aneurysm or dissection, Large anterior mediastinal mass measuring 14.6 x 8.3 cm with mass effect, upon the main pulmonary artery trunk which is narrowed. No evidence of pulmonary embolism. This mass could be related to underlying thymic mass,  A couple of lingular nodular masses measure up to 3 cm presumably related to metastatic lesions, as per CT surgery patient might need an MRI, discussed with IR patient is going for the biopsy today, will keep him NPO,  as per Radiologist will further decided if patient need MRI after the biosy, patient is also noted to have elevated Alpha Feto protein, Oncology consulted appreciated, US testicles done showed No evidence of testicular mass, No evidence of testicular torsion, mild right hydrocele, Mild left-sided varicocele.       Anemia: no signs of active GI bleeding: check iron panel, B12, folate, repeat FOBT pending, Hb went down to 7  and got 1 units of PRBC transfusion, trend Hgb, will called hematology consult, could be due to Anemia of chronic disease in setting of malignancy, will monitor CBC.     Schizoaffective disorder: continue Seroquel.     History of seizures: continue Depakote    Hypothyroidism: continue synthroid, TSH normal.     Mildly elevated INR, likely from vitamin K deficiency, his LFTs are WNL, will give low dose vitamin k and will see PT.    DVT ppx: SCD's b/l. Hold pharm dvt ppx due to anemia and pending further FOBT.

## 2019-07-23 NOTE — PROGRESS NOTE ADULT - SUBJECTIVE AND OBJECTIVE BOX
Vascular & Interventional Radiology Pre-Procedure Note    Procedure Name: _______    HPI: 49y Male with _______    Allergies:   Medications:    piperacillin/tazobactam IVPB..: 25 mL/Hr IV Intermittent (07-23 @ 06:31)    Data:    T(C): 37  HR: 88  BP: 90/52  RR: 18  SpO2: 99%    -WBC 6.73 / HgB 8.5 / Hct 28.1 / Plt 326  -Na 139 / Cl 105 / BUN 8.0 / Glucose 85  -K 3.8 / CO2 24.0 / Cr 0.84  -ALT -- / Alk Phos -- / T.Bili --  -INR1.58    Imaging: _______    Plan:   -49y Male presents for _______  -Risks/Benefits/alternatives explained with the patient and/or healthcare proxy and witnessed informed consent obtained. Vascular & Interventional Radiology Pre-Procedure Note    Procedure Name:  Mediastinal mass biopsy.    HPI: 49y Male with  large mediastinal mass, for biopsy.    Allergies:  none  Medications:    piperacillin/tazobactam IVPB..: 25 mL/Hr IV Intermittent (07-23 @ 06:31)    Data:    T(C): 37  HR: 88  BP: 90/52  RR: 18  SpO2: 99%    -WBC 6.73 / HgB 8.5 / Hct 28.1 / Plt 326  -Na 139 / Cl 105 / BUN 8.0 / Glucose 85  -K 3.8 / CO2 24.0 / Cr 0.84  -ALT -- / Alk Phos -- / T.Bili --  -INR1.58    Imaging:  reviewed    Plan:   49y Male presents for  mediastinal mass biopsy.    -Risks/Benefits/alternatives explained with the healthcare proxy and witnessed informed consent obtained.

## 2019-07-24 ENCOUNTER — RESULT REVIEW (OUTPATIENT)
Age: 49
End: 2019-07-24

## 2019-07-24 LAB
ANION GAP SERPL CALC-SCNC: 13 MMOL/L — SIGNIFICANT CHANGE UP (ref 5–17)
BUN SERPL-MCNC: 9 MG/DL — SIGNIFICANT CHANGE UP (ref 8–20)
CALCIUM SERPL-MCNC: 9.4 MG/DL — SIGNIFICANT CHANGE UP (ref 8.6–10.2)
CHLORIDE SERPL-SCNC: 105 MMOL/L — SIGNIFICANT CHANGE UP (ref 98–107)
CO2 SERPL-SCNC: 22 MMOL/L — SIGNIFICANT CHANGE UP (ref 22–29)
CREAT SERPL-MCNC: 0.81 MG/DL — SIGNIFICANT CHANGE UP (ref 0.5–1.3)
GLUCOSE SERPL-MCNC: 77 MG/DL — SIGNIFICANT CHANGE UP (ref 70–115)
HCT VFR BLD CALC: 34 % — LOW (ref 39–50)
HGB BLD-MCNC: 9.7 G/DL — LOW (ref 13–17)
INR BLD: 1.55 RATIO — HIGH (ref 0.88–1.16)
MCHC RBC-ENTMCNC: 25.9 PG — LOW (ref 27–34)
MCHC RBC-ENTMCNC: 28.5 GM/DL — LOW (ref 32–36)
MCV RBC AUTO: 90.7 FL — SIGNIFICANT CHANGE UP (ref 80–100)
PLATELET # BLD AUTO: 275 K/UL — SIGNIFICANT CHANGE UP (ref 150–400)
POTASSIUM SERPL-MCNC: 3.7 MMOL/L — SIGNIFICANT CHANGE UP (ref 3.5–5.3)
POTASSIUM SERPL-SCNC: 3.7 MMOL/L — SIGNIFICANT CHANGE UP (ref 3.5–5.3)
PROTHROM AB SERPL-ACNC: 18.1 SEC — HIGH (ref 10–12.9)
RBC # BLD: 3.75 M/UL — LOW (ref 4.2–5.8)
RBC # FLD: 16.1 % — HIGH (ref 10.3–14.5)
SODIUM SERPL-SCNC: 140 MMOL/L — SIGNIFICANT CHANGE UP (ref 135–145)
WBC # BLD: 6.19 K/UL — SIGNIFICANT CHANGE UP (ref 3.8–10.5)
WBC # FLD AUTO: 6.19 K/UL — SIGNIFICANT CHANGE UP (ref 3.8–10.5)

## 2019-07-24 PROCEDURE — 71045 X-RAY EXAM CHEST 1 VIEW: CPT | Mod: 26

## 2019-07-24 PROCEDURE — 99231 SBSQ HOSP IP/OBS SF/LOW 25: CPT

## 2019-07-24 PROCEDURE — 88305 TISSUE EXAM BY PATHOLOGIST: CPT | Mod: 26

## 2019-07-24 PROCEDURE — 32405: CPT

## 2019-07-24 PROCEDURE — 88333 PATH CONSLTJ SURG CYTO XM 1: CPT | Mod: 26

## 2019-07-24 PROCEDURE — 99232 SBSQ HOSP IP/OBS MODERATE 35: CPT

## 2019-07-24 PROCEDURE — 77012 CT SCAN FOR NEEDLE BIOPSY: CPT | Mod: 26

## 2019-07-24 RX ORDER — SODIUM CHLORIDE 9 MG/ML
1000 INJECTION, SOLUTION INTRAVENOUS
Refills: 0 | Status: DISCONTINUED | OUTPATIENT
Start: 2019-07-24 | End: 2019-07-24

## 2019-07-24 RX ORDER — FENTANYL CITRATE 50 UG/ML
50 INJECTION INTRAVENOUS
Refills: 0 | Status: DISCONTINUED | OUTPATIENT
Start: 2019-07-24 | End: 2019-07-24

## 2019-07-24 RX ORDER — ONDANSETRON 8 MG/1
4 TABLET, FILM COATED ORAL ONCE
Refills: 0 | Status: DISCONTINUED | OUTPATIENT
Start: 2019-07-24 | End: 2019-07-24

## 2019-07-24 RX ADMIN — Medication 250 MILLIGRAM(S): at 06:14

## 2019-07-24 RX ADMIN — DIVALPROEX SODIUM 750 MILLIGRAM(S): 500 TABLET, DELAYED RELEASE ORAL at 06:14

## 2019-07-24 RX ADMIN — Medication 150 MICROGRAM(S): at 06:14

## 2019-07-24 RX ADMIN — DIVALPROEX SODIUM 750 MILLIGRAM(S): 500 TABLET, DELAYED RELEASE ORAL at 17:17

## 2019-07-24 RX ADMIN — QUETIAPINE FUMARATE 200 MILLIGRAM(S): 200 TABLET, FILM COATED ORAL at 17:17

## 2019-07-24 RX ADMIN — SODIUM CHLORIDE 100 MILLILITER(S): 9 INJECTION, SOLUTION INTRAVENOUS at 22:01

## 2019-07-24 RX ADMIN — Medication 250 MILLIGRAM(S): at 17:17

## 2019-07-24 RX ADMIN — QUETIAPINE FUMARATE 50 MILLIGRAM(S): 200 TABLET, FILM COATED ORAL at 17:17

## 2019-07-24 RX ADMIN — QUETIAPINE FUMARATE 200 MILLIGRAM(S): 200 TABLET, FILM COATED ORAL at 06:14

## 2019-07-24 RX ADMIN — QUETIAPINE FUMARATE 50 MILLIGRAM(S): 200 TABLET, FILM COATED ORAL at 06:14

## 2019-07-24 NOTE — PROGRESS NOTE ADULT - ASSESSMENT
49M with anemia and mediastinal pass.  Patient of [facility].  Underwent mediastinal biopsy today.  Patient stable, NAD.

## 2019-07-24 NOTE — PROGRESS NOTE ADULT - PROBLEM SELECTOR PLAN 1
primary team asked about MRI, d/w Dr. Portillo states it is not necessary.   CXR today r/o ptx  care per primary team.

## 2019-07-24 NOTE — PROGRESS NOTE ADULT - SUBJECTIVE AND OBJECTIVE BOX
chart reviewed and behaviors discussed w RN Could not tolerate biopsy rescheduled for today Inappropriate behaviors i.e. masturbating reported remains MUTE  Vital Signs Last 24 Hrs  T(C): 36.7 (23 Jul 2019 23:41), Max: 37 (23 Jul 2019 08:54)  T(F): 98 (23 Jul 2019 23:41), Max: 98.6 (23 Jul 2019 08:54)  HR: 68 (23 Jul 2019 23:41) (68 - 88)  BP: 87/57 (23 Jul 2019 23:41) (87/57 - 90/52)  BP(mean): --  RR: 18 (23 Jul 2019 23:41) (18 - 18)  SpO2: 96% (23 Jul 2019 23:41) (96% - 99%)  MEDICATIONS  (STANDING):  dextrose 5% + sodium chloride 0.9%. 1000 milliLiter(s) (100 mL/Hr) IV Continuous <Continuous>  diVALproex  milliGRAM(s) Oral two times a day  levothyroxine 150 MICROGram(s) Oral daily  QUEtiapine 200 milliGRAM(s) Oral two times a day  QUEtiapine 50 milliGRAM(s) Oral two times a day  saccharomyces boulardii 250 milliGRAM(s) Oral two times a day                        8.5    6.73  )-----------( 326      ( 23 Jul 2019 09:49 )             28.1     07-23    139  |  105  |  8.0  ----------------------------<  85  3.8   |  24.0  |  0.84    Ca    8.6      23 Jul 2019 09:49        PT/INR - ( 23 Jul 2019 09:49 )   PT: 18.4 sec;   INR: 1.58 ratio

## 2019-07-24 NOTE — PROGRESS NOTE ADULT - SUBJECTIVE AND OBJECTIVE BOX
SAMIRA NUGENT    [id number]    49y      Male    Patient is a 49y old  Male who presents with a chief complaint of Anemia and found to have a mediastinal mass (24 Jul 2019 07:51)      INTERVAL HPI/OVERNIGHT EVENTS:    As per nursing staff he has no fever any more, he was not having any SOB or cough, patient not speaking at his baseline      REVIEW OF SYSTEMS:    Unable to get much info      Vital Signs Last 24 Hrs  T(C): 36.6 (24 Jul 2019 13:09), Max: 36.7 (23 Jul 2019 23:41)  T(F): 97.8 (24 Jul 2019 13:09), Max: 98 (23 Jul 2019 23:41)  HR: 90 (24 Jul 2019 13:09) (68 - 94)  BP: 110/70 (24 Jul 2019 13:09) (87/57 - 110/70)  BP(mean): --  RR: 18 (24 Jul 2019 13:09) (14 - 18)  SpO2: 98% (24 Jul 2019 13:09) (95% - 98%)    PHYSICAL EXAM:    GENERAL: Elderly male looking   HEENT: PERRL, +EOMI  NECK: soft, Supple, No JVD,   CHEST/LUNG: Clear to auscultate bilaterally; No wheezing  HEART: S1S2+, Regular rate and rhythm; No murmurs, rubs, or gallops  ABDOMEN: Soft, Nontender, Nondistended; Bowel sounds present  EXTREMITIES:  2+ Peripheral Pulses, No clubbing, cyanosis, or edema  SKIN: No rashes or lesions  NEURO: AAOX3, no focal deficits, no motor r sensory loss  PSYCH: normal mood      LABS:                        8.5    6.73  )-----------( 326      ( 23 Jul 2019 09:49 )             28.1     07-23    139  |  105  |  8.0  ----------------------------<  85  3.8   |  24.0  |  0.84    Ca    8.6      23 Jul 2019 09:49      PT/INR - ( 23 Jul 2019 09:49 )   PT: 18.4 sec;   INR: 1.58 ratio                 I&O's Summary    23 Jul 2019 07:01  -  24 Jul 2019 07:00  --------------------------------------------------------  IN: 1300 mL / OUT: 0 mL / NET: 1300 mL    24 Jul 2019 07:01  -  24 Jul 2019 14:53  --------------------------------------------------------  IN: 400 mL / OUT: 0 mL / NET: 400 mL        MEDICATIONS  (STANDING):  dextrose 5% + sodium chloride 0.9%. 1000 milliLiter(s) (100 mL/Hr) IV Continuous <Continuous>  diVALproex  milliGRAM(s) Oral two times a day  levothyroxine 150 MICROGram(s) Oral daily  QUEtiapine 200 milliGRAM(s) Oral two times a day  QUEtiapine 50 milliGRAM(s) Oral two times a day  saccharomyces boulardii 250 milliGRAM(s) Oral two times a day    MEDICATIONS  (PRN):  acetaminophen   Tablet .. 650 milliGRAM(s) Oral every 6 hours PRN Temp greater or equal to 38C (100.4F) SAMIRA NUGENT    [id number]    49y      Male    Patient is a 49y old  Male who presents with a chief complaint of Anemia and found to have a mediastinal mass (24 Jul 2019 07:51)      INTERVAL HPI/OVERNIGHT EVENTS:    As per nursing staff he has no fever any more, he is s/p core biopsy of mediastinal mass,  he was not having any SOB or cough, patient not speaking at his baseline      REVIEW OF SYSTEMS:    Unable to get much info      Vital Signs Last 24 Hrs  T(C): 36.6 (24 Jul 2019 13:09), Max: 36.7 (23 Jul 2019 23:41)  T(F): 97.8 (24 Jul 2019 13:09), Max: 98 (23 Jul 2019 23:41)  HR: 90 (24 Jul 2019 13:09) (68 - 94)  BP: 110/70 (24 Jul 2019 13:09) (87/57 - 110/70)  RR: 18 (24 Jul 2019 13:09) (14 - 18)  SpO2: 98% (24 Jul 2019 13:09) (95% - 98%)    PHYSICAL EXAM:    GENERAL: Middle age male looking comfortable   HEENT: atraumatic   NECK: soft, Supple, No JVD,   CHEST/LUNG: Decrease air entry bilaterally; No wheezing  HEART: S1S2+, Regular rate and rhythm; No murmurs  ABDOMEN: Soft, Nontender, Nondistended; Bowel sounds present  EXTREMITIES:  1+ Peripheral Pulses, No edema  SKIN: No rashes or lesions  NEURO: Not participating      LABS:                        8.5    6.73  )-----------( 326      ( 23 Jul 2019 09:49 )             28.1     07-23    139  |  105  |  8.0  ----------------------------<  85  3.8   |  24.0  |  0.84    Ca    8.6      23 Jul 2019 09:49      PT/INR - ( 23 Jul 2019 09:49 )   PT: 18.4 sec;   INR: 1.58 ratio                 I&O's Summary    23 Jul 2019 07:01  -  24 Jul 2019 07:00  --------------------------------------------------------  IN: 1300 mL / OUT: 0 mL / NET: 1300 mL    24 Jul 2019 07:01  -  24 Jul 2019 14:53  --------------------------------------------------------  IN: 400 mL / OUT: 0 mL / NET: 400 mL        MEDICATIONS  (STANDING):  dextrose 5% + sodium chloride 0.9%. 1000 milliLiter(s) (100 mL/Hr) IV Continuous <Continuous>  diVALproex  milliGRAM(s) Oral two times a day  levothyroxine 150 MICROGram(s) Oral daily  QUEtiapine 200 milliGRAM(s) Oral two times a day  QUEtiapine 50 milliGRAM(s) Oral two times a day  saccharomyces boulardii 250 milliGRAM(s) Oral two times a day    MEDICATIONS  (PRN):  acetaminophen   Tablet .. 650 milliGRAM(s) Oral every 6 hours PRN Temp greater or equal to 38C (100.4F)

## 2019-07-24 NOTE — PROGRESS NOTE ADULT - ASSESSMENT
Pt is a 48 yo M presenting from [facility] after having a drop in Hgb and lethargy/weakness. PMH Schizoaffective disorder, hx of seizures, anemia, bilateral hearing loss, hypothyroidism.    Anterior mediastinal mass: thoracic surgery consulted, hemodynamically stable, he had borderline hypotension earlier but now improved after IVF, monitored bed with , no arrhythmias d/c cardiac monitor, TTE done  showed Normal global left ventricular systolic function, Left ventricular ejection fraction, by visual estimation, is 60 to 65%, Moderately enlarged right ventricle, Moderately reduced RV systolic function, There appears to be compresion of LA from an extracardiac structure, There is increased velocity across PA, probably related to compression of PA from an extra-cardiac structure. Pulmonic stenosis is unlikely since it was not present on his prior echos, estimated pulmonary artery systolic pressure is 67.0 mmHg assuming a right atrial pressure of 3 mmHg, which is consistent with severe pulmonary hypertension, Small pericardial effusion, CT report shows Large anterior mediastinal mass measuring 14.6 x 8.3 cm with mass effect upon the main pulmonary artery trunk which is narrowed, with possible lingular nodular masses measure up to 3 cm presumably related to metastatic lesions, Thoracic aorta normal caliber without aneurysm or dissection, Large anterior mediastinal mass measuring 14.6 x 8.3 cm with mass effect, upon the main pulmonary artery trunk which is narrowed. No evidence of pulmonary embolism. This mass could be related to underlying thymic mass,  A couple of lingular nodular masses measure up to 3 cm presumably related to metastatic lesions, as per CT surgery patient might need an MRI, discussed with IR patient is going for the biopsy today, will keep him NPO,  as per Radiologist will further decided if patient need MRI after the biosy, patient is also noted to have elevated Alpha Feto protein, Oncology consulted appreciated, US testicles done showed No evidence of testicular mass, No evidence of testicular torsion, mild right hydrocele, Mild left-sided varicocele.       Anemia: no signs of active GI bleeding: check iron panel, B12, folate, repeat FOBT pending, Hb went down to 7  and got 1 units of PRBC transfusion, trend Hgb, will called hematology consult, could be due to Anemia of chronic disease in setting of malignancy, will monitor CBC.     Schizoaffective disorder: continue Seroquel.     History of seizures: continue Depakote    Hypothyroidism: continue synthroid, TSH normal.     Mildly elevated INR, likely from vitamin K deficiency, his LFTs are WNL, will give low dose vitamin k and will see PT.    DVT ppx: SCD's b/l. Hold pharm dvt ppx due to anemia and pending further FOBT. Pt is a 48 yo M presenting from [facility] after having a drop in Hgb and lethargy/weakness. PMH Schizoaffective disorder, hx of seizures, anemia, bilateral hearing loss, hypothyroidism.    Anterior mediastinal mass: thoracic surgery consulted, hemodynamically stable, he had borderline hypotension earlier but now improved after IVF, monitored bed with , no arrhythmias d/c cardiac monitor, TTE done  showed Normal global left ventricular systolic function, Left ventricular ejection fraction, by visual estimation, is 60 to 65%, Moderately enlarged right ventricle, Moderately reduced RV systolic function, There appears to be compresion of LA from an extracardiac structure, There is increased velocity across PA, probably related to compression of PA from an extra-cardiac structure. Pulmonic stenosis is unlikely since it was not present on his prior echos, estimated pulmonary artery systolic pressure is 67.0 mmHg assuming a right atrial pressure of 3 mmHg, which is consistent with severe pulmonary hypertension, Small pericardial effusion, CT report shows Large anterior mediastinal mass measuring 14.6 x 8.3 cm with mass effect upon the main pulmonary artery trunk which is narrowed, with possible lingular nodular masses measure up to 3 cm presumably related to metastatic lesions, Thoracic aorta normal caliber without aneurysm or dissection, Large anterior mediastinal mass measuring 14.6 x 8.3 cm with mass effect, upon the main pulmonary artery trunk which is narrowed. No evidence of pulmonary embolism. This mass could be related to underlying thymic mass,  A couple of lingular nodular masses measure up to 3 cm presumably related to metastatic lesions, as per CT surgery patient might need an MRI, patient is s/p core biopsy today,  as per Radiologist will further decided if patient need MRI after the biosy, patient is also noted to have elevated Alpha Feto protein, Oncology consulted appreciated, US testicles done showed No evidence of testicular mass, No evidence of testicular torsion, mild right hydrocele, Mild left-sided varicocele.        Anemia: no signs of active GI bleeding: check iron panel, B12, folate, repeat FOBT pending, Hb went down to 7  and got 1 units of PRBC transfusion, trend Hgb, will called hematology consult, could be due to Anemia of chronic disease in setting of malignancy, will monitor CBC.     Schizoaffective disorder: continue Seroquel.     History of seizures: continue Depakote    Hypothyroidism: continue synthroid, TSH normal.     Mildly elevated INR, likely from vitamin K deficiency, his LFTs are WNL, will give low dose vitamin k and will see PT.    DVT ppx: SCD's b/l. Hold pharm dvt ppx due to anemia and pending further FOBT.

## 2019-07-24 NOTE — PROGRESS NOTE ADULT - PROBLEM SELECTOR PLAN 1
no new recommendations  advise return to [facility] post biopsy with medical plan developed in conjunction with primary team at [place] and family

## 2019-07-24 NOTE — PROGRESS NOTE ADULT - SUBJECTIVE AND OBJECTIVE BOX
Patient seen and examined. selectively mute.  nodded yes when asked if he was ok.      T(C): 36.4 (07-24-19 @ 15:24)  T(F): 97.6 (07-24-19 @ 15:24)  HR: 78 (07-24-19 @ 15:24)  BP: 99/51 (07-24-19 @ 15:24)  BP(mean): --  ABP: --  ABP(mean): --  RR: 18 (07-24-19 @ 15:24)  SpO2: 98% (07-24-19 @ 15:24)  Wt(kg): --  CVP(mm Hg): --  CI: --  PA: --  PA(mean): --  PA(direct): --  SVRI: --      Physical Exam:  Gen: A&Ox2  Pulm:  CTA b/l, no r/r/w  CV:  S1S2, RRR, no m/r/g  Abd: +BS distended (brother at bedside says this is baseline)  Ext:  +DP b/l, no c/c/e  Incision:  dressing c/d/i no sq air noted or bruising     I&O's Detail    23 Jul 2019 07:01  -  24 Jul 2019 07:00  --------------------------------------------------------  IN:    dextrose 5% + sodium chloride 0.9%.: 1300 mL  Total IN: 1300 mL    OUT:  Total OUT: 0 mL    Total NET: 1300 mL      24 Jul 2019 07:01  -  24 Jul 2019 17:22  --------------------------------------------------------  IN:    dextrose 5% + sodium chloride 0.9%.: 300 mL    lactated ringers.: 100 mL  Total IN: 400 mL    OUT:  Total OUT: 0 mL    Total NET: 400 mL                              9.7    6.19  )-----------( 275      ( 24 Jul 2019 15:53 )             34.0   07-24    140  |  105  |  9.0  ----------------------------<  77  3.7   |  22.0  |  0.81    Ca    9.4      24 Jul 2019 15:53    aPTT: x    ; PT: 18.1 sec; INR: 1.55 ratio  07-24-19 @ 15:53         CAPILLARY BLOOD GLUCOSE            Medications:  acetaminophen   Tablet .. 650 milliGRAM(s) Oral every 6 hours PRN  dextrose 5% + sodium chloride 0.9%. 1000 milliLiter(s) IV Continuous <Continuous>  diVALproex  milliGRAM(s) Oral two times a day  levothyroxine 150 MICROGram(s) Oral daily  QUEtiapine 200 milliGRAM(s) Oral two times a day  QUEtiapine 50 milliGRAM(s) Oral two times a day  saccharomyces boulardii 250 milliGRAM(s) Oral two times a day      CXR: pending    Assessment:  Pt is a 49y year old Male  s/p Biopsy, mediastinum, with CT guidance      Patent currently stable, NAD.    Plan:

## 2019-07-25 ENCOUNTER — TRANSCRIPTION ENCOUNTER (OUTPATIENT)
Age: 49
End: 2019-07-25

## 2019-07-25 VITALS
TEMPERATURE: 98 F | OXYGEN SATURATION: 95 % | HEART RATE: 92 BPM | RESPIRATION RATE: 19 BRPM | SYSTOLIC BLOOD PRESSURE: 103 MMHG | DIASTOLIC BLOOD PRESSURE: 70 MMHG

## 2019-07-25 LAB
ANION GAP SERPL CALC-SCNC: 12 MMOL/L — SIGNIFICANT CHANGE UP (ref 5–17)
BUN SERPL-MCNC: 11 MG/DL — SIGNIFICANT CHANGE UP (ref 8–20)
CALCIUM SERPL-MCNC: 8.7 MG/DL — SIGNIFICANT CHANGE UP (ref 8.6–10.2)
CHLORIDE SERPL-SCNC: 107 MMOL/L — SIGNIFICANT CHANGE UP (ref 98–107)
CO2 SERPL-SCNC: 23 MMOL/L — SIGNIFICANT CHANGE UP (ref 22–29)
CREAT SERPL-MCNC: 0.74 MG/DL — SIGNIFICANT CHANGE UP (ref 0.5–1.3)
CULTURE RESULTS: SIGNIFICANT CHANGE UP
CULTURE RESULTS: SIGNIFICANT CHANGE UP
GLUCOSE SERPL-MCNC: 101 MG/DL — SIGNIFICANT CHANGE UP (ref 70–115)
HCT VFR BLD CALC: 29.9 % — LOW (ref 39–50)
HGB BLD-MCNC: 9 G/DL — LOW (ref 13–17)
INR BLD: 1.49 RATIO — HIGH (ref 0.88–1.16)
MCHC RBC-ENTMCNC: 25.9 PG — LOW (ref 27–34)
MCHC RBC-ENTMCNC: 30.1 GM/DL — LOW (ref 32–36)
MCV RBC AUTO: 85.9 FL — SIGNIFICANT CHANGE UP (ref 80–100)
PLATELET # BLD AUTO: 309 K/UL — SIGNIFICANT CHANGE UP (ref 150–400)
POTASSIUM SERPL-MCNC: 3.8 MMOL/L — SIGNIFICANT CHANGE UP (ref 3.5–5.3)
POTASSIUM SERPL-SCNC: 3.8 MMOL/L — SIGNIFICANT CHANGE UP (ref 3.5–5.3)
PROTHROM AB SERPL-ACNC: 17.3 SEC — HIGH (ref 10–12.9)
RBC # BLD: 3.48 M/UL — LOW (ref 4.2–5.8)
RBC # FLD: 15.9 % — HIGH (ref 10.3–14.5)
SODIUM SERPL-SCNC: 142 MMOL/L — SIGNIFICANT CHANGE UP (ref 135–145)
SPECIMEN SOURCE: SIGNIFICANT CHANGE UP
SPECIMEN SOURCE: SIGNIFICANT CHANGE UP
WBC # BLD: 6.69 K/UL — SIGNIFICANT CHANGE UP (ref 3.8–10.5)
WBC # FLD AUTO: 6.69 K/UL — SIGNIFICANT CHANGE UP (ref 3.8–10.5)

## 2019-07-25 PROCEDURE — 74174 CTA ABD&PLVS W/CONTRAST: CPT

## 2019-07-25 PROCEDURE — 71275 CT ANGIOGRAPHY CHEST: CPT

## 2019-07-25 PROCEDURE — 88305 TISSUE EXAM BY PATHOLOGIST: CPT

## 2019-07-25 PROCEDURE — 83605 ASSAY OF LACTIC ACID: CPT

## 2019-07-25 PROCEDURE — 85027 COMPLETE CBC AUTOMATED: CPT

## 2019-07-25 PROCEDURE — 71045 X-RAY EXAM CHEST 1 VIEW: CPT

## 2019-07-25 PROCEDURE — 77012 CT SCAN FOR NEEDLE BIOPSY: CPT

## 2019-07-25 PROCEDURE — 76870 US EXAM SCROTUM: CPT

## 2019-07-25 PROCEDURE — 84704 HCG FREE BETACHAIN TEST: CPT

## 2019-07-25 PROCEDURE — 82746 ASSAY OF FOLIC ACID SERUM: CPT

## 2019-07-25 PROCEDURE — 84443 ASSAY THYROID STIM HORMONE: CPT

## 2019-07-25 PROCEDURE — 87040 BLOOD CULTURE FOR BACTERIA: CPT

## 2019-07-25 PROCEDURE — 36415 COLL VENOUS BLD VENIPUNCTURE: CPT

## 2019-07-25 PROCEDURE — 99285 EMERGENCY DEPT VISIT HI MDM: CPT | Mod: 25

## 2019-07-25 PROCEDURE — 82272 OCCULT BLD FECES 1-3 TESTS: CPT

## 2019-07-25 PROCEDURE — 81001 URINALYSIS AUTO W/SCOPE: CPT

## 2019-07-25 PROCEDURE — 82607 VITAMIN B-12: CPT

## 2019-07-25 PROCEDURE — 82728 ASSAY OF FERRITIN: CPT

## 2019-07-25 PROCEDURE — 88333 PATH CONSLTJ SURG CYTO XM 1: CPT

## 2019-07-25 PROCEDURE — 93306 TTE W/DOPPLER COMPLETE: CPT

## 2019-07-25 PROCEDURE — 85730 THROMBOPLASTIN TIME PARTIAL: CPT

## 2019-07-25 PROCEDURE — 86850 RBC ANTIBODY SCREEN: CPT

## 2019-07-25 PROCEDURE — 96374 THER/PROPH/DIAG INJ IV PUSH: CPT | Mod: XU

## 2019-07-25 PROCEDURE — 87086 URINE CULTURE/COLONY COUNT: CPT

## 2019-07-25 PROCEDURE — 84466 ASSAY OF TRANSFERRIN: CPT

## 2019-07-25 PROCEDURE — 86041 ACETYLCHOLN RCPTR BNDNG ANTB: CPT

## 2019-07-25 PROCEDURE — 80048 BASIC METABOLIC PNL TOTAL CA: CPT

## 2019-07-25 PROCEDURE — 86901 BLOOD TYPING SEROLOGIC RH(D): CPT

## 2019-07-25 PROCEDURE — 86923 COMPATIBILITY TEST ELECTRIC: CPT

## 2019-07-25 PROCEDURE — 97163 PT EVAL HIGH COMPLEX 45 MIN: CPT

## 2019-07-25 PROCEDURE — 36430 TRANSFUSION BLD/BLD COMPNT: CPT

## 2019-07-25 PROCEDURE — 83550 IRON BINDING TEST: CPT

## 2019-07-25 PROCEDURE — P9016: CPT

## 2019-07-25 PROCEDURE — 83540 ASSAY OF IRON: CPT

## 2019-07-25 PROCEDURE — 83615 LACTATE (LD) (LDH) ENZYME: CPT

## 2019-07-25 PROCEDURE — 86900 BLOOD TYPING SEROLOGIC ABO: CPT

## 2019-07-25 PROCEDURE — 80053 COMPREHEN METABOLIC PANEL: CPT

## 2019-07-25 PROCEDURE — 93005 ELECTROCARDIOGRAM TRACING: CPT

## 2019-07-25 PROCEDURE — 84484 ASSAY OF TROPONIN QUANT: CPT

## 2019-07-25 PROCEDURE — 83735 ASSAY OF MAGNESIUM: CPT

## 2019-07-25 PROCEDURE — 82270 OCCULT BLOOD FECES: CPT

## 2019-07-25 PROCEDURE — 99239 HOSP IP/OBS DSCHRG MGMT >30: CPT

## 2019-07-25 PROCEDURE — 85610 PROTHROMBIN TIME: CPT

## 2019-07-25 PROCEDURE — 82105 ALPHA-FETOPROTEIN SERUM: CPT

## 2019-07-25 RX ADMIN — Medication 250 MILLIGRAM(S): at 05:58

## 2019-07-25 RX ADMIN — SODIUM CHLORIDE 100 MILLILITER(S): 9 INJECTION, SOLUTION INTRAVENOUS at 05:58

## 2019-07-25 RX ADMIN — DIVALPROEX SODIUM 750 MILLIGRAM(S): 500 TABLET, DELAYED RELEASE ORAL at 05:58

## 2019-07-25 RX ADMIN — Medication 150 MICROGRAM(S): at 05:58

## 2019-07-25 RX ADMIN — QUETIAPINE FUMARATE 200 MILLIGRAM(S): 200 TABLET, FILM COATED ORAL at 05:58

## 2019-07-25 RX ADMIN — QUETIAPINE FUMARATE 50 MILLIGRAM(S): 200 TABLET, FILM COATED ORAL at 05:58

## 2019-07-25 NOTE — PROGRESS NOTE ADULT - REASON FOR ADMISSION
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

## 2019-07-25 NOTE — DISCHARGE NOTE NURSING/CASE MANAGEMENT/SOCIAL WORK - NSDCDPATPORTLINK_GEN_ALL_CORE
You can access the [facility] Patient Portal, offered by [facility], by registering with the following website: [email or web address]

## 2019-07-25 NOTE — PROGRESS NOTE ADULT - PROVIDER SPECIALTY LIST ADULT
Hospitalist
Hospitalist
Intervent Radiology
Psychiatry
Thoracic Surgery
Heme/Onc
Hospitalist
Hospitalist

## 2019-07-25 NOTE — PROGRESS NOTE ADULT - SUBJECTIVE AND OBJECTIVE BOX
thoracic surgery note appreciated S/P biopsy patient is unchanged MUTE nods head briefly to questions In no distress  Vital Signs Last 24 Hrs  T(C): 36.4 (24 Jul 2019 23:23), Max: 36.6 (24 Jul 2019 13:09)  T(F): 97.5 (24 Jul 2019 23:23), Max: 97.8 (24 Jul 2019 13:09)  HR: 80 (24 Jul 2019 23:23) (78 - 94)  BP: 108/75 (24 Jul 2019 23:23) (90/58 - 110/70)  BP(mean): --  RR: 18 (24 Jul 2019 23:23) (14 - 18)  SpO2: 97% (24 Jul 2019 23:23) (95% - 98%)                        9.7    6.19  )-----------( 275      ( 24 Jul 2019 15:53 )             34.0     07-24    140  |  105  |  9.0  ----------------------------<  77  3.7   |  22.0  |  0.81    Ca    9.4      24 Jul 2019 15:53        PT/INR - ( 24 Jul 2019 15:53 )   PT: 18.1 sec;   INR: 1.55 ratio    MEDICATIONS  (STANDING):  dextrose 5% + sodium chloride 0.9%. 1000 milliLiter(s) (100 mL/Hr) IV Continuous <Continuous>  diVALproex  milliGRAM(s) Oral two times a day  levothyroxine 150 MICROGram(s) Oral daily  QUEtiapine 200 milliGRAM(s) Oral two times a day  QUEtiapine 50 milliGRAM(s) Oral two times a day  saccharomyces boulardii 250 milliGRAM(s) Oral two times a day

## 2019-07-25 NOTE — DISCHARGE NOTE PROVIDER - NSDCCPCAREPLAN_GEN_ALL_CORE_FT
PRINCIPAL DISCHARGE DIAGNOSIS  Diagnosis: Symptomatic anemia  Assessment and Plan of Treatment:       SECONDARY DISCHARGE DIAGNOSES  Diagnosis: Mediastinal mass  Assessment and Plan of Treatment:

## 2019-07-25 NOTE — PROGRESS NOTE ADULT - ASSESSMENT
schizoaffective disorder - chronic condition no new recommendations  [facility]  Pathology report can be followed up by them for further treatment options if no current acute issues

## 2019-07-25 NOTE — DISCHARGE NOTE PROVIDER - NSDCFUADDAPPT_GEN_ALL_CORE_FT
please call Dr Portillo's and Dr Emery's Office and get an appointment in 1 week.   they will discuss with you the result of biopsy and further treatment plan.

## 2019-07-25 NOTE — DISCHARGE NOTE PROVIDER - PROVIDER TOKENS
PROVIDER:[TOKEN:[[id number]]],PROVIDER:[TOKEN:[[id number]]],FREE:[LAST:[[facility]],PHONE:[(   )    -],FAX:[(   )    -]]

## 2019-07-25 NOTE — DISCHARGE NOTE PROVIDER - CARE PROVIDERS DIRECT ADDRESSES
,[email or web address],DirectAddress_Unknown,DirectAddress_Unknown

## 2019-07-25 NOTE — DISCHARGE NOTE PROVIDER - CARE PROVIDER_API CALL
Armen Portillo)  Surgery; Thoracic Surgery  [address] [place] [address]  Phone: [phone]  Fax: [phone]  Follow Up Time:     Hugo Emery)  Hematology  [address] [facility] [address]  [place] [address]  Phone: [phone]  Fax: [phone]  Follow Up Time:     [facility],   Phone: (   )    -  Fax: (   )    -  Follow Up Time:

## 2019-07-25 NOTE — DISCHARGE NOTE PROVIDER - HOSPITAL COURSE
Pt is a 50 yo M presenting from [facility] after having a drop in Hgb and lethargy/weakness. PMH Schizoaffective disorder, hx of seizures, anemia, bilateral hearing loss, hypothyroidism, he admitted under impression and workup for anemia and Anterior mediastinal mass: thoracic surgery consulted, hemodynamically stable, he had borderline hypotension earlier improved after IVF, admitted on monitored bed with , no arrhythmias d/c cardiac monitor, TTE done  showed Normal global left ventricular systolic function, Left ventricular ejection fraction, by visual estimation, is 60 to 65%, Moderately enlarged right ventricle, Moderately reduced RV systolic function, There appears to be compresion of LA from an extracardiac structure, There is increased velocity across PA, probably related to compression of PA from an extra-cardiac structure. Pulmonic stenosis is unlikely since it was not present on his prior echos, estimated pulmonary artery systolic pressure is 67.0 mmHg assuming a right atrial pressure of 3 mmHg, which is consistent with severe pulmonary hypertension, Small pericardial effusion, CT report shows Large anterior mediastinal mass measuring 14.6 x 8.3 cm with mass effect upon the main pulmonary artery trunk which is narrowed, with possible lingular nodular masses measure up to 3 cm presumably related to metastatic lesions, Thoracic aorta normal caliber without aneurysm or dissection, Large anterior mediastinal mass measuring 14.6 x 8.3 cm with mass effect, upon the main pulmonary artery trunk which is narrowed. No evidence of pulmonary embolism. This mass could be related to underlying thymic mass,  A couple of lingular nodular masses measure up to 3 cm presumably related to metastatic lesions, as per CT surgery patient might need an MRI, patient is s/p core biopsy today,  as per Radiologist will further decided if patient need MRI after the biosy, patient is also noted to have elevated Alpha Feto protein, Oncology consulted appreciated, US testicles done showed No evidence of testicular mass, No evidence of testicular torsion, mild right hydrocele, Mild left-sided varicocele, spoke with CT surgery and not recommending any MRI any more, patient will be followed by CT surgery as well as Oncology as out patient once the biopsy results will be back.     for patient anemia, there was no signs of active GI bleeding, checked iron panel showed low normal levels, B12 and folate WNL, repeat FOBT negative, Hb went down to 7  and got 1 units of PRBC transfusion, Hb remained stable, current Hb is 9.5, his Anemia is likely related to chronic disease in setting of malignancy.     he was continued with his medication for Schizoaffective disorder, he was on Seroquel, depakote for History of seizures, levothyroxine for Hypothyroidism, his TSH is with in normal level.     patient was noted to have Mildly elevated INR, could be from some vitamin K deficiency, his LFTs are WNL, he has no bleeding, gave him small dose of vitamin k, no change.     patient is doing ok, he did well with PT, he is being discharged back to [facility], I spoke with Dr Beavers and he accepted patient under his service.         Vital Signs Last 24 Hrs    T(C): 36.5 (25 Jul 2019 08:29), Max: 36.6 (24 Jul 2019 13:09)    T(F): 97.7 (25 Jul 2019 08:29), Max: 97.8 (24 Jul 2019 13:09)    HR: 93 (25 Jul 2019 08:29) (78 - 93)    BP: 101/70 (25 Jul 2019 08:29) (97/66 - 110/70)    RR: 19 (25 Jul 2019 08:29) (15 - 19)    SpO2: 99% (25 Jul 2019 08:29) (97% - 99%)        PHYSICAL EXAM:        GENERAL: Middle age male looking comfortable     HEENT: atraumatic     NECK: soft, Supple, No JVD,     CHEST/LUNG: Decrease air entry bilaterally; No wheezing    HEART: S1S2+, Regular rate and rhythm; No murmurs    ABDOMEN: Soft, Nontender, Nondistended; Bowel sounds present    EXTREMITIES:  1+ Peripheral Pulses, No edema    SKIN: No rashes or lesions    NEURO: Not participating        Total time spent 35 minutes.

## 2019-07-26 LAB
ACRM MODULATING ANTIBODY: 0 NMOL/L — SIGNIFICANT CHANGE UP
CULTURE RESULTS: SIGNIFICANT CHANGE UP
SPECIMEN SOURCE: SIGNIFICANT CHANGE UP

## 2019-08-05 LAB — SURGICAL PATHOLOGY STUDY: SIGNIFICANT CHANGE UP

## 2019-08-19 ENCOUNTER — APPOINTMENT (OUTPATIENT)
Dept: THORACIC SURGERY | Facility: CLINIC | Age: 49
End: 2019-08-19
Payer: MEDICAID

## 2019-08-19 VITALS
HEIGHT: 68 IN | OXYGEN SATURATION: 96 % | WEIGHT: 120 LBS | RESPIRATION RATE: 16 BRPM | DIASTOLIC BLOOD PRESSURE: 67 MMHG | HEART RATE: 106 BPM | BODY MASS INDEX: 18.19 KG/M2 | SYSTOLIC BLOOD PRESSURE: 100 MMHG

## 2019-08-19 DIAGNOSIS — Z86.59 PERSONAL HISTORY OF OTHER MENTAL AND BEHAVIORAL DISORDERS: ICD-10-CM

## 2019-08-19 DIAGNOSIS — Z86.2 PERSONAL HISTORY OF DISEASES OF THE BLOOD AND BLOOD-FORMING ORGANS AND CERTAIN DISORDERS INVOLVING THE IMMUNE MECHANISM: ICD-10-CM

## 2019-08-19 DIAGNOSIS — R91.8 OTHER NONSPECIFIC ABNORMAL FINDING OF LUNG FIELD: ICD-10-CM

## 2019-08-19 PROCEDURE — 99214 OFFICE O/P EST MOD 30 MIN: CPT

## 2019-08-19 RX ORDER — LEVOTHYROXINE SODIUM 150 UG/1
150 TABLET ORAL
Refills: 0 | Status: ACTIVE | COMMUNITY

## 2019-08-19 RX ORDER — QUETIAPINE 400 MG/1
TABLET, FILM COATED ORAL
Refills: 0 | Status: ACTIVE | COMMUNITY

## 2019-08-19 RX ORDER — PANTOPRAZOLE SODIUM 40 MG/1
40 TABLET, DELAYED RELEASE ORAL
Refills: 0 | Status: ACTIVE | COMMUNITY

## 2019-08-19 RX ORDER — UBIDECARENONE/VIT E ACET 100MG-5
50 MCG CAPSULE ORAL
Refills: 0 | Status: ACTIVE | COMMUNITY

## 2019-08-19 RX ORDER — DIVALPROEX SODIUM 500 MG/1
TABLET, DELAYED RELEASE ORAL
Refills: 0 | Status: ACTIVE | COMMUNITY

## 2019-08-19 RX ORDER — HALOPERIDOL 1 MG/1
1 TABLET ORAL
Refills: 0 | Status: ACTIVE | COMMUNITY

## 2019-08-19 NOTE — HISTORY OF PRESENT ILLNESS
[FreeTextEntry1] : \ute Almeida was in the office today for a followup visit. He was recently hospitalized and found to have an anterior mediastinal mass. 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 Through his brother they describe fatigue and shortness of breath which has been progressive. His appetite is poor as well.

## 2019-08-19 NOTE — ASSESSMENT
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 He is scheduled to undergo treatment in the near future at an outside institution. He can see me on a p.r.n. basis from this point forward  additional problems arise.\par \[facility] Thank you for allowing me to participate in the care of your patient\par \[facility] Armen Portillo MD\[facility] Department of Cardiovascular and Thoracic Surgery\[facility] \[facility] Prabhjot and Aydee Rodríguez\[facility] [employer] [facility]

## 2019-08-19 NOTE — PHYSICAL EXAM
[Auscultation Breath Sounds / Voice Sounds] : lungs were clear to auscultation bilaterally [Heart Sounds] : normal S1 and S2 [Heart Rate And Rhythm] : heart rate was normal and rhythm regular [Murmurs] : no murmurs [Heart Sounds Gallop] : no gallops [Heart Sounds Pericardial Friction Rub] : no pericardial rub [Examination Of The Chest] : the chest was normal in appearance [Chest Visual Inspection Thoracic Asymmetry] : no chest asymmetry [Diminished Respiratory Excursion] : normal chest expansion [Abdomen Soft] : soft [Bowel Sounds] : normal bowel sounds [Abdomen Tenderness] : non-tender [Abdomen Mass (___ Cm)] : no abdominal mass palpated [] : no hepato-splenomegaly [Cervical Lymph Nodes Enlarged Posterior Bilaterally] : posterior cervical [No CVA Tenderness] : no ~M costovertebral angle tenderness [Cervical Lymph Nodes Enlarged Anterior Bilaterally] : anterior cervical [No Spinal Tenderness] : no spinal tenderness [No Focal Deficits] : no focal deficits [Abnormal Walk] : normal gait [Oriented To Time, Place, And Person] : oriented to person, place, and time [Impaired Insight] : insight and judgment were intact [Affect] : the affect was normal

## 2019-08-19 NOTE — CONSULT LETTER
[Armen Portillo MD] : Armen Portillo MD [Dear  ___] : Dear  [unfilled], [Department of Cardiovascular and Thoracic Surgery] : Department of Cardiovascular and Thoracic Surgery [] :  [[facility]] : [facility]

## 2019-08-19 NOTE — REVIEW OF SYSTEMS
[Feeling Poorly] : feeling poorly [Feeling Tired] : feeling tired [de-identified] : He has psychiatric issues at baseline

## 2019-09-24 NOTE — ED BEHAVIORAL HEALTH ASSESSMENT NOTE - SECONDARY DX1
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

## 2019-11-22 ENCOUNTER — OUTPATIENT (OUTPATIENT)
Dept: OUTPATIENT SERVICES | Facility: HOSPITAL | Age: 49
LOS: 1 days | End: 2019-11-22
Payer: COMMERCIAL

## 2019-11-22 DIAGNOSIS — F20.9 SCHIZOPHRENIA, UNSPECIFIED: ICD-10-CM

## 2019-11-22 PROCEDURE — 36415 COLL VENOUS BLD VENIPUNCTURE: CPT

## 2019-11-22 PROCEDURE — 85027 COMPLETE CBC AUTOMATED: CPT

## 2020-01-10 ENCOUNTER — OUTPATIENT (OUTPATIENT)
Dept: OUTPATIENT SERVICES | Facility: HOSPITAL | Age: 50
LOS: 1 days | End: 2020-01-10
Payer: COMMERCIAL

## 2020-01-10 DIAGNOSIS — R13.19 OTHER DYSPHAGIA: ICD-10-CM

## 2020-01-10 PROCEDURE — 74230 X-RAY XM SWLNG FUNCJ C+: CPT | Mod: 26

## 2020-01-10 PROCEDURE — 92611 MOTION FLUOROSCOPY/SWALLOW: CPT

## 2020-01-10 PROCEDURE — 74230 X-RAY XM SWLNG FUNCJ C+: CPT

## 2020-04-20 NOTE — ED BEHAVIORAL HEALTH ASSESSMENT NOTE - VETERAN
Have we ever prescribed this med? Yes.  If yes, what date? 03/13/2020    Last OV: 08/30/2019- UMANG Mccall    Next OV: No appointment on file-  PLAN:   Patient Instructions   1) Continue oxygen at 24/7 at 2L  2) Continue Symbicort at 160/4.5 and Spiriva. Continue rescue inhaler.  Encourage increase use of nebulizer during rudolph smoke exacerbation.   3) Encourage light conditioning   4) Add Mucinex found OTC and aerobika to help with mucus clearance  5) Continue IgG infusion every other week  6) Vaccines: Allergic to vaccines. Encourage hand washing  7) Complete smoking cessation discussed  8) Zpack on hand. Prednisone allergy.   9) Referral to Behavioral Health  10) Return in about 6 months (around 2/29/2020), or with Binu or Stefany. F/U after flu season per pt request, for if not sooner, review of symptoms.       DX: Chronic obstructive pulmonary disease, unspecified COPD type (HCC) (J44.9)    Medications:   Requested Prescriptions     Pending Prescriptions Disp Refills   • levalbuterol (XOPENEX) 1.25 MG/3ML Nebu Soln [Pharmacy Med Name: LEVALBUTEROL 1.25 MG/3 ML SOL] 216 mL 0     Sig: USE 3ML VIA NEBULIZER ROUTE EVERY 4 HOURS AS NEEDED FOR SHORTNESS OF BREATH *J44*        Unable to assess

## 2020-09-10 NOTE — PROCEDURE NOTE - PROCEDURE DATE TIME, MLM
03-Jun-2017 01:30
Additional Notes: Patient consent was obtained to proceed with the visit and recommended plan of care after discussion of all risks and benefits, including the risks of COVID-19 exposure.
Detail Level: Simple

## 2021-01-25 NOTE — DISCHARGE NOTE ADULT - NS AS DC PROVIDER CONTACT Y/N MULTI
Acute Care Visit  Santino Montiel MD      SUBJECTIVE:     Jailyn Floyd is a 26 year old female presenting for   Chief Complaint   Patient presents with   • Hip Pain     Pt has hiip pain (8/10) since this morning, nothing out of ordinary yesterday. Pt took Tylenol 500 mg at 7am and noon, pain has not gone away. She felels worse, leg feels like it wants to buckle.     • Office Visit     - woke up this morning with severe hip pain on R side  - felt like it \"crunched weird\" a couple times when waking up  - no fall or inciting trauma   - has tried tylenol 500mg every 4 hours  - this happens in other parts of her body (shoulders, knees), but those flares resolve more quickly   - over the course of the day, feels like pain has been getting worse    - hasn't had any R hip issues or injuries before  - no knee or foot pain, numbness, tingling   - is a fencer, but hasn't done any recently     - no fevers, chills, runny nose, cough, CP, SOB, nausea, vomiting, dysuria, hematuria, constipation, diarrhea, recent sex, vaginal discharge, vaginal itching    - wanted to get checked out     Review of Systems  Neg except per hpi      PAST MEDICAL HISTORY:  No past medical history on file.    MEDICATIONS:  Current Outpatient Medications   Medication Sig   • albuterol 108 (90 Base) MCG/ACT inhaler    • etonogestrel-ethinyl estradiol (NUVARING) 0.12-0.015 MG/24HR vaginal ring Place 1 each vaginally.   • Fremanezumab-vfrm (Ajovy) 225 MG/1.5ML Solution Auto-injector INJECT UNDER THE SKIN 1.5 MLS EVERY 30 DAYS   • sumatriptan (IMITREX) 100 MG tablet    • clonazePAM (KlonoPIN) 0.5 MG tablet Take 0.5 mg by mouth 2 times daily as needed for Anxiety.   • EPINEPHrine (EpiPen 2-Jarred) 0.3 MG/0.3ML auto-injector Inject 0.3 mg into the muscle 1 time as needed for Anaphylaxis.     No current facility-administered medications for this visit.        ALLERGIES:  ALLERGIES:   Allergen Reactions   • Rye   (Food) ANAPHYLAXIS   • Seasonal Other (See Comments)      Itchy eyes, sneezing, congestion       PAST SURGICAL HISTORY:  Past Surgical History:   Procedure Laterality Date   • [place] tooth extraction  12/2010       FAMILY HISTORY:  Family History   Problem Relation Age of Onset   • Hypothyroid Mother    • Cancer, Breast Mother    • Depression Father    • Anxiety disorder Father    • Patient is unaware of any medical problems Sister    • Myocardial Infarction Maternal Grandmother    • Stroke Maternal Grandmother    • Myocardial Infarction Maternal Grandfather    • Stroke Maternal Grandfather        SOCIAL HISTORY:  Social History     Tobacco Use   • Smoking status: Never Smoker   • Smokeless tobacco: Never Used   Substance Use Topics   • Alcohol use: Yes     Frequency: 2-4 times a month     Drinks per session: 1 or 2   • Drug use: Yes     Types: Marijuana     Comment: occasionally        OBJECTIVE  Vitals:    01/25/21 1532   BP: (!) 140/89   BP Location: LUE - Left upper extremity   Patient Position: Sitting   Cuff Size: Regular   Pulse: 94   Resp: 16   Temp: 98.6 °F (37 °C)   TempSrc: Temporal   LMP: 01/06/2021       Physical Exam  Constitutional:       Appearance: She is not toxic-appearing.      Comments: Limping into room, in pain   HENT:      Head: Normocephalic.      Nose: Nose normal.      Mouth/Throat:      Mouth: Mucous membranes are moist.      Pharynx: No oropharyngeal exudate or posterior oropharyngeal erythema.   Eyes:      General:         Right eye: No discharge.         Left eye: No discharge.      Conjunctiva/sclera: Conjunctivae normal.   Cardiovascular:      Rate and Rhythm: Normal rate and regular rhythm.      Pulses: Normal pulses.      Heart sounds: Normal heart sounds.   Abdominal:      General: There is no distension.      Palpations: Abdomen is soft.      Tenderness: There is no abdominal tenderness. There is no right CVA tenderness, left CVA tenderness or guarding.   Musculoskeletal:      Comments: No lumbar ttp, no paraspinal ttp  RLE:  Hip-  no deformities or swelling noted, Full ROM but slower 2/2 pain, vague anterior diffuse ttp, no bursa ttp, no erythema, discharge, rashes; 5/5 strength and normal sensation  Knee/Ankle- full ROM, strength, sensation   Neurological:      Mental Status: She is alert.       ASSESSMENT/PLAN  Jailyn Floyd is a 26 year old female presenting for   Chief Complaint   Patient presents with   • Hip Pain     Pt has hiip pain (8/10) since this morning, nothing out of ordinary yesterday. Pt took Tylenol 500 mg at 7am and noon, pain has not gone away. She felels worse, leg feels like it wants to buckle.     • Office Visit     Problem List Items Addressed This Visit        Musculoskeletal    Acute right hip pain - Primary     Unclear etiology in setting of no trauma  Recent labs ordered by PCP for chronic pain/polyarthralgias reviewed, unremarkable   Possibly 2/2 fibromyalgia flare  Less likely septic arthritis vs bursitis vs dislocation/subluxation vs osteonecrosis  Start w/motrin 600mg TID, salonpas, rest, heat, ice  Continue PT  If no improvement in a few days, consider Hip XR  BP elevation likely 2/2 pain, f/u at next visit             Follow Up: Return in about 2 months (around 3/25/2021).    Santino Montiel MD  [facility]  [address] [place] [address]  [phone]       Yes

## 2021-03-09 NOTE — H&P ADULT - NECK DETAILS
Post-care instructions were given and  reviewed with the patient in detail. Patient is to keep the biopsy site dry overnight, and then apply healing ointment daily until healed. Patient is instructed to call for any questions or problems. supple/no JVD

## 2021-06-11 NOTE — PATIENT PROFILE ADULT - FALL HARM RISK CONCLUSION
Kandy Yañez attended 3 hours of group therapy today.    7/11/2017 3:51 PM Donald Welander  
Fall with Harm Risk

## 2022-01-10 NOTE — PROGRESS NOTE ADULT - NSHPATTENDINGPLANDISCUSS_GEN_ALL_CORE
RN and Patient's brother
RN and Patient's brother
Patient, RN
RN and Patient's brother
Dr. Johnson
Thalidomide Counseling: I discussed with the patient the risks of thalidomide including but not limited to birth defects, anxiety, weakness, chest pain, dizziness, cough and severe allergy.

## 2022-03-21 NOTE — H&P ADULT - REASON FOR ADMISSION
BMI elevated.  Encouraged healthy diet and regular exercise.  Recommended at least 150 min of moderate intensity aerobic exercise per week.   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

## 2022-09-01 NOTE — H&P ADULT - PROBLEM SELECTOR PLAN 1
.Refill Request     CONFIRM preferrred pharmacy with the patient. If Mail Order Rx - Pend for 90 day refill.       Last Seen: Last Seen Department: 8/5/2022  Last Seen by PCP: Visit date not found    Last Written: 2-15-22 90 with 1     Next Appointment:   Future Appointments   Date Time Provider Rula Emmanuel   9/2/2022 10:30 AM MD TALYA Cardoza  Lavinia BACH   9/22/2022  1:45 PM MD TALYA Cardoza [facility]       Future appointment scheduled      Requested Prescriptions     Pending Prescriptions Disp Refills    levothyroxine (SYNTHROID) 200 MCG tablet 90 tablet 1     Sig: TAKE ONE TABLET BY MOUTH DAILY CT brain. Valproic acid level. TFT

## 2022-12-27 NOTE — CHART NOTE - NSCHARTNOTESELECT_GEN_ALL_CORE
Anesthesia Post Evaluation    Patient: Shu Bright    Procedure(s) Performed: Procedure(s) (LRB):   SECTION (N/A)    Final Anesthesia Type: CSE      Patient location during evaluation: floor  Patient participation: Yes- Able to Participate  Level of consciousness: awake and alert  Post-procedure vital signs: reviewed and stable  Pain management: adequate  Airway patency: patent  SUSI mitigation strategies: Use of major conduction anesthesia (spinal/epidural) or peripheral nerve block and Multimodal analgesia  PONV status at discharge: No PONV  Anesthetic complications: no      Cardiovascular status: hemodynamically stable, blood pressure returned to baseline and stable  Respiratory status: unassisted, spontaneous ventilation and room air  Hydration status: euvolemic  Follow-up not needed.          Vitals Value Taken Time   /64 22   Temp 36.4 °C (97.6 °F) 22   Pulse 90 22   Resp 18 22   SpO2 99 % 22         Event Time   Out of Recovery 12:45:00         Pain/Erendira Score: Pain Rating Prior to Med Admin: 7 (2022  5:47 AM)  Pain Rating Post Med Admin: 0 (2022  4:43 PM)        
VIR/Event Note
Event Note
Thoracic PA/Event Note

## 2023-02-03 NOTE — PATIENT PROFILE ADULT - BRADEN MOBILITY
BLADDER HEALTH    WHAT IS CONSIDERED NORMAL? The average bladder can hold about 2 cups of urine before it needs to be emptied  The normal range of voiding urine is 6 to 8 times during a 24 hour period  As we get older, our bladder capacity can get smaller and we may need to pass urine more frequently but usually not more than every 2 hours  Urine should flow easily without discomfort in a good, steady stream until the bladder is empty  No pushing or straining is necessary to empty the bladder  An urge is a signal that you feel as the bladder stretches to fill with urine  Urges can be felt even if the bladder is not full  Urges are not commands to go to the toilet, merely a signal and can be controlled  WHAT ARE GOOD BLADDER HABITS? Take your time when emptying your bladder  Don’t strain or push to empty your bladder  Make sure you empty your bladder completely each time you pass urine  Do not rush the process  Consistently ignoring the urge to go (waiting more than 4 hours between toileting) or urinating too infrequently may be convenient but not healthy for your bladder  Avoid going to the toilet “just in case” or more often than every 2 hours  It is usually not necessary to go when you feel the first urge  Try to go only when your bladder is full  Urgency and frequency of urination can be improved by retraining the bladder and spacing your fluid intake throughout the day  Practice good toilet habits  Don’t let your bladder control your life  TIPS TO MAINTAIN GOOD BLADDER HABITS  Maintain a good fluid intake  Depending on your body size and environment, drink 6 -8 cups (8 oz each) of fluid per day unless otherwise advised by your doctor  Not enough fluid creates a foul odor and dark color of the urine  Limit the amount of caffeine (coffee, cola, chocolate or tea) and citrus foods that you consume as these foods can be associated with increased sensation of urinary urgency and frequency  Limit the amount of alcohol you drink  Alcohol increases urine production and makes it difficult for the brain to coordinate bladder control  Avoid constipation by maintaining a balanced diet of dietary fiber  Cigarette smoking is also irritating to the bladder surface and is associated with bladder cancer  In addition, the coughing associated with smoking may lead to increased incontinent episodes because of the increased pressure  HOW DIET CAN AFFECT YOUR BLADDER  Although there is no particular "diet" that can cure bladder control, there are certain dietary suggestions you can use to help control the problem  There are 2 points to consider when evaluating how your habits and diet may affect your bladder:    Foods and fluids can irritate the bladder  Some foods and beverages are thought to contribute to bladder leakage and irritability  However their effect on the bladder is not completely understood and you may want to see if eliminating one or all of these items improves your bladder control  If you are unable to give them up completely, it is recommended that you use the following items in moderation:  Acidic beverages and foods (orange juice, grapefruit juice, lemonade etc)  Alcoholic beverages  Vinegar  Coffee (regular and decaf)  Tea (regular and decaf)  Caffeinated beverages  Carbonated beverages          Drinking enough and the right kinds of fluids  Many people with bladder control issues decrease their intake of liquids in hope that they will need to urinate less frequently or have less urinary leakage  You should not restrict fluids to control your bladder  While a decrease in liquid intake does result in a decrease in the volume of urine, the smaller amount of urine may be more highly concentrated  Highly concentrated, dark yellow urine is irritating to the bladder surface and may actually cause you to go to the bathroom more frequently        It also encourages the growth of bacteria, which may lead to infections resulting in incontinence  Substitutions for Bladder Irritants: water is always the best beverage choice  Grape and apple juice are thirst quenchers are good selections and are not as irritating to the bladder    Low acid fruits:  Pears, apricots, papaya, watermelon  For coffee drinkers: KAVA®, Postum®, Yuliana®, Kaffree Анна®  For tea drinkers:  non-citrus or herbal and sun brewed tea (3) slightly limited

## 2023-02-03 NOTE — ED ADULT NURSE NOTE - GASTROINTESTINAL ASSESSMENT
Right arm wrapped to protect fragile skin after normal saline infiltration. Arm looks better already less pink still some mild swelling. Loose kerlix and ace wrap used to protect arm and skin integrity. WDL

## 2023-03-03 NOTE — ED ADULT NURSE NOTE - FALL CAUSE
Pt removed BiPAP mask and refuses to let staff place it back on, stating, \"Nu uh, I can't do that thing\" and pushing it away. Pt placed on 4L NC.    Delicia Porter, RRT     slipping, stumbling. tripping

## 2023-03-07 NOTE — ED ADULT NURSE NOTE - PAIN RATING/NUMBER SCALE (0-10): REST
Consent: Written consent obtained. Risks include but not limited to bruising, beading, irregular texture, ulceration, infection, allergic reaction, scar formation, incomplete augmentation, temporary nature, procedural pain. 0

## 2023-05-16 NOTE — OCCUPATIONAL THERAPY INITIAL EVALUATION ADULT - LEVEL OF INDEPENDENCE: BED TO CHAIR, REHAB EVAL
Writer assumed care at 0700. Upset this morning. Writer found a pink small pill in pt.'s bed this morning, when writer asked pt. what was it, pt. stated \"I don't know.\" There was an empty bottle of oxycodone in pt.'s room and when pt. was asked if this pill was oxycodone, pt. got very made and started saying \"Yes and so what, it is fucking prescribed to me.\" When writer started explain to pt. that he is not allowed to take his own meds from home when he is in the hospital, pt. got very loud and told writer \"Then take it and get the fuck out of my face.\" Pill was put back in bottle and it was locked up with security.     Constantly complaining of abdominal and back pain and was frequently given IV morphine for pain. States that morphine \"does wonders\" for him.     Chest tube was connected to an atrium today by IR. Hooked to -20 suction per IR orders, immediately pus draining out after CT set up to suction.     J-tube got exchanged by IR today. An 18Fr J-tube placed in, ready for use per IR. Dietician consulted for tube feedings recs.     Writer talked to wife this morning who confirms that pt. was discharged from [facility] with the chest tube in place. Had a 's appointment last Friday and she is not sure what happened, but she went home and found that pt. has cut both his J-tube and chest tube.     Oncoming RN updated.    contact guard

## 2023-08-03 NOTE — PATIENT PROFILE ADULT. - URINARY CATHETER
Thank you for allowing us to care for you at [facility] - Emergency Room today. Thank you for your patience. You have been seen and evaluated. We reviewed the results pertinent to your visit in the emergency department today. Please read the instructions provided. If any prescriptions were sent for you, please fill it from your pharmacy and take them as instructed. Remember, your care process does not end after your visit today. Please follow-up with your doctor as advised for a follow-up check to ensure you are  improving, to see if you need any further evaluation/testing, or to evaluate for any alternate diagnoses. Please return to the emergency department if you develop any worsening or other new or concerning symptoms as these could be signs of more serious medical illness. Please take tylenol 15mg/kg upto a maximum of 500 mg every 4 to 6 hours if needed for pain or fever control. Do not exceed total dosage of 3000 mg per day    You can also take Ibuprofen 10 mg/kg upto a maximum of 800 mg every 8 hours as needed for pain or fever control. You can alternate this medicine with tylenol if needed. Please take medicine with food. Keep yourself well hydrated. Take probiotics. no

## 2023-09-25 NOTE — ED ADULT TRIAGE NOTE - WEIGHT METHOD
stated Qbrexza Pregnancy And Lactation Text: There is no available data on Qbrexza use in pregnant women.  There is no available data on Qbrexza use in lactation.

## 2023-10-04 NOTE — ED BEHAVIORAL HEALTH ASSESSMENT NOTE - NS ED BHA HOMICIDALITY PRESENT CURRENT IDEATION
(1) Stop gabapentin.  (2) Start Lyrica 25 mg three times daily.  (3) Referral to Pain Management.  (4) Discuss your shoulder pain with your Orthopedic doctor.   Unable to assess

## 2024-02-28 NOTE — PROGRESS NOTE ADULT - SUBJECTIVE AND OBJECTIVE BOX
chart reviewed and patient seen now asleep aide at bedside reports calm cooperative behaviors eating a little better Continues on IV antibiotics General Sunscreen Counseling: I recommended a broad spectrum sunscreen with a SPF of 30 or higher.  I explained that SPF 30 sunscreens block approximately 97 percent of the sun's harmful rays.  Sunscreens should be applied at least 15 minutes prior to expected sun exposure and then every 2 hours after that as long as sun exposure continues. If swimming or exercising sunscreen should be reapplied every 45 minutes to an hour after getting wet or sweating.  One ounce, or the equivalent of a shot glass full of sunscreen, is adequate to protect the skin not covered by a bathing suit. I also recommended a lip balm with a sunscreen as well. Sun protective clothing can be used in lieu of sunscreen but must be worn the entire time you are exposed to the sun's rays. Detail Level: Detailed

## 2024-04-26 NOTE — PROGRESS NOTE ADULT - SUBJECTIVE AND OBJECTIVE BOX
INTERVAL HPI/OVERNIGHT EVENTS:    CC; schizoaffective disorder, bilateral pleural effusions s/p VATS    Chart and course reviewed. No overnight events.     Vital Signs Last 24 Hrs  T(C): 36.3 (08 Jul 2017 08:43), Max: 36.9 (07 Jul 2017 16:36)  T(F): 97.4 (08 Jul 2017 08:43), Max: 98.4 (07 Jul 2017 16:36)  HR: 68 (08 Jul 2017 08:43) (68 - 76)  BP: 107/57 (08 Jul 2017 08:43) (103/65 - 107/57)  BP(mean): --  RR: 18 (08 Jul 2017 08:43) (18 - 20)  SpO2: 97% (08 Jul 2017 08:43) (97% - 98%)    PHYSICAL EXAM:    GENERAL: Not in distress, alert  CHEST/LUNG: b/l air entry  HEART: Regular   ABDOMEN: Soft, BS+  EXTREMITIES: No edema    MEDICATIONS  (STANDING):  levothyroxine 150 MICROGram(s) Oral daily  pantoprazole    Tablet 40 milliGRAM(s) Oral before breakfast  haloperidol     Tablet 5 milliGRAM(s) Oral two times a day  diVALproex  milliGRAM(s) Oral daily  enoxaparin Injectable 40 milliGRAM(s) SubCutaneous daily  diVALproex  milliGRAM(s) Oral at bedtime  zinc oxide 40%/lanolin Ointment 1 Application(s) Topical three times a day  clotrimazole/betamethasone Cream 1 Application(s) Topical two times a day  colchicine 0.6 milliGRAM(s) Oral daily  ferrous    sulfate 325 milliGRAM(s) Oral daily  ascorbic acid 500 milliGRAM(s) Oral daily    MEDICATIONS  (PRN):  acetaminophen   Tablet 650 milliGRAM(s) Oral every 6 hours PRN For Temp greater than 38.5 C (101.3 F)  artificial  tears Solution 1 Drop(s) Both EYES every 8 hours PRN Dry Eyes      Allergies    Allergy Status Unknown    Intolerances    Chicken (Other (Unknown))        LABS:                          10.3   8.6   )-----------( 270      ( 08 Jul 2017 11:06 )             31.9     07-08    141  |  103  |  33.0<H>  ----------------------------<  98  4.3   |  25.0  |  1.05    Ca    9.2      08 Jul 2017 11:06  Phos  3.7     07-08  Mg     1.9     07-08            RADIOLOGY & ADDITIONAL TESTS: (Active)   Number of days: 206        Elimination:  Continence:   Bowel: Yes  Bladder: Yes  Urinary Catheter: None   Colostomy/Ileostomy/Ileal Conduit: No       Date of Last BM: 4/26/2024    Intake/Output Summary (Last 24 hours) at 4/26/2024 1249  Last data filed at 4/26/2024 0618  Gross per 24 hour   Intake 1923.82 ml   Output --   Net 1923.82 ml     I/O last 3 completed shifts:  In: 3781.5 [P.O.:1240; I.V.:2541.5]  Out: -     Safety Concerns:     At Risk for Falls    Impairments/Disabilities:      At risk for falls    Nutrition Therapy:  Current Nutrition Therapy:   - Oral Diet:  General    Routes of Feeding: Oral  Liquids: No Restrictions  Daily Fluid Restriction: no  Last Modified Barium Swallow with Video (Video Swallowing Test): not done    Treatments at the Time of Hospital Discharge:   Respiratory Treatments: na  Oxygen Therapy:  2 L   Ventilator:    - No ventilator support    Rehab Therapies: Physical Therapy and Occupational Therapy  Weight Bearing Status/Restrictions: No weight bearing restrictions  Other Medical Equipment (for information only, NOT a DME order):  walker  Other Treatments: na    Patient's personal belongings (please select all that are sent with patient):  With patient    RN SIGNATURE:  Electronically signed by Sushila Mccabe RN on 4/26/24 at 5:15 PM EDT    CASE MANAGEMENT/SOCIAL WORK SECTION    Inpatient Status Date: 4/25/24    Readmission Risk Assessment Score:  Readmission Risk              Risk of Unplanned Readmission:  25           Discharging to Facility/ Agency   Name: Discharging to Facility/ Agency   Name: [facility]  Address:  [facility] [facility] [address], [place] [address]  Phone:  [phone]  Fax:  [phone]         / signature: Electronically signed by CLAUDE Breaux on 4/26/24 at 1:55 PM EDT    PHYSICIAN SECTION    Prognosis: Good    Condition at Discharge: Stable    Rehab Potential (if transferring to Rehab): Good    Recommended Labs  or Other Treatments After Discharge: PT OT, follow-up with urology in 2 weeks, follow-up with oncology in 1 week    Physician Certification: I certify the above information and transfer of Cezar Abarca  is necessary for the continuing treatment of the diagnosis listed and that he requires Home Care for greater 30 days.     Update Admission H&P: No change in H&P    PHYSICIAN SIGNATURE:  Electronically signed by Bart Sánchez MD on 4/26/24 at 12:49 PM EDT

## 2024-09-17 NOTE — PROGRESS NOTE ADULT - ASSESSMENT
48 y/o male with schizoaffective disorder, Pleural effusion s/p thoracentesis, anorexia, dehydration recently discharged from hospital, noted to have  Moderate pleural effusion in the left lateral region, pt is s/p IR guided thoracentesis, removed almost 1400 cc, sent back to ed as he was not eating at [facility], afebrile, xray showed moderate pleural effusion. Had a seizure episode on 5/29 2/2 refusal to take po meds. Started on iv AEDs, Now stay complicated by worsening leucocytosis. ID consulted. continues to refuse to eat. CT chest to evaluate pleural effusion awaited. would Consider thoracic surgery cpnsult if persistent/worsening effusion.  fever improving with zosyn. Spoke to sister Aydee @ [phone] at length - awit CT chest, swallow eval r/o aspiration 4 = No assist / stand by assistance

## 2025-02-12 NOTE — PROGRESS NOTE ADULT - PROBLEM/PLAN-6
Consent: The patient's consent was obtained including but not limited to risks of crusting, scabbing, blistering, scarring, darker or lighter pigmentary change, recurrence, incomplete removal and infection.
Detail Level: Detailed
Number Of Freeze-Thaw Cycles: 1 freeze-thaw cycle
Post-Care Instructions: I reviewed with the patient in detail post-care instructions. Patient is to wear sunprotection, and avoid picking at any of the treated lesions. Pt may apply Vaseline to crusted or scabbing areas.
Duration Of Freeze Thaw-Cycle (Seconds): 3
Render Note In Bullet Format When Appropriate: No
Render Post-Care Instructions In Note?: yes
DISPLAY PLAN FREE TEXT

## 2025-03-05 NOTE — BRIEF OPERATIVE NOTE - SURGICAL START DATE/TIME
breathing is unlabored without accessory muscle use, normal breath sounds 29-Jun-2017 Called [facility] to confirm the needed information for the medication and to confirm where medication is to be shipped. Savella will be delivered to patient's home. Pharmacy needs verbal order stating 1 tab behind the dosage instructions. Was given number for the pharmacist to give verbal. [facility] [phone]    Spoke with pharmacist Raz and they need new prescription sent in for Savella. New script sent electronically to [facility]

## 2025-04-30 NOTE — H&P ADULT - PROBLEM/PLAN-4
RN call to patient to follow up post-op, as pt had paracentesis on 4/29/25 with Sara Hernández CNP.      Spoke to pts daughter who is reports she just saw patient and pt is doing well.     Pt reports 0/10 pain to site.     Site and dressing is clean, dry, and intact.     NO questions or concerns at this time.  Electronically signed by Sharmaine Cartagena RN on 4/30/2025 at 11:38 AM         
DISPLAY PLAN FREE TEXT